# Patient Record
Sex: FEMALE | Race: WHITE | NOT HISPANIC OR LATINO | Employment: OTHER | URBAN - METROPOLITAN AREA
[De-identification: names, ages, dates, MRNs, and addresses within clinical notes are randomized per-mention and may not be internally consistent; named-entity substitution may affect disease eponyms.]

---

## 2023-02-25 PROBLEM — I87.2 VENOUS INSUFFICIENCY: Status: ACTIVE | Noted: 2023-02-25

## 2023-02-25 PROBLEM — E55.9 VITAMIN D DEFICIENCY: Status: ACTIVE | Noted: 2023-02-25

## 2023-02-25 PROBLEM — S92.309A METATARSAL FRACTURE: Status: ACTIVE | Noted: 2023-02-25

## 2023-02-25 PROBLEM — M25.432 SWELLING OF LEFT WRIST: Status: ACTIVE | Noted: 2023-02-25

## 2023-02-25 PROBLEM — M79.606 PAIN AND SWELLING OF LOWER EXTREMITY: Status: ACTIVE | Noted: 2023-02-25

## 2023-02-25 PROBLEM — B37.9 YEAST INFECTION: Status: ACTIVE | Noted: 2023-02-25

## 2023-02-25 PROBLEM — I87.8 CHRONIC VENOUS STASIS: Status: ACTIVE | Noted: 2023-02-25

## 2023-02-25 PROBLEM — M25.422 SWELLING OF LEFT ELBOW: Status: ACTIVE | Noted: 2023-02-25

## 2023-02-25 PROBLEM — J12.9 VIRAL PNEUMONIA: Status: ACTIVE | Noted: 2023-02-25

## 2023-02-25 PROBLEM — E66.812 CLASS 2 SEVERE OBESITY WITH SERIOUS COMORBIDITY AND BODY MASS INDEX (BMI) OF 38.0 TO 38.9 IN ADULT: Status: ACTIVE | Noted: 2023-02-25

## 2023-02-25 PROBLEM — K59.09 CHRONIC CONSTIPATION: Status: ACTIVE | Noted: 2023-02-25

## 2023-02-25 PROBLEM — H26.9 CATARACT, CORTICAL, BOTH EYES: Status: ACTIVE | Noted: 2023-02-25

## 2023-02-25 PROBLEM — I48.91 ATRIAL FIBRILLATION (MULTI): Status: ACTIVE | Noted: 2023-02-25

## 2023-02-25 PROBLEM — R13.10 DYSPHAGIA: Status: ACTIVE | Noted: 2023-02-25

## 2023-02-25 PROBLEM — M65.30 TRIGGER FINGER: Status: ACTIVE | Noted: 2023-02-25

## 2023-02-25 PROBLEM — M79.676 TOE PAIN, CHRONIC: Status: ACTIVE | Noted: 2023-02-25

## 2023-02-25 PROBLEM — H52.00 HYPEROPIA: Status: ACTIVE | Noted: 2023-02-25

## 2023-02-25 PROBLEM — R60.0 BILATERAL LOWER EXTREMITY EDEMA: Status: ACTIVE | Noted: 2023-02-25

## 2023-02-25 PROBLEM — B35.0 TINEA CAPITIS: Status: ACTIVE | Noted: 2023-02-25

## 2023-02-25 PROBLEM — H00.019 STYE: Status: ACTIVE | Noted: 2023-02-25

## 2023-02-25 PROBLEM — L85.3 XEROSIS OF SKIN: Status: ACTIVE | Noted: 2023-02-25

## 2023-02-25 PROBLEM — R94.39 ABNORMAL STRESS TEST: Status: ACTIVE | Noted: 2023-02-25

## 2023-02-25 PROBLEM — E66.01 CLASS 2 SEVERE OBESITY WITH SERIOUS COMORBIDITY AND BODY MASS INDEX (BMI) OF 38.0 TO 38.9 IN ADULT (MULTI): Status: ACTIVE | Noted: 2023-02-25

## 2023-02-25 PROBLEM — K21.9 GASTROESOPHAGEAL REFLUX DISEASE: Status: ACTIVE | Noted: 2023-02-25

## 2023-02-25 PROBLEM — E11.319 DIABETIC RETINOPATHY (MULTI): Status: ACTIVE | Noted: 2023-02-25

## 2023-02-25 PROBLEM — K31.89 GASTRIC NODULE: Status: ACTIVE | Noted: 2023-02-25

## 2023-02-25 PROBLEM — G89.29 TOE PAIN, CHRONIC: Status: ACTIVE | Noted: 2023-02-25

## 2023-02-25 PROBLEM — M79.89 PAIN AND SWELLING OF LOWER EXTREMITY: Status: ACTIVE | Noted: 2023-02-25

## 2023-02-25 PROBLEM — I48.0 PAROXYSMAL ATRIAL FIBRILLATION (MULTI): Status: ACTIVE | Noted: 2023-02-25

## 2023-02-25 PROBLEM — B35.1 FUNGAL NAIL INFECTION: Status: ACTIVE | Noted: 2023-02-25

## 2023-02-25 PROBLEM — E11.3219: Status: ACTIVE | Noted: 2023-02-25

## 2023-02-25 PROBLEM — R60.0 BILATERAL LEG EDEMA: Status: ACTIVE | Noted: 2023-02-25

## 2023-02-25 PROBLEM — E11.9 TYPE 2 DIABETES MELLITUS (MULTI): Status: ACTIVE | Noted: 2023-02-25

## 2023-02-25 PROBLEM — F41.9 ANXIETY: Status: ACTIVE | Noted: 2023-02-25

## 2023-02-25 RX ORDER — INSULIN LISPRO 100 [IU]/ML
5 INJECTION, SOLUTION INTRAVENOUS; SUBCUTANEOUS
COMMUNITY
Start: 2021-10-25 | End: 2023-10-12 | Stop reason: SDUPTHER

## 2023-02-25 RX ORDER — TRIAMCINOLONE ACETONIDE 1 MG/G
CREAM TOPICAL
COMMUNITY
Start: 2017-11-21 | End: 2024-01-22 | Stop reason: SDUPTHER

## 2023-02-25 RX ORDER — BLOOD SUGAR DIAGNOSTIC
STRIP MISCELLANEOUS
COMMUNITY
Start: 2022-05-03 | End: 2024-04-04 | Stop reason: WASHOUT

## 2023-02-25 RX ORDER — PEN NEEDLE, DIABETIC 30 GX3/16"
NEEDLE, DISPOSABLE MISCELLANEOUS
COMMUNITY
End: 2023-10-12 | Stop reason: SDUPTHER

## 2023-02-25 RX ORDER — FUROSEMIDE 20 MG/1
1 TABLET ORAL
COMMUNITY
Start: 2022-01-19 | End: 2024-01-22 | Stop reason: SDUPTHER

## 2023-02-25 RX ORDER — ACETAMINOPHEN, DEXTROMETHORPHAN HBR, DOXYLAMINE SUCCINATE, PHENYLEPHRINE HCL 650; 20; 12.5; 1 MG/30ML; MG/30ML; MG/30ML; MG/30ML
1 SOLUTION ORAL DAILY
COMMUNITY
Start: 2022-05-04

## 2023-02-25 RX ORDER — CHOLECALCIFEROL (VITAMIN D3) 125 MCG
CAPSULE ORAL
COMMUNITY
Start: 2022-10-24

## 2023-02-25 RX ORDER — AMMONIUM LACTATE 12 G/100G
LOTION TOPICAL
COMMUNITY
Start: 2022-03-14

## 2023-02-25 RX ORDER — KETOCONAZOLE 20 MG/ML
SHAMPOO, SUSPENSION TOPICAL EVERY OTHER DAY
COMMUNITY
Start: 2015-10-13 | End: 2024-01-22 | Stop reason: SDUPTHER

## 2023-02-25 RX ORDER — INSULIN GLARGINE 100 [IU]/ML
24 INJECTION, SOLUTION SUBCUTANEOUS EVERY MORNING
COMMUNITY
Start: 2015-03-05 | End: 2023-10-12 | Stop reason: SDUPTHER

## 2023-02-25 RX ORDER — ALBUTEROL SULFATE 90 UG/1
2 AEROSOL, METERED RESPIRATORY (INHALATION) EVERY 4 HOURS PRN
COMMUNITY
Start: 2018-01-20 | End: 2023-05-30 | Stop reason: SDUPTHER

## 2023-03-24 ENCOUNTER — APPOINTMENT (OUTPATIENT)
Dept: PRIMARY CARE | Facility: CLINIC | Age: 58
End: 2023-03-24
Payer: COMMERCIAL

## 2023-04-13 ENCOUNTER — APPOINTMENT (OUTPATIENT)
Dept: PRIMARY CARE | Facility: CLINIC | Age: 58
End: 2023-04-13
Payer: COMMERCIAL

## 2023-05-01 ENCOUNTER — OFFICE VISIT (OUTPATIENT)
Dept: PRIMARY CARE | Facility: CLINIC | Age: 58
End: 2023-05-01
Payer: COMMERCIAL

## 2023-05-01 VITALS
DIASTOLIC BLOOD PRESSURE: 60 MMHG | TEMPERATURE: 97.5 F | WEIGHT: 221 LBS | HEART RATE: 88 BPM | OXYGEN SATURATION: 97 % | RESPIRATION RATE: 14 BRPM | SYSTOLIC BLOOD PRESSURE: 120 MMHG | BODY MASS INDEX: 39.78 KG/M2

## 2023-05-01 DIAGNOSIS — Z79.4 TYPE 2 DIABETES MELLITUS WITH HYPERGLYCEMIA, WITH LONG-TERM CURRENT USE OF INSULIN (MULTI): ICD-10-CM

## 2023-05-01 DIAGNOSIS — L72.3 INFLAMED SEBACEOUS CYST: ICD-10-CM

## 2023-05-01 DIAGNOSIS — E11.319 DIABETIC RETINOPATHY ASSOCIATED WITH TYPE 2 DIABETES MELLITUS, MACULAR EDEMA PRESENCE UNSPECIFIED, UNSPECIFIED LATERALITY, UNSPECIFIED RETINOPATHY SEVERITY (MULTI): ICD-10-CM

## 2023-05-01 DIAGNOSIS — E11.65 TYPE 2 DIABETES MELLITUS WITH HYPERGLYCEMIA, WITH LONG-TERM CURRENT USE OF INSULIN (MULTI): ICD-10-CM

## 2023-05-01 DIAGNOSIS — J30.2 SEASONAL ALLERGIES: ICD-10-CM

## 2023-05-01 DIAGNOSIS — I48.91 ATRIAL FIBRILLATION, UNSPECIFIED TYPE (MULTI): ICD-10-CM

## 2023-05-01 DIAGNOSIS — F41.9 ANXIETY: ICD-10-CM

## 2023-05-01 DIAGNOSIS — E11.3219 NPDR WITH MACULAR EDEMA (MULTI): ICD-10-CM

## 2023-05-01 DIAGNOSIS — I89.0 LYMPHEDEMA: Primary | ICD-10-CM

## 2023-05-01 DIAGNOSIS — I48.0 PAROXYSMAL ATRIAL FIBRILLATION (MULTI): ICD-10-CM

## 2023-05-01 DIAGNOSIS — E66.01 CLASS 2 SEVERE OBESITY DUE TO EXCESS CALORIES WITH SERIOUS COMORBIDITY AND BODY MASS INDEX (BMI) OF 39.0 TO 39.9 IN ADULT (MULTI): ICD-10-CM

## 2023-05-01 PROCEDURE — 3074F SYST BP LT 130 MM HG: CPT | Performed by: INTERNAL MEDICINE

## 2023-05-01 PROCEDURE — 1036F TOBACCO NON-USER: CPT | Performed by: INTERNAL MEDICINE

## 2023-05-01 PROCEDURE — 3046F HEMOGLOBIN A1C LEVEL >9.0%: CPT | Performed by: INTERNAL MEDICINE

## 2023-05-01 PROCEDURE — 99214 OFFICE O/P EST MOD 30 MIN: CPT | Performed by: INTERNAL MEDICINE

## 2023-05-01 PROCEDURE — 3078F DIAST BP <80 MM HG: CPT | Performed by: INTERNAL MEDICINE

## 2023-05-01 PROCEDURE — 3008F BODY MASS INDEX DOCD: CPT | Performed by: INTERNAL MEDICINE

## 2023-05-01 RX ORDER — FLUTICASONE PROPIONATE 50 UG/1
2 SPRAY, METERED NASAL
Qty: 16 G | Refills: 2 | Status: SHIPPED | OUTPATIENT
Start: 2023-05-01 | End: 2024-01-22 | Stop reason: SDUPTHER

## 2023-05-01 RX ORDER — FLUTICASONE PROPIONATE 50 UG/1
2 SPRAY, METERED NASAL
COMMUNITY
End: 2023-05-01 | Stop reason: SDUPTHER

## 2023-05-01 RX ORDER — ALPRAZOLAM 0.25 MG/1
TABLET ORAL
COMMUNITY
End: 2023-05-01 | Stop reason: ALTCHOICE

## 2023-05-01 RX ORDER — ALPRAZOLAM 0.25 MG/1
0.25 TABLET ORAL 3 TIMES DAILY PRN
Qty: 18 TABLET | Refills: 0 | Status: SHIPPED | OUTPATIENT
Start: 2023-05-01 | End: 2024-02-23 | Stop reason: ALTCHOICE

## 2023-05-01 RX ORDER — CEPHALEXIN 500 MG/1
500 CAPSULE ORAL 3 TIMES DAILY
Qty: 21 CAPSULE | Refills: 0 | Status: SHIPPED | OUTPATIENT
Start: 2023-05-01 | End: 2023-05-08

## 2023-05-01 NOTE — PATIENT INSTRUCTIONS
Take abx with food and call if sx do not resolve  Use flonase  for 1-2 weeks  Can use xanax prn for anxiety regarding medical procedures. She should discuss with anesthesia her concerns.  Call  with any problems or questions.   Follow up in 6 months for physical.

## 2023-05-01 NOTE — PROGRESS NOTES
"Subjective    Holly Chavez is a 57 y.o. female who presents for Leg Swelling.  HPI    Follow up after vascular appointment with Dr. Ravi. Has venous deficiency and has to have further testing  She is having a vascular venous procedure and needs some xanax for procedural anxiety.   C/o foot/ankle swelling. Denies sob     C/o skin infection , on abd. Sore to touch . Right side started out as a little \"pimple\" on her right  side of abdomen. Has been covering with a bandage. And it has been  a little red. Burning.    Review of Systems   All other systems reviewed and are negative.        Objective     /60 (BP Location: Left arm, Patient Position: Sitting, BP Cuff Size: Adult)   Pulse 88   Temp 36.4 °C (97.5 °F) (Skin)   Resp 14   Wt 100 kg (221 lb)   SpO2 97%   BMI 39.78 kg/m²    Physical Exam  Vitals reviewed.   Constitutional:       General: She is not in acute distress.     Appearance: Normal appearance.   Cardiovascular:      Rate and Rhythm: Normal rate and regular rhythm.      Pulses: Normal pulses.      Heart sounds: Normal heart sounds.      Comments: Left ankle swelling non pitting  Pulmonary:      Effort: Pulmonary effort is normal.      Breath sounds: Normal breath sounds.   Abdominal:      General: Abdomen is flat.      Tenderness: There is no abdominal tenderness.      Comments: There is an irritated cyst on abd wall. There is no draiange. Mild erythema  Area that was covered with bandage  is red   Skin:     General: Skin is warm and dry.   Neurological:      Mental Status: She is alert.       Health Maintenance Due   Topic Date Due    Hepatitis B Vaccines (1 of 3 - 3-dose series) Never done    Medicare Annual Wellness Visit (AWV)  Never done    HIV Screening  Never done    MMR Vaccines (1 of 1 - Standard series) Never done    Diabetes: Foot Exam  Never done    Diabetes: Retinopathy Screening  Never done    Hepatitis C Screening  Never done    Cervical Cancer Screening  Never done    " DTaP/Tdap/Td Vaccines (1 - Tdap) Never done    Zoster Vaccines (1 of 2) Never done    Pneumococcal Vaccine: Pediatrics (0 to 5 Years) and At-Risk Patients (6 to 64 Years) (2 - PPSV23 if available, else PCV20) 11/22/2018    COVID-19 Vaccine (5 - Booster) 01/28/2023    Diabetes: Hemoglobin A1C  05/09/2023          Assessment/Plan   Problem List Items Addressed This Visit          Circulatory    Atrial fibrillation (CMS/HCC)    Paroxysmal atrial fibrillation (CMS/HCC)       Endocrine/Metabolic    Diabetic retinopathy (CMS/HCC)    Type 2 diabetes mellitus (CMS/HCC)    NPDR with macular edema (CMS/[facility])       Other    Anxiety     Other Visit Diagnoses       Lymphedema    -  Primary    Relevant Orders    Follow Up In [facility] - PCP    Class 2 severe obesity due to excess calories with serious comorbidity and body mass index (BMI) of 39.0 to 39.9 in adult (CMS/HCC)        Inflamed sebaceous cyst        Relevant Medications    cephalexin (Keflex) 500 mg capsule    Seasonal allergies        Relevant Medications    Flonase Allergy Relief 50 mcg/actuation nasal spray        Stable based on symptoms. Continue established treatment plan and follow up at least yearly  She will discuss glp therapy for weight loss  this may help her swelling

## 2023-05-16 LAB — SARS-COV-2 RESULT: NOT DETECTED

## 2023-05-22 ENCOUNTER — DOCUMENTATION (OUTPATIENT)
Dept: PRIMARY CARE | Facility: CLINIC | Age: 58
End: 2023-05-22
Payer: COMMERCIAL

## 2023-05-23 ENCOUNTER — PATIENT OUTREACH (OUTPATIENT)
Dept: PRIMARY CARE | Facility: CLINIC | Age: 58
End: 2023-05-23
Payer: COMMERCIAL

## 2023-05-23 NOTE — PROGRESS NOTES
Discharge Facility:[facility]  Discharge Diagnosis:Pneumonia  Admission Date:5/18/2023  Discharge Date: 5/19/2023    PCP Appointment Date:NONE  Specialist Appointment Date: NONE  Hospital Encounter and Summary: Linked

## 2023-05-25 ENCOUNTER — PATIENT OUTREACH (OUTPATIENT)
Dept: PRIMARY CARE | Facility: CLINIC | Age: 58
End: 2023-05-25
Payer: COMMERCIAL

## 2023-05-25 NOTE — PROGRESS NOTES
"Patient returned my call from 5/22/2023.   She is doing good. Starting to feel like she is \"turning the corner\". She just returned from [place]. There were no appts available with pcp within the 14 day range. Advised patient to call PCP office to make an appt. I also sent a message to the staff.    Angelika Salmeron, [facility]  [facility]  [phone]  [email or web address]    "

## 2023-05-30 ENCOUNTER — OFFICE VISIT (OUTPATIENT)
Dept: PRIMARY CARE | Facility: CLINIC | Age: 58
End: 2023-05-30
Payer: COMMERCIAL

## 2023-05-30 VITALS
SYSTOLIC BLOOD PRESSURE: 120 MMHG | WEIGHT: 225 LBS | DIASTOLIC BLOOD PRESSURE: 60 MMHG | HEIGHT: 63 IN | HEART RATE: 78 BPM | OXYGEN SATURATION: 97 % | TEMPERATURE: 97.5 F | BODY MASS INDEX: 39.87 KG/M2 | RESPIRATION RATE: 14 BRPM

## 2023-05-30 DIAGNOSIS — J45.20 MILD INTERMITTENT REACTIVE AIRWAY DISEASE WITHOUT COMPLICATION (HHS-HCC): ICD-10-CM

## 2023-05-30 DIAGNOSIS — Z79.4 TYPE 2 DIABETES MELLITUS WITH HYPERGLYCEMIA, WITH LONG-TERM CURRENT USE OF INSULIN (MULTI): ICD-10-CM

## 2023-05-30 DIAGNOSIS — J18.9 COMMUNITY ACQUIRED PNEUMONIA, UNSPECIFIED LATERALITY: Primary | ICD-10-CM

## 2023-05-30 DIAGNOSIS — E11.65 TYPE 2 DIABETES MELLITUS WITH HYPERGLYCEMIA, WITH LONG-TERM CURRENT USE OF INSULIN (MULTI): ICD-10-CM

## 2023-05-30 PROCEDURE — 3046F HEMOGLOBIN A1C LEVEL >9.0%: CPT | Performed by: INTERNAL MEDICINE

## 2023-05-30 PROCEDURE — 1036F TOBACCO NON-USER: CPT | Performed by: INTERNAL MEDICINE

## 2023-05-30 PROCEDURE — 3074F SYST BP LT 130 MM HG: CPT | Performed by: INTERNAL MEDICINE

## 2023-05-30 PROCEDURE — 3008F BODY MASS INDEX DOCD: CPT | Performed by: INTERNAL MEDICINE

## 2023-05-30 PROCEDURE — 3078F DIAST BP <80 MM HG: CPT | Performed by: INTERNAL MEDICINE

## 2023-05-30 PROCEDURE — 99495 TRANSJ CARE MGMT MOD F2F 14D: CPT | Performed by: INTERNAL MEDICINE

## 2023-05-30 RX ORDER — ALBUTEROL SULFATE 90 UG/1
2 AEROSOL, METERED RESPIRATORY (INHALATION) EVERY 4 HOURS PRN
Qty: 18 G | Refills: 3 | Status: SHIPPED | OUTPATIENT
Start: 2023-05-30

## 2023-05-30 RX ORDER — ONDANSETRON 4 MG/1
4 TABLET, ORALLY DISINTEGRATING ORAL EVERY 8 HOURS PRN
COMMUNITY
Start: 2023-05-15

## 2023-05-30 RX ORDER — ALBUTEROL SULFATE 0.83 MG/ML
2.5 SOLUTION RESPIRATORY (INHALATION) 4 TIMES DAILY PRN
Qty: 75 ML | Refills: 3 | Status: SHIPPED | OUTPATIENT
Start: 2023-05-30 | End: 2024-05-29

## 2023-05-30 ASSESSMENT — LIFESTYLE VARIABLES: HOW OFTEN DO YOU HAVE A DRINK CONTAINING ALCOHOL: MONTHLY OR LESS

## 2023-05-30 NOTE — PATIENT INSTRUCTIONS
Call  with any problems or questions.   Use nebulizer albuterol  as needed  If still with spastic cough will consider steroid inhaler if not imrpoved.  With her poorly controlled dm will try to avoid oral steroids.  Follow up  if sx do not improve.   Chest xray in 2 weeks.

## 2023-05-30 NOTE — PROGRESS NOTES
"Subjective    Holly Chavez is a 57 y.o. female who presents for Hospital Follow-up.  [facility] hospital follow up 5/17-5/19/2023. Dx pneumonia  She was sick for a week. She was started on an urgent care and the cough got worse. She went to er and she was admitted.   Completed abx. Ran out of albuterol hfa.     C/o cough, fatigue.  Wheezing at night. She was in a couple of days. She was discharged on abx.   The albuterol helps.   She has a venogram scheduled and needs to cancel because she is still not feeling great.     Review of Systems   All other systems reviewed and are negative.        Objective     /60 (BP Location: Left arm, Patient Position: Sitting, BP Cuff Size: Adult)   Pulse 78   Temp 36.4 °C (97.5 °F) (Skin)   Resp 14   Ht 1.588 m (5' 2.5\")   Wt 102 kg (225 lb)   SpO2 97%   BMI 40.50 kg/m²    Physical Exam  Vitals reviewed.   Constitutional:       General: She is not in acute distress.     Appearance: Normal appearance.   Cardiovascular:      Rate and Rhythm: Normal rate and regular rhythm.      Pulses: Normal pulses.      Heart sounds: Normal heart sounds.   Pulmonary:      Effort: Pulmonary effort is normal.      Breath sounds: Normal breath sounds.      Comments: Lungs are clear but there is a wheeze with coughing  Abdominal:      Tenderness: There is no abdominal tenderness.   Musculoskeletal:         General: No swelling.   Skin:     General: Skin is warm and dry.   Neurological:      Mental Status: She is alert.       Health Maintenance Due   Topic Date Due    Hepatitis B Vaccines (1 of 3 - 3-dose series) Never done    Medicare Annual Wellness Visit (AWV)  Never done    HIV Screening  Never done    MMR Vaccines (1 of 1 - Standard series) Never done    Diabetes: Foot Exam  Never done    Diabetes: Retinopathy Screening  Never done    Hepatitis C Screening  Never done    DTaP/Tdap/Td Vaccines (1 - Tdap) Never done    Zoster Vaccines (1 of 2) Never done    Pneumococcal Vaccine: " Pediatrics (0 to 5 Years) and At-Risk Patients (6 to 64 Years) (2 - PPSV23 if available, else PCV20) 11/22/2018    COVID-19 Vaccine (5 - Booster) 01/28/2023    Diabetes: Hemoglobin A1C  05/09/2023    Mammogram  07/25/2023          Assessment/Plan   Problem List Items Addressed This Visit          Respiratory    Viral pneumonia    Relevant Medications    albuterol 90 mcg/actuation inhaler    albuterol 2.5 mg /3 mL (0.083 %) nebulizer solution       Endocrine/Metabolic    Type 2 diabetes mellitus (CMS/HCC)     Other Visit Diagnoses       Community acquired pneumonia, unspecified laterality    -  Primary    Relevant Orders    XR chest 2 views

## 2023-06-02 ENCOUNTER — PATIENT OUTREACH (OUTPATIENT)
Dept: PRIMARY CARE | Facility: CLINIC | Age: 58
End: 2023-06-02
Payer: COMMERCIAL

## 2023-06-02 NOTE — PROGRESS NOTES
Unable to reach patient for call back after patient's follow up appointment with PCP 5/30/2023.   M with call back number for patient to call if needed   If no voicemail available call attempts x 2 were made to contact the patient to assist with any questions or concerns patient may have.

## 2023-06-08 ENCOUNTER — TELEPHONE (OUTPATIENT)
Dept: PRIMARY CARE | Facility: CLINIC | Age: 58
End: 2023-06-08
Payer: COMMERCIAL

## 2023-06-08 NOTE — RESULT ENCOUNTER NOTE
Her cxr shows some vascular congestion. How is she feeling? Have her come in for appt needs further work up

## 2023-06-08 NOTE — TELEPHONE ENCOUNTER
----- Message from Johanna Salas DO sent at 6/8/2023  9:54 AM EDT -----  Her cxr shows some vascular congestion. How is she feeling? Have her come in for appt needs further work up

## 2023-06-12 ENCOUNTER — OFFICE VISIT (OUTPATIENT)
Dept: PRIMARY CARE | Facility: CLINIC | Age: 58
End: 2023-06-12
Payer: COMMERCIAL

## 2023-06-12 ENCOUNTER — LAB (OUTPATIENT)
Dept: LAB | Facility: LAB | Age: 58
End: 2023-06-12
Payer: COMMERCIAL

## 2023-06-12 VITALS
SYSTOLIC BLOOD PRESSURE: 110 MMHG | BODY MASS INDEX: 39.87 KG/M2 | HEART RATE: 76 BPM | RESPIRATION RATE: 14 BRPM | TEMPERATURE: 97.3 F | OXYGEN SATURATION: 99 % | WEIGHT: 225 LBS | DIASTOLIC BLOOD PRESSURE: 70 MMHG | HEIGHT: 63 IN

## 2023-06-12 DIAGNOSIS — I48.0 PAROXYSMAL ATRIAL FIBRILLATION (MULTI): ICD-10-CM

## 2023-06-12 DIAGNOSIS — R06.2 WHEEZING: ICD-10-CM

## 2023-06-12 DIAGNOSIS — R09.89 CHEST CONGESTION: Primary | ICD-10-CM

## 2023-06-12 DIAGNOSIS — R09.89 CHEST CONGESTION: ICD-10-CM

## 2023-06-12 PROBLEM — I48.91 ATRIAL FIBRILLATION (MULTI): Status: RESOLVED | Noted: 2023-02-25 | Resolved: 2023-06-12

## 2023-06-12 LAB — NATRIURETIC PEPTIDE B (PG/ML) IN SER/PLAS: 59 PG/ML (ref 0–99)

## 2023-06-12 PROCEDURE — 36415 COLL VENOUS BLD VENIPUNCTURE: CPT

## 2023-06-12 PROCEDURE — 99214 OFFICE O/P EST MOD 30 MIN: CPT | Performed by: INTERNAL MEDICINE

## 2023-06-12 PROCEDURE — 3078F DIAST BP <80 MM HG: CPT | Performed by: INTERNAL MEDICINE

## 2023-06-12 PROCEDURE — 1036F TOBACCO NON-USER: CPT | Performed by: INTERNAL MEDICINE

## 2023-06-12 PROCEDURE — 3008F BODY MASS INDEX DOCD: CPT | Performed by: INTERNAL MEDICINE

## 2023-06-12 PROCEDURE — 3074F SYST BP LT 130 MM HG: CPT | Performed by: INTERNAL MEDICINE

## 2023-06-12 PROCEDURE — 83880 ASSAY OF NATRIURETIC PEPTIDE: CPT

## 2023-06-12 PROCEDURE — 3046F HEMOGLOBIN A1C LEVEL >9.0%: CPT | Performed by: INTERNAL MEDICINE

## 2023-06-12 NOTE — PROGRESS NOTES
"Subjective    Holly Chavez is a 57 y.o. female who presents for Follow-up.  HPI  Discuss xray results   Still has cough, post nasal drip   She has bloody nose at times.but her drainage is clear. She has been using the Flonase  She has chest congestion.  The albuterol helps.   Not coughing up phlegm    Review of Systems   All other systems reviewed and are negative.        Objective     /70 (BP Location: Left arm, Patient Position: Sitting, BP Cuff Size: Adult)   Pulse 76   Temp 36.3 °C (97.3 °F) (Skin)   Resp 14   Ht 1.588 m (5' 2.5\")   Wt 102 kg (225 lb)   SpO2 99%   BMI 40.50 kg/m²    Physical Exam  Vitals reviewed.   Constitutional:       General: She is not in acute distress.     Appearance: Normal appearance.   HENT:      Right Ear: Tympanic membrane normal.      Left Ear: Tympanic membrane normal.      Ears:      Comments: Bl ome     Mouth/Throat:      Mouth: Mucous membranes are moist.      Pharynx: Oropharynx is clear.   Cardiovascular:      Rate and Rhythm: Normal rate and regular rhythm.      Pulses: Normal pulses.      Heart sounds: Normal heart sounds.   Pulmonary:      Effort: Pulmonary effort is normal.      Breath sounds: Normal breath sounds.   Abdominal:      Tenderness: There is no abdominal tenderness.   Musculoskeletal:         General: No swelling.   Skin:     General: Skin is warm and dry.   Neurological:      Mental Status: She is alert.       Health Maintenance Due   Topic Date Due    Hepatitis B Vaccines (1 of 3 - 3-dose series) Never done    Medicare Annual Wellness Visit (AWV)  Never done    HIV Screening  Never done    MMR Vaccines (1 of 1 - Standard series) Never done    Diabetes: Foot Exam  Never done    Diabetes: Retinopathy Screening  Never done    Hepatitis C Screening  Never done    DTaP/Tdap/Td Vaccines (1 - Tdap) Never done    Zoster Vaccines (1 of 2) Never done    Pneumococcal Vaccine: Pediatrics (0 to 5 Years) and At-Risk Patients (6 to 64 Years) (2 - PPSV23 if " "available, else PCV20) 11/22/2018    COVID-19 Vaccine (5 - Booster) 01/28/2023    Diabetes: Hemoglobin A1C  05/09/2023    Mammogram  07/25/2023          Assessment/Plan   Problem List Items Addressed This Visit          Circulatory    RESOLVED: Atrial fibrillation (CMS/HCC)     Other Visit Diagnoses       Chest congestion    -  Primary    Relevant Orders    B-Type Natriuretic Peptide    Wheezing        Relevant Orders    B-Type Natriuretic Peptide        Her sx are likely allergic. Would recommend she hold off on the fluticasone due to her mild nose bleeds.   Check bnpep. Her cxr   showed cardiomegaly and \"congestion\" she had a normal echo  last year. She just saw cardio . If   "

## 2023-07-06 ENCOUNTER — HOSPITAL ENCOUNTER (OUTPATIENT)
Dept: DATA CONVERSION | Facility: HOSPITAL | Age: 58
End: 2023-07-06
Attending: SURGERY | Admitting: SURGERY
Payer: COMMERCIAL

## 2023-07-06 DIAGNOSIS — R60.0 LOCALIZED EDEMA: ICD-10-CM

## 2023-07-06 DIAGNOSIS — I87.2 VENOUS INSUFFICIENCY (CHRONIC) (PERIPHERAL): ICD-10-CM

## 2023-07-06 LAB
ACTIVATED PARTIAL THROMBOPLASTIN TIME IN PPP BY COAGULATION ASSAY: 35 SEC (ref 27–38)
ANION GAP IN SER/PLAS: 13 MMOL/L (ref 10–20)
CALCIUM (MG/DL) IN SER/PLAS: 9.2 MG/DL (ref 8.6–10.3)
CARBON DIOXIDE, TOTAL (MMOL/L) IN SER/PLAS: 26 MMOL/L (ref 21–32)
CHLORIDE (MMOL/L) IN SER/PLAS: 101 MMOL/L (ref 98–107)
CREATININE (MG/DL) IN SER/PLAS: 0.66 MG/DL (ref 0.5–1.05)
ERYTHROCYTE DISTRIBUTION WIDTH (RATIO) BY AUTOMATED COUNT: 13.1 % (ref 11.5–14.5)
ERYTHROCYTE MEAN CORPUSCULAR HEMOGLOBIN CONCENTRATION (G/DL) BY AUTOMATED: 33.3 G/DL (ref 32–36)
ERYTHROCYTE MEAN CORPUSCULAR VOLUME (FL) BY AUTOMATED COUNT: 81 FL (ref 80–100)
ERYTHROCYTES (10*6/UL) IN BLOOD BY AUTOMATED COUNT: 4.91 X10E12/L (ref 4–5.2)
GFR FEMALE: >90 ML/MIN/1.73M2
GLUCOSE (MG/DL) IN SER/PLAS: 297 MG/DL (ref 74–99)
HEMATOCRIT (%) IN BLOOD BY AUTOMATED COUNT: 39.9 % (ref 36–46)
HEMOGLOBIN (G/DL) IN BLOOD: 13.3 G/DL (ref 12–16)
INR IN PPP BY COAGULATION ASSAY: 1 (ref 0.9–1.1)
LEUKOCYTES (10*3/UL) IN BLOOD BY AUTOMATED COUNT: 9.4 X10E9/L (ref 4.4–11.3)
NRBC (PER 100 WBCS) BY AUTOMATED COUNT: 0 /100 WBC (ref 0–0)
PLATELETS (10*3/UL) IN BLOOD AUTOMATED COUNT: 194 X10E9/L (ref 150–450)
POTASSIUM (MMOL/L) IN SER/PLAS: 4 MMOL/L (ref 3.5–5.3)
PROTHROMBIN TIME (PT) IN PPP BY COAGULATION ASSAY: 11.5 SEC (ref 9.8–12.8)
SODIUM (MMOL/L) IN SER/PLAS: 136 MMOL/L (ref 136–145)
UREA NITROGEN (MG/DL) IN SER/PLAS: 18 MG/DL (ref 6–23)

## 2023-07-19 ENCOUNTER — PATIENT OUTREACH (OUTPATIENT)
Dept: PRIMARY CARE | Facility: CLINIC | Age: 58
End: 2023-07-19
Payer: COMMERCIAL

## 2023-08-18 ENCOUNTER — TELEPHONE (OUTPATIENT)
Dept: PRIMARY CARE | Facility: CLINIC | Age: 58
End: 2023-08-18
Payer: COMMERCIAL

## 2023-08-18 ENCOUNTER — PATIENT OUTREACH (OUTPATIENT)
Dept: PRIMARY CARE | Facility: CLINIC | Age: 58
End: 2023-08-18
Payer: COMMERCIAL

## 2023-08-18 NOTE — PROGRESS NOTES
Patient has met target of no readmission for 90 days post hospital discharge and is graduated from Transitional Care Management program at this time.

## 2023-09-07 PROBLEM — R29.818 SUSPECTED SLEEP APNEA: Status: ACTIVE | Noted: 2023-09-07

## 2023-09-07 PROBLEM — A41.9 SEPSIS (MULTI): Status: ACTIVE | Noted: 2023-09-07

## 2023-09-07 PROBLEM — H25.13 NUCLEAR SCLEROSIS OF BOTH EYES: Status: ACTIVE | Noted: 2023-09-07

## 2023-09-07 PROBLEM — J06.9 VIRAL URI WITH COUGH: Status: ACTIVE | Noted: 2023-09-07

## 2023-09-07 PROBLEM — E66.01 CLASS 2 SEVERE OBESITY WITH SERIOUS COMORBIDITY AND BODY MASS INDEX (BMI) OF 38.0 TO 38.9 IN ADULT (MULTI): Status: ACTIVE | Noted: 2023-09-07

## 2023-09-07 PROBLEM — R06.02 SOB (SHORTNESS OF BREATH): Status: ACTIVE | Noted: 2023-09-07

## 2023-09-07 PROBLEM — R07.81 RIB PAIN: Status: ACTIVE | Noted: 2023-09-07

## 2023-09-07 PROBLEM — J18.9 PNEUMONIA: Status: ACTIVE | Noted: 2023-09-07

## 2023-09-07 PROBLEM — R11.0 NAUSEA IN ADULT: Status: ACTIVE | Noted: 2023-09-07

## 2023-09-07 PROBLEM — E66.812 CLASS 2 SEVERE OBESITY WITH SERIOUS COMORBIDITY AND BODY MASS INDEX (BMI) OF 38.0 TO 38.9 IN ADULT: Status: ACTIVE | Noted: 2023-09-07

## 2023-09-07 PROBLEM — E11.9 TYPE 2 DIABETES MELLITUS WITHOUT COMPLICATION, WITHOUT LONG-TERM CURRENT USE OF INSULIN (MULTI): Status: ACTIVE | Noted: 2018-02-05

## 2023-09-07 PROBLEM — I87.2 VENOUS INSUFFICIENCY: Status: ACTIVE | Noted: 2023-09-07

## 2023-09-07 RX ORDER — LANCETS
EACH MISCELLANEOUS
COMMUNITY
End: 2024-04-04 | Stop reason: WASHOUT

## 2023-09-07 RX ORDER — BENZONATATE 200 MG/1
200 CAPSULE ORAL 3 TIMES DAILY PRN
COMMUNITY
End: 2024-04-26 | Stop reason: WASHOUT

## 2023-09-07 RX ORDER — UBIDECARENONE 75 MG
1 CAPSULE ORAL DAILY
COMMUNITY
End: 2023-12-06 | Stop reason: SDUPTHER

## 2023-09-07 RX ORDER — BLOOD-GLUCOSE,RECEIVER,CONT
EACH MISCELLANEOUS
COMMUNITY
End: 2024-04-04 | Stop reason: WASHOUT

## 2023-09-21 ENCOUNTER — TELEPHONE (OUTPATIENT)
Dept: PEDIATRICS | Facility: CLINIC | Age: 58
End: 2023-09-21
Payer: COMMERCIAL

## 2023-09-21 DIAGNOSIS — I48.0 PAROXYSMAL ATRIAL FIBRILLATION (MULTI): ICD-10-CM

## 2023-09-21 NOTE — TELEPHONE ENCOUNTER
Needs to have apixaban (Eliquis) 5 mg tablet refilled. Send to [id number] - [facility] [address] AT [facility] [address]

## 2023-09-30 NOTE — H&P
History of Present Illness:   Pregnant/Lactating:  ·  Are You Pregnant no   ·  Are You Currently Breastfeeding no     History Present Illness:  Reason for surgery: left leg pain and swelling   HPI:    58yo female presenting with bilateral lower extremity swelling and pain, L>R, not relieved with conservative measures. Venous reflux study revealed significant reflux  in the GSV and common femoral veins. She recommended to under venogram w/IVUS to evaluate for more central sources of reflux.    Allergies:        Allergies:  ·  codeine : Other  ·  Tape  - Adhesive, Bandaids, Paper: Rash    Home Medication Review:   Home Medications Reviewed: yes     Impression/Procedure:   ·  Impression and Planned Procedure: Venous insufficiency with b/l leg pain and swelling   - Proceed with venogram w/IVUS and possible intervention       ERAS (Enhanced Recovery After Surgery):  ·  ERAS Patient: no       Physical Exam by System:    Respiratory/Thorax: Patent airways, CTAB, normal  breath sounds with good chest expansion, thorax symmetric   Cardiovascular: Regular, rate and rhythm, no murmurs,  2+ equal pulses of the extremities, normal S 1and S 2     Consent:   COVID-19 Consent:  ·  COVID-19 Risk Consent Surgeon has reviewed key risks related to the risk of josé miguel COVID-19 and if they contract COVID-19 what the risks are.       Electronic Signatures:  Dana Ravi)  (Signed 06-Jul-2023 12:54)   Authored: History of Present Illness, Allergies, Home  Medication Review, Impression/Procedure, ERAS, Physical Exam, Consent, Note Completion      Last Updated: 06-Jul-2023 12:54 by Dana Ravi)

## 2023-10-12 ENCOUNTER — TELEPHONE (OUTPATIENT)
Dept: PRIMARY CARE | Facility: CLINIC | Age: 58
End: 2023-10-12

## 2023-10-12 ENCOUNTER — PROCEDURE VISIT (OUTPATIENT)
Dept: PODIATRY | Facility: CLINIC | Age: 58
End: 2023-10-12
Payer: COMMERCIAL

## 2023-10-12 DIAGNOSIS — E11.69 TYPE 2 DIABETES MELLITUS WITH OTHER SPECIFIED COMPLICATION, WITH LONG-TERM CURRENT USE OF INSULIN (MULTI): Primary | ICD-10-CM

## 2023-10-12 DIAGNOSIS — B35.1 FUNGAL NAIL INFECTION: ICD-10-CM

## 2023-10-12 DIAGNOSIS — Z79.4 TYPE 2 DIABETES MELLITUS WITH OTHER SPECIFIED COMPLICATION, WITH LONG-TERM CURRENT USE OF INSULIN (MULTI): Primary | ICD-10-CM

## 2023-10-12 DIAGNOSIS — M79.676 CHRONIC PAIN OF TOE, UNSPECIFIED LATERALITY: Primary | ICD-10-CM

## 2023-10-12 DIAGNOSIS — G89.29 CHRONIC PAIN OF TOE, UNSPECIFIED LATERALITY: Primary | ICD-10-CM

## 2023-10-12 PROCEDURE — 11721 DEBRIDE NAIL 6 OR MORE: CPT | Performed by: PODIATRIST

## 2023-10-12 NOTE — PROGRESS NOTES
History Of Present Illness  Holly Chavez is a 58 y.o. female presenting with painful elongated nails.     Past Medical History  She has a past medical history of Acute upper respiratory infection, unspecified (11/13/2018), Benign paroxysmal vertigo, unspecified ear (12/04/2017), Cutaneous abscess of groin (12/04/2017), Cutaneous abscess of limb, unspecified (09/20/2018), Morbid (severe) obesity due to excess calories (CMS/HCC) (07/08/2019), Personal history of diseases of the skin and subcutaneous tissue (11/20/2015), Personal history of diseases of the skin and subcutaneous tissue (09/27/2018), Personal history of other endocrine, nutritional and metabolic disease, Personal history of other endocrine, nutritional and metabolic disease (08/11/2015), Personal history of other endocrine, nutritional and metabolic disease (04/16/2019), Personal history of other endocrine, nutritional and metabolic disease (06/06/2019), Rash and other nonspecific skin eruption (08/11/2015), Rash and other nonspecific skin eruption (08/11/2015), Type 1 diabetes mellitus with mild nonproliferative retinopathy and macular edema (CMS/HCC) (11/09/2015), Type 1 diabetes mellitus with mild nonproliferative retinopathy and macular edema (CMS/HCC) (11/09/2015), Type 1 diabetes mellitus with mild nonproliferative retinopathy and macular edema (CMS/HCC) (11/10/2015), Type 1 diabetes mellitus with mild nonproliferative retinopathy and macular edema (CMS/HCC) (11/12/2015), Type 2 diabetes mellitus with mild nonproliferative diabetic retinopathy without macular edema, unspecified eye (CMS/HCC) (11/09/2015), Type 2 diabetes mellitus with mild nonproliferative diabetic retinopathy without macular edema, unspecified eye (CMS/HCC) (11/09/2015), Type 2 diabetes mellitus with mild nonproliferative diabetic retinopathy without macular edema, unspecified eye (CMS/HCC) (11/10/2015), and Type 2 diabetes mellitus with mild nonproliferative diabetic retinopathy  without macular edema, unspecified eye (CMS/[facility]) (11/12/2015).    Surgical History  She has a past surgical history that includes Tubal ligation (08/11/2015); Hernia repair (08/11/2015); and Cervical biopsy w/ loop electrode excision (04/12/2016).     Social History  She reports that she has quit smoking. Her smoking use included cigarettes. She has never used smokeless tobacco. She reports current alcohol use. She reports that she does not use drugs.    Family History  Family History   Problem Relation Name Age of Onset    Stroke Mother      Diabetes Mother      Other (cerebrovascular accident) Mother      Heart failure Father      Diabetes Father      Pancreatic cancer Father      Breast cancer Mother's Sister      Other (cardiac disorder) Other      Diabetes Other      Hypertension Other      Cancer Other      Kidney disease Other      Breast cancer Other aunt         Allergies  Adhesive tape-silicones and Codeine    Medications  Current Outpatient Medications   Medication Sig Dispense Refill    albuterol 2.5 mg /3 mL (0.083 %) nebulizer solution Take 3 mL (2.5 mg) by nebulization 4 times a day as needed for wheezing or shortness of breath. 75 mL 3    albuterol 90 mcg/actuation inhaler Inhale 2 puffs every 4 hours if needed for wheezing or shortness of breath. 18 g 3    ammonium lactate (Lac-Hydrin) 12 % lotion APPLY 1 APPLICATION ONCE DAILY      apixaban (Eliquis) 5 mg tablet Take 1 tablet (5 mg) by mouth once daily. 90 tablet 3    cholecalciferol (Vitamin D-3) 125 MCG (5000 UT) capsule Take by mouth.      cyanocobalamin, vitamin B-12, (Vitamin B-12) 1,000 mcg tablet extended release Take 1 tablet (1,000 mcg) by mouth once daily.      Dexcom G4 platinum  (Dexcom G6 ) misc       Flonase Allergy Relief 50 mcg/actuation nasal spray Administer 2 sprays into each nostril once daily. 16 g 2    furosemide (Lasix) 20 mg tablet Take 1 tablet (20 mg) by mouth. 3 times a week as needed      insulin glargine  "(Lantus) 100 unit/mL (3 mL) pen Inject 24 Units under the skin once daily in the morning.      insulin lispro (HumaLOG) 100 unit/mL injection Inject 5 Units under the skin. with meals and per sliding scale 45 units max daily dose      ketoconazole (NIZOral) 2 % shampoo every other day. Apply to scalp every other day for 5 minutes and rinse      lancets misc       miscellaneous medical supply [facility] once daily.  thigh high Daily      NON FORMULARY Accu-Chek Sherley Plus      ondansetron ODT (Zofran-ODT) 4 mg disintegrating tablet Take 1 tablet (4 mg) by mouth every 8 hours if needed.      OneTouch Ultra Test strip Test 4 times daily      pen needle, diabetic 32 gauge x 5/32\" needle Use with insulin injections four times daily      triamcinolone (Kenalog) 0.1 % cream APPLY EXTERNALLY TO THE AFFECTED AREA 2 TO 3 TIMES DAILY      UREACIN-20 TOP       ALPRAZolam (Xanax) 0.25 mg tablet Take 1 tablet (0.25 mg) by mouth 3 times a day as needed for anxiety (prn anxiety surrounding medical procedures) for up to 6 days. 18 tablet 0    benzonatate (Tessalon) 200 mg capsule Take 1 capsule (200 mg) by mouth 3 times a day as needed.      BENZONATATE ORAL       cyanocobalamin (Vitamin B-12) 500 mcg tablet Take 1 tablet (500 mcg) by mouth once daily.      FLUCONAZOLE, BULK, MISC       GLIPIZIDE ORAL       guaifenesin (MUCUS RELIEF ORAL)       LISINOPRIL ORAL       NON FORMULARY Dexamethasone      venlafaxine HCl (VENLAFAXINE ORAL)        No current facility-administered medications for this visit.       Review of Systems    REVIEW OF SYSTEMS  GENERAL:  Negative for malaise, significant weight loss, fever  CARDIOVASCULAR: leg swelling   MUSCULOSKELETAL:  Negative for joint pain or swelling, back pain, and muscle pain.  SKIN:  Negative for lesions, rash, and itching  PSYCH:  Negative for sleep disturbance, mood disorder and recent psychosocial stressors  NEURO: Negative, denies any burning, tingling or numbness     Objective:   Vasc: DP " and PT pulses are palpable bilateral.  CFT is less than 3 seconds bilateral.  Skin temperature is warm to cool proximal to distal bilateral.  Pitting pedal edema.     Neuro:  Light touch is intact to the foot bilateral.  Protective sensation is intact to the foot when tested with the 5.07 SWM bilateral.  There is no clonus noted.  The hallux is downgoing bilateral.      Derm: Nails 1-5 bilateral are thickened, elongated and crumbly with subungual debris. Skin is supple with normal texture and turgor noted.  Webspaces are clean, dry and intact bilateral.  There are no hyperkeratoses, ulcerations, verruca or other lesions noted.      Ortho: Muscle strength is 5/5 for all pedal groups tested.  Ankle joint, subtalar joint, 1st MPJ and lesser MPJ ROM is full and without pain or crepitus.  The foot type is rectus bilateral off weight bearing.  There are no structural deformities noted.    Assessment/Plan     Diagnoses and all orders for this visit:  Chronic pain of toe, unspecified laterality  Fungal nail infection      Toenails are debrided in length and thickness to avoid infection and for pain relief        I spent 20 minutes in the professional and overall care of this patient.      Vannessa Singh DPM

## 2023-10-12 NOTE — TELEPHONE ENCOUNTER
Holly needs a refill on her Humalog, the lancets, and pen needles. This will be going to [facility] on [address]. Her last visit was August 2nd.

## 2023-10-13 RX ORDER — INSULIN GLARGINE 100 [IU]/ML
34 INJECTION, SOLUTION SUBCUTANEOUS EVERY MORNING
Qty: 30 ML | Refills: 3 | Status: SHIPPED | OUTPATIENT
Start: 2023-10-13 | End: 2024-04-01 | Stop reason: SDUPTHER

## 2023-10-13 RX ORDER — INSULIN LISPRO 100 [IU]/ML
60 INJECTION, SOLUTION INTRAVENOUS; SUBCUTANEOUS DAILY
Qty: 60 ML | Refills: 2 | Status: SHIPPED | OUTPATIENT
Start: 2023-10-13

## 2023-10-13 RX ORDER — PEN NEEDLE, DIABETIC 30 GX3/16"
4 NEEDLE, DISPOSABLE MISCELLANEOUS DAILY
Qty: 400 EACH | Refills: 3 | Status: SHIPPED | OUTPATIENT
Start: 2023-10-13

## 2023-11-03 ENCOUNTER — APPOINTMENT (OUTPATIENT)
Dept: PRIMARY CARE | Facility: CLINIC | Age: 58
End: 2023-11-03
Payer: COMMERCIAL

## 2023-11-26 ENCOUNTER — HOSPITAL ENCOUNTER (EMERGENCY)
Facility: HOSPITAL | Age: 58
Discharge: HOME | End: 2023-11-26
Attending: STUDENT IN AN ORGANIZED HEALTH CARE EDUCATION/TRAINING PROGRAM
Payer: COMMERCIAL

## 2023-11-26 ENCOUNTER — APPOINTMENT (OUTPATIENT)
Dept: RADIOLOGY | Facility: HOSPITAL | Age: 58
End: 2023-11-26
Payer: COMMERCIAL

## 2023-11-26 VITALS
RESPIRATION RATE: 17 BRPM | OXYGEN SATURATION: 98 % | BODY MASS INDEX: 40.48 KG/M2 | TEMPERATURE: 97.7 F | HEIGHT: 62 IN | WEIGHT: 220 LBS | HEART RATE: 70 BPM | SYSTOLIC BLOOD PRESSURE: 168 MMHG | DIASTOLIC BLOOD PRESSURE: 67 MMHG

## 2023-11-26 DIAGNOSIS — B37.9 YEAST INFECTION: ICD-10-CM

## 2023-11-26 DIAGNOSIS — M77.8 TOE TENDONITIS: Primary | ICD-10-CM

## 2023-11-26 LAB
APPEARANCE UR: CLEAR
BILIRUB UR STRIP.AUTO-MCNC: NEGATIVE MG/DL
COLOR UR: YELLOW
FLUAV RNA RESP QL NAA+PROBE: NOT DETECTED
FLUBV RNA RESP QL NAA+PROBE: NOT DETECTED
GLUCOSE UR STRIP.AUTO-MCNC: ABNORMAL MG/DL
KETONES UR STRIP.AUTO-MCNC: NEGATIVE MG/DL
LEUKOCYTE ESTERASE UR QL STRIP.AUTO: NEGATIVE
NITRITE UR QL STRIP.AUTO: NEGATIVE
PH UR STRIP.AUTO: 6 [PH]
PROT UR STRIP.AUTO-MCNC: NEGATIVE MG/DL
RBC # UR STRIP.AUTO: NEGATIVE /UL
SARS-COV-2 RNA RESP QL NAA+PROBE: NOT DETECTED
SP GR UR STRIP.AUTO: 1.02
UROBILINOGEN UR STRIP.AUTO-MCNC: 2 MG/DL

## 2023-11-26 PROCEDURE — 73630 X-RAY EXAM OF FOOT: CPT | Mod: LT,FY

## 2023-11-26 PROCEDURE — 99284 EMERGENCY DEPT VISIT MOD MDM: CPT | Performed by: EMERGENCY MEDICINE

## 2023-11-26 PROCEDURE — 2500000005 HC RX 250 GENERAL PHARMACY W/O HCPCS: Performed by: EMERGENCY MEDICINE

## 2023-11-26 PROCEDURE — 73630 X-RAY EXAM OF FOOT: CPT | Mod: LEFT SIDE | Performed by: RADIOLOGY

## 2023-11-26 PROCEDURE — 87086 URINE CULTURE/COLONY COUNT: CPT | Mod: STJLAB | Performed by: EMERGENCY MEDICINE

## 2023-11-26 PROCEDURE — 2500000001 HC RX 250 WO HCPCS SELF ADMINISTERED DRUGS (ALT 637 FOR MEDICARE OP): Performed by: EMERGENCY MEDICINE

## 2023-11-26 PROCEDURE — 87636 SARSCOV2 & INF A&B AMP PRB: CPT

## 2023-11-26 PROCEDURE — 99283 EMERGENCY DEPT VISIT LOW MDM: CPT | Mod: 25 | Performed by: STUDENT IN AN ORGANIZED HEALTH CARE EDUCATION/TRAINING PROGRAM

## 2023-11-26 PROCEDURE — 81003 URINALYSIS AUTO W/O SCOPE: CPT | Performed by: EMERGENCY MEDICINE

## 2023-11-26 PROCEDURE — 94760 N-INVAS EAR/PLS OXIMETRY 1: CPT

## 2023-11-26 RX ORDER — FLUCONAZOLE 200 MG/1
200 TABLET ORAL ONCE
Qty: 1 TABLET | Refills: 0 | Status: SHIPPED | OUTPATIENT
Start: 2023-11-26 | End: 2023-11-26

## 2023-11-26 RX ORDER — LIDOCAINE 560 MG/1
1 PATCH PERCUTANEOUS; TOPICAL; TRANSDERMAL ONCE
Status: DISCONTINUED | OUTPATIENT
Start: 2023-11-26 | End: 2023-11-26 | Stop reason: HOSPADM

## 2023-11-26 RX ORDER — FLUCONAZOLE 150 MG/1
150 TABLET ORAL ONCE
Status: COMPLETED | OUTPATIENT
Start: 2023-11-26 | End: 2023-11-26

## 2023-11-26 RX ORDER — LIDOCAINE 50 MG/G
1 PATCH TOPICAL
Qty: 14 PATCH | Refills: 0 | Status: SHIPPED | OUTPATIENT
Start: 2023-11-26 | End: 2023-12-10

## 2023-11-26 RX ADMIN — FLUCONAZOLE 150 MG: 150 TABLET ORAL at 20:54

## 2023-11-26 RX ADMIN — LIDOCAINE 1 PATCH: 4 PATCH TOPICAL at 21:37

## 2023-11-26 ASSESSMENT — LIFESTYLE VARIABLES
HAVE PEOPLE ANNOYED YOU BY CRITICIZING YOUR DRINKING: NO
EVER FELT BAD OR GUILTY ABOUT YOUR DRINKING: NO
EVER HAD A DRINK FIRST THING IN THE MORNING TO STEADY YOUR NERVES TO GET RID OF A HANGOVER: NO
REASON UNABLE TO ASSESS: NO
HAVE YOU EVER FELT YOU SHOULD CUT DOWN ON YOUR DRINKING: NO

## 2023-11-26 ASSESSMENT — COLUMBIA-SUICIDE SEVERITY RATING SCALE - C-SSRS
6. HAVE YOU EVER DONE ANYTHING, STARTED TO DO ANYTHING, OR PREPARED TO DO ANYTHING TO END YOUR LIFE?: NO
2. HAVE YOU ACTUALLY HAD ANY THOUGHTS OF KILLING YOURSELF?: NO
1. IN THE PAST MONTH, HAVE YOU WISHED YOU WERE DEAD OR WISHED YOU COULD GO TO SLEEP AND NOT WAKE UP?: NO

## 2023-11-26 ASSESSMENT — PAIN SCALES - GENERAL: PAINLEVEL_OUTOF10: 4

## 2023-11-26 ASSESSMENT — PAIN - FUNCTIONAL ASSESSMENT: PAIN_FUNCTIONAL_ASSESSMENT: 0-10

## 2023-11-27 NOTE — ED PROVIDER NOTES
EMERGENCY DEPARTMENT ENCOUNTER      Pt Name: Holly Chavez  MRN: [id number]  Birthdate 1965  Date of evaluation: 11/26/2023  Provider: Nicola Lee DO    CHIEF COMPLAINT       Chief Complaint   Patient presents with    Foot Injury     L foot pain 4/10;  2nd toe throbbing pain; denies any recent injury to foot; additional concern for UTI due to urinary urgency          HISTORY OF PRESENT ILLNESS    HPI    58-year-old female with past medical history significant for peripheral vascular disease, type 2 diabetes mellitus, atrial fibrillation on Eliquis, venous stasis, chronic lower extremity and toe pain presenting to the emergency department for chief complaint of pain in her left second toe.  Patient states the pain started last night and has continued to worsen.  Reports the pain extends from the tip of her toe all the way to the base of the foot.  Reports experiencing nausea, chills last night.  Also reports experiencing increased urinary frequency and burning with urination over the past couple days and dry cough.  Denies chest pain, shortness of breath, recent surgery, recent long distance travel, hemoptysis, back pain, flank pain, lightheadedness, nausea, vomiting, diaphoresis.      Nursing Notes were reviewed.    PAST MEDICAL HISTORY     Past Medical History:   Diagnosis Date    Acute upper respiratory infection, unspecified 11/13/2018    Acute URI    Benign paroxysmal vertigo, unspecified ear 12/04/2017    Benign positional vertigo    Cutaneous abscess of groin 12/04/2017    Abscess of groin, left    Cutaneous abscess of limb, unspecified 09/20/2018    Abscess of axilla    Morbid (severe) obesity due to excess calories (CMS/[facility]) 07/08/2019    Morbid obesity due to excess calories    Personal history of diseases of the skin and subcutaneous tissue 11/20/2015    History of skin pruritus    Personal history of diseases of the skin and subcutaneous tissue 09/27/2018    History of hidradenitis suppurativa     Personal history of other endocrine, nutritional and metabolic disease     History of diabetes mellitus    Personal history of other endocrine, nutritional and metabolic disease 08/11/2015    History of uncontrolled diabetes    Personal history of other endocrine, nutritional and metabolic disease 04/16/2019    History of diabetes mellitus    Personal history of other endocrine, nutritional and metabolic disease 06/06/2019    History of diabetes mellitus    Rash and other nonspecific skin eruption 08/11/2015    Rash    Rash and other nonspecific skin eruption 08/11/2015    Rash    Type 1 diabetes mellitus with mild nonproliferative retinopathy and macular edema (CMS/HCC) 11/09/2015    Background diabetic macular edema, with mild nonproliferative retinopathy, associated with type 1 diabetes mellitus    Type 1 diabetes mellitus with mild nonproliferative retinopathy and macular edema (CMS/HCC) 11/09/2015    Background diabetic macular edema, with mild nonproliferative retinopathy, associated with type 1 diabetes mellitus    Type 1 diabetes mellitus with mild nonproliferative retinopathy and macular edema (CMS/HCC) 11/10/2015    Background diabetic macular edema, with mild nonproliferative retinopathy, associated with type 1 diabetes mellitus    Type 1 diabetes mellitus with mild nonproliferative retinopathy and macular edema (CMS/HCC) 11/12/2015    Background diabetic macular edema, with mild nonproliferative retinopathy, associated with type 1 diabetes mellitus    Type 2 diabetes mellitus with mild nonproliferative diabetic retinopathy without macular edema, unspecified eye (CMS/[facility]) 11/09/2015    Mild nonproliferative diabetic retinopathy without macular edema    Type 2 diabetes mellitus with mild nonproliferative diabetic retinopathy without macular edema, unspecified eye (CMS/[facility]) 11/09/2015    Mild nonproliferative diabetic retinopathy without macular edema    Type 2 diabetes mellitus with mild  nonproliferative diabetic retinopathy without macular edema, unspecified eye (CMS/[facility]) 11/10/2015    Mild nonproliferative diabetic retinopathy without macular edema    Type 2 diabetes mellitus with mild nonproliferative diabetic retinopathy without macular edema, unspecified eye (CMS/[facility]) 11/12/2015    Mild nonproliferative diabetic retinopathy without macular edema         SURGICAL HISTORY       Past Surgical History:   Procedure Laterality Date    CERVICAL BIOPSY  W/ LOOP ELECTRODE EXCISION  04/12/2016    Cervical Loop Electrosurgical Excision (LEEP)    HERNIA REPAIR  08/11/2015    Hernia Repair    TUBAL LIGATION  08/11/2015    Tubal Ligation         CURRENT MEDICATIONS       Previous Medications    ALBUTEROL 2.5 MG /3 ML (0.083 %) NEBULIZER SOLUTION    Take 3 mL (2.5 mg) by nebulization 4 times a day as needed for wheezing or shortness of breath.    ALBUTEROL 90 MCG/ACTUATION INHALER    Inhale 2 puffs every 4 hours if needed for wheezing or shortness of breath.    ALPRAZOLAM (XANAX) 0.25 MG TABLET    Take 1 tablet (0.25 mg) by mouth 3 times a day as needed for anxiety (prn anxiety surrounding medical procedures) for up to 6 days.    AMMONIUM LACTATE (LAC-HYDRIN) 12 % LOTION    APPLY 1 APPLICATION ONCE DAILY    APIXABAN (ELIQUIS) 5 MG TABLET    Take 1 tablet (5 mg) by mouth once daily.    BENZONATATE (TESSALON) 200 MG CAPSULE    Take 1 capsule (200 mg) by mouth 3 times a day as needed.    BENZONATATE ORAL        CHOLECALCIFEROL (VITAMIN D-3) 125 MCG (5000 UT) CAPSULE    Take by mouth.    CYANOCOBALAMIN (VITAMIN B-12) 500 MCG TABLET    Take 1 tablet (500 mcg) by mouth once daily.    CYANOCOBALAMIN, VITAMIN B-12, (VITAMIN B-12) 1,000 MCG TABLET EXTENDED RELEASE    Take 1 tablet (1,000 mcg) by mouth once daily.    DEXCOM G4 PLATINUM  (DEXCOM G6 ) MISC        FLONASE ALLERGY RELIEF 50 MCG/ACTUATION NASAL SPRAY    Administer 2 sprays into each nostril once daily.    FLUCONAZOLE, BULK, MISC         "FUROSEMIDE (LASIX) 20 MG TABLET    Take 1 tablet (20 mg) by mouth. 3 times a week as needed    GLIPIZIDE ORAL        GUAIFENESIN (MUCUS RELIEF ORAL)        INSULIN GLARGINE (LANTUS) 100 UNIT/ML (3 ML) PEN    Inject 34 Units under the skin once daily in the morning.    INSULIN LISPRO (HUMALOG) 100 UNIT/ML INJECTION    Inject 60 Units under the skin once daily. As directed in divided doses (max daily dose 60 units)    KETOCONAZOLE (NIZORAL) 2 % SHAMPOO    every other day. Apply to scalp every other day for 5 minutes and rinse    LANCETS MISC        LISINOPRIL ORAL        MISCELLANEOUS MEDICAL SUPPLY MISC    once daily.  thigh high Daily    NON FORMULARY    Dexamethasone    NON FORMULARY    Accu-Chek Sherley Plus    ONDANSETRON ODT (ZOFRAN-ODT) 4 MG DISINTEGRATING TABLET    Take 1 tablet (4 mg) by mouth every 8 hours if needed.    ONETOUCH ULTRA TEST STRIP    Test 4 times daily    PEN NEEDLE, DIABETIC 32 GAUGE X 5/32\" NEEDLE    4 Needles once daily. Use with insulin injections four times daily    TRIAMCINOLONE (KENALOG) 0.1 % CREAM    APPLY EXTERNALLY TO THE AFFECTED AREA 2 TO 3 TIMES DAILY    UREACIN-20 TOP        VENLAFAXINE HCL (VENLAFAXINE ORAL)           ALLERGIES     Adhesive tape-silicones and Codeine    FAMILY HISTORY       Family History   Problem Relation Name Age of Onset    Stroke Mother      Diabetes Mother      Other (cerebrovascular accident) Mother      Heart failure Father      Diabetes Father      Pancreatic cancer Father      Breast cancer Mother's Sister      Other (cardiac disorder) Other      Diabetes Other      Hypertension Other      Cancer Other      Kidney disease Other      Breast cancer Other aunt           SOCIAL HISTORY       Social History     Socioeconomic History    Marital status: Single     Spouse name: None    Number of children: None    Years of education: None    Highest education level: None   Occupational History    None   Tobacco Use    Smoking status: Former     Types: Cigarettes "    Smokeless tobacco: Never   Substance and Sexual Activity    Alcohol use: Yes    Drug use: Never    Sexual activity: None   Other Topics Concern    None   Social History Narrative    None     Social Determinants of Health     Financial Resource Strain: Not on file   Food Insecurity: Not on file   Transportation Needs: Not on file   Physical Activity: Not on file   Stress: Not on file   Social Connections: Not on file   Intimate Partner Violence: Not on file   Housing Stability: Not on file       SCREENINGS                        PHYSICAL EXAM    (up to 7 for level 4, 8 or more for level 5)     ED Triage Vitals [11/26/23 1841]   Temp Heart Rate Resp BP   36.5 °C (97.7 °F) 74 18 171/70      SpO2 Temp src Heart Rate Source Patient Position   94 % -- -- Sitting      BP Location FiO2 (%)     Right arm --       Physical Exam  Constitutional:       General: She is not in acute distress.     Appearance: Normal appearance. She is obese. She is not ill-appearing, toxic-appearing or diaphoretic.   HENT:      Head: Normocephalic and atraumatic.      Right Ear: External ear normal.      Left Ear: External ear normal.      Nose: No congestion or rhinorrhea.      Mouth/Throat:      Mouth: Mucous membranes are moist.      Pharynx: Oropharynx is clear. No oropharyngeal exudate or posterior oropharyngeal erythema.   Eyes:      Extraocular Movements: Extraocular movements intact.      Pupils: Pupils are equal, round, and reactive to light.   Cardiovascular:      Rate and Rhythm: Normal rate and regular rhythm.      Pulses: Normal pulses.      Heart sounds: Normal heart sounds.   Pulmonary:      Effort: Pulmonary effort is normal.      Breath sounds: Normal breath sounds.   Abdominal:      Palpations: Abdomen is soft.   Musculoskeletal:         General: Normal range of motion.      Cervical back: Normal range of motion and neck supple.      Comments:  pain on palpation and with range of motion of the extensor tendon of the second  left toe   Skin:     General: Skin is warm and dry.   Neurological:      General: No focal deficit present.      Mental Status: She is alert and oriented to person, place, and time.   Psychiatric:         Mood and Affect: Mood normal.         Behavior: Behavior normal.          DIAGNOSTIC RESULTS     LABS:  Labs Reviewed   URINALYSIS WITH REFLEX MICROSCOPIC - Abnormal       Result Value    Color, Urine Yellow      Appearance, Urine Clear      Specific Gravity, Urine 1.021      pH, Urine 6.0      Protein, Urine NEGATIVE      Glucose, Urine >=500 (3+) (*)     Blood, Urine NEGATIVE      Ketones, Urine NEGATIVE      Bilirubin, Urine NEGATIVE      Urobilinogen, Urine 2.0 (*)     Nitrite, Urine NEGATIVE      Leukocyte Esterase, Urine NEGATIVE     URINE CULTURE   SARS-COV-2 AND INFLUENZA A/B PCR       All other labs were within normal range or not returned as of this dictation.    Imaging  XR foot left 3+ views    (Results Pending)        Procedures  Procedures     EMERGENCY DEPARTMENT COURSE/MDM:     ED Course as of 11/27/23 1840   Sun Nov 26, 2023   1939 58-year-old female with past medical history significant for peripheral vascular disease, type 2 diabetes mellitus, atrial fibrillation on Eliquis, venous stasis, chronic lower extremity and toe pain presenting to the emergency department for chief complaint of pain in her left second toe.  Patient states the pain started last night and has continued to worsen.  Reports the pain extends from the tip of her toe all the way to the base of the foot.  Reports experiencing nausea, chills last night.  Also reports experiencing increased urinary frequency and burning with urination over the past couple days and dry cough.  Denies chest pain, shortness of breath, recent surgery, recent long distance travel, hemoptysis, back pain, flank pain, lightheadedness, nausea, vomiting, diaphoresis.  Hemodynamically stable, no acute distress, nontoxic-appearing, afebrile.  On exam patient  has blanching erythema of the left lower extremity from the knee to the ankle.  No notable erythema patient's foot or toes.  Asymmetric swelling of the left extremity compared to the right which patient states has been going on for a while and she is being evaluated by vascular for possible lymphedema.  Urinalysis, COVID and flu influenza swabs, x-ray of the left foot ordered. [CH]      ED Course User Index  [CH] Nicola Lee DO         Diagnoses as of 11/27/23 1840   Toe tendonitis   Yeast infection        Medical Decision Making    58-year-old female with past medical history significant for peripheral vascular disease, type 2 diabetes mellitus, atrial fibrillation on Eliquis, venous stasis, chronic lower extremity and toe pain presenting to the emergency department for chief complaint of pain in her left second toe.  Reports dry cough, nausea, chills, hematuria, increased urinary frequency.  Hemodynamically stable, no acute distress, nontoxic-appearing, afebrile.  On exam patient has blanching erythema of the left lower extremity from the knee to the ankle which she states she has had for years and is not new.  No notable erythema patient's foot or toes.  Asymmetric swelling of the left extremity compared to the right which patient states has been going on for a while and she is being evaluated by vascular for possible lymphedema.  Patient does have pain on palpation and with range of motion of the extensor tendon of the second left toe suggestive of possible tendinitis.  Urinalysis, COVID and flu influenza swabs, x-ray of the left foot ordered.    Severe 2nd and 3rd tarsometatarsal joint degenerative changes on x-ray, but otherwise no evidence of acute pathology.  Labs unremarkable.  Attending physician noted edema and itching patient's genital region concerning for yeast infection.  Patient was given a dose of Diflucan.  Patient has an allergy to adhesives.  Will give patient a single lidocaine patch in  the emergency department to wear at home, advised patient to remove the if she experiences itching, swelling, pain, redness, or any other symptoms of allergic reaction.  Patient deemed safe for discharge home for outpatient follow-up with her primary care physician Dr. Saals and her podiatrist Dr. Singh with prescriptions for lidocaine patch, Diflucan for further evaluation treatment of yeast infection and suspected tendinitis.    Patient and or family in agreement and understanding of treatment plan.  All questions answered.      I reviewed the case with the attending ED physician. The attending ED physician agrees with the plan. Patient and/or patient´s representative was counseled regarding labs, imaging, likely diagnosis, and plan. All questions were answered.    Nicola Lee DO   Emergency Medicine, PGY2    ED Medications administered this visit:  Medications - No data to display    New Prescriptions from this visit:    New Prescriptions    No medications on file       Follow-up:  No follow-up provider specified.      Final Impression: No diagnosis found.      (Please note that portions of this note were completed with a voice recognition program.  Efforts were made to edit the dictations but occasionally words are mis-transcribed.)     Nicola Lee DO  Resident  11/27/23 [id number]      The patient was seen by the resident/fellow.  I have personally performed a substantive portion of the encounter.  I have seen and examined the patient; agree with the workup, evaluation, MDM, management and diagnosis.  The care plan has been discussed with the resident/fellow; I have reviewed the resident/fellow’s note and agree with the documented findings with the exception/addition of the following:    Note the patient has spent the last few days, standing on her toes, and placing [place] decorations up outside, as well as letting on the inside.  And this may have caused a tendinitis.  The patient's pain  does seem to travel up the tendon of the second digit going to the midfoot, where it stops.  There is no sign of ischemic lesions, swelling or edema, there is no bony tenderness on exam from the plantar side of the foot.  Therefore believe this is mostly a tendinitis.  She is to use the Lidoderm patches, and rest the foot as directed and return to the emergency room for any fevers, chills, skin breakdown, discoloration, or any worsening concerning symptoms.     Akil Guillen DO  12/02/23 1828     Island Pedicle Flap-Requiring Vessel Identification Text: The defect edges were debeveled with a #15 scalpel blade.  Given the location of the defect, shape of the defect and the proximity to free margins an island pedicle advancement flap was deemed most appropriate.  Using a sterile surgical marker, an appropriate advancement flap was drawn, based on the axial vessel mentioned above, incorporating the defect, outlining the appropriate donor tissue and placing the expected incisions within the relaxed skin tension lines where possible.    The area thus outlined was incised deep to adipose tissue with a #15 scalpel blade.  The skin margins were undermined to an appropriate distance in all directions around the primary defect and laterally outward around the island pedicle utilizing iris scissors.  There was minimal undermining beneath the pedicle flap.

## 2023-11-27 NOTE — DISCHARGE INSTRUCTIONS
Please take your next dose of Diflucan on 11/29/2023 to complete the treatment for the yeast infection.  Return to the emergency room for any worsening pain, swelling, redness, or any worsening concerning symptoms.  You can use over-the-counter antifungal creams as well to help with the discomfort.

## 2023-11-28 LAB — BACTERIA UR CULT: NORMAL

## 2023-11-30 ENCOUNTER — HOSPITAL ENCOUNTER (EMERGENCY)
Facility: HOSPITAL | Age: 58
Discharge: HOME | End: 2023-12-01
Attending: EMERGENCY MEDICINE
Payer: COMMERCIAL

## 2023-11-30 DIAGNOSIS — H65.02 ACUTE SEROUS OTITIS MEDIA OF LEFT EAR, RECURRENCE NOT SPECIFIED: ICD-10-CM

## 2023-11-30 DIAGNOSIS — H65.00 ACUTE SEROUS OTITIS MEDIA, RECURRENCE NOT SPECIFIED, UNSPECIFIED LATERALITY: ICD-10-CM

## 2023-11-30 DIAGNOSIS — J01.00 ACUTE MAXILLARY SINUSITIS, RECURRENCE NOT SPECIFIED: Primary | ICD-10-CM

## 2023-11-30 PROCEDURE — 99283 EMERGENCY DEPT VISIT LOW MDM: CPT | Performed by: EMERGENCY MEDICINE

## 2023-11-30 PROCEDURE — 99284 EMERGENCY DEPT VISIT MOD MDM: CPT | Performed by: EMERGENCY MEDICINE

## 2023-11-30 RX ORDER — AMOXICILLIN AND CLAVULANATE POTASSIUM 875; 125 MG/1; MG/1
1 TABLET, FILM COATED ORAL EVERY 12 HOURS
Qty: 14 TABLET | Refills: 0 | Status: SHIPPED | OUTPATIENT
Start: 2023-11-30 | End: 2023-12-07

## 2023-11-30 ASSESSMENT — LIFESTYLE VARIABLES
HAVE YOU EVER FELT YOU SHOULD CUT DOWN ON YOUR DRINKING: NO
HAVE PEOPLE ANNOYED YOU BY CRITICIZING YOUR DRINKING: NO
EVER FELT BAD OR GUILTY ABOUT YOUR DRINKING: NO
REASON UNABLE TO ASSESS: NO
EVER HAD A DRINK FIRST THING IN THE MORNING TO STEADY YOUR NERVES TO GET RID OF A HANGOVER: NO

## 2023-11-30 ASSESSMENT — COLUMBIA-SUICIDE SEVERITY RATING SCALE - C-SSRS
2. HAVE YOU ACTUALLY HAD ANY THOUGHTS OF KILLING YOURSELF?: NO
6. HAVE YOU EVER DONE ANYTHING, STARTED TO DO ANYTHING, OR PREPARED TO DO ANYTHING TO END YOUR LIFE?: NO
1. IN THE PAST MONTH, HAVE YOU WISHED YOU WERE DEAD OR WISHED YOU COULD GO TO SLEEP AND NOT WAKE UP?: NO

## 2023-11-30 ASSESSMENT — PAIN SCALES - GENERAL: PAINLEVEL_OUTOF10: 5 - MODERATE PAIN

## 2023-11-30 ASSESSMENT — PAIN DESCRIPTION - LOCATION: LOCATION: EAR

## 2023-11-30 ASSESSMENT — PAIN DESCRIPTION - ORIENTATION: ORIENTATION: LEFT

## 2023-11-30 ASSESSMENT — PAIN DESCRIPTION - PAIN TYPE: TYPE: ACUTE PAIN

## 2023-11-30 ASSESSMENT — PAIN - FUNCTIONAL ASSESSMENT: PAIN_FUNCTIONAL_ASSESSMENT: 0-10

## 2023-12-01 VITALS
DIASTOLIC BLOOD PRESSURE: 67 MMHG | HEART RATE: 71 BPM | WEIGHT: 220 LBS | BODY MASS INDEX: 40.48 KG/M2 | OXYGEN SATURATION: 94 % | RESPIRATION RATE: 16 BRPM | TEMPERATURE: 97.7 F | HEIGHT: 62 IN | SYSTOLIC BLOOD PRESSURE: 154 MMHG

## 2023-12-01 RX ORDER — CIPROFLOXACIN AND DEXAMETHASONE 3; 1 MG/ML; MG/ML
4 SUSPENSION/ DROPS AURICULAR (OTIC) 2 TIMES DAILY
Qty: 7.5 ML | Refills: 0 | Status: SHIPPED | OUTPATIENT
Start: 2023-12-01 | End: 2024-01-22 | Stop reason: ALTCHOICE

## 2023-12-01 NOTE — ED PROVIDER NOTES
EMERGENCY DEPARTMENT ENCOUNTER      Pt Name: Holly Chavez  MRN: [id number]  Birthdate 1965  Date of evaluation: 11/30/2023  Provider: Lex Kline DO    CHIEF COMPLAINT       Chief Complaint   Patient presents with    Earache     Pt c/o L ear pain, drainage and sore throat since Monday that is progressively getting worse. Pt denies any fevers or other flu like sx.          HISTORY OF PRESENT ILLNESS    59yo F presenting for evaluation of symptoms that have escalated over the last 7-10 days. Her symptoms originally started as a sore throat. This progressed to L ear pain. She went to urgent care on Tuesday and they told her to take mucinex and flonase. Her L ear pain has become L frontal sinus pain. She denies any N/V, numbness, tingling, fevers but does endorse a dry cough she has not been able to kick for weeks. She does have asthma but has not needed her albuterol. She denies any other issues at this time.    Medhx: DM II on insulin, afib on eliquis, asthma  Allergies: as listed in chart          Nursing Notes were reviewed.    PAST MEDICAL HISTORY     Past Medical History:   Diagnosis Date    Acute upper respiratory infection, unspecified 11/13/2018    Acute URI    Benign paroxysmal vertigo, unspecified ear 12/04/2017    Benign positional vertigo    Cutaneous abscess of groin 12/04/2017    Abscess of groin, left    Cutaneous abscess of limb, unspecified 09/20/2018    Abscess of axilla    Morbid (severe) obesity due to excess calories (CMS/HCC) 07/08/2019    Morbid obesity due to excess calories    Personal history of diseases of the skin and subcutaneous tissue 11/20/2015    History of skin pruritus    Personal history of diseases of the skin and subcutaneous tissue 09/27/2018    History of hidradenitis suppurativa    Personal history of other endocrine, nutritional and metabolic disease     History of diabetes mellitus    Personal history of other endocrine, nutritional and metabolic disease 08/11/2015     History of uncontrolled diabetes    Personal history of other endocrine, nutritional and metabolic disease 04/16/2019    History of diabetes mellitus    Personal history of other endocrine, nutritional and metabolic disease 06/06/2019    History of diabetes mellitus    Rash and other nonspecific skin eruption 08/11/2015    Rash    Rash and other nonspecific skin eruption 08/11/2015    Rash    Type 1 diabetes mellitus with mild nonproliferative retinopathy and macular edema (CMS/HCC) 11/09/2015    Background diabetic macular edema, with mild nonproliferative retinopathy, associated with type 1 diabetes mellitus    Type 1 diabetes mellitus with mild nonproliferative retinopathy and macular edema (CMS/HCC) 11/09/2015    Background diabetic macular edema, with mild nonproliferative retinopathy, associated with type 1 diabetes mellitus    Type 1 diabetes mellitus with mild nonproliferative retinopathy and macular edema (CMS/[facility]) 11/10/2015    Background diabetic macular edema, with mild nonproliferative retinopathy, associated with type 1 diabetes mellitus    Type 1 diabetes mellitus with mild nonproliferative retinopathy and macular edema (CMS/[facility]) 11/12/2015    Background diabetic macular edema, with mild nonproliferative retinopathy, associated with type 1 diabetes mellitus    Type 2 diabetes mellitus with mild nonproliferative diabetic retinopathy without macular edema, unspecified eye (CMS/[facility]) 11/09/2015    Mild nonproliferative diabetic retinopathy without macular edema    Type 2 diabetes mellitus with mild nonproliferative diabetic retinopathy without macular edema, unspecified eye (CMS/[facility]) 11/09/2015    Mild nonproliferative diabetic retinopathy without macular edema    Type 2 diabetes mellitus with mild nonproliferative diabetic retinopathy without macular edema, unspecified eye (CMS/[facility]) 11/10/2015    Mild nonproliferative diabetic retinopathy without macular edema    Type 2 diabetes mellitus with mild  nonproliferative diabetic retinopathy without macular edema, unspecified eye (CMS/[facility]) 11/12/2015    Mild nonproliferative diabetic retinopathy without macular edema         SURGICAL HISTORY       Past Surgical History:   Procedure Laterality Date    CERVICAL BIOPSY  W/ LOOP ELECTRODE EXCISION  04/12/2016    Cervical Loop Electrosurgical Excision (LEEP)    HERNIA REPAIR  08/11/2015    Hernia Repair    TUBAL LIGATION  08/11/2015    Tubal Ligation         CURRENT MEDICATIONS       Previous Medications    ALBUTEROL 2.5 MG /3 ML (0.083 %) NEBULIZER SOLUTION    Take 3 mL (2.5 mg) by nebulization 4 times a day as needed for wheezing or shortness of breath.    ALBUTEROL 90 MCG/ACTUATION INHALER    Inhale 2 puffs every 4 hours if needed for wheezing or shortness of breath.    ALPRAZOLAM (XANAX) 0.25 MG TABLET    Take 1 tablet (0.25 mg) by mouth 3 times a day as needed for anxiety (prn anxiety surrounding medical procedures) for up to 6 days.    AMMONIUM LACTATE (LAC-HYDRIN) 12 % LOTION    APPLY 1 APPLICATION ONCE DAILY    APIXABAN (ELIQUIS) 5 MG TABLET    Take 1 tablet (5 mg) by mouth once daily.    BENZONATATE (TESSALON) 200 MG CAPSULE    Take 1 capsule (200 mg) by mouth 3 times a day as needed.    BENZONATATE ORAL        CHOLECALCIFEROL (VITAMIN D-3) 125 MCG (5000 UT) CAPSULE    Take by mouth.    CYANOCOBALAMIN (VITAMIN B-12) 500 MCG TABLET    Take 1 tablet (500 mcg) by mouth once daily.    CYANOCOBALAMIN, VITAMIN B-12, (VITAMIN B-12) 1,000 MCG TABLET EXTENDED RELEASE    Take 1 tablet (1,000 mcg) by mouth once daily.    DEXCOM G4 PLATINUM  (DEXCOM G6 ) MISC        FLONASE ALLERGY RELIEF 50 MCG/ACTUATION NASAL SPRAY    Administer 2 sprays into each nostril once daily.    FUROSEMIDE (LASIX) 20 MG TABLET    Take 1 tablet (20 mg) by mouth. 3 times a week as needed    GLIPIZIDE ORAL        GUAIFENESIN (MUCUS RELIEF ORAL)        INSULIN GLARGINE (LANTUS) 100 UNIT/ML (3 ML) PEN    Inject 34 Units under the skin  "once daily in the morning.    INSULIN LISPRO (HUMALOG) 100 UNIT/ML INJECTION    Inject 60 Units under the skin once daily. As directed in divided doses (max daily dose 60 units)    KETOCONAZOLE (NIZORAL) 2 % SHAMPOO    every other day. Apply to scalp every other day for 5 minutes and rinse    LANCETS MISC        LIDOCAINE (LIDODERM) 5 % PATCH    Place 1 patch over 12 hours on the skin once daily (M-F) for 14 days. Please use 1 patch for 12 hours and it must be removed for 12 hours as well.    LISINOPRIL ORAL        MISCELLANEOUS MEDICAL SUPPLY [facility]    once daily.  thigh high Daily    NON FORMULARY    Dexamethasone    NON FORMULARY    Accu-Chek Sherley Plus    ONDANSETRON ODT (ZOFRAN-ODT) 4 MG DISINTEGRATING TABLET    Take 1 tablet (4 mg) by mouth every 8 hours if needed.    ONETOUCH ULTRA TEST STRIP    Test 4 times daily    PEN NEEDLE, DIABETIC 32 GAUGE X 5/32\" NEEDLE    4 Needles once daily. Use with insulin injections four times daily    TRIAMCINOLONE (KENALOG) 0.1 % CREAM    APPLY EXTERNALLY TO THE AFFECTED AREA 2 TO 3 TIMES DAILY    UREACIN-20 TOP        VENLAFAXINE HCL (VENLAFAXINE ORAL)           ALLERGIES     Adhesive tape-silicones and Codeine    FAMILY HISTORY       Family History   Problem Relation Name Age of Onset    Stroke Mother      Diabetes Mother      Other (cerebrovascular accident) Mother      Heart failure Father      Diabetes Father      Pancreatic cancer Father      Breast cancer Mother's Sister      Other (cardiac disorder) Other      Diabetes Other      Hypertension Other      Cancer Other      Kidney disease Other      Breast cancer Other aunt           SOCIAL HISTORY       Social History     Socioeconomic History    Marital status: Single     Spouse name: None    Number of children: None    Years of education: None    Highest education level: None   Occupational History    None   Tobacco Use    Smoking status: Former     Types: Cigarettes    Smokeless tobacco: Never   Substance and Sexual " Activity    Alcohol use: Yes    Drug use: Never    Sexual activity: None   Other Topics Concern    None   Social History Narrative    None     Social Determinants of Health     Financial Resource Strain: Not on file   Food Insecurity: Not on file   Transportation Needs: Not on file   Physical Activity: Not on file   Stress: Not on file   Social Connections: Not on file   Intimate Partner Violence: Not on file   Housing Stability: Not on file       SCREENINGS                        PHYSICAL EXAM    (up to 7 for level 4, 8 or more for level 5)     ED Triage Vitals [11/30/23 2304]   Temp Heart Rate Resp BP   36.5 °C (97.7 °F) 80 16 (!) 197/84      SpO2 Temp Source Heart Rate Source Patient Position   95 % Temporal Monitor Sitting      BP Location FiO2 (%)     Right arm --       Physical Exam  Constitutional:       Appearance: Normal appearance.   HENT:      Head:      Comments: TTP frontal sinus     Ears:      Comments: Pustule noted on L TM, otitis externa     Nose: Nose normal.   Eyes:      Extraocular Movements: Extraocular movements intact.      Conjunctiva/sclera: Conjunctivae normal.   Cardiovascular:      Rate and Rhythm: Normal rate and regular rhythm.   Pulmonary:      Effort: Pulmonary effort is normal.      Breath sounds: Normal breath sounds.   Musculoskeletal:         General: Normal range of motion.      Cervical back: Normal range of motion.   Neurological:      General: No focal deficit present.      Mental Status: She is alert and oriented to person, place, and time.   Psychiatric:         Mood and Affect: Mood normal.         Behavior: Behavior normal.          DIAGNOSTIC RESULTS     LABS:  Labs Reviewed - No data to display    All other labs were within normal range or not returned as of this dictation.    Imaging  No orders to display        Procedures  Procedures     EMERGENCY DEPARTMENT COURSE/MDM:     Diagnoses as of 12/01/23 0056   Acute serous otitis media of left ear, recurrence not specified         Medical Decision Making  57yo F presenting with worsening symptoms consistent with sinusitis. Pt has L sided frontal sinus TTP, ear fullness sensation, and congestion. PE supports these symptoms. Pt BP elevated at 197/84 in room, pt denies BP issues. Will recheck. Pt stable for discharge home with augmentin and ciprodex at this time.         Patient and or family in agreement and understanding of treatment plan.  All questions answered.      I reviewed the case with the attending ED physician. The attending ED physician agrees with the plan. Patient and/or patient´s representative was counseled regarding labs, imaging, likely diagnosis, and plan. All questions were answered.    ED Medications administered this visit:  Medications - No data to display    New Prescriptions from this visit:    New Prescriptions    AMOXICILLIN-POT CLAVULANATE (AUGMENTIN) 875-125 MG TABLET    Take 1 tablet (875 mg) by mouth every 12 hours for 7 days.    CIPROFLOXACIN-DEXAMETHASONE (CIPRODEX) OTIC SUSPENSION    Administer 4 drops into the left ear 2 times a day.       Follow-up:  Johanna Salas DO  [address] [place] [id number] [phone]              Final Impression:   1. Acute maxillary sinusitis, recurrence not specified    2. Acute serous otitis media, recurrence not specified, unspecified laterality    3. Acute serous otitis media of left ear, recurrence not specified          (Please note that portions of this note were completed with a voice recognition program.  Efforts were made to edit the dictations but occasionally words are mis-transcribed.)     Lex Kline DO  Resident  12/01/23 0056

## 2023-12-01 NOTE — DISCHARGE INSTRUCTIONS
You have a sinus infection with L ear infection. You are prescribed two abx to use. You have been given ear drops to use for your external ear infection and augmentin for your sinusitis. Please take these as prescribed. Your BP was noted to be elevated today, you have no symptoms concerning with high BP but li keep monitoring your BP at home and follow up with your PCP. You can come back should your symptoms worsen or fail to improve.

## 2023-12-06 ENCOUNTER — OFFICE VISIT (OUTPATIENT)
Dept: PRIMARY CARE | Facility: CLINIC | Age: 58
End: 2023-12-06
Payer: COMMERCIAL

## 2023-12-06 VITALS
SYSTOLIC BLOOD PRESSURE: 130 MMHG | RESPIRATION RATE: 14 BRPM | BODY MASS INDEX: 41.41 KG/M2 | HEIGHT: 62 IN | WEIGHT: 225 LBS | TEMPERATURE: 97.3 F | DIASTOLIC BLOOD PRESSURE: 72 MMHG | OXYGEN SATURATION: 99 % | HEART RATE: 70 BPM

## 2023-12-06 DIAGNOSIS — I10 PRIMARY HYPERTENSION: ICD-10-CM

## 2023-12-06 DIAGNOSIS — G47.33 OSA (OBSTRUCTIVE SLEEP APNEA): Primary | ICD-10-CM

## 2023-12-06 DIAGNOSIS — J32.9 CHRONIC SINUSITIS, UNSPECIFIED LOCATION: ICD-10-CM

## 2023-12-06 DIAGNOSIS — I87.8 CHRONIC VENOUS STASIS: ICD-10-CM

## 2023-12-06 PROCEDURE — 3008F BODY MASS INDEX DOCD: CPT | Performed by: INTERNAL MEDICINE

## 2023-12-06 PROCEDURE — 1036F TOBACCO NON-USER: CPT | Performed by: INTERNAL MEDICINE

## 2023-12-06 PROCEDURE — 3046F HEMOGLOBIN A1C LEVEL >9.0%: CPT | Performed by: INTERNAL MEDICINE

## 2023-12-06 PROCEDURE — 4010F ACE/ARB THERAPY RXD/TAKEN: CPT | Performed by: INTERNAL MEDICINE

## 2023-12-06 PROCEDURE — G0008 ADMIN INFLUENZA VIRUS VAC: HCPCS | Performed by: INTERNAL MEDICINE

## 2023-12-06 PROCEDURE — 90686 IIV4 VACC NO PRSV 0.5 ML IM: CPT | Performed by: INTERNAL MEDICINE

## 2023-12-06 PROCEDURE — 3078F DIAST BP <80 MM HG: CPT | Performed by: INTERNAL MEDICINE

## 2023-12-06 PROCEDURE — 99214 OFFICE O/P EST MOD 30 MIN: CPT | Performed by: INTERNAL MEDICINE

## 2023-12-06 PROCEDURE — 3075F SYST BP GE 130 - 139MM HG: CPT | Performed by: INTERNAL MEDICINE

## 2023-12-06 RX ORDER — LISINOPRIL 2.5 MG/1
2.5 TABLET ORAL DAILY
Qty: 30 TABLET | Refills: 5 | Status: SHIPPED | OUTPATIENT
Start: 2023-12-06 | End: 2024-04-26 | Stop reason: ALTCHOICE

## 2023-12-06 NOTE — PROGRESS NOTES
"Subjective    Holly Chavez is a 58 y.o. female who presents for Hospital Follow-up.  HPI  Has had 2 ED visits to [facility] in the last week  First visit was for swelling of left foot. Diagnosed with tendonitis and prescribed lidocaine patches. Still has left foot swelling. She sees vascular in Feb for venous stasis. She does not wear her compression reglularly. She has been wearing it for a few days and her left is feeling better      Second visit- was for sore throat and ear pain. Prescribed ear gtts and abx for sinus infection. Still c/o left ear pain.   She has chronic sinus allergies. Her ears feel pressure.   She has a cat in the house.  She was on something for her bp and chol in the past  but she  got lightheaded so she stopped.    Her weight is up  Needs flu vaccine    Review of Systems   All other systems reviewed and are negative.        Objective     /72 (BP Location: Left arm, Patient Position: Sitting, BP Cuff Size: Adult)   Pulse 70   Temp 36.3 °C (97.3 °F) (Skin)   Resp 14   Ht 1.575 m (5' 2\")   Wt 102 kg (225 lb)   SpO2 99%   BMI 41.15 kg/m²    Physical Exam  Vitals reviewed.   Constitutional:       General: She is not in acute distress.     Appearance: Normal appearance.   HENT:      Ears:      Comments: Bl ome  Cardiovascular:      Rate and Rhythm: Normal rate and regular rhythm.      Pulses: Normal pulses.      Heart sounds: Normal heart sounds.   Pulmonary:      Effort: Pulmonary effort is normal.      Breath sounds: Normal breath sounds.   Abdominal:      Tenderness: There is no abdominal tenderness.   Musculoskeletal:         General: Swelling present.      Comments: Wearing compression on right leg only   Skin:     General: Skin is warm and dry.   Neurological:      Mental Status: She is alert.       Health Maintenance Due   Topic Date Due    Medicare Annual Wellness Visit (AWV)  Never done    HIV Screening  Never done    MMR Vaccines (1 of 1 - Standard series) Never done    Diabetes: " Foot Exam  Never done    Diabetes: Retinopathy Screening  Never done    Hepatitis C Screening  Never done    DTaP/Tdap/Td Vaccines (1 - Tdap) Never done    Zoster Vaccines (1 of 2) Never done    Pneumococcal Vaccine: Pediatrics (0 to 5 Years) and At-Risk Patients (6 to 64 Years) (2 - PPSV23 or PCV20) 11/22/2018    COVID-19 Vaccine (5 - Pfizer series) 01/28/2023    Diabetes: Hemoglobin A1C  05/09/2023    Mammogram  07/25/2023    Colorectal Cancer Screening  09/23/2023          Assessment/Plan   Problem List Items Addressed This Visit       Chronic venous stasis     Other Visit Diagnoses       EDDI (obstructive sleep apnea)    -  Primary    Relevant Orders    In-Center Sleep Study (Non-Sleep Provider Only)    Primary hypertension        Relevant Medications    lisinopril 2.5 mg tablet    Other Relevant Orders    Basic Metabolic Panel    Chronic sinusitis, unspecified location            Recommend daily fluticasone nasal spray and a non sedating antihistamine  of her choice.   [place] out abx.   She has chronic venous stasis. Recommend weight loss, sleep study. Wear compression stalkings daily.  Her bp is elevated. Start low dose lisinopril. Side effects discussed.  Will see her back in 4-6 weeks and will discus starting statin.

## 2023-12-27 ENCOUNTER — APPOINTMENT (OUTPATIENT)
Dept: ENDOCRINOLOGY | Facility: CLINIC | Age: 58
End: 2023-12-27
Payer: COMMERCIAL

## 2023-12-27 ENCOUNTER — OFFICE VISIT (OUTPATIENT)
Dept: OPHTHALMOLOGY | Facility: CLINIC | Age: 58
End: 2023-12-27
Payer: COMMERCIAL

## 2023-12-27 DIAGNOSIS — E11.3411 SEVERE NONPROLIFERATIVE DIABETIC RETINOPATHY OF RIGHT EYE, WITH MACULAR EDEMA, ASSOCIATED WITH TYPE 2 DIABETES MELLITUS (MULTI): Primary | ICD-10-CM

## 2023-12-27 PROCEDURE — 67028 INJECTION EYE DRUG: CPT | Mod: BILATERAL PROCEDURE | Performed by: OPHTHALMOLOGY

## 2023-12-27 PROCEDURE — 92134 CPTRZ OPH DX IMG PST SGM RTA: CPT | Mod: BILATERAL PROCEDURE | Performed by: OPHTHALMOLOGY

## 2023-12-27 PROCEDURE — 1036F TOBACCO NON-USER: CPT | Performed by: OPHTHALMOLOGY

## 2023-12-27 PROCEDURE — 3046F HEMOGLOBIN A1C LEVEL >9.0%: CPT | Performed by: OPHTHALMOLOGY

## 2023-12-27 PROCEDURE — 3008F BODY MASS INDEX DOCD: CPT | Performed by: OPHTHALMOLOGY

## 2023-12-27 PROCEDURE — 4010F ACE/ARB THERAPY RXD/TAKEN: CPT | Performed by: OPHTHALMOLOGY

## 2023-12-27 ASSESSMENT — VISUAL ACUITY
OD_CC+: -2
METHOD: SNELLEN - LINEAR
CORRECTION_TYPE: GLASSES
OD_CC: 20/50
OS_CC+: -2
OS_CC: 20/30

## 2023-12-27 ASSESSMENT — REFRACTION_WEARINGRX
OS_CYLINDER: -0.50
OD_ADD: +2.50
OD_AXIS: 065
OS_AXIS: 045
OD_SPHERE: +3.50
OS_SPHERE: +4.50
OD_CYLINDER: -0.50
OS_ADD: +2.50

## 2023-12-27 ASSESSMENT — ENCOUNTER SYMPTOMS
NEUROLOGICAL NEGATIVE: 0
HEMATOLOGIC/LYMPHATIC NEGATIVE: 0
CARDIOVASCULAR NEGATIVE: 0
GASTROINTESTINAL NEGATIVE: 0
EYES NEGATIVE: 0
ALLERGIC/IMMUNOLOGIC NEGATIVE: 0
ENDOCRINE NEGATIVE: 0
PSYCHIATRIC NEGATIVE: 0
MUSCULOSKELETAL NEGATIVE: 0
RESPIRATORY NEGATIVE: 0
CONSTITUTIONAL NEGATIVE: 0

## 2023-12-27 ASSESSMENT — TONOMETRY
OS_IOP_MMHG: 22
OD_IOP_MMHG: 22
IOP_METHOD: GOLDMANN APPLANATION

## 2023-12-28 PROBLEM — E11.3411: Status: ACTIVE | Noted: 2023-12-28

## 2023-12-28 ASSESSMENT — CUP TO DISC RATIO
OD_RATIO: .3
OS_RATIO: .3

## 2023-12-28 ASSESSMENT — SLIT LAMP EXAM - LIDS
COMMENTS: GOOD POSITION
COMMENTS: GOOD POSITION

## 2023-12-28 ASSESSMENT — EXTERNAL EXAM - RIGHT EYE: OD_EXAM: NORMAL

## 2023-12-28 ASSESSMENT — EXTERNAL EXAM - LEFT EYE: OS_EXAM: NORMAL

## 2023-12-28 NOTE — PROGRESS NOTES
Assessment/Plan   Assessment/Plan   Diagnoses and all orders for this visit:  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, with macular edema, associated with type 2 diabetes mellitus (CMS/[facility])  -     OCT, Retina - OU - Both Eyes  -     Intravitreal Injection, Pharmacologic Agent - OU - Both Eyes  -     aflibercept (Eylea) intravitreal injection 2 mg  -     aflibercept (Eylea) intravitreal injection 2 mg      Today will need therapy ou    Treatment options for DME OU discussed, including observation, anti-VEGF injections (including Avastin, Lucentis, Beovu, Vabysmo and Eylea) and laser. Recommend anti-VEGF injections. Eylea done OUtoday in a sterile manner with Betadine 5% for antisepsis.        3m

## 2024-01-02 ENCOUNTER — TELEPHONE (OUTPATIENT)
Dept: ENDOCRINOLOGY | Facility: CLINIC | Age: 59
End: 2024-01-02
Payer: MEDICARE

## 2024-01-02 DIAGNOSIS — E11.9 TYPE 2 DIABETES MELLITUS WITHOUT COMPLICATION, WITHOUT LONG-TERM CURRENT USE OF INSULIN (MULTI): Primary | ICD-10-CM

## 2024-01-02 RX ORDER — LANCETS 26 GAUGE
EACH MISCELLANEOUS
Qty: 1 EACH | Refills: 0 | Status: SHIPPED | OUTPATIENT
Start: 2024-01-02 | End: 2025-01-01

## 2024-01-02 NOTE — TELEPHONE ENCOUNTER
Patient broke lancing device; patient needs onetouch delica plus lancing device sent to local pharmacy.

## 2024-01-04 ENCOUNTER — LAB (OUTPATIENT)
Dept: LAB | Facility: LAB | Age: 59
End: 2024-01-04
Payer: MEDICARE

## 2024-01-04 DIAGNOSIS — I10 PRIMARY HYPERTENSION: ICD-10-CM

## 2024-01-04 LAB
ANION GAP SERPL CALC-SCNC: 13 MMOL/L (ref 10–20)
BUN SERPL-MCNC: 21 MG/DL (ref 6–23)
CALCIUM SERPL-MCNC: 8.8 MG/DL (ref 8.6–10.3)
CHLORIDE SERPL-SCNC: 100 MMOL/L (ref 98–107)
CO2 SERPL-SCNC: 30 MMOL/L (ref 21–32)
CREAT SERPL-MCNC: 0.72 MG/DL (ref 0.5–1.05)
GFR SERPL CREATININE-BSD FRML MDRD: >90 ML/MIN/1.73M*2
GLUCOSE SERPL-MCNC: 348 MG/DL (ref 74–99)
POTASSIUM SERPL-SCNC: 4.5 MMOL/L (ref 3.5–5.3)
SODIUM SERPL-SCNC: 138 MMOL/L (ref 136–145)

## 2024-01-04 PROCEDURE — 80048 BASIC METABOLIC PNL TOTAL CA: CPT

## 2024-01-04 PROCEDURE — 36415 COLL VENOUS BLD VENIPUNCTURE: CPT

## 2024-01-09 ENCOUNTER — PROCEDURE VISIT (OUTPATIENT)
Dept: PODIATRY | Facility: CLINIC | Age: 59
End: 2024-01-09
Payer: MEDICARE

## 2024-01-09 DIAGNOSIS — M79.676 CHRONIC PAIN OF TOE, UNSPECIFIED LATERALITY: ICD-10-CM

## 2024-01-09 DIAGNOSIS — B35.1 FUNGAL NAIL INFECTION: ICD-10-CM

## 2024-01-09 DIAGNOSIS — G89.29 CHRONIC PAIN OF TOE, UNSPECIFIED LATERALITY: ICD-10-CM

## 2024-01-09 DIAGNOSIS — E11.9 TYPE 2 DIABETES MELLITUS WITHOUT COMPLICATION, WITHOUT LONG-TERM CURRENT USE OF INSULIN (MULTI): Primary | ICD-10-CM

## 2024-01-09 PROCEDURE — 11721 DEBRIDE NAIL 6 OR MORE: CPT | Performed by: PODIATRIST

## 2024-01-09 NOTE — PROGRESS NOTES
History Of Present Illness  Holly Chavez is a 58 y.o. female presenting with painful elongated nails.     Past Medical History  She has a past medical history of Acute upper respiratory infection, unspecified (11/13/2018), Benign paroxysmal vertigo, unspecified ear (12/04/2017), Cutaneous abscess of groin (12/04/2017), Cutaneous abscess of limb, unspecified (09/20/2018), Morbid (severe) obesity due to excess calories (CMS/HCC) (07/08/2019), Personal history of diseases of the skin and subcutaneous tissue (11/20/2015), Personal history of diseases of the skin and subcutaneous tissue (09/27/2018), Personal history of other endocrine, nutritional and metabolic disease, Personal history of other endocrine, nutritional and metabolic disease (08/11/2015), Personal history of other endocrine, nutritional and metabolic disease (04/16/2019), Personal history of other endocrine, nutritional and metabolic disease (06/06/2019), Rash and other nonspecific skin eruption (08/11/2015), Rash and other nonspecific skin eruption (08/11/2015), Type 1 diabetes mellitus with mild nonproliferative retinopathy and macular edema (CMS/HCC) (11/09/2015), Type 1 diabetes mellitus with mild nonproliferative retinopathy and macular edema (CMS/HCC) (11/09/2015), Type 1 diabetes mellitus with mild nonproliferative retinopathy and macular edema (CMS/HCC) (11/10/2015), Type 1 diabetes mellitus with mild nonproliferative retinopathy and macular edema (CMS/HCC) (11/12/2015), Type 2 diabetes mellitus with mild nonproliferative diabetic retinopathy without macular edema, unspecified eye (CMS/HCC) (11/09/2015), Type 2 diabetes mellitus with mild nonproliferative diabetic retinopathy without macular edema, unspecified eye (CMS/HCC) (11/09/2015), Type 2 diabetes mellitus with mild nonproliferative diabetic retinopathy without macular edema, unspecified eye (CMS/HCC) (11/10/2015), and Type 2 diabetes mellitus with mild nonproliferative diabetic retinopathy  without macular edema, unspecified eye (CMS/[facility]) (11/12/2015).    Surgical History  She has a past surgical history that includes Tubal ligation (08/11/2015); Hernia repair (08/11/2015); and Cervical biopsy w/ loop electrode excision (04/12/2016).     Social History  She reports that she has quit smoking. Her smoking use included cigarettes. She has never used smokeless tobacco. She reports current alcohol use. She reports that she does not use drugs.    Family History  Family History   Problem Relation Name Age of Onset    Stroke Mother      Diabetes Mother      Other (cerebrovascular accident) Mother      Heart failure Father      Diabetes Father      Pancreatic cancer Father      Breast cancer Mother's Sister      Other (cardiac disorder) Other      Diabetes Other      Hypertension Other      Cancer Other      Kidney disease Other      Breast cancer Other aunt     Glaucoma Neg Hx      Macular degeneration Neg Hx          Allergies  Adhesive tape-silicones and Codeine    Medications  Current Outpatient Medications   Medication Sig Dispense Refill    albuterol 2.5 mg /3 mL (0.083 %) nebulizer solution Take 3 mL (2.5 mg) by nebulization 4 times a day as needed for wheezing or shortness of breath. 75 mL 3    albuterol 90 mcg/actuation inhaler Inhale 2 puffs every 4 hours if needed for wheezing or shortness of breath. 18 g 3    ammonium lactate (Lac-Hydrin) 12 % lotion APPLY 1 APPLICATION ONCE DAILY      apixaban (Eliquis) 5 mg tablet Take 1 tablet (5 mg) by mouth once daily. 90 tablet 3    Autolet (OneTouch Delica Plus Lanc Dev) lancing device Use as instructed 1 each 0    benzonatate (Tessalon) 200 mg capsule Take 1 capsule (200 mg) by mouth 3 times a day as needed.      BENZONATATE ORAL       cholecalciferol (Vitamin D-3) 125 MCG (5000 UT) capsule Take by mouth.      ciprofloxacin-dexamethasone (Ciprodex) otic suspension Administer 4 drops into the left ear 2 times a day. 7.5 mL 0    cyanocobalamin, vitamin B-12,  "(Vitamin B-12) 1,000 mcg tablet extended release Take 1 tablet (1,000 mcg) by mouth once daily.      Dexcom G4 platinum  (Dexcom G6 ) misc       Flonase Allergy Relief 50 mcg/actuation nasal spray Administer 2 sprays into each nostril once daily. 16 g 2    furosemide (Lasix) 20 mg tablet Take 1 tablet (20 mg) by mouth. 3 times a week as needed      guaifenesin (MUCUS RELIEF ORAL)       insulin glargine (Lantus) 100 unit/mL (3 mL) pen Inject 34 Units under the skin once daily in the morning. 30 mL 3    insulin lispro (HumaLOG) 100 unit/mL injection Inject 60 Units under the skin once daily. As directed in divided doses (max daily dose 60 units) 60 mL 2    ketoconazole (NIZOral) 2 % shampoo every other day. Apply to scalp every other day for 5 minutes and rinse      lancets misc       lisinopril 2.5 mg tablet Take 1 tablet (2.5 mg) by mouth once daily. 30 tablet 5    miscellaneous medical supply [facility] once daily.  thigh high Daily      NON FORMULARY Accu-Chek Sherley Plus      ondansetron ODT (Zofran-ODT) 4 mg disintegrating tablet Take 1 tablet (4 mg) by mouth every 8 hours if needed.      OneTouch Ultra Test strip Test 4 times daily      pen needle, diabetic 32 gauge x 5/32\" needle 4 Needles once daily. Use with insulin injections four times daily 400 each 3    triamcinolone (Kenalog) 0.1 % cream APPLY EXTERNALLY TO THE AFFECTED AREA 2 TO 3 TIMES DAILY      ALPRAZolam (Xanax) 0.25 mg tablet Take 1 tablet (0.25 mg) by mouth 3 times a day as needed for anxiety (prn anxiety surrounding medical procedures) for up to 6 days. 18 tablet 0     No current facility-administered medications for this visit.       Review of Systems    REVIEW OF SYSTEMS  GENERAL:  Negative for malaise, significant weight loss, fever  CARDIOVASCULAR: leg swelling   MUSCULOSKELETAL:  Negative for joint pain or swelling, back pain, and muscle pain.  SKIN:  Negative for lesions, rash, and itching  PSYCH:  Negative for sleep disturbance, " mood disorder and recent psychosocial stressors  NEURO: Negative, denies any burning, tingling or numbness     Objective:   Vasc: DP and PT pulses are palpable bilateral.  CFT is less than 3 seconds bilateral.  Skin temperature is warm to cool proximal to distal bilateral.  Pitting pedal edema. No digital hair      Neuro:  Light touch is intact to the foot bilateral.  Protective sensation is intact to the foot when tested with the 5.07 SWM bilateral.  There is no clonus noted.  The hallux is downgoing bilateral.      Derm: Nails 1-5 bilateral are thickened, elongated and crumbly with subungual debris. Skin is supple with normal texture and turgor noted.  Webspaces are clean, dry and intact bilateral.  There are no hyperkeratoses, ulcerations, verruca or other lesions noted.      Ortho: Muscle strength is 5/5 for all pedal groups tested.  Ankle joint, subtalar joint, 1st MPJ and lesser MPJ ROM is full and without pain or crepitus.  The foot type is rectus bilateral off weight bearing.  There are no structural deformities noted.    Assessment/Plan     There are no diagnoses linked to this encounter.      Toenails are debrided in length and thickness to avoid infection and for pain relief        I spent 20 minutes in the professional and overall care of this patient.      Annel Cancino, CMA

## 2024-01-10 ENCOUNTER — APPOINTMENT (OUTPATIENT)
Dept: PRIMARY CARE | Facility: CLINIC | Age: 59
End: 2024-01-10
Payer: MEDICARE

## 2024-01-22 ENCOUNTER — OFFICE VISIT (OUTPATIENT)
Dept: PRIMARY CARE | Facility: CLINIC | Age: 59
End: 2024-01-22
Payer: MEDICARE

## 2024-01-22 VITALS
BODY MASS INDEX: 40.67 KG/M2 | HEART RATE: 72 BPM | WEIGHT: 221 LBS | RESPIRATION RATE: 14 BRPM | TEMPERATURE: 97.3 F | OXYGEN SATURATION: 99 % | SYSTOLIC BLOOD PRESSURE: 140 MMHG | DIASTOLIC BLOOD PRESSURE: 70 MMHG | HEIGHT: 62 IN

## 2024-01-22 DIAGNOSIS — Z79.4 TYPE 2 DIABETES MELLITUS WITH HYPERGLYCEMIA, WITH LONG-TERM CURRENT USE OF INSULIN (MULTI): ICD-10-CM

## 2024-01-22 DIAGNOSIS — Z12.12 SCREENING FOR COLORECTAL CANCER: ICD-10-CM

## 2024-01-22 DIAGNOSIS — I10 PRIMARY HYPERTENSION: ICD-10-CM

## 2024-01-22 DIAGNOSIS — I48.0 PAROXYSMAL ATRIAL FIBRILLATION (MULTI): ICD-10-CM

## 2024-01-22 DIAGNOSIS — E78.5 HYPERLIPIDEMIA, UNSPECIFIED HYPERLIPIDEMIA TYPE: ICD-10-CM

## 2024-01-22 DIAGNOSIS — Z12.11 SCREENING FOR COLORECTAL CANCER: ICD-10-CM

## 2024-01-22 DIAGNOSIS — E11.3411 SEVERE NONPROLIFERATIVE DIABETIC RETINOPATHY OF RIGHT EYE, WITH MACULAR EDEMA, ASSOCIATED WITH TYPE 2 DIABETES MELLITUS (MULTI): ICD-10-CM

## 2024-01-22 DIAGNOSIS — J32.9 CHRONIC SINUSITIS, UNSPECIFIED LOCATION: ICD-10-CM

## 2024-01-22 DIAGNOSIS — J30.2 SEASONAL ALLERGIES: ICD-10-CM

## 2024-01-22 DIAGNOSIS — R60.0 BILATERAL LOWER EXTREMITY EDEMA: ICD-10-CM

## 2024-01-22 DIAGNOSIS — Z00.00 ROUTINE GENERAL MEDICAL EXAMINATION AT HEALTH CARE FACILITY: Primary | ICD-10-CM

## 2024-01-22 DIAGNOSIS — R42 VERTIGO: ICD-10-CM

## 2024-01-22 DIAGNOSIS — E11.65 TYPE 2 DIABETES MELLITUS WITH HYPERGLYCEMIA, WITH LONG-TERM CURRENT USE OF INSULIN (MULTI): ICD-10-CM

## 2024-01-22 DIAGNOSIS — E66.01 MORBID OBESITY WITH BODY MASS INDEX (BMI) OF 40.0 TO 44.9 IN ADULT (MULTI): ICD-10-CM

## 2024-01-22 DIAGNOSIS — B35.0 TINEA CAPITIS: ICD-10-CM

## 2024-01-22 DIAGNOSIS — Z12.31 ENCOUNTER FOR SCREENING MAMMOGRAM FOR BREAST CANCER: ICD-10-CM

## 2024-01-22 LAB — POC HEMOGLOBIN A1C: 11 % (ref 4.2–6.5)

## 2024-01-22 PROCEDURE — G0439 PPPS, SUBSEQ VISIT: HCPCS | Performed by: INTERNAL MEDICINE

## 2024-01-22 PROCEDURE — 3078F DIAST BP <80 MM HG: CPT | Performed by: INTERNAL MEDICINE

## 2024-01-22 PROCEDURE — 4010F ACE/ARB THERAPY RXD/TAKEN: CPT | Performed by: INTERNAL MEDICINE

## 2024-01-22 PROCEDURE — 1036F TOBACCO NON-USER: CPT | Performed by: INTERNAL MEDICINE

## 2024-01-22 PROCEDURE — 3077F SYST BP >= 140 MM HG: CPT | Performed by: INTERNAL MEDICINE

## 2024-01-22 PROCEDURE — 99214 OFFICE O/P EST MOD 30 MIN: CPT | Performed by: INTERNAL MEDICINE

## 2024-01-22 PROCEDURE — 3008F BODY MASS INDEX DOCD: CPT | Performed by: INTERNAL MEDICINE

## 2024-01-22 PROCEDURE — 83036 HEMOGLOBIN GLYCOSYLATED A1C: CPT | Performed by: INTERNAL MEDICINE

## 2024-01-22 RX ORDER — ATORVASTATIN CALCIUM 10 MG/1
10 TABLET, FILM COATED ORAL DAILY
Qty: 30 TABLET | Refills: 5 | Status: SHIPPED | OUTPATIENT
Start: 2024-01-22 | End: 2024-07-20

## 2024-01-22 RX ORDER — FLUTICASONE PROPIONATE 50 UG/1
2 SPRAY, METERED NASAL
Qty: 16 G | Refills: 2 | Status: SHIPPED | OUTPATIENT
Start: 2024-01-22

## 2024-01-22 RX ORDER — TIRZEPATIDE 2.5 MG/.5ML
2.5 INJECTION, SOLUTION SUBCUTANEOUS
Qty: 2 ML | Refills: 1 | Status: SHIPPED | OUTPATIENT
Start: 2024-01-22 | End: 2024-02-23 | Stop reason: DRUGHIGH

## 2024-01-22 RX ORDER — FUROSEMIDE 20 MG/1
20 TABLET ORAL 3 TIMES WEEKLY
Qty: 36 TABLET | Refills: 3 | Status: SHIPPED | OUTPATIENT
Start: 2024-01-22 | End: 2025-01-21

## 2024-01-22 RX ORDER — KETOCONAZOLE 20 MG/ML
SHAMPOO, SUSPENSION TOPICAL EVERY OTHER DAY
Qty: 120 ML | Refills: 3 | Status: SHIPPED | OUTPATIENT
Start: 2024-01-22 | End: 2024-02-23

## 2024-01-22 RX ORDER — TRIAMCINOLONE ACETONIDE 1 MG/G
CREAM TOPICAL 2 TIMES DAILY
Qty: 45 G | Refills: 3 | Status: SHIPPED | OUTPATIENT
Start: 2024-01-22 | End: 2025-01-21

## 2024-01-22 ASSESSMENT — ACTIVITIES OF DAILY LIVING (ADL)
MANAGING_FINANCES: INDEPENDENT
TAKING_MEDICATION: INDEPENDENT
DOING_HOUSEWORK: INDEPENDENT
DRESSING: INDEPENDENT
GROCERY_SHOPPING: INDEPENDENT
BATHING: INDEPENDENT

## 2024-01-22 ASSESSMENT — PATIENT HEALTH QUESTIONNAIRE - PHQ9
2. FEELING DOWN, DEPRESSED OR HOPELESS: NOT AT ALL
SUM OF ALL RESPONSES TO PHQ9 QUESTIONS 1 AND 2: 0
1. LITTLE INTEREST OR PLEASURE IN DOING THINGS: NOT AT ALL

## 2024-01-22 ASSESSMENT — ENCOUNTER SYMPTOMS
LOSS OF SENSATION IN FEET: 0
DEPRESSION: 0
OCCASIONAL FEELINGS OF UNSTEADINESS: 1

## 2024-01-22 NOTE — PROGRESS NOTES
"Subjective    Holly Chavez is a 58 y.o. female who presents for Medicare Annual Wellness Visit Subsequent.  HPI  Colonoscopy   Due for mammogram, order is .   Blood glucose 275-300s , appt with Dr. Nelson in April   Continues to have b/l foot swelling.   She is not eating well.   She has enlarged.     Has not had shingrix  No Living will     Review of Systems   All other systems reviewed and are negative.        Objective     /70 (BP Location: Left arm, Patient Position: Sitting, BP Cuff Size: Adult)   Pulse 72   Temp 36.3 °C (97.3 °F) (Skin)   Resp 14   Ht 1.575 m (5' 2\")   Wt 100 kg (221 lb)   SpO2 99%   BMI 40.42 kg/m²    Physical Exam  Constitutional:       Appearance: Normal appearance.   Cardiovascular:      Rate and Rhythm: Normal rate and regular rhythm.      Pulses: Normal pulses.   Pulmonary:      Effort: Pulmonary effort is normal.      Breath sounds: Normal breath sounds.   Chest:      Chest wall: No mass or tenderness.   Breasts:     Right: Normal.      Left: Normal.   Abdominal:      General: Abdomen is flat.   Musculoskeletal:      Cervical back: Neck supple. No tenderness.   Lymphadenopathy:      Cervical: No cervical adenopathy.      Upper Body:      Right upper body: No supraclavicular or axillary adenopathy.      Left upper body: No supraclavicular or axillary adenopathy.   Neurological:      Mental Status: She is alert.   Psychiatric:         Attention and Perception: Attention and perception normal.         Mood and Affect: Mood and affect normal.       Health Maintenance Due   Topic Date Due    Yearly Adult Physical  Never done    HIV Screening  Never done    MMR Vaccines (1 of 1 - Standard series) Never done    Diabetes: Foot Exam  Never done    Hepatitis C Screening  Never done    DTaP/Tdap/Td Vaccines (1 - Tdap) Never done    Zoster Vaccines (1 of 2) Never done    Pneumococcal Vaccine: Pediatrics (0 to 5 Years) and At-Risk Patients (6 to 64 Years) (2 - PPSV23 or PCV20) " 11/22/2018    Mammogram  07/25/2023    Colorectal Cancer Screening  09/23/2023    Lipid Panel  02/09/2024    Diabetes: Urine Protein Screening  02/09/2024          Assessment/Plan   Problem List Items Addressed This Visit       Bilateral lower extremity edema    Relevant Medications    furosemide (Lasix) 20 mg tablet    Tinea capitis    Relevant Medications    triamcinolone (Kenalog) 0.1 % cream    ketoconazole (NIZOral) 2 % shampoo    Paroxysmal atrial fibrillation (CMS/HCC)    Type 2 diabetes mellitus (CMS/HCC)    Relevant Medications    tirzepatide (Mounjaro) 2.5 mg/0.5 mL pen injector    Other Relevant Orders    POCT glycosylated hemoglobin (Hb A1C) manually resulted (Completed)    Lipid Panel    Albumin , Urine Random    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, with macular edema, associated with type 2 diabetes mellitus (CMS/HCC)     Other Visit Diagnoses       Routine general medical examination at health care facility    -  Primary    Relevant Orders    CBC    Comprehensive Metabolic Panel    Lipid Panel    Morbid obesity with body mass index (BMI) of 40.0 to 44.9 in adult (CMS/HCC)        Seasonal allergies        Relevant Medications    Flonase Allergy Relief 50 mcg/actuation nasal spray    Encounter for screening mammogram for breast cancer        Relevant Orders    BI mammo bilateral screening tomosynthesis    Screening for colorectal cancer        Relevant Orders    Colonoscopy Screening; Average Risk Patient    Hyperlipidemia, unspecified hyperlipidemia type        Relevant Medications    atorvastatin (Lipitor) 10 mg tablet    Primary hypertension        Relevant Orders    CBC    Vertigo        Relevant Orders    Referral to Physical Therapy    Chronic sinusitis, unspecified location        Relevant Orders    Referral to ENT        Her diabetes is very poorly controlled. We discussed the importance of diet. Decrease her sugars , processed carbs  Her blood pressure is elevated. She has  vertigo (bpv) and that is more from her inner ear.  Cont on her lisinopril.  Add atorvastatin. Side effects discussed  Increase her lantus to 34 units.  Wear her cgm.  Recommend weight loss.   Will add mounjaro to see if covered.  Colonoscopy ordered  Check labs in one month  Call  with any problems or questions.   Follow up in a month.

## 2024-01-24 ENCOUNTER — TELEPHONE (OUTPATIENT)
Dept: PRIMARY CARE | Facility: CLINIC | Age: 59
End: 2024-01-24
Payer: MEDICARE

## 2024-01-24 DIAGNOSIS — I10 HYPERTENSION, UNSPECIFIED TYPE: ICD-10-CM

## 2024-01-24 RX ORDER — ACETAMINOPHEN 500 MG
1 TABLET ORAL DAILY
COMMUNITY
End: 2024-01-26 | Stop reason: SDUPTHER

## 2024-01-24 RX ORDER — ACETAMINOPHEN 500 MG
1 TABLET ORAL DAILY
Qty: 1 KIT | Refills: 0 | Status: CANCELLED | OUTPATIENT
Start: 2024-01-24

## 2024-01-24 NOTE — TELEPHONE ENCOUNTER
Pt left vm stating she forgot to mention.  Has been taking Lisinopril x1 month.  Felt dizzy, lethargic yesterday.  Took /67.  BP 98/56 today.  Fatigued.  Rechecked before calling 101/70.  Questioned recommendations.

## 2024-01-24 NOTE — TELEPHONE ENCOUNTER
Pt reports she started new meds. Woke up with congestion, cough today.  Questioned what she can take w/new meds.

## 2024-01-26 ENCOUNTER — CLINICAL SUPPORT (OUTPATIENT)
Dept: PRIMARY CARE | Facility: CLINIC | Age: 59
End: 2024-01-26
Payer: MEDICARE

## 2024-01-26 VITALS
OXYGEN SATURATION: 98 % | BODY MASS INDEX: 40.67 KG/M2 | DIASTOLIC BLOOD PRESSURE: 60 MMHG | HEIGHT: 62 IN | TEMPERATURE: 97.2 F | WEIGHT: 221 LBS | SYSTOLIC BLOOD PRESSURE: 110 MMHG | RESPIRATION RATE: 14 BRPM | HEART RATE: 76 BPM

## 2024-01-26 DIAGNOSIS — I10 HYPERTENSION, UNSPECIFIED TYPE: ICD-10-CM

## 2024-01-26 DIAGNOSIS — B37.2 YEAST INFECTION OF THE SKIN: ICD-10-CM

## 2024-01-26 RX ORDER — NYSTATIN 100000 U/G
OINTMENT TOPICAL 2 TIMES DAILY
COMMUNITY
End: 2024-01-26 | Stop reason: SDUPTHER

## 2024-01-26 RX ORDER — NYSTATIN 100000 U/G
OINTMENT TOPICAL 2 TIMES DAILY
Qty: 30 G | Refills: 3 | Status: SHIPPED | OUTPATIENT
Start: 2024-01-26

## 2024-01-26 RX ORDER — NYSTATIN 100000 U/G
OINTMENT TOPICAL 2 TIMES DAILY
Qty: 30 G | Refills: 3 | Status: CANCELLED | OUTPATIENT
Start: 2024-01-26

## 2024-01-26 RX ORDER — ACETAMINOPHEN 500 MG
1 TABLET ORAL DAILY
Qty: 1 KIT | Refills: 0 | Status: CANCELLED | OUTPATIENT
Start: 2024-01-26

## 2024-01-26 RX ORDER — ACETAMINOPHEN 500 MG
1 TABLET ORAL DAILY
Qty: 1 KIT | Refills: 0 | Status: SHIPPED | OUTPATIENT
Start: 2024-01-26 | End: 2024-04-26 | Stop reason: ALTCHOICE

## 2024-01-26 NOTE — PROGRESS NOTES
NV blood pressure check  Since decreasing lisinopril to 1/2 tablet every day on Wednesday, denies dizziness.

## 2024-01-29 ENCOUNTER — APPOINTMENT (OUTPATIENT)
Dept: RADIOLOGY | Facility: CLINIC | Age: 59
End: 2024-01-29
Payer: MEDICARE

## 2024-02-05 ENCOUNTER — APPOINTMENT (OUTPATIENT)
Dept: PHYSICAL THERAPY | Facility: CLINIC | Age: 59
End: 2024-02-05
Payer: MEDICARE

## 2024-02-07 ENCOUNTER — CLINICAL SUPPORT (OUTPATIENT)
Dept: PHYSICAL THERAPY | Facility: CLINIC | Age: 59
End: 2024-02-07
Payer: MEDICARE

## 2024-02-07 ENCOUNTER — TELEPHONE (OUTPATIENT)
Dept: VASCULAR SURGERY | Facility: CLINIC | Age: 59
End: 2024-02-07

## 2024-02-07 ENCOUNTER — TELEMEDICINE (OUTPATIENT)
Dept: VASCULAR SURGERY | Facility: CLINIC | Age: 59
End: 2024-02-07
Payer: MEDICARE

## 2024-02-07 DIAGNOSIS — I83.12 VARICOSE VEINS OF BOTH LOWER EXTREMITIES WITH INFLAMMATION: ICD-10-CM

## 2024-02-07 DIAGNOSIS — R42 VERTIGO: Primary | ICD-10-CM

## 2024-02-07 DIAGNOSIS — I83.11 VARICOSE VEINS OF BOTH LOWER EXTREMITIES WITH INFLAMMATION: ICD-10-CM

## 2024-02-07 DIAGNOSIS — R42 VERTIGO: ICD-10-CM

## 2024-02-07 DIAGNOSIS — I87.2 VENOUS INSUFFICIENCY: Primary | ICD-10-CM

## 2024-02-07 PROCEDURE — 99213 OFFICE O/P EST LOW 20 MIN: CPT | Performed by: SURGERY

## 2024-02-07 PROCEDURE — 97110 THERAPEUTIC EXERCISES: CPT | Mod: GP | Performed by: GENERAL ACUTE CARE HOSPITAL

## 2024-02-07 PROCEDURE — 1036F TOBACCO NON-USER: CPT | Performed by: SURGERY

## 2024-02-07 PROCEDURE — 4010F ACE/ARB THERAPY RXD/TAKEN: CPT | Performed by: SURGERY

## 2024-02-07 PROCEDURE — 97162 PT EVAL MOD COMPLEX 30 MIN: CPT | Mod: GP | Performed by: GENERAL ACUTE CARE HOSPITAL

## 2024-02-07 PROCEDURE — 3008F BODY MASS INDEX DOCD: CPT | Performed by: SURGERY

## 2024-02-07 RX ORDER — HYDROCHLOROTHIAZIDE 12.5 MG/1
12.5 CAPSULE ORAL
COMMUNITY
End: 2024-04-26 | Stop reason: ALTCHOICE

## 2024-02-07 RX ORDER — CIPROFLOXACIN 500 MG/1
500 TABLET ORAL EVERY 12 HOURS
COMMUNITY
Start: 2021-03-13 | End: 2024-02-23 | Stop reason: WASHOUT

## 2024-02-07 ASSESSMENT — ENCOUNTER SYMPTOMS
BRUISES/BLEEDS EASILY: 0
CONSTITUTIONAL NEGATIVE: 1
GASTROINTESTINAL NEGATIVE: 1
DIZZINESS: 0
HEADACHES: 0
WOUND: 0
NUMBNESS: 0
ENDOCRINE NEGATIVE: 1
EYES NEGATIVE: 1
SPEECH DIFFICULTY: 0
SHORTNESS OF BREATH: 0
BACK PAIN: 0
COLOR CHANGE: 0
COUGH: 0
WEAKNESS: 0
PSYCHIATRIC NEGATIVE: 1

## 2024-02-07 NOTE — PROGRESS NOTES
History Of Present Illness  Holly Chavez is a 58 y.o. female presenting via telephone visit for follow-up of venous insufficiency and leg edema.  Her last visit was 6 months ago.  She states that since that time her swelling has improved.  She has been wearing compression stockings daily and states that this has helped with her symptoms.     Past Medical History  She has a past medical history of Acute upper respiratory infection, unspecified (11/13/2018), Benign paroxysmal vertigo, unspecified ear (12/04/2017), Cutaneous abscess of groin (12/04/2017), Cutaneous abscess of limb, unspecified (09/20/2018), Morbid (severe) obesity due to excess calories (CMS/[facility]) (07/08/2019), Personal history of diseases of the skin and subcutaneous tissue (11/20/2015), Personal history of diseases of the skin and subcutaneous tissue (09/27/2018), Personal history of other endocrine, nutritional and metabolic disease, Personal history of other endocrine, nutritional and metabolic disease (08/11/2015), Personal history of other endocrine, nutritional and metabolic disease (04/16/2019), Personal history of other endocrine, nutritional and metabolic disease (06/06/2019), Rash and other nonspecific skin eruption (08/11/2015), Rash and other nonspecific skin eruption (08/11/2015), Type 1 diabetes mellitus with mild nonproliferative retinopathy and macular edema (CMS/HCC) (11/09/2015), Type 1 diabetes mellitus with mild nonproliferative retinopathy and macular edema (CMS/HCC) (11/09/2015), Type 1 diabetes mellitus with mild nonproliferative retinopathy and macular edema (CMS/HCC) (11/10/2015), Type 1 diabetes mellitus with mild nonproliferative retinopathy and macular edema (CMS/HCC) (11/12/2015), Type 2 diabetes mellitus with mild nonproliferative diabetic retinopathy without macular edema, unspecified eye (CMS/[facility]) (11/09/2015), Type 2 diabetes mellitus with mild nonproliferative diabetic retinopathy without macular edema, unspecified eye  (CMS/[facility]) (11/09/2015), Type 2 diabetes mellitus with mild nonproliferative diabetic retinopathy without macular edema, unspecified eye (CMS/[facility]) (11/10/2015), and Type 2 diabetes mellitus with mild nonproliferative diabetic retinopathy without macular edema, unspecified eye (CMS/[facility]) (11/12/2015).    Surgical History  She has a past surgical history that includes Tubal ligation (08/11/2015); Hernia repair (08/11/2015); and Cervical biopsy w/ loop electrode excision (04/12/2016).     Social History  She reports that she has quit smoking. Her smoking use included cigarettes. She has never used smokeless tobacco. She reports current alcohol use. She reports that she does not use drugs.    Family History  Family History   Problem Relation Name Age of Onset    Stroke Mother      Diabetes Mother      Other (cerebrovascular accident) Mother      Heart failure Father      Diabetes Father      Pancreatic cancer Father      Breast cancer Mother's Sister      Other (cardiac disorder) Other      Diabetes Other      Hypertension Other      Cancer Other      Kidney disease Other      Breast cancer Other aunt     Glaucoma Neg Hx      Macular degeneration Neg Hx          Allergies  Adhesive tape-silicones and Codeine    Review of Systems   Constitutional: Negative.    HENT: Negative.     Eyes: Negative.    Respiratory:  Negative for cough and shortness of breath.    Cardiovascular:  Positive for leg swelling. Negative for chest pain.   Gastrointestinal: Negative.    Endocrine: Negative.    Genitourinary: Negative.    Musculoskeletal:  Negative for back pain and gait problem.   Skin:  Negative for color change, pallor and wound.   Neurological:  Negative for dizziness, syncope, speech difficulty, weakness, numbness and headaches.   Hematological:  Does not bruise/bleed easily.   Psychiatric/Behavioral: Negative.          Physical Exam     Last Recorded Vitals  There were no vitals taken for this visit.    Relevant  Results      Current Outpatient Medications:     albuterol 2.5 mg /3 mL (0.083 %) nebulizer solution, Take 3 mL (2.5 mg) by nebulization 4 times a day as needed for wheezing or shortness of breath., Disp: 75 mL, Rfl: 3    albuterol 90 mcg/actuation inhaler, Inhale 2 puffs every 4 hours if needed for wheezing or shortness of breath., Disp: 18 g, Rfl: 3    ammonium lactate (Lac-Hydrin) 12 % lotion, APPLY 1 APPLICATION ONCE DAILY, Disp: , Rfl:     apixaban (Eliquis) 5 mg tablet, Take 1 tablet (5 mg) by mouth once daily., Disp: 90 tablet, Rfl: 3    atorvastatin (Lipitor) 10 mg tablet, Take 1 tablet (10 mg) by mouth once daily., Disp: 30 tablet, Rfl: 5    Autolet (OneTouch Delica Plus Lanc Dev) lancing device, Use as instructed, Disp: 1 each, Rfl: 0    benzonatate (Tessalon) 200 mg capsule, Take 1 capsule (200 mg) by mouth 3 times a day as needed., Disp: , Rfl:     BENZONATATE ORAL, , Disp: , Rfl:     blood pressure monitor kit, 1 each once daily., Disp: 1 kit, Rfl: 0    cholecalciferol (Vitamin D-3) 125 MCG (5000 UT) capsule, Take by mouth., Disp: , Rfl:     ciprofloxacin (Cipro) 500 mg tablet, Take 1 tablet (500 mg) by mouth every 12 hours., Disp: , Rfl:     cyanocobalamin, vitamin B-12, (Vitamin B-12) 1,000 mcg tablet extended release, Take 1 tablet (1,000 mcg) by mouth once daily., Disp: , Rfl:     Dexcom G4 platinum  (Dexcom G6 ) misc, , Disp: , Rfl:     Flonase Allergy Relief 50 mcg/actuation nasal spray, Administer 2 sprays into each nostril once daily., Disp: 16 g, Rfl: 2    furosemide (Lasix) 20 mg tablet, Take 1 tablet (20 mg) by mouth 3 times a week. 3 times a week as needed, Disp: 36 tablet, Rfl: 3    guaifenesin (MUCUS RELIEF ORAL), , Disp: , Rfl:     hydroCHLOROthiazide (Microzide) 12.5 mg capsule, Take 1 capsule (12.5 mg) by mouth., Disp: , Rfl:     insulin glargine (Lantus) 100 unit/mL (3 mL) pen, Inject 34 Units under the skin once daily in the morning., Disp: 30 mL, Rfl: 3    insulin  "lispro (HumaLOG) 100 unit/mL injection, Inject 60 Units under the skin once daily. As directed in divided doses (max daily dose 60 units), Disp: 60 mL, Rfl: 2    ketoconazole (NIZOral) 2 % shampoo, Apply topically every other day. Apply to scalp every other day for 5 minutes and rinse, Disp: 120 mL, Rfl: 3    lancets misc, , Disp: , Rfl:     lisinopril 2.5 mg tablet, Take 1 tablet (2.5 mg) by mouth once daily., Disp: 30 tablet, Rfl: 5    miscellaneous medical supply misc, once daily.  thigh high Daily, Disp: , Rfl:     NON FORMULARY, Accu-Chek Sherley Plus, Disp: , Rfl:     nystatin (Mycostatin) ointment, Apply topically 2 times a day., Disp: 30 g, Rfl: 3    ondansetron ODT (Zofran-ODT) 4 mg disintegrating tablet, Take 1 tablet (4 mg) by mouth every 8 hours if needed., Disp: , Rfl:     OneTouch Ultra Test strip, Test 4 times daily, Disp: , Rfl:     pen needle, diabetic 32 gauge x 5/32\" needle, 4 Needles once daily. Use with insulin injections four times daily, Disp: 400 each, Rfl: 3    tirzepatide (Mounjaro) 2.5 mg/0.5 mL pen injector, Inject 2.5 mg under the skin 1 (one) time per week., Disp: 2 mL, Rfl: 1    triamcinolone (Kenalog) 0.1 % cream, Apply topically 2 times a day. APPLY EXTERNALLY TO THE AFFECTED AREA 2 TO 3 TIMES DAILY, Disp: 45 g, Rfl: 3    ALPRAZolam (Xanax) 0.25 mg tablet, Take 1 tablet (0.25 mg) by mouth 3 times a day as needed for anxiety (prn anxiety surrounding medical procedures) for up to 6 days., Disp: 18 tablet, Rfl: 0          Assessment/Plan   Diagnoses and all orders for this visit:  Venous insufficiency  Varicose veins of both lower extremities with inflammation      57yo female with known venous insufficiency.  Her symptoms have improved significantly with conservative management.  I have recommended that she continue wearing compression stockings daily.  She should also continue with daily walking exercise and elevating her legs at rest.  She is to follow-up in the vascular office in 1 " year, or sooner if needed       I spent 20 minutes in the professional and overall care of this patient.      Dana Ravi MD

## 2024-02-07 NOTE — PROGRESS NOTES
Patient Name: Holly Chavez  MRN: [id number]  Today's Date: 2/7/2024     Current Problem:  1. Vertigo  Referral to Physical Therapy        Visit 1/10    Cause of symptoms: nothing that can be recalled, years ago took meds for dizziness   Severity of Symptoms: 0/10, 2/10 when getting up from sitting, standing looking up and turn head fast 3-4-10, 7/10   COVID 2 years ago   Previous treatment/effect: anti dizziness med 2 monnths and dizziness subsided   PMH related to problems: DM, HTN, afib   Psychological (depression/anxiety):  Falls/ near falls: nonoe   Functional limitations: none   Occupation/Hobbies: disability,   Sleep: sleep so so   Red Flags: none  Cranial Nerves: WFL  Spontaneous Nystagmus:   Gaze Holding Nystagmus: -  Smooth Pursuit: -negative  Saccadic Eye Movement: -  VOR (slow): -  VOR (Head Thrust): -  VOR cancellation: -  Convergence: 10 cm  Pupillary reflex: normal    SLS eyes open (R/L): 5/7  SLS eyes closed (R/L):  <3/<3     Gait: independent   [place] Hallpike: -    Cervical Spine:  Cervical ROM (degrees) AROM(PROM)   Cervical Flexion: 42   Cervical Extension:29   Cervical Rotation R/L:    61()  64 ()   Cervical Sidebend R/L:    29() 31  ()    Evaluation, Tests and measures, Education including HEP instruction and feedback. Patient appears to be compliant and motivated to perform HEP as instructed and participate in active physical therapy as indicated.The patient was educated on: the importance of positioning, proper posture, and body mechanics signs and symptoms of Vertigo BPPV peripheral vs central origin. .  Patient's questions were answered to their satisfaction, & patient verbalized understanding & agreement with POC.  The patient presents to Physical Therapy with signs and symptoms consistent with the medical diagnosis of vertigo. Key impairments include:  dizziness unsteadiness and difficulty with functional activities such as changing positions . Skilled PT is required to address these key  impairments and to provide and progress with an appropriate home exercise program. This evaluation is of moderate complexity due to the stable/changing/unstable nature of the patient's presentation as well as the comorbidities and medical factors included in this evaluation.  Treatment may include: Therapeutic Exercise (PROM, AA/AROM, Flexibility, Strengthening, Stabilization, HEP instruction). Therapeutic Activities (Transfers, Body Mechanics, Work Related Activities, Closed Chain, Agility and Power).   Manual Techniques (Soft tissue Mobilization, Joint mobilization/Distraction, Muscle Energy Techniques, Lymphatic Drainage, Dry Needling).  Neuromuscular Reeducation (Postural Training, Balance/Proprioception, Relaxation Techniques). Biofeedback. Aquatic Exercise. Modalities: Ultrasound, Moist Heat, Cryotherapy, Vasopneumatic with or without Cryotherapy. Electrical Stimulation (TENS/IFC/ Pre modulated for pain relief, NMES for Muscle reeducation). Gait Training. Orthotic Fit and Training. Strapping, Kinesio taping.     Cervical AROM will be >/=: Flexion 50  deg, Extension 60  deg, Side Bend  30 deg, Rotation 70  deg, to demonstrate improved joint mechanics during functional activities.    Patient will have >/= 30 seconds with cervical neck flexion test to demonstrate improved cervical stabilization.    Patient will improve cervical deep neck flexion through biofeedback holding 30 mmHg for 10 seconds for 10 repetitions.    Patient will demonstrate improved posture without cueing through therapy session indicated by neutral cervical tilt, neutral shoulder position, and decreased cervical lordosis    Patient will correctly perform home exercise program independently, demonstrated through patient teach back upon discharge.

## 2024-02-07 NOTE — LETTER
February 7, 2024    Florentin Vargas, ISABELLE  [address] [place] [address], [address] [address]    Patient: Holly Chavez   YOB: 1965   Date of Visit: 2/7/2024       Dear Johanna Salas DO  [address] [facility] [place] [address]    The attached plan of care is being sent to you because your patient’s medical reimbursement requires that you certify the plan of care. Your signature is required to allow uninterrupted insurance coverage.      You may indicate your approval by signing below and faxing this form back to us at Dept Fax: [phone].    Please call Dept: [phone] with any questions or concerns.    Thank you for this referral,        Florentin Vargas PT  [address]  [address] [place] [address]    Payer: Payor: MEDICARE / Plan: MEDICARE PART A AND B / Product Type: *No Product type* /                                                                         Date:     Dear Florentin Vargas, PT,     Re: Ms. Holly Chavez, MRN:[id number]    I certify that I have reviewed the attached plan of care and it is medically necessary for Ms. Holly Chavez (1965) who is under my care.          ______________________________________                    _________________  Provider name and credentials                                           Date and time                                                                                           Plan of Care 2/7/24   Effective from: 2/7/2024  Effective to: 5/7/2024    Plan ID: [id number]            Participants as of Finalize on 2/7/2024    Name Type Comments Contact Info    Johanna Salas DO PCP - General  [phone]    Florentin Vargas PT Physical Therapist  [phone]       Last Plan Note     Author: Florentin Vargas PT Status: Incomplete Last edited: 2/7/2024 10:00 AM       Patient Name: Holly Chavez  MRN: [id number]  Today's Date: 2/7/2024     Current Problem:  1. Vertigo   Referral to Physical Therapy        Visit 1/10    Cause of symptoms: nothing that can be recalled, years ago took meds for dizziness   Severity of Symptoms: 0/10, 2/10 when getting up from sitting, standing looking up and turn head fast 3-4-10, 7/10   COVID 2 years ago   Previous treatment/effect: anti dizziness med 2 monnths and dizziness subsided   PMH related to problems: DM, HTN, afib   Psychological (depression/anxiety):  Falls/ near falls: nonoe   Functional limitations: none   Occupation/Hobbies: disability,   Sleep: sleep so so   Red Flags: none  Cranial Nerves: WFL  Spontaneous Nystagmus:   Gaze Holding Nystagmus: -  Smooth Pursuit: -negative  Saccadic Eye Movement: -  VOR (slow): -  VOR (Head Thrust): -  VOR cancellation: -  Convergence: 10 cm  Pupillary reflex: normal    SLS eyes open (R/L): 5/7  SLS eyes closed (R/L):  <3/<3     Gait: independent   [place] Hallpike: -    Cervical Spine:  Cervical ROM (degrees) AROM(PROM)   Cervical Flexion: 42   Cervical Extension:29   Cervical Rotation R/L:    61()  64 ()   Cervical Sidebend R/L:    29() 31  ()    Evaluation, Tests and measures, Education including HEP instruction and feedback. Patient appears to be compliant and motivated to perform HEP as instructed and participate in active physical therapy as indicated.The patient was educated on: the importance of positioning, proper posture, and body mechanics signs and symptoms of Vertigo BPPV peripheral vs central origin. .  Patient's questions were answered to their satisfaction, & patient verbalized understanding & agreement with POC.  The patient presents to Physical Therapy with signs and symptoms consistent with the medical diagnosis of vertigo. Key impairments include:  dizziness unsteadiness and difficulty with functional activities such as changing positions . Skilled PT is required to address these key impairments and to provide and progress with an appropriate home exercise program. This evaluation is of  moderate complexity due to the stable/changing/unstable nature of the patient's presentation as well as the comorbidities and medical factors included in this evaluation.  Treatment may include: Therapeutic Exercise (PROM, AA/AROM, Flexibility, Strengthening, Stabilization, HEP instruction). Therapeutic Activities (Transfers, Body Mechanics, Work Related Activities, Closed Chain, Agility and Power).   Manual Techniques (Soft tissue Mobilization, Joint mobilization/Distraction, Muscle Energy Techniques, Lymphatic Drainage, Dry Needling).  Neuromuscular Reeducation (Postural Training, Balance/Proprioception, Relaxation Techniques). Biofeedback. Aquatic Exercise. Modalities: Ultrasound, Moist Heat, Cryotherapy, Vasopneumatic with or without Cryotherapy. Electrical Stimulation (TENS/IFC/ Pre modulated for pain relief, NMES for Muscle reeducation). Gait Training. Orthotic Fit and Training. Strapping, Kinesio taping.     Cervical AROM will be >/=: Flexion 50  deg, Extension 60  deg, Side Bend  30 deg, Rotation 70  deg, to demonstrate improved joint mechanics during functional activities.    Patient will have >/= 30 seconds with cervical neck flexion test to demonstrate improved cervical stabilization.    Patient will improve cervical deep neck flexion through biofeedback holding 30 mmHg for 10 seconds for 10 repetitions.    Patient will demonstrate improved posture without cueing through therapy session indicated by neutral cervical tilt, neutral shoulder position, and decreased cervical lordosis    Patient will correctly perform home exercise program independently, demonstrated through patient teach back upon discharge.          Current Participants as of 2/7/2024    Name Type Comments Contact Info    Johanna Salas DO PCP - General  [phone]    Signature pending    Florentin Vargas PT Physical Therapist  [phone]    Signature pending

## 2024-02-13 ENCOUNTER — HOSPITAL ENCOUNTER (OUTPATIENT)
Dept: RADIOLOGY | Facility: CLINIC | Age: 59
Discharge: HOME | End: 2024-02-13
Payer: MEDICARE

## 2024-02-13 ENCOUNTER — OFFICE VISIT (OUTPATIENT)
Dept: OTOLARYNGOLOGY | Facility: CLINIC | Age: 59
End: 2024-02-13
Payer: MEDICARE

## 2024-02-13 VITALS
DIASTOLIC BLOOD PRESSURE: 75 MMHG | SYSTOLIC BLOOD PRESSURE: 114 MMHG | BODY MASS INDEX: 40.67 KG/M2 | TEMPERATURE: 97.4 F | WEIGHT: 221 LBS | HEIGHT: 62 IN

## 2024-02-13 DIAGNOSIS — J34.3 HYPERTROPHY OF BOTH INFERIOR NASAL TURBINATES: ICD-10-CM

## 2024-02-13 DIAGNOSIS — Z12.31 ENCOUNTER FOR SCREENING MAMMOGRAM FOR BREAST CANCER: ICD-10-CM

## 2024-02-13 DIAGNOSIS — J30.9 CHRONIC ALLERGIC RHINITIS: ICD-10-CM

## 2024-02-13 DIAGNOSIS — K21.9 LARYNGOPHARYNGEAL REFLUX (LPR): Primary | ICD-10-CM

## 2024-02-13 PROCEDURE — 77067 SCR MAMMO BI INCL CAD: CPT | Performed by: RADIOLOGY

## 2024-02-13 PROCEDURE — 3074F SYST BP LT 130 MM HG: CPT | Performed by: OTOLARYNGOLOGY

## 2024-02-13 PROCEDURE — 77063 BREAST TOMOSYNTHESIS BI: CPT | Performed by: RADIOLOGY

## 2024-02-13 PROCEDURE — 31575 DIAGNOSTIC LARYNGOSCOPY: CPT | Performed by: OTOLARYNGOLOGY

## 2024-02-13 PROCEDURE — 3008F BODY MASS INDEX DOCD: CPT | Performed by: OTOLARYNGOLOGY

## 2024-02-13 PROCEDURE — 3078F DIAST BP <80 MM HG: CPT | Performed by: OTOLARYNGOLOGY

## 2024-02-13 PROCEDURE — 1036F TOBACCO NON-USER: CPT | Performed by: OTOLARYNGOLOGY

## 2024-02-13 PROCEDURE — 99204 OFFICE O/P NEW MOD 45 MIN: CPT | Performed by: OTOLARYNGOLOGY

## 2024-02-13 PROCEDURE — 4010F ACE/ARB THERAPY RXD/TAKEN: CPT | Performed by: OTOLARYNGOLOGY

## 2024-02-13 PROCEDURE — 77067 SCR MAMMO BI INCL CAD: CPT

## 2024-02-13 RX ORDER — OMEPRAZOLE 20 MG/1
CAPSULE, DELAYED RELEASE ORAL
Qty: 90 CAPSULE | Refills: 3 | Status: SHIPPED | OUTPATIENT
Start: 2024-02-13

## 2024-02-13 ASSESSMENT — PATIENT HEALTH QUESTIONNAIRE - PHQ9
1. LITTLE INTEREST OR PLEASURE IN DOING THINGS: NOT AT ALL
2. FEELING DOWN, DEPRESSED OR HOPELESS: NOT AT ALL
SUM OF ALL RESPONSES TO PHQ9 QUESTIONS 1 AND 2: 0

## 2024-02-13 NOTE — PROGRESS NOTES
Impression:  1. Laryngopharyngeal reflux (LPR)  omeprazole (PriLOSEC) 20 mg DR capsule      2. Chronic allergic rhinitis  Referral to ENT      3. Hypertrophy of both inferior nasal turbinates              RECOMMENDATIONS/PLAN :  I reassured the patient there is no evidence of any infectious drainage from her sinuses however her nose does appear allergic.  We will restart Flonase nasal spray-2 puffs each nostril daily for the next 3 months.  I want her to drink plenty of water and she will start rinsing her nose using a sinus rinse kit.  We will also start her on omeprazole 20 mg daily for the next 6 months.  She will call and let us know how she is doing over the next 5 to 6 months and can otherwise follow-up as needed.      **This electronic medical record note was created with the use of voice recognition software.  Despite proofreading, typographical or grammatical errors may be present that could affect meaning of content **    Subjective   Patient ID:     Holly Chavez is a 58 y.o. female who presents to the office today complaining of nasal congestion with postnasal drip.  Most of the time her drainage is clear and thick.  Currently she is not rinsing her sinuses.  She denies repetitive sinus infections.  She also complains of a fullness sensation in her throat with persistent throat clearing.  She thought this was postnasal drip.  Sometimes she does have heartburn and she is not taking any medication right now.  She denies any hemoptysis or hematemesis.    ROS:  A detailed 12 system review of systems is noted on the intake form has been reviewed with the patient with details noted in the HPI and scanned into the patient's medical record.    Objective     Past Medical History:   Diagnosis Date    Acute upper respiratory infection, unspecified 11/13/2018    Acute URI    Benign paroxysmal vertigo, unspecified ear 12/04/2017    Benign positional vertigo    Cutaneous abscess of groin 12/04/2017    Abscess of groin,  left    Cutaneous abscess of limb, unspecified 09/20/2018    Abscess of axilla    Morbid (severe) obesity due to excess calories (CMS/HCC) 07/08/2019    Morbid obesity due to excess calories    Personal history of diseases of the skin and subcutaneous tissue 11/20/2015    History of skin pruritus    Personal history of diseases of the skin and subcutaneous tissue 09/27/2018    History of hidradenitis suppurativa    Personal history of other endocrine, nutritional and metabolic disease     History of diabetes mellitus    Personal history of other endocrine, nutritional and metabolic disease 08/11/2015    History of uncontrolled diabetes    Personal history of other endocrine, nutritional and metabolic disease 04/16/2019    History of diabetes mellitus    Personal history of other endocrine, nutritional and metabolic disease 06/06/2019    History of diabetes mellitus    Rash and other nonspecific skin eruption 08/11/2015    Rash    Rash and other nonspecific skin eruption 08/11/2015    Rash    Type 1 diabetes mellitus with mild nonproliferative retinopathy and macular edema (CMS/HCC) 11/09/2015    Background diabetic macular edema, with mild nonproliferative retinopathy, associated with type 1 diabetes mellitus    Type 1 diabetes mellitus with mild nonproliferative retinopathy and macular edema (CMS/HCC) 11/09/2015    Background diabetic macular edema, with mild nonproliferative retinopathy, associated with type 1 diabetes mellitus    Type 1 diabetes mellitus with mild nonproliferative retinopathy and macular edema (CMS/HCC) 11/10/2015    Background diabetic macular edema, with mild nonproliferative retinopathy, associated with type 1 diabetes mellitus    Type 1 diabetes mellitus with mild nonproliferative retinopathy and macular edema (CMS/HCC) 11/12/2015    Background diabetic macular edema, with mild nonproliferative retinopathy, associated with type 1 diabetes mellitus    Type 2 diabetes mellitus with mild  nonproliferative diabetic retinopathy without macular edema, unspecified eye (CMS/[facility]) 11/09/2015    Mild nonproliferative diabetic retinopathy without macular edema    Type 2 diabetes mellitus with mild nonproliferative diabetic retinopathy without macular edema, unspecified eye (CMS/HCC) 11/09/2015    Mild nonproliferative diabetic retinopathy without macular edema    Type 2 diabetes mellitus with mild nonproliferative diabetic retinopathy without macular edema, unspecified eye (CMS/HCC) 11/10/2015    Mild nonproliferative diabetic retinopathy without macular edema    Type 2 diabetes mellitus with mild nonproliferative diabetic retinopathy without macular edema, unspecified eye (CMS/HCC) 11/12/2015    Mild nonproliferative diabetic retinopathy without macular edema        Past Surgical History:   Procedure Laterality Date    CERVICAL BIOPSY  W/ LOOP ELECTRODE EXCISION  04/12/2016    Cervical Loop Electrosurgical Excision (LEEP)    HERNIA REPAIR  08/11/2015    Hernia Repair    TUBAL LIGATION  08/11/2015    Tubal Ligation        Allergies   Allergen Reactions    Adhesive Tape-Silicones Unknown    Codeine Unknown          Current Outpatient Medications:     ammonium lactate (Lac-Hydrin) 12 % lotion, APPLY 1 APPLICATION ONCE DAILY, Disp: , Rfl:     apixaban (Eliquis) 5 mg tablet, Take 1 tablet (5 mg) by mouth once daily., Disp: 90 tablet, Rfl: 3    atorvastatin (Lipitor) 10 mg tablet, Take 1 tablet (10 mg) by mouth once daily., Disp: 30 tablet, Rfl: 5    Autolet (OneTouch Delica Plus Lanc Dev) lancing device, Use as instructed, Disp: 1 each, Rfl: 0    blood pressure monitor kit, 1 each once daily., Disp: 1 kit, Rfl: 0    cholecalciferol (Vitamin D-3) 125 MCG (5000 UT) capsule, Take by mouth., Disp: , Rfl:     cyanocobalamin, vitamin B-12, (Vitamin B-12) 1,000 mcg tablet extended release, Take 1 tablet (1,000 mcg) by mouth once daily., Disp: , Rfl:     Flonase Allergy Relief 50 mcg/actuation nasal spray, Administer 2  sprays into each nostril once daily., Disp: 16 g, Rfl: 2    insulin glargine (Lantus) 100 unit/mL (3 mL) pen, Inject 34 Units under the skin once daily in the morning., Disp: 30 mL, Rfl: 3    insulin lispro (HumaLOG) 100 unit/mL injection, Inject 60 Units under the skin once daily. As directed in divided doses (max daily dose 60 units), Disp: 60 mL, Rfl: 2    ketoconazole (NIZOral) 2 % shampoo, Apply topically every other day. Apply to scalp every other day for 5 minutes and rinse, Disp: 120 mL, Rfl: 3    lisinopril 2.5 mg tablet, Take 1 tablet (2.5 mg) by mouth once daily., Disp: 30 tablet, Rfl: 5    nystatin (Mycostatin) ointment, Apply topically 2 times a day., Disp: 30 g, Rfl: 3    tirzepatide (Mounjaro) 2.5 mg/0.5 mL pen injector, Inject 2.5 mg under the skin 1 (one) time per week., Disp: 2 mL, Rfl: 1    triamcinolone (Kenalog) 0.1 % cream, Apply topically 2 times a day. APPLY EXTERNALLY TO THE AFFECTED AREA 2 TO 3 TIMES DAILY, Disp: 45 g, Rfl: 3    albuterol 2.5 mg /3 mL (0.083 %) nebulizer solution, Take 3 mL (2.5 mg) by nebulization 4 times a day as needed for wheezing or shortness of breath., Disp: 75 mL, Rfl: 3    albuterol 90 mcg/actuation inhaler, Inhale 2 puffs every 4 hours if needed for wheezing or shortness of breath., Disp: 18 g, Rfl: 3    ALPRAZolam (Xanax) 0.25 mg tablet, Take 1 tablet (0.25 mg) by mouth 3 times a day as needed for anxiety (prn anxiety surrounding medical procedures) for up to 6 days., Disp: 18 tablet, Rfl: 0    benzonatate (Tessalon) 200 mg capsule, Take 1 capsule (200 mg) by mouth 3 times a day as needed., Disp: , Rfl:     BENZONATATE ORAL, , Disp: , Rfl:     ciprofloxacin (Cipro) 500 mg tablet, Take 1 tablet (500 mg) by mouth every 12 hours., Disp: , Rfl:     Dexcom G4 platinum  (Dexcom G6 ) misc, , Disp: , Rfl:     furosemide (Lasix) 20 mg tablet, Take 1 tablet (20 mg) by mouth 3 times a week. 3 times a week as needed, Disp: 36 tablet, Rfl: 3    guaifenesin  "(MUCUS RELIEF ORAL), , Disp: , Rfl:     hydroCHLOROthiazide (Microzide) 12.5 mg capsule, Take 1 capsule (12.5 mg) by mouth., Disp: , Rfl:     lancets misc, , Disp: , Rfl:     miscellaneous medical supply misc, once daily.  thigh high Daily, Disp: , Rfl:     NON FORMULARY, Accu-Chek Sherley Plus, Disp: , Rfl:     omeprazole (PriLOSEC) 20 mg DR capsule, 20 mg by mouth daily, Disp: 90 capsule, Rfl: 3    ondansetron ODT (Zofran-ODT) 4 mg disintegrating tablet, Take 1 tablet (4 mg) by mouth every 8 hours if needed., Disp: , Rfl:     OneTouch Ultra Test strip, Test 4 times daily, Disp: , Rfl:     pen needle, diabetic 32 gauge x 5/32\" needle, 4 Needles once daily. Use with insulin injections four times daily, Disp: 400 each, Rfl: 3     Tobacco Use: Medium Risk (2/13/2024)    Patient History     Smoking Tobacco Use: Former     Smokeless Tobacco Use: Never     Passive Exposure: Not on file        Alcohol Use: Unknown (5/30/2023)    AUDIT-C     Frequency of Alcohol Consumption: Monthly or less     Average Number of Drinks: Not on file     Frequency of Binge Drinking: Not on file        Social History     Substance and Sexual Activity   Drug Use Never        Physical Exam:  Visit Vitals  /75   Temp 36.3 °C (97.4 °F) (Temporal)   Ht 1.575 m (5' 2\")   Wt 100 kg (221 lb)   BMI 40.42 kg/m²   Smoking Status Former   BSA 2.09 m²      General: Patient is alert, oriented, cooperative in no apparent distress.  Head: Normocephalic, atraumatic.  Eyes: PERRL, EOMI, Conjunctiva is clear. No nystagmus.  Ears: Right Ear-- Pinna is normal.  External auditory canal is patent. Tympanic membrane is [intact, translucent and has good mobility with my pneumatic otoscope. No effusion].  Mastoid is nontender.  Left ear-- Pinna is normal.  External auditory canal is patent. Tympanic membrane is [intact, translucent and has good mobility with my pneumatic otoscope.  No effusion].  Mastoid is nontender.  Nose: Septum is mildly deviated to the left.  " No septal perforation or lesions. No septal hematoma/ seroma.  No signs of bleeding.  Inferior turbinates are moderately swollen and pale.   No evidence of intranasal polyps.  No infectious drainage.  Throat:  Floor of mouth is clear, no masses.  Tongue appears normal, no lesions or masses. Gums, gingiva, buccal mucosa appear pink and moist, no lesions.  Edentulous upper and lower.  Mouth is extremely dry.  Peritonsillar regions appear symmetric without swelling.  Hard and soft palate appear normal, no obvious cleft. Uvula is midline.  Oropharynx: No lesions. Retropharyngeal wall is flat.  No active postnasal drip.  Neck: Supple,  no lymphadenopathy.  No masses.  Salivary Glands: Symmetric bilaterally.  No palpable masses.  No evidence of acute infection or salivary stones  Neurologic: Cranial Nerves 2-12 are grossly intact without focal deficits. Cerebellar function testing is normal.     Results:   []    Procedure:   Pre Op Diagnosis: Fullness sensation in the throat-rule out upper airway swelling  Post Op Diagnosis: Same  Procedure: Flexible Nasopharyngolaryngoscopy  Surgeon: Cristofer Morris DO  Assist: None  Anesthesia: Topical Lidocaine 4%/ 0.05% Afrin 1:1 mixture  EBL: None  Complications: None  Disposition: The patient tolerated the procedure well. There were no complications.    Procedure:  After informed consent was obtained with the risks, benefits, complications and alternatives explained to the patient/ guardian, the patient was sat up in the ENT chair and the nose was anesthetized and decongested with topical  4% lidocaine and 0.05% Afrin. After allowing this to take effect, the flexible nasopharyngoscope was advanced thru the nostrils and down to the larynx. The following areas were visualized:  The nasal passages, septum, nasopharynx, sinus ostia, base of tongue, epiglottis, true and false vocal folds, arytenoids, post cricoid region and subglottis were all examined and found to be normal except as  follows:  Septum has a slight spur with deviation to the left.  Ostiomeatal complexes are clear bilaterally.  No pus polyps or lesions.  Nasopharynx is clear.  No postnasal drip.  Base of tongue clear.  Epiglottis is normal.  True and false vocal cords are approximating equally bilaterally.  No nodules polyps or lesions.  Arytenoids show mild to moderate edema consistent with laryngopharyngeal reflux.  Subglottis is clear.      The patient tolerated the procedure well and there were no complications.      Cristofer Morris, DO

## 2024-02-20 ENCOUNTER — LAB (OUTPATIENT)
Dept: LAB | Facility: LAB | Age: 59
End: 2024-02-20
Payer: MEDICARE

## 2024-02-20 DIAGNOSIS — E11.65 TYPE 2 DIABETES MELLITUS WITH HYPERGLYCEMIA, WITH LONG-TERM CURRENT USE OF INSULIN (MULTI): ICD-10-CM

## 2024-02-20 DIAGNOSIS — I10 PRIMARY HYPERTENSION: ICD-10-CM

## 2024-02-20 DIAGNOSIS — Z00.00 ROUTINE GENERAL MEDICAL EXAMINATION AT HEALTH CARE FACILITY: ICD-10-CM

## 2024-02-20 DIAGNOSIS — Z79.4 TYPE 2 DIABETES MELLITUS WITH HYPERGLYCEMIA, WITH LONG-TERM CURRENT USE OF INSULIN (MULTI): ICD-10-CM

## 2024-02-20 LAB
ALBUMIN SERPL BCP-MCNC: 4.1 G/DL (ref 3.4–5)
ALP SERPL-CCNC: 73 U/L (ref 33–136)
ALT SERPL W P-5'-P-CCNC: 12 U/L (ref 7–45)
ANION GAP SERPL CALC-SCNC: 11 MMOL/L (ref 10–20)
AST SERPL W P-5'-P-CCNC: 13 U/L (ref 9–39)
BILIRUB SERPL-MCNC: 0.9 MG/DL (ref 0–1.2)
BUN SERPL-MCNC: 20 MG/DL (ref 6–23)
CALCIUM SERPL-MCNC: 9.4 MG/DL (ref 8.6–10.3)
CHLORIDE SERPL-SCNC: 101 MMOL/L (ref 98–107)
CHOLEST SERPL-MCNC: 111 MG/DL (ref 0–199)
CHOLESTEROL/HDL RATIO: 2.8
CO2 SERPL-SCNC: 31 MMOL/L (ref 21–32)
CREAT SERPL-MCNC: 0.73 MG/DL (ref 0.5–1.05)
CREAT UR-MCNC: 118 MG/DL (ref 20–320)
EGFRCR SERPLBLD CKD-EPI 2021: >90 ML/MIN/1.73M*2
ERYTHROCYTE [DISTWIDTH] IN BLOOD BY AUTOMATED COUNT: 13.5 % (ref 11.5–14.5)
GLUCOSE SERPL-MCNC: 197 MG/DL (ref 74–99)
HCT VFR BLD AUTO: 37.7 % (ref 36–46)
HDLC SERPL-MCNC: 39.7 MG/DL
HGB BLD-MCNC: 12.6 G/DL (ref 12–16)
LDLC SERPL CALC-MCNC: 56 MG/DL
MCH RBC QN AUTO: 27.9 PG (ref 26–34)
MCHC RBC AUTO-ENTMCNC: 33.4 G/DL (ref 32–36)
MCV RBC AUTO: 84 FL (ref 80–100)
MICROALBUMIN UR-MCNC: 31.8 MG/L
MICROALBUMIN/CREAT UR: 26.9 UG/MG CREAT
NON HDL CHOLESTEROL: 71 MG/DL (ref 0–149)
NRBC BLD-RTO: 0 /100 WBCS (ref 0–0)
PLATELET # BLD AUTO: 208 X10*3/UL (ref 150–450)
POTASSIUM SERPL-SCNC: 4.5 MMOL/L (ref 3.5–5.3)
PROT SERPL-MCNC: 7 G/DL (ref 6.4–8.2)
RBC # BLD AUTO: 4.51 X10*6/UL (ref 4–5.2)
SODIUM SERPL-SCNC: 138 MMOL/L (ref 136–145)
TRIGL SERPL-MCNC: 79 MG/DL (ref 0–149)
VLDL: 16 MG/DL (ref 0–40)
WBC # BLD AUTO: 8.9 X10*3/UL (ref 4.4–11.3)

## 2024-02-20 PROCEDURE — 36415 COLL VENOUS BLD VENIPUNCTURE: CPT

## 2024-02-20 PROCEDURE — 82043 UR ALBUMIN QUANTITATIVE: CPT

## 2024-02-20 PROCEDURE — 80061 LIPID PANEL: CPT

## 2024-02-20 PROCEDURE — 85027 COMPLETE CBC AUTOMATED: CPT

## 2024-02-20 PROCEDURE — 80053 COMPREHEN METABOLIC PANEL: CPT

## 2024-02-20 PROCEDURE — 82570 ASSAY OF URINE CREATININE: CPT

## 2024-02-23 ENCOUNTER — OFFICE VISIT (OUTPATIENT)
Dept: PRIMARY CARE | Facility: CLINIC | Age: 59
End: 2024-02-23
Payer: MEDICARE

## 2024-02-23 ENCOUNTER — TREATMENT (OUTPATIENT)
Dept: PHYSICAL THERAPY | Facility: CLINIC | Age: 59
End: 2024-02-23
Payer: MEDICARE

## 2024-02-23 VITALS
OXYGEN SATURATION: 94 % | HEART RATE: 74 BPM | WEIGHT: 220 LBS | SYSTOLIC BLOOD PRESSURE: 120 MMHG | BODY MASS INDEX: 40.48 KG/M2 | DIASTOLIC BLOOD PRESSURE: 70 MMHG | HEIGHT: 62 IN | RESPIRATION RATE: 14 BRPM | TEMPERATURE: 97.3 F

## 2024-02-23 DIAGNOSIS — L72.3 INFLAMED SEBACEOUS CYST: Primary | ICD-10-CM

## 2024-02-23 DIAGNOSIS — I10 PRIMARY HYPERTENSION: ICD-10-CM

## 2024-02-23 DIAGNOSIS — E11.65 TYPE 2 DIABETES MELLITUS WITH HYPERGLYCEMIA, WITH LONG-TERM CURRENT USE OF INSULIN (MULTI): ICD-10-CM

## 2024-02-23 DIAGNOSIS — R42 VERTIGO: ICD-10-CM

## 2024-02-23 DIAGNOSIS — Z79.4 TYPE 2 DIABETES MELLITUS WITH HYPERGLYCEMIA, WITH LONG-TERM CURRENT USE OF INSULIN (MULTI): ICD-10-CM

## 2024-02-23 PROBLEM — J12.9 VIRAL PNEUMONIA: Status: RESOLVED | Noted: 2023-02-25 | Resolved: 2024-02-23

## 2024-02-23 PROBLEM — R60.0 BILATERAL LOWER EXTREMITY EDEMA: Status: RESOLVED | Noted: 2023-02-25 | Resolved: 2024-02-23

## 2024-02-23 PROBLEM — E66.812 CLASS 2 SEVERE OBESITY WITH SERIOUS COMORBIDITY AND BODY MASS INDEX (BMI) OF 38.0 TO 38.9 IN ADULT: Status: RESOLVED | Noted: 2023-09-07 | Resolved: 2024-02-23

## 2024-02-23 PROBLEM — M79.89 PAIN AND SWELLING OF LOWER EXTREMITY: Status: RESOLVED | Noted: 2023-02-25 | Resolved: 2024-02-23

## 2024-02-23 PROBLEM — I87.2 VENOUS INSUFFICIENCY: Status: RESOLVED | Noted: 2023-09-07 | Resolved: 2024-02-23

## 2024-02-23 PROBLEM — R11.0 NAUSEA IN ADULT: Status: RESOLVED | Noted: 2023-09-07 | Resolved: 2024-02-23

## 2024-02-23 PROBLEM — B37.9 YEAST INFECTION: Status: RESOLVED | Noted: 2023-02-25 | Resolved: 2024-02-23

## 2024-02-23 PROBLEM — J18.9 PNEUMONIA: Status: RESOLVED | Noted: 2023-09-07 | Resolved: 2024-02-23

## 2024-02-23 PROBLEM — S92.309A METATARSAL FRACTURE: Status: RESOLVED | Noted: 2023-02-25 | Resolved: 2024-02-23

## 2024-02-23 PROBLEM — A41.9 SEPSIS (MULTI): Status: RESOLVED | Noted: 2023-09-07 | Resolved: 2024-02-23

## 2024-02-23 PROBLEM — M79.606 PAIN AND SWELLING OF LOWER EXTREMITY: Status: RESOLVED | Noted: 2023-02-25 | Resolved: 2024-02-23

## 2024-02-23 PROBLEM — E66.01 CLASS 2 SEVERE OBESITY WITH SERIOUS COMORBIDITY AND BODY MASS INDEX (BMI) OF 38.0 TO 38.9 IN ADULT (MULTI): Status: RESOLVED | Noted: 2023-09-07 | Resolved: 2024-02-23

## 2024-02-23 PROBLEM — R07.81 RIB PAIN: Status: RESOLVED | Noted: 2023-09-07 | Resolved: 2024-02-23

## 2024-02-23 PROBLEM — M25.432 SWELLING OF LEFT WRIST: Status: RESOLVED | Noted: 2023-02-25 | Resolved: 2024-02-23

## 2024-02-23 PROBLEM — R06.02 SOB (SHORTNESS OF BREATH): Status: RESOLVED | Noted: 2023-09-07 | Resolved: 2024-02-23

## 2024-02-23 PROCEDURE — 3048F LDL-C <100 MG/DL: CPT | Performed by: INTERNAL MEDICINE

## 2024-02-23 PROCEDURE — 3008F BODY MASS INDEX DOCD: CPT | Performed by: INTERNAL MEDICINE

## 2024-02-23 PROCEDURE — 99214 OFFICE O/P EST MOD 30 MIN: CPT | Performed by: INTERNAL MEDICINE

## 2024-02-23 PROCEDURE — 1036F TOBACCO NON-USER: CPT | Performed by: INTERNAL MEDICINE

## 2024-02-23 PROCEDURE — 97110 THERAPEUTIC EXERCISES: CPT | Mod: GP | Performed by: GENERAL ACUTE CARE HOSPITAL

## 2024-02-23 PROCEDURE — 3078F DIAST BP <80 MM HG: CPT | Performed by: INTERNAL MEDICINE

## 2024-02-23 PROCEDURE — 4010F ACE/ARB THERAPY RXD/TAKEN: CPT | Performed by: INTERNAL MEDICINE

## 2024-02-23 PROCEDURE — 3060F POS MICROALBUMINURIA REV: CPT | Performed by: INTERNAL MEDICINE

## 2024-02-23 PROCEDURE — 97140 MANUAL THERAPY 1/> REGIONS: CPT | Mod: GP | Performed by: GENERAL ACUTE CARE HOSPITAL

## 2024-02-23 PROCEDURE — 3074F SYST BP LT 130 MM HG: CPT | Performed by: INTERNAL MEDICINE

## 2024-02-23 RX ORDER — TIRZEPATIDE 5 MG/.5ML
5 INJECTION, SOLUTION SUBCUTANEOUS
Qty: 2 ML | Refills: 3 | Status: SHIPPED | OUTPATIENT
Start: 2024-02-23

## 2024-02-23 RX ORDER — CEPHALEXIN 500 MG/1
500 CAPSULE ORAL 4 TIMES DAILY
Qty: 28 CAPSULE | Refills: 0 | Status: SHIPPED | OUTPATIENT
Start: 2024-02-23 | End: 2024-03-01

## 2024-02-23 NOTE — PROGRESS NOTES
"Subjective    Holly Chavez is a 58 y.o. female who presents for Follow-up.  HPI  1 month follow up   Started Mounjaro, statin. Tolerating.   Blood sugar average 170-210.     C/o cyst right axilla, painful, swollen, draining.   She has had this cyst for 20 years. It is now painful and seaping.    Reports ongoing cough. Saw ENT. Dx silent reflux.     Review of Systems   All other systems reviewed and are negative.        Objective     /70 (BP Location: Left arm, Patient Position: Sitting, BP Cuff Size: Adult)   Pulse 74   Temp 36.3 °C (97.3 °F)   Resp 14   Ht 1.575 m (5' 2\")   Wt 99.8 kg (220 lb)   LMP  (LMP Unknown)   SpO2 94%   BMI 40.24 kg/m²    Physical Exam  Vitals reviewed.   Constitutional:       General: She is not in acute distress.     Appearance: Normal appearance.   Cardiovascular:      Rate and Rhythm: Normal rate and regular rhythm.      Pulses: Normal pulses.      Heart sounds: Normal heart sounds.   Pulmonary:      Effort: Pulmonary effort is normal.      Breath sounds: Normal breath sounds.   Abdominal:      Tenderness: There is no abdominal tenderness.   Musculoskeletal:         General: No swelling.      Comments: Right axilla shows inflamed sebaceous cyst. No drainage   Skin:     General: Skin is warm and dry.   Neurological:      Mental Status: She is alert.       Health Maintenance Due   Topic Date Due    HIV Screening  Never done    MMR Vaccines (1 of 1 - Standard series) Never done    Diabetes: Foot Exam  Never done    Hepatitis C Screening  Never done    DTaP/Tdap/Td Vaccines (1 - Tdap) Never done    Zoster Vaccines (1 of 2) Never done    Pneumococcal Vaccine: Pediatrics (0 to 5 Years) and At-Risk Patients (6 to 64 Years) (2 - PPSV23 or PCV20) 11/22/2018    Colorectal Cancer Screening  09/23/2023    Diabetes: Hemoglobin A1C  04/22/2024          Assessment/Plan   Problem List Items Addressed This Visit       Type 2 diabetes mellitus (CMS/HCC)    Relevant Medications    tirzepatide " (Mounjaro) 5 mg/0.5 mL pen injector    Primary hypertension     Other Visit Diagnoses       Inflamed sebaceous cyst    -  Primary    Relevant Medications    cephalexin (Keflex) 500 mg capsule    Other Relevant Orders    Referral to Dermatology        Will refer to derm. Take abx with food.  Increase the mounaro.  Call  with any problems or questions.   Follow up  in two months.   If she starts having low blood sugars back off on her meal time insulin. She will call me  if any lows  Her bp is good

## 2024-02-23 NOTE — PROGRESS NOTES
Patient Name: Holly Chavez  MRN: [id number]  Today's Date: 2/23/2024     Current Problem:  1. Vertigo  Follow Up In Physical Therapy          Visit 2/10    Subjective:  Change in pain/ pain level: 3/10  Patient comments: head ache today    Objective: no reported dizziness with exercises when corrected     Treatment:    Therapeutic exercise (09517):  HEP review   Manual Therapy (55323):  STM Cervical paraspinals, Sternocleidomastoid bending, Upper Trapezius release, Suboccipital Release, manual traction  Manual stretching UT, levator trap, SCM,Upper cervical Flexion    Assessment:  Patient notes improved pain levels resultant from activities in therapy session. Improved tissue quality noted as well as body mechanics demonstrated after cueing and corrections. Patient continues to require active therapy to progress functional capabilities with decreasing pain levels and improving mechanics with functional activities including progression of Home exercise program. Tolerance to today's treatment was good. Muscle fatigue noted.  Patient appears motivated and compliant this date, demonstrating a working understanding of principals instructed and home program.    Plan: TOBIN NV?  Progress with functional strength as tolerated with respect to tissue healing. Manual therapy PRN to improve/maintain ROM, prevent adhesion, reduce pain.

## 2024-03-04 ENCOUNTER — APPOINTMENT (OUTPATIENT)
Dept: OPHTHALMOLOGY | Facility: CLINIC | Age: 59
End: 2024-03-04
Payer: COMMERCIAL

## 2024-03-05 ENCOUNTER — APPOINTMENT (OUTPATIENT)
Dept: PHYSICAL THERAPY | Facility: CLINIC | Age: 59
End: 2024-03-05
Payer: MEDICARE

## 2024-03-05 ENCOUNTER — TELEPHONE (OUTPATIENT)
Dept: PRIMARY CARE | Facility: CLINIC | Age: 59
End: 2024-03-05
Payer: COMMERCIAL

## 2024-03-05 NOTE — TELEPHONE ENCOUNTER
Pt left vm.  Checked glucose approx 1/2 hour prior to call.  77.  Ate sugary foods and drink.  Checked 15 min later.  66.  1/2 hour later 59.  Requesting call back d/t glucose dropping.

## 2024-03-12 ENCOUNTER — PROCEDURE VISIT (OUTPATIENT)
Dept: PODIATRY | Facility: CLINIC | Age: 59
End: 2024-03-12
Payer: MEDICARE

## 2024-03-12 ENCOUNTER — TREATMENT (OUTPATIENT)
Dept: PHYSICAL THERAPY | Facility: CLINIC | Age: 59
End: 2024-03-12
Payer: MEDICARE

## 2024-03-12 DIAGNOSIS — R42 VERTIGO: ICD-10-CM

## 2024-03-12 DIAGNOSIS — I87.8 CHRONIC VENOUS STASIS: Primary | ICD-10-CM

## 2024-03-12 DIAGNOSIS — M79.676 CHRONIC PAIN OF TOE, UNSPECIFIED LATERALITY: ICD-10-CM

## 2024-03-12 DIAGNOSIS — B35.1 FUNGAL NAIL INFECTION: ICD-10-CM

## 2024-03-12 DIAGNOSIS — G89.29 CHRONIC PAIN OF TOE, UNSPECIFIED LATERALITY: ICD-10-CM

## 2024-03-12 PROCEDURE — 97140 MANUAL THERAPY 1/> REGIONS: CPT | Mod: GP | Performed by: GENERAL ACUTE CARE HOSPITAL

## 2024-03-12 PROCEDURE — 97110 THERAPEUTIC EXERCISES: CPT | Mod: GP | Performed by: GENERAL ACUTE CARE HOSPITAL

## 2024-03-12 PROCEDURE — 11721 DEBRIDE NAIL 6 OR MORE: CPT | Performed by: PODIATRIST

## 2024-03-12 NOTE — PROGRESS NOTES
Patient Name: Holly Chavez  MRN: [id number]  Today's Date: 3/12/2024     Current Problem:  1. Vertigo  Follow Up In Physical Therapy          Visit 3/10    Subjective:  Change in pain/ pain level: 0/10  Change in function: about 60-70 % better   Patient comments: no head aches, not havig pain   I am noticing differences with not as wobbly and not stumbing as much- strill have my moments     Treatment:    Therapeutic exercise (19239):  HEP review, posture correction, exercise discussion, walking progression, fitness tracker   Manual Therapy (65004):  IDN bilateral UT lateral and belly, levator- all with manip       Assessment:  Patient educated in dry needling precautions, indications, and contraindications. Patient is aware of the risks and benefits of procedure and wishes to have it performed. No adverse reaction to the dry needling noted. Patient notes improved pain levels resultant from activities in therapy session. Improved tissue quality noted as well as body mechanics demonstrated after cueing and corrections. Patient continues to require active therapy to progress functional capabilities with decreasing pain levels and improving mechanics with functional activities including progression of Home exercise program. Tolerance to today's treatment was good. Muscle fatigue noted.  Patient appears motivated and compliant this date, demonstrating a working understanding of principals instructed and home program.    Plan:  Progress with functional strength as tolerated with respect to tissue healing. Manual therapy PRN to improve/maintain ROM, prevent adhesion, reduce pain.

## 2024-03-12 NOTE — PROGRESS NOTES
History Of Present Illness  Holly Chavez is a 58 y.o. female presenting for diabetic nail care. Patient complains with painful elongated nails.     Past Medical History  She has a past medical history of Acute upper respiratory infection, unspecified (11/13/2018), Benign paroxysmal vertigo, unspecified ear (12/04/2017), Cutaneous abscess of groin (12/04/2017), Cutaneous abscess of limb, unspecified (09/20/2018), Morbid (severe) obesity due to excess calories (CMS/[facility]) (07/08/2019), Personal history of diseases of the skin and subcutaneous tissue (11/20/2015), Personal history of diseases of the skin and subcutaneous tissue (09/27/2018), Personal history of other endocrine, nutritional and metabolic disease, Personal history of other endocrine, nutritional and metabolic disease (08/11/2015), Personal history of other endocrine, nutritional and metabolic disease (04/16/2019), Personal history of other endocrine, nutritional and metabolic disease (06/06/2019), Pneumonia (09/07/2023), Rash and other nonspecific skin eruption (08/11/2015), Rash and other nonspecific skin eruption (08/11/2015), Type 1 diabetes mellitus with mild nonproliferative retinopathy and macular edema (CMS/HCC) (11/09/2015), Type 1 diabetes mellitus with mild nonproliferative retinopathy and macular edema (CMS/HCC) (11/09/2015), Type 1 diabetes mellitus with mild nonproliferative retinopathy and macular edema (CMS/HCC) (11/10/2015), Type 1 diabetes mellitus with mild nonproliferative retinopathy and macular edema (CMS/HCC) (11/12/2015), Type 2 diabetes mellitus with mild nonproliferative diabetic retinopathy without macular edema, unspecified eye (CMS/HCC) (11/09/2015), Type 2 diabetes mellitus with mild nonproliferative diabetic retinopathy without macular edema, unspecified eye (CMS/HCC) (11/09/2015), Type 2 diabetes mellitus with mild nonproliferative diabetic retinopathy without macular edema, unspecified eye (CMS/HCC) (11/10/2015), and Type 2  diabetes mellitus with mild nonproliferative diabetic retinopathy without macular edema, unspecified eye (CMS/[facility]) (11/12/2015).    Surgical History  She has a past surgical history that includes Tubal ligation (08/11/2015); Hernia repair (08/11/2015); and Cervical biopsy w/ loop electrode excision (04/12/2016).     Social History  She reports that she quit smoking about 25 years ago. Her smoking use included cigarettes. She has never used smokeless tobacco. She reports current alcohol use. She reports that she does not use drugs.    Family History  Family History   Problem Relation Name Age of Onset    Stroke Mother      Diabetes Mother      Other (cerebrovascular accident) Mother      Heart failure Father      Diabetes Father      Pancreatic cancer Father      Breast cancer Mother's Sister      Other (cardiac disorder) Other      Diabetes Other      Hypertension Other      Cancer Other      Kidney disease Other      Breast cancer Other aunt     Breast cancer Father's Sister      Glaucoma Neg Hx      Macular degeneration Neg Hx          Allergies  Adhesive tape-silicones and Codeine    Medications  Current Outpatient Medications   Medication Sig Dispense Refill    albuterol 2.5 mg /3 mL (0.083 %) nebulizer solution Take 3 mL (2.5 mg) by nebulization 4 times a day as needed for wheezing or shortness of breath. 75 mL 3    albuterol 90 mcg/actuation inhaler Inhale 2 puffs every 4 hours if needed for wheezing or shortness of breath. 18 g 3    ammonium lactate (Lac-Hydrin) 12 % lotion APPLY 1 APPLICATION ONCE DAILY      apixaban (Eliquis) 5 mg tablet Take 1 tablet (5 mg) by mouth once daily. 90 tablet 3    atorvastatin (Lipitor) 10 mg tablet Take 1 tablet (10 mg) by mouth once daily. 30 tablet 5    Autolet (OneTouch Delica Plus Lanc Dev) lancing device Use as instructed 1 each 0    benzonatate (Tessalon) 200 mg capsule Take 1 capsule (200 mg) by mouth 3 times a day as needed.      BENZONATATE ORAL       blood pressure  "monitor kit 1 each once daily. 1 kit 0    cholecalciferol (Vitamin D-3) 125 MCG (5000 UT) capsule Take by mouth.      cyanocobalamin, vitamin B-12, (Vitamin B-12) 1,000 mcg tablet extended release Take 1 tablet (1,000 mcg) by mouth once daily.      Dexcom G4 platinum  (Dexcom G6 ) misc       Flonase Allergy Relief 50 mcg/actuation nasal spray Administer 2 sprays into each nostril once daily. 16 g 2    furosemide (Lasix) 20 mg tablet Take 1 tablet (20 mg) by mouth 3 times a week. 3 times a week as needed 36 tablet 3    guaifenesin (MUCUS RELIEF ORAL)       hydroCHLOROthiazide (Microzide) 12.5 mg capsule Take 1 capsule (12.5 mg) by mouth.      insulin glargine (Lantus) 100 unit/mL (3 mL) pen Inject 34 Units under the skin once daily in the morning. 30 mL 3    insulin lispro (HumaLOG) 100 unit/mL injection Inject 60 Units under the skin once daily. As directed in divided doses (max daily dose 60 units) 60 mL 2    lancets misc       lisinopril 2.5 mg tablet Take 1 tablet (2.5 mg) by mouth once daily. (Patient taking differently: Take 0.5 tablets (1.25 mg) by mouth once daily.) 30 tablet 5    miscellaneous medical supply [facility] once daily.  thigh high Daily      NON FORMULARY Accu-Chek Sherley Plus      nystatin (Mycostatin) ointment Apply topically 2 times a day. 30 g 3    omeprazole (PriLOSEC) 20 mg DR capsule 20 mg by mouth daily 90 capsule 3    ondansetron ODT (Zofran-ODT) 4 mg disintegrating tablet Take 1 tablet (4 mg) by mouth every 8 hours if needed.      OneTouch Ultra Test strip Test 4 times daily      pen needle, diabetic 32 gauge x 5/32\" needle 4 Needles once daily. Use with insulin injections four times daily 400 each 3    tirzepatide (Mounjaro) 5 mg/0.5 mL pen injector Inject 5 mg under the skin 1 (one) time per week. 2 mL 3    triamcinolone (Kenalog) 0.1 % cream Apply topically 2 times a day. APPLY EXTERNALLY TO THE AFFECTED AREA 2 TO 3 TIMES DAILY 45 g 3     No current facility-administered " medications for this visit.       Review of Systems    REVIEW OF SYSTEMS  GENERAL:  Negative for malaise, significant weight loss, fever  CARDIOVASCULAR: leg swelling   MUSCULOSKELETAL:  Negative for joint pain or swelling, back pain, and muscle pain.  SKIN:  Negative for lesions, rash, and itching  PSYCH:  Negative for sleep disturbance, mood disorder and recent psychosocial stressors  NEURO: Negative, denies any burning, tingling or numbness     Objective:   Vasc: DP and PT pulses are palpable bilateral.  CFT is less than 3 seconds bilateral.  Skin temperature is warm to cool proximal to distal bilateral.  Pitting pedal edema. No digital hair.      Neuro:  Light touch is intact to the foot bilateral.      Derm: Nails 1-5 bilateral are thickened, elongated and crumbly with subungual debris. Skin is supple with normal texture and turgor noted.  Webspaces are clean, dry and intact bilateral.  There are no hyperkeratoses, ulcerations, verruca or other lesions noted.      Ortho: Muscle strength is 5/5 for all pedal groups tested.  Ankle joint, subtalar joint, 1st MPJ and lesser MPJ ROM is full and without pain or crepitus.  The foot type is rectus bilateral off weight bearing.  There are no structural deformities noted.    Assessment/Plan   DM  Painful nail mycosis        Toenails are debrided in length and thickness to avoid infection and for pain relief        I spent 20 minutes in the professional and overall care of this patient.

## 2024-03-19 ENCOUNTER — APPOINTMENT (OUTPATIENT)
Dept: PHYSICAL THERAPY | Facility: CLINIC | Age: 59
End: 2024-03-19
Payer: MEDICARE

## 2024-03-26 ENCOUNTER — TREATMENT (OUTPATIENT)
Dept: PHYSICAL THERAPY | Facility: CLINIC | Age: 59
End: 2024-03-26
Payer: MEDICARE

## 2024-03-26 DIAGNOSIS — R42 VERTIGO: ICD-10-CM

## 2024-03-26 PROCEDURE — 97110 THERAPEUTIC EXERCISES: CPT | Mod: GP | Performed by: GENERAL ACUTE CARE HOSPITAL

## 2024-03-26 NOTE — PROGRESS NOTES
Patient Name: Holly Chavez  MRN: [id number]  Today's Date: 3/26/2024     Current Problem:  1. Vertigo  Follow Up In Physical Therapy          Visit 4/10    Subjective:  Change in pain/ pain level: 0/10  Change in function: no vertigo  Patient comments: improved symptoms     Objective: HEP complinace good     Treatment:    Therapeutic exercise (12758):  MDT       Assessment:  Patient notes improved pain levels resultant from activities in therapy session. Improved tissue quality noted as well as body mechanics demonstrated after cueing and corrections. Patient continues to require active therapy to progress functional capabilities with decreasing pain levels and improving mechanics with functional activities including progression of Home exercise program. Tolerance to today's treatment was good.   Patient appears motivated and compliant this date, demonstrating a working understanding of principals instructed and home program.    Plan:  Progress with functional strength as tolerated with respect to tissue healing. Manual therapy PRN to improve/maintain ROM, prevent adhesion, reduce pain.

## 2024-04-01 ENCOUNTER — OFFICE VISIT (OUTPATIENT)
Dept: ENDOCRINOLOGY | Facility: CLINIC | Age: 59
End: 2024-04-01
Payer: MEDICARE

## 2024-04-01 VITALS
BODY MASS INDEX: 39.38 KG/M2 | WEIGHT: 214 LBS | HEIGHT: 62 IN | SYSTOLIC BLOOD PRESSURE: 104 MMHG | DIASTOLIC BLOOD PRESSURE: 66 MMHG

## 2024-04-01 DIAGNOSIS — E11.69 TYPE 2 DIABETES MELLITUS WITH OTHER SPECIFIED COMPLICATION, WITH LONG-TERM CURRENT USE OF INSULIN (MULTI): ICD-10-CM

## 2024-04-01 DIAGNOSIS — E66.01 MORBID OBESITY (MULTI): ICD-10-CM

## 2024-04-01 DIAGNOSIS — E11.9 TYPE 2 DIABETES MELLITUS WITHOUT COMPLICATION, WITHOUT LONG-TERM CURRENT USE OF INSULIN (MULTI): Primary | ICD-10-CM

## 2024-04-01 DIAGNOSIS — Z79.4 TYPE 2 DIABETES MELLITUS WITH OTHER SPECIFIED COMPLICATION, WITH LONG-TERM CURRENT USE OF INSULIN (MULTI): ICD-10-CM

## 2024-04-01 LAB
POC FINGERSTICK BLOOD GLUCOSE: 164 MG/DL (ref 70–100)
POC HEMOGLOBIN A1C: 7.4 % (ref 4.2–6.5)

## 2024-04-01 PROCEDURE — 4010F ACE/ARB THERAPY RXD/TAKEN: CPT | Performed by: HOSPITALIST

## 2024-04-01 PROCEDURE — 3078F DIAST BP <80 MM HG: CPT | Performed by: HOSPITALIST

## 2024-04-01 PROCEDURE — 83036 HEMOGLOBIN GLYCOSYLATED A1C: CPT | Performed by: HOSPITALIST

## 2024-04-01 PROCEDURE — 3048F LDL-C <100 MG/DL: CPT | Performed by: HOSPITALIST

## 2024-04-01 PROCEDURE — 3060F POS MICROALBUMINURIA REV: CPT | Performed by: HOSPITALIST

## 2024-04-01 PROCEDURE — 82962 GLUCOSE BLOOD TEST: CPT | Performed by: HOSPITALIST

## 2024-04-01 PROCEDURE — 3074F SYST BP LT 130 MM HG: CPT | Performed by: HOSPITALIST

## 2024-04-01 PROCEDURE — 3008F BODY MASS INDEX DOCD: CPT | Performed by: HOSPITALIST

## 2024-04-01 PROCEDURE — 99214 OFFICE O/P EST MOD 30 MIN: CPT | Performed by: HOSPITALIST

## 2024-04-01 RX ORDER — INSULIN GLARGINE 100 [IU]/ML
30 INJECTION, SOLUTION SUBCUTANEOUS EVERY MORNING
Qty: 18 ML | Refills: 2 | Status: SHIPPED | OUTPATIENT
Start: 2024-04-01 | End: 2024-05-31 | Stop reason: CLARIF

## 2024-04-01 ASSESSMENT — ENCOUNTER SYMPTOMS
AGITATION: 0
NERVOUS/ANXIOUS: 0
ARTHRALGIAS: 0
FREQUENCY: 0
CONSTIPATION: 1
SORE THROAT: 0
CONSTITUTIONAL NEGATIVE: 1
TROUBLE SWALLOWING: 0
DIARRHEA: 0
HEADACHES: 0
SHORTNESS OF BREATH: 0
LIGHT-HEADEDNESS: 0
VOICE CHANGE: 0
TREMORS: 0
PHOTOPHOBIA: 0
ABDOMINAL DISTENTION: 0
SLEEP DISTURBANCE: 0
NAUSEA: 1
DYSURIA: 0
PALPITATIONS: 0
ABDOMINAL PAIN: 0
VOMITING: 0
CHEST TIGHTNESS: 0
EYE ITCHING: 0

## 2024-04-01 NOTE — PROGRESS NOTES
T2 DM UNcontrolled without neuropathy    A1c not checked as it was a virtual visit      she has DEXCOM G7 CGM now , has been using it but not today.      BG checks 3 times a day, last 3 days BG all in 200s , lowest in 190s, no hypoglycemia      Current regimen    Lantus 30-0-0-0  ( was on 34 before monjaro   HUmlog 8-8-8-0, SSI 2: 50 > 150 ( not taking it, only took twice since 3 week)    mounjaro 5 mg weekly ( since 3 weeks )       Trulcity stopped due to severe nausea. discussed we will avoid GLP 1 agonist    tried 1/2 tab metformin and again had diarrhea od stopped it   She mentions she has not been on GLP1 agonist as her father passed away with pancreatic cancer and mother had pancreas infection and she is very scared. she doesn't have any personal history for the same   she does have frequent UTIs   glipizide caused hypoglycemia in past     no meter for download      Internal h/o : started on lisinopril summer 2023. Was started on statin as well. Dr. Parisi started on mounjaro ~ 2m ago. Currently 5 mg weekly    Lost 13 lbs first month. Constantly hungry with 5 mg dose.    Mild nausea and constipation. Started taking miralax prn , was going daily , 4d / week .      Dexcom G67 CGM - doesn't have clarity stella, no hypoglycemia since 2 weeks.     A1c improved a lot after starting on Mounjaro.    PLAN:  Blood sugars much better after starting on Mounjaro.  Will hold off increasing the Mounjaro dose to 7.5 mg for another 2 months due to mild side effects.  Restart Humalog sliding scale only 2 units for every 50 above 150 of blood sugar.  Continue Lantus 30 units if blood sugar less than 80 in the morning decreased to 28 units.  Diabetic diet and start light weight resistance exercise to prevent muscle loss while on incretins  DIscussed hypoglycemia and the Management of hypoglycemia.       Advised to download clarity stella so we can get her linked and I can download her Dexcom remotely.  She is agreeable for virtual apt  in future.  -Continue lisinopril and atorvastatin.  Managed with primary care.    RTC 4 months      SH- here with her partner   She has 3 kids 1 son and 2 daughters , 8 GK

## 2024-04-01 NOTE — PROGRESS NOTES
"Subjective   Patient ID: Holly Chavez is a 58 y.o. female who presents for Diabetes (DM 2 diagnosed at age 40, Family history mom and dad, Pcp is Dr Parisi, Does see podiatry and an eye doctor  Tests TID  Meter is Dexcom G&).   Lab Results   Component Value Date    HGBA1C 7.4 (A) 04/01/2024              HPI    Review of Systems   Constitutional: Negative.    HENT:  Negative for sore throat, trouble swallowing and voice change.    Eyes:  Negative for photophobia, itching and visual disturbance.   Respiratory:  Negative for chest tightness and shortness of breath.    Cardiovascular:  Negative for chest pain and palpitations.   Gastrointestinal:  Positive for constipation and nausea. Negative for abdominal distention, abdominal pain, diarrhea and vomiting.   Endocrine: Negative for cold intolerance, heat intolerance and polyuria.   Genitourinary:  Negative for dysuria and frequency.   Musculoskeletal:  Negative for arthralgias.   Skin:  Negative for pallor.   Allergic/Immunologic: Negative for environmental allergies.   Neurological:  Negative for tremors, light-headedness and headaches.   Psychiatric/Behavioral:  Negative for agitation and sleep disturbance. The patient is not nervous/anxious.     Blood pressure 104/66, height 1.575 m (5' 2\"), weight 97.1 kg (214 lb).      Objective   Physical Exam  Constitutional:       Appearance: Normal appearance.   HENT:      Head: Normocephalic.      Nose: Nose normal.      Mouth/Throat:      Mouth: Mucous membranes are moist.   Eyes:      Extraocular Movements: Extraocular movements intact.   Cardiovascular:      Rate and Rhythm: Normal rate.   Pulmonary:      Effort: Pulmonary effort is normal. No respiratory distress.   Abdominal:      General: There is no distension.   Musculoskeletal:         General: Normal range of motion.      Cervical back: Normal range of motion and neck supple.      Right lower leg: Edema present.      Left lower leg: Edema present.   Skin:     General: " Skin is warm and dry.   Neurological:      Mental Status: She is alert and oriented to person, place, and time.   Psychiatric:         Mood and Affect: Mood normal.         Assessment/Plan   Diagnoses and all orders for this visit:  Type 2 diabetes mellitus without complication, without long-term current use of insulin (CMS/[facility])  -     POCT glycosylated hemoglobin (Hb A1C) manually resulted  -     POCT fingerstick glucose manually resulted  Type 2 diabetes mellitus with other specified complication, with long-term current use of insulin (CMS/[facility])  -     insulin glargine (Lantus) 100 unit/mL (3 mL) pen; Inject 30 Units under the skin once daily in the morning.  Morbid obesity (CMS/[facility])       T2 DM UNcontrolled without neuropathy    A1c not checked as it was a virtual visit      she has DEXCOM G7 CGM now , has been using it but not today.      BG checks 3 times a day, last 3 days BG all in 200s , lowest in 190s, no hypoglycemia      Current regimen    Lantus 30-0-0-0  ( was on 34 before monjaro   HUmlog 8-8-8-0, SSI 2: 50 > 150 ( not taking it, only took twice since 3 week)    mounjaro 5 mg weekly ( since 3 weeks )       Trulcity stopped due to severe nausea. discussed we will avoid GLP 1 agonist    tried 1/2 tab metformin and again had diarrhea od stopped it   She mentions she has not been on GLP1 agonist as her father passed away with pancreatic cancer and mother had pancreas infection and she is very scared. she doesn't have any personal history for the same   she does have frequent UTIs   glipizide caused hypoglycemia in past     no meter for download      Internal h/o : started on lisinopril summer 2023. Was started on statin as well. Dr. Parisi started on mounjaro ~ 2m ago. Currently 5 mg weekly    Lost 13 lbs first month. Constantly hungry with 5 mg dose.    Mild nausea and constipation. Started taking miralax prn , was going daily , 4d / week .      Dexcom G67 CGM - doesn't have clarity stella, no hypoglycemia  since 2 weeks.     A1c improved a lot after starting on Mounjaro.    PLAN:  Blood sugars much better after starting on Mounjaro.  Will hold off increasing the Mounjaro dose to 7.5 mg for another 2 months due to mild side effects.  Restart Humalog sliding scale only 2 units for every 50 above 150 of blood sugar.  Continue Lantus 30 units if blood sugar less than 80 in the morning decreased to 28 units.  Diabetic diet and start light weight resistance exercise to prevent muscle loss while on incretins  DIscussed hypoglycemia and the Management of hypoglycemia.       Advised to download clarity stella so we can get her linked and I can download her Dexcom remotely.  She is agreeable for virtual apt in future.  -Continue lisinopril and atorvastatin.  Managed with primary care.    RTC 4 months      SH- here with her partner   She has 3 kids 1 son and 2 daughters , 8 GK

## 2024-04-04 DIAGNOSIS — E11.9 TYPE 2 DIABETES MELLITUS WITHOUT COMPLICATION, WITHOUT LONG-TERM CURRENT USE OF INSULIN (MULTI): ICD-10-CM

## 2024-04-04 RX ORDER — BLOOD-GLUCOSE SENSOR
1 EACH MISCELLANEOUS
Qty: 6 EACH | Refills: 1 | Status: SHIPPED | OUTPATIENT
Start: 2024-04-04 | End: 2024-05-09 | Stop reason: SDUPTHER

## 2024-04-04 RX ORDER — INSULIN PUMP SYRINGE, 3 ML
1 EACH MISCELLANEOUS SEE ADMIN INSTRUCTIONS
Qty: 1 EACH | Refills: 0 | Status: SHIPPED | OUTPATIENT
Start: 2024-04-04 | End: 2025-04-04

## 2024-04-09 ENCOUNTER — TREATMENT (OUTPATIENT)
Dept: PHYSICAL THERAPY | Facility: CLINIC | Age: 59
End: 2024-04-09
Payer: MEDICARE

## 2024-04-09 DIAGNOSIS — R42 VERTIGO: ICD-10-CM

## 2024-04-09 PROCEDURE — 97110 THERAPEUTIC EXERCISES: CPT | Mod: GP | Performed by: GENERAL ACUTE CARE HOSPITAL

## 2024-04-09 PROCEDURE — 97140 MANUAL THERAPY 1/> REGIONS: CPT | Mod: GP | Performed by: GENERAL ACUTE CARE HOSPITAL

## 2024-04-09 NOTE — PROGRESS NOTES
Patient Name: Holly Chavez  MRN: [id number]  Today's Date: 4/9/2024     Current Problem:  1. Vertigo  Follow Up In Physical Therapy          Visit 5/10    Subjective:  Change in pain/ pain level: 0/10  Change in function: no dizziness, have had a couple of times of unsteadiness.  Have had some sensations of weakness when I stand up. Have had low blood sugar and lowered blood pressure due to meds   Patient comments: had a cough for the last few weeks (silent reflux), I think it may be from the BP meds, it is a side effect     Objective: Added to HEP     Treatment:    Therapeutic exercise (14136):  Access Code: [email or web address]  URL: [email or web address]  Date: 04/09/2024  Prepared by: Florentin Vargas    Exercises  - Standing Shoulder Row with Anchored Resistance  - 2-3 x weekly - 20 reps  - Shoulder extension with resistance - Neutral  - 2-3 x weekly - 20 reps  - Standing Row with Resistance with Anchored Resistance at Chest Height Palms Down  - 2-3 x weekly - 20 reps  - Shoulder External Rotation with Anchored Resistance  - 2-3 x weekly - 20 reps  - Shoulder Internal Rotation with Resistance  - 2-3 x weekly - 20 reps  - Cervical Retraction with Overpressure  - 8 x daily - 7 x weekly - 10 reps  - Seated Cervical Retraction and Extension  - 8 x daily - 7 x weekly - 10 reps  Manual Therapy (64092):  STM Cervical paraspinals, Sternocleidomastoid bending, Upper Trapezius release, Suboccipital Release, manual traction  Manual stretching UT, levator trap, SCM,Upper cervical Flexion    Assessment:  Progressed HEP. Patient notes improved pain levels resultant from activities in therapy session. Improved tissue quality noted as well as body mechanics demonstrated after cueing and corrections. Patient continues to require active therapy to progress functional capabilities with decreasing pain levels and improving mechanics with functional activities including progression of Home exercise program. Tolerance to  today's treatment was good. Muscle fatigue noted.  Patient appears motivated and compliant this date, demonstrating a working understanding of principals instructed and home program.    Plan:  Progress with functional strength as tolerated with respect to tissue healing. Manual therapy PRN to improve/maintain ROM, prevent adhesion, reduce pain.

## 2024-04-16 ENCOUNTER — TELEPHONE (OUTPATIENT)
Dept: ENDOCRINOLOGY | Facility: CLINIC | Age: 59
End: 2024-04-16
Payer: COMMERCIAL

## 2024-04-16 ENCOUNTER — APPOINTMENT (OUTPATIENT)
Dept: PHYSICAL THERAPY | Facility: CLINIC | Age: 59
End: 2024-04-16
Payer: MEDICARE

## 2024-04-16 NOTE — TELEPHONE ENCOUNTER
Holly left a vm 4-16-24 stating that she has not heard if her sensors were  Approved.  This has been since her appt, 4-1-24.  Please call her at [phone] to discuss.

## 2024-04-24 ENCOUNTER — TREATMENT (OUTPATIENT)
Dept: PHYSICAL THERAPY | Facility: CLINIC | Age: 59
End: 2024-04-24
Payer: MEDICARE

## 2024-04-24 ENCOUNTER — APPOINTMENT (OUTPATIENT)
Dept: PRIMARY CARE | Facility: CLINIC | Age: 59
End: 2024-04-24
Payer: MEDICARE

## 2024-04-24 DIAGNOSIS — R42 VERTIGO: ICD-10-CM

## 2024-04-24 PROCEDURE — 97110 THERAPEUTIC EXERCISES: CPT | Mod: GP | Performed by: GENERAL ACUTE CARE HOSPITAL

## 2024-04-24 NOTE — PROGRESS NOTES
Patient Name: Holly Chavez  MRN: [id number]  Today's Date: 4/24/2024     Current Problem:  1. Vertigo  Follow Up In Physical Therapy          Visit 6/10    Subjective:  Change in pain/ pain level: 0/10  Patient comments: I have been sick- feeling like I am going to pass out  Monjaro causing severe constipation and then having bad diarrhea   Objective: progressed HEP    Treatment:    Therapeutic exercise (09215):    Access Code: [email or web address]  URL: [email or web address]  Date: 04/24/2024  Prepared by: Florentin Vargas    Exercises  - Standing Shoulder Row with Anchored Resistance  - 2-3 x weekly - 20 reps  - Shoulder extension with resistance - Neutral  - 2-3 x weekly - 20 reps  - Standing Row with Resistance with Anchored Resistance at Chest Height Palms Down  - 2-3 x weekly - 20 reps  - Shoulder External Rotation with Anchored Resistance  - 2-3 x weekly - 20 reps  - Shoulder Internal Rotation with Resistance  - 2-3 x weekly - 20 reps  - Cervical Retraction with Overpressure  - 8 x daily - 7 x weekly - 10 reps  - Seated Cervical Retraction and Extension  - 8 x daily - 7 x weekly - 10 reps  - Supine Bridge  - 2-3 x weekly - 20 reps - 5 sec  hold  - Clamshell  - 2-3 x weekly - 20 reps  - Sidelying Hip Abduction  - 2-3 x weekly - 20 reps  - Active Straight Leg Raise with Quad Set  - 2-3 x weekly - 20 reps  - Seated Long Arc Quad  - 2-3 x weekly - 20 reps  - Sit to Stand Without Arm Support  - 2-3 x weekly - 20 reps    Assessment:   Patient notes no change in pain levels resultant from activities in therapy session. Improved tissue quality noted as well as body mechanics demonstrated after cueing and corrections. Patient continues to require active therapy to progress functional capabilities with decreasing pain levels and improving mechanics with functional activities including progression of Home exercise program. Tolerance to today's treatment was good. Muscle fatigue noted.  Patient appears motivated  and compliant this date, demonstrating a working understanding of principals instructed and home program.    Plan:  Progress with functional strength as tolerated with respect to tissue healing. Manual therapy PRN to improve/maintain ROM, prevent adhesion, reduce pain.

## 2024-04-26 ENCOUNTER — OFFICE VISIT (OUTPATIENT)
Dept: PRIMARY CARE | Facility: CLINIC | Age: 59
End: 2024-04-26
Payer: MEDICARE

## 2024-04-26 VITALS
TEMPERATURE: 98.1 F | HEIGHT: 62 IN | SYSTOLIC BLOOD PRESSURE: 120 MMHG | HEART RATE: 78 BPM | OXYGEN SATURATION: 100 % | BODY MASS INDEX: 38.83 KG/M2 | RESPIRATION RATE: 14 BRPM | DIASTOLIC BLOOD PRESSURE: 60 MMHG | WEIGHT: 211 LBS

## 2024-04-26 DIAGNOSIS — K21.9 GASTROESOPHAGEAL REFLUX DISEASE WITHOUT ESOPHAGITIS: ICD-10-CM

## 2024-04-26 DIAGNOSIS — I10 PRIMARY HYPERTENSION: ICD-10-CM

## 2024-04-26 DIAGNOSIS — E11.65 TYPE 2 DIABETES MELLITUS WITH HYPERGLYCEMIA, WITH LONG-TERM CURRENT USE OF INSULIN (MULTI): Primary | ICD-10-CM

## 2024-04-26 DIAGNOSIS — Z79.4 TYPE 2 DIABETES MELLITUS WITH HYPERGLYCEMIA, WITH LONG-TERM CURRENT USE OF INSULIN (MULTI): Primary | ICD-10-CM

## 2024-04-26 DIAGNOSIS — H93.A1 PULSATILE TINNITUS OF RIGHT EAR: ICD-10-CM

## 2024-04-26 PROCEDURE — 99214 OFFICE O/P EST MOD 30 MIN: CPT | Performed by: INTERNAL MEDICINE

## 2024-04-26 PROCEDURE — 1036F TOBACCO NON-USER: CPT | Performed by: INTERNAL MEDICINE

## 2024-04-26 PROCEDURE — 3060F POS MICROALBUMINURIA REV: CPT | Performed by: INTERNAL MEDICINE

## 2024-04-26 PROCEDURE — 3074F SYST BP LT 130 MM HG: CPT | Performed by: INTERNAL MEDICINE

## 2024-04-26 PROCEDURE — 3048F LDL-C <100 MG/DL: CPT | Performed by: INTERNAL MEDICINE

## 2024-04-26 PROCEDURE — 3078F DIAST BP <80 MM HG: CPT | Performed by: INTERNAL MEDICINE

## 2024-04-26 PROCEDURE — 3008F BODY MASS INDEX DOCD: CPT | Performed by: INTERNAL MEDICINE

## 2024-04-26 RX ORDER — TIRZEPATIDE 7.5 MG/.5ML
7.5 INJECTION, SOLUTION SUBCUTANEOUS
Qty: 2 ML | Refills: 5 | Status: SHIPPED | OUTPATIENT
Start: 2024-04-28

## 2024-04-26 NOTE — PROGRESS NOTES
"Subjective    Holly Chavez is a 58 y.o. female who presents for Diabetes.  HPI    2 month follow up   Did better Mounjaro 2.5mg with appetite.     Saw Dr. Nelson 4/1/2024 hA1c 7.4. Has not used humalog in months     Following with dermatology for cyst.     C/o ringing in ears   Going to PT for vertigo. It has helped  Reports BP has been low. Holding lisinopril   Her blood pressure has been low    Review of Systems   All other systems reviewed and are negative.        Objective     /60 (BP Location: Left arm, Patient Position: Sitting, BP Cuff Size: Adult)   Pulse 78   Temp 36.7 °C (98.1 °F) (Skin)   Resp 14   Ht 1.575 m (5' 2\")   Wt 95.7 kg (211 lb)   LMP  (LMP Unknown)   SpO2 100%   BMI 38.59 kg/m²    Physical Exam  Vitals reviewed.   Constitutional:       General: She is not in acute distress.     Appearance: Normal appearance.   Cardiovascular:      Rate and Rhythm: Normal rate and regular rhythm.      Pulses: Normal pulses.      Heart sounds: Normal heart sounds.   Pulmonary:      Effort: Pulmonary effort is normal.      Breath sounds: Normal breath sounds.   Abdominal:      Tenderness: There is no abdominal tenderness.   Musculoskeletal:         General: No swelling.   Skin:     General: Skin is warm and dry.   Neurological:      Mental Status: She is alert.       Health Maintenance Due   Topic Date Due    HIV Screening  Never done    MMR Vaccines (1 of 1 - Standard series) Never done    Diabetes: Foot Exam  Never done    Hepatitis C Screening  Never done    DTaP/Tdap/Td Vaccines (1 - Tdap) Never done    Zoster Vaccines (1 of 2) Never done    Pneumococcal Vaccine: Pediatrics (0 to 5 Years) and At-Risk Patients (6 to 64 Years) (2 of 2 - PPSV23 or PCV20) 11/22/2018    Colorectal Cancer Screening  09/23/2023    RSV Pregnant patients and/or  patients aged 60+ years (1 - 1-dose 60+ series) Never done          Assessment/Plan   Problem List Items Addressed This Visit       Gastroesophageal reflux disease "    Type 2 diabetes mellitus (Multi) - Primary    Relevant Medications    tirzepatide (Mounjaro) 7.5 mg/0.5 mL pen injector (Start on 4/28/2024)    Primary hypertension     Other Visit Diagnoses       Pulsatile tinnitus of right ear        Relevant Orders    Referral to ENT        Her blood pressure recheck   is better.   Will increase her mounjaro to 7.5  follow up in 3 months. Recommend yearly eye exams and self foot exams. call if any problems or questions.

## 2024-04-30 ENCOUNTER — TREATMENT (OUTPATIENT)
Dept: PHYSICAL THERAPY | Facility: CLINIC | Age: 59
End: 2024-04-30
Payer: MEDICARE

## 2024-04-30 DIAGNOSIS — Z79.4 TYPE 2 DIABETES MELLITUS WITH HYPERGLYCEMIA, WITH LONG-TERM CURRENT USE OF INSULIN (MULTI): ICD-10-CM

## 2024-04-30 DIAGNOSIS — R42 VERTIGO: ICD-10-CM

## 2024-04-30 DIAGNOSIS — E11.65 TYPE 2 DIABETES MELLITUS WITH HYPERGLYCEMIA, WITH LONG-TERM CURRENT USE OF INSULIN (MULTI): ICD-10-CM

## 2024-04-30 PROCEDURE — 97140 MANUAL THERAPY 1/> REGIONS: CPT | Mod: GP | Performed by: GENERAL ACUTE CARE HOSPITAL

## 2024-04-30 PROCEDURE — 97110 THERAPEUTIC EXERCISES: CPT | Mod: GP | Performed by: GENERAL ACUTE CARE HOSPITAL

## 2024-04-30 RX ORDER — BLOOD-GLUCOSE SENSOR
1 EACH MISCELLANEOUS
COMMUNITY
End: 2024-04-30 | Stop reason: SDUPTHER

## 2024-04-30 RX ORDER — BLOOD-GLUCOSE SENSOR
EACH MISCELLANEOUS
Qty: 10 EACH | Refills: 3 | Status: SHIPPED | OUTPATIENT
Start: 2024-04-30

## 2024-04-30 NOTE — PROGRESS NOTES
Patient Name: Holly Chavez  MRN: [id number]  Today's Date: 4/30/2024     Current Problem:  1. Vertigo  Follow Up In Physical Therapy          Visit 7/10    Subjective:  Change in pain/ pain level: 0/10    Patient comments:  say I need more fluids and salt to bring up BP    Treatment:    Therapeutic exercise (97453):  HEP review MDT [facility]  Manual Therapy (06706):  STM Cervical paraspinals, Sternocleidomastoid bending, Upper Trapezius release, Suboccipital Release, manual traction  Mobilization with motion  Manual stretching UT, levator trap, SCM,Upper cervical Flexion  Thoracic P-A Rib P-A    Assessment:  Patient notes improved pain levels resultant from activities in therapy session. Improved tissue quality noted as well as body mechanics demonstrated after cueing and corrections. Patient continues to require active therapy to progress functional capabilities with decreasing pain levels and improving mechanics with functional activities including progression of Home exercise program. Tolerance to today's treatment was good. Muscle fatigue noted.  Patient appears motivated and compliant this date, demonstrating a working understanding of principals instructed and home program.    Plan:  Progress with functional strength as tolerated with respect to tissue healing. Manual therapy PRN to improve/maintain ROM, prevent adhesion, reduce pain.

## 2024-05-01 ENCOUNTER — OFFICE VISIT (OUTPATIENT)
Dept: CARDIOLOGY | Facility: CLINIC | Age: 59
End: 2024-05-01
Payer: MEDICARE

## 2024-05-01 VITALS
WEIGHT: 210 LBS | HEIGHT: 62 IN | SYSTOLIC BLOOD PRESSURE: 106 MMHG | DIASTOLIC BLOOD PRESSURE: 62 MMHG | HEART RATE: 76 BPM | BODY MASS INDEX: 38.64 KG/M2

## 2024-05-01 DIAGNOSIS — I48.0 PAROXYSMAL ATRIAL FIBRILLATION (MULTI): Primary | ICD-10-CM

## 2024-05-01 DIAGNOSIS — E11.9 TYPE 2 DIABETES MELLITUS WITHOUT COMPLICATION, WITHOUT LONG-TERM CURRENT USE OF INSULIN (MULTI): ICD-10-CM

## 2024-05-01 PROBLEM — E66.01 MORBID OBESITY (MULTI): Status: RESOLVED | Noted: 2023-02-25 | Resolved: 2024-05-01

## 2024-05-01 PROCEDURE — 3008F BODY MASS INDEX DOCD: CPT | Performed by: INTERNAL MEDICINE

## 2024-05-01 PROCEDURE — 3074F SYST BP LT 130 MM HG: CPT | Performed by: INTERNAL MEDICINE

## 2024-05-01 PROCEDURE — 1036F TOBACCO NON-USER: CPT | Performed by: INTERNAL MEDICINE

## 2024-05-01 PROCEDURE — 3078F DIAST BP <80 MM HG: CPT | Performed by: INTERNAL MEDICINE

## 2024-05-01 PROCEDURE — 3048F LDL-C <100 MG/DL: CPT | Performed by: INTERNAL MEDICINE

## 2024-05-01 PROCEDURE — 99214 OFFICE O/P EST MOD 30 MIN: CPT | Performed by: INTERNAL MEDICINE

## 2024-05-01 PROCEDURE — 3060F POS MICROALBUMINURIA REV: CPT | Performed by: INTERNAL MEDICINE

## 2024-05-01 NOTE — PATIENT INSTRUCTIONS

## 2024-05-01 NOTE — PROGRESS NOTES
"Chief Complaint:   Please see below.     History Of Present Illness:    Holly Chavez is a 58 y.o. female presenting with PAF.    This 58-year-old diabetic, hyperlipidemic woman with a BMI of 38.4 returns to the office in routine follow-up.  The patient has a [id number] score is at least 2, placing her at high risk for stroke and thromboembolism. She is appropriately anticoagulated.. The patient's pharmacologic nuclear stress test was negative for ischemia in April 2022.     Overall she feels much better.  She has lost 10 pounds since her last visit in April 2023.  She has been exercising doing resistance bands and walking.  With her weight loss, her blood pressure has decreased, and she has experienced episodes of lightheadedness.  Her lisinopril was recently stopped by her primary care physician.  Of note, she only drinks about 32 ounces of water per day.  Since the last visit here, the patient denies chest pain, shortness of breath, palpitations, orthopnea, PND, syncope, and near syncope.       Last Recorded Vitals:  Vitals:    05/01/24 1500   BP: 106/62   BP Location: Left arm   Patient Position: Sitting   Pulse: 76   Weight: 95.3 kg (210 lb)   Height: 1.575 m (5' 2\")   Body mass index is 38.41 kg/m².      Past Medical History:  She has a past medical history of Acute upper respiratory infection, unspecified (11/13/2018), Benign paroxysmal vertigo, unspecified ear (12/04/2017), Cutaneous abscess of groin (12/04/2017), Cutaneous abscess of limb, unspecified (09/20/2018), Morbid (severe) obesity due to excess calories (Multi) (07/08/2019), Personal history of diseases of the skin and subcutaneous tissue (11/20/2015), Personal history of diseases of the skin and subcutaneous tissue (09/27/2018), Personal history of other endocrine, nutritional and metabolic disease, Personal history of other endocrine, nutritional and metabolic disease (08/11/2015), Personal history of other endocrine, nutritional and metabolic " disease (04/16/2019), Personal history of other endocrine, nutritional and metabolic disease (06/06/2019), Pneumonia (09/07/2023), Rash and other nonspecific skin eruption (08/11/2015), Rash and other nonspecific skin eruption (08/11/2015), Type 1 diabetes mellitus with mild nonproliferative retinopathy and macular edema (Multi) (11/09/2015), Type 1 diabetes mellitus with mild nonproliferative retinopathy and macular edema (Multi) (11/09/2015), Type 1 diabetes mellitus with mild nonproliferative retinopathy and macular edema (Multi) (11/10/2015), Type 1 diabetes mellitus with mild nonproliferative retinopathy and macular edema (Multi) (11/12/2015), Type 2 diabetes mellitus with mild nonproliferative diabetic retinopathy without macular edema, unspecified eye (Multi) (11/09/2015), Type 2 diabetes mellitus with mild nonproliferative diabetic retinopathy without macular edema, unspecified eye (Multi) (11/09/2015), Type 2 diabetes mellitus with mild nonproliferative diabetic retinopathy without macular edema, unspecified eye (Multi) (11/10/2015), and Type 2 diabetes mellitus with mild nonproliferative diabetic retinopathy without macular edema, unspecified eye (Multi) (11/12/2015).    Past Surgical History:  She has a past surgical history that includes Tubal ligation (08/11/2015); Hernia repair (08/11/2015); and Cervical biopsy w/ loop electrode excision (04/12/2016).      Social History:  She reports that she quit smoking about 25 years ago. Her smoking use included cigarettes. She has never used smokeless tobacco. She reports that she does not currently use alcohol. She reports that she does not use drugs.    Family History:  Family History   Problem Relation Name Age of Onset    Stroke Mother      Diabetes Mother      Other (cerebrovascular accident) Mother      Heart failure Father      Diabetes Father      Pancreatic cancer Father      Breast cancer Mother's Sister      Other (cardiac disorder) Other      Diabetes  "Other      Hypertension Other      Cancer Other      Kidney disease Other      Breast cancer Other aunt     Breast cancer Father's Sister      Glaucoma Neg Hx      Macular degeneration Neg Hx          Allergies:  Adhesive tape-silicones and Codeine    Outpatient Medications:  Current Outpatient Medications   Medication Instructions    albuterol 90 mcg/actuation inhaler 2 puffs, inhalation, Every 4 hours PRN    albuterol 2.5 mg, nebulization, 4 times daily PRN    ammonium lactate (Lac-Hydrin) 12 % lotion APPLY 1 APPLICATION ONCE DAILY    apixaban (ELIQUIS) 5 mg, oral, 2 times daily    atorvastatin (LIPITOR) 10 mg, oral, Daily    Autolet (OneTouch Delica Plus Lanc Dev) lancing device Use as instructed    Blood glucose monitoring meter kit (OneTouch Ultra2 Meter) kit 1 each, miscellaneous, See admin instructions, Test 3 x daily    blood-glucose sensor (Dexcom G7 Sensor) device 1 each, miscellaneous, Every 14 days    blood-glucose sensor (Dexcom G7 Sensor) device Change every 10 days    cholecalciferol (Vitamin D-3) 125 MCG (5000 UT) capsule oral    cyanocobalamin, vitamin B-12, (Vitamin B-12) 1,000 mcg tablet extended release 1 tablet, oral, Daily    Flonase Allergy Relief 50 mcg/actuation nasal spray 2 sprays, Each Nostril, Daily RT    furosemide (LASIX) 20 mg, oral, 3 times weekly, 3 times a week as needed    guaifenesin (MUCUS RELIEF ORAL)     insulin glargine (LANTUS) 30 Units, subcutaneous, Every morning    insulin lispro (HUMALOG) 60 Units, subcutaneous, Daily, As directed in divided doses (max daily dose 60 units)    miscellaneous medical supply misc Daily,  thigh high Daily<BR>    Mounjaro 5 mg, subcutaneous, Once Weekly    Mounjaro 7.5 mg, subcutaneous, Once Weekly    nystatin (Mycostatin) ointment Topical, 2 times daily    omeprazole (PriLOSEC) 20 mg DR capsule 20 mg by mouth daily    ondansetron ODT (ZOFRAN-ODT) 4 mg, oral, Every 8 hours PRN    pen needle, diabetic 32 gauge x 5/32\" needle 4 Needles, " miscellaneous, Daily, Use with insulin injections four times daily    triamcinolone (Kenalog) 0.1 % cream Topical, 2 times daily, APPLY EXTERNALLY TO THE AFFECTED AREA 2 TO 3 TIMES DAILY       Physical Exam:  GENERAL:  pleasant 58 year-old  HEENT: No xanthelasma  NECK: Supple, no palpable adenopathy or thyromegaly  CHEST: Clear to auscultation, respiratory effort unlabored  CARDIAC: RRR, normal S1 and S2, no audible murmur, rub, gallop, carotids are brisk, PMI is not displaced  ABD: Active bowel sounds, nontender, no organomegaly, no evidence of ascites  EXT: No clubbing, cyanosis, edema, or tenderness  NEURO: Awake, alert, appropriate, speech is fluent       Last Labs:  CBC -  Lab Results   Component Value Date    WBC 8.9 02/20/2024    HGB 12.6 02/20/2024    HCT 37.7 02/20/2024    MCV 84 02/20/2024     02/20/2024       CMP -  Lab Results   Component Value Date    CALCIUM 9.4 02/20/2024    PHOS 2.9 05/19/2023    PROT 7.0 02/20/2024    ALBUMIN 4.1 02/20/2024    AST 13 02/20/2024    ALT 12 02/20/2024    ALKPHOS 73 02/20/2024    BILITOT 0.9 02/20/2024       LIPID PANEL -   Lab Results   Component Value Date    CHOL 111 02/20/2024    TRIG 79 02/20/2024    HDL 39.7 02/20/2024    CHHDL 2.8 02/20/2024    LDLF 87 02/09/2023    VLDL 16 02/20/2024    NHDL 71 02/20/2024       RENAL FUNCTION PANEL -   Lab Results   Component Value Date    GLUCOSE 197 (H) 02/20/2024     02/20/2024    K 4.5 02/20/2024     02/20/2024    CO2 31 02/20/2024    ANIONGAP 11 02/20/2024    BUN 20 02/20/2024    CREATININE 0.73 02/20/2024    CALCIUM 9.4 02/20/2024    PHOS 2.9 05/19/2023    ALBUMIN 4.1 02/20/2024        Lab Results   Component Value Date     (H) 08/19/2023    HGBA1C 7.4 (A) 04/01/2024         Lab review: I have Chemistry CMP:   Lab Results   Component Value Date    ALBUMIN 4.1 02/20/2024    CALCIUM 9.4 02/20/2024    CO2 31 02/20/2024    CREATININE 0.73 02/20/2024    GLUCOSE 197 (H) 02/20/2024    BILITOT 0.9  02/20/2024    PROT 7.0 02/20/2024    ALT 12 02/20/2024    AST 13 02/20/2024    ALKPHOS 73 02/20/2024   , Chemistry BMP   Lab Results   Component Value Date    GLUCOSE 197 (H) 02/20/2024    CALCIUM 9.4 02/20/2024    CO2 31 02/20/2024    CREATININE 0.73 02/20/2024   , CBC:  Lab Results   Component Value Date    WBC 8.9 02/20/2024    RBC 4.51 02/20/2024    HGB 12.6 02/20/2024    HCT 37.7 02/20/2024    MCV 84 02/20/2024    MCH 27.9 02/20/2024    MCHC 33.4 02/20/2024    RDW 13.5 02/20/2024    NRBC 0.0 02/20/2024   , and Lipids:   Lab Results   Component Value Date    CHOL 111 02/20/2024    HDL 39.7 02/20/2024    LDLCALC 56 02/20/2024    TRIG 79 02/20/2024       Assessment/Plan   Problem List Items Addressed This Visit          Cardiac and Vasculature    Paroxysmal atrial fibrillation (Multi) - Primary    Relevant Medications    apixaban (Eliquis) 5 mg tablet    Other Relevant Orders    Follow Up In Cardiology       Endocrine/Metabolic    Type 2 diabetes mellitus without complication, without long-term current use of insulin (Multi)    BMI 38.0-38.9,adult   PAF:stable on anticoagulation, maintaining SR.  Continue present management.  I've reviewed the patient's recent labs.  Episodes of lightheadedness: Likely related to lisinopril, also aggravated by inadequate fluid intake.  Additionally, she may have an element of diabetic autonomic dysfunction contributing to orthostatic changes in blood pressure and lightheadedness.  I advised her to increase her intake of noncaffeinated, nonalcoholic beverages to at least 64 to 80 ounces daily.  3.  BMI 38.4: Congratulated her on her weight loss.  Advised her to continue her efforts of reincorporating exercise into her daily routine.  Encouraged her to add weight lifting with light weights in addition to aerobic work.      Faustino Dove MD

## 2024-05-07 ENCOUNTER — TREATMENT (OUTPATIENT)
Dept: PHYSICAL THERAPY | Facility: CLINIC | Age: 59
End: 2024-05-07
Payer: MEDICARE

## 2024-05-07 ENCOUNTER — TELEPHONE (OUTPATIENT)
Dept: PRIMARY CARE | Facility: CLINIC | Age: 59
End: 2024-05-07
Payer: COMMERCIAL

## 2024-05-07 DIAGNOSIS — R42 VERTIGO: ICD-10-CM

## 2024-05-07 PROCEDURE — 97110 THERAPEUTIC EXERCISES: CPT | Mod: GP | Performed by: GENERAL ACUTE CARE HOSPITAL

## 2024-05-07 NOTE — PROGRESS NOTES
Patient Name: Holly Chavez  MRN: [id number]  Today's Date: 5/7/2024     Current Problem:  No diagnosis found.    Visit 8/10    Subjective:  Change in pain/ pain level: 0/10    Treatment:    Therapeutic exercise (13991):  Access Code: [email or web address]  URL: [email or web address]  Date: 05/07/2024  Prepared by: Florentin Vargas    Exercises  - Standing Shoulder Row with Anchored Resistance  - 2-3 x weekly - 20 reps  - Shoulder extension with resistance - Neutral  - 2-3 x weekly - 20 reps  - Standing Row with Resistance with Anchored Resistance at Chest Height Palms Down  - 2-3 x weekly - 20 reps  - Shoulder External Rotation with Anchored Resistance  - 2-3 x weekly - 20 reps  - Shoulder Internal Rotation with Resistance  - 2-3 x weekly - 20 reps  - Cervical Retraction with Overpressure  - 8 x daily - 7 x weekly - 10 reps  - Seated Cervical Retraction and Extension  - 8 x daily - 7 x weekly - 10 reps  - Supine Bridge  - 2-3 x weekly - 20 reps - 5 sec  hold  - Clamshell  - 2-3 x weekly - 20 reps  - Sidelying Hip Abduction  - 2-3 x weekly - 20 reps  - Active Straight Leg Raise with Quad Set  - 2-3 x weekly - 20 reps  - Seated Long Arc Quad  - 2-3 x weekly - 20 reps  - Sit to Stand Without Arm Support  - 2-3 x weekly - 20 reps  - Supine Transversus Abdominis Bracing - Hands on Stomach  - 3 x daily - 7 x weekly - 10 reps - 10 hold  - Supine Pelvic Floor Contraction  - 3 x daily - 7 x weekly - 10 reps - 10 hold  - Supine Multifidus with Heel Press  - 3 x daily - 7 x weekly - 10 reps - 10 hold    Assessment:  Progressed exercises SEE EFS. Patient notes improved pain levels resultant from activities in therapy session. Improved tissue quality noted as well as body mechanics demonstrated after cueing and corrections. Patient continues to require active therapy to progress functional capabilities with decreasing pain levels and improving mechanics with functional activities including progression of Home exercise  program. Tolerance to today's treatment was good. Muscle fatigue noted.  Patient appears motivated and compliant this date, demonstrating a working understanding of principals instructed and home program.    Plan:  Progress with functional strength as tolerated with respect to tissue healing. Manual therapy PRN to improve/maintain ROM, prevent adhesion, reduce pain.

## 2024-05-09 DIAGNOSIS — E11.9 TYPE 2 DIABETES MELLITUS WITHOUT COMPLICATION, WITHOUT LONG-TERM CURRENT USE OF INSULIN (MULTI): ICD-10-CM

## 2024-05-09 RX ORDER — BLOOD-GLUCOSE SENSOR
1 EACH MISCELLANEOUS SEE ADMIN INSTRUCTIONS
Qty: 9 EACH | Refills: 1 | Status: SHIPPED | OUTPATIENT
Start: 2024-05-09

## 2024-05-10 ENCOUNTER — TELEPHONE (OUTPATIENT)
Dept: ENDOCRINOLOGY | Facility: CLINIC | Age: 59
End: 2024-05-10
Payer: COMMERCIAL

## 2024-05-10 NOTE — TELEPHONE ENCOUNTER
Holly called and left a message 5-10-24.  She is still fighting to get her G7 Dex Com Sensors.  The drug store told her they have to come from [facility] because they are medical equipment.  She talked to Don from [phone] and he said he needs a new prescription along with any paperwork available.  Their fax number is [phone].  Any questions please Holly at [phone].  Thank you

## 2024-05-14 ENCOUNTER — APPOINTMENT (OUTPATIENT)
Dept: PHYSICAL THERAPY | Facility: CLINIC | Age: 59
End: 2024-05-14
Payer: MEDICARE

## 2024-05-23 ENCOUNTER — PROCEDURE VISIT (OUTPATIENT)
Dept: PODIATRY | Facility: CLINIC | Age: 59
End: 2024-05-23
Payer: MEDICARE

## 2024-05-23 DIAGNOSIS — E11.65 TYPE 2 DIABETES MELLITUS WITH HYPERGLYCEMIA, WITH LONG-TERM CURRENT USE OF INSULIN (MULTI): Primary | ICD-10-CM

## 2024-05-23 DIAGNOSIS — Z79.4 TYPE 2 DIABETES MELLITUS WITH HYPERGLYCEMIA, WITH LONG-TERM CURRENT USE OF INSULIN (MULTI): Primary | ICD-10-CM

## 2024-05-23 DIAGNOSIS — G89.29 CHRONIC PAIN OF TOE, UNSPECIFIED LATERALITY: ICD-10-CM

## 2024-05-23 DIAGNOSIS — B35.1 FUNGAL NAIL INFECTION: ICD-10-CM

## 2024-05-23 DIAGNOSIS — I89.0 LYMPHEDEMA: ICD-10-CM

## 2024-05-23 DIAGNOSIS — M79.676 CHRONIC PAIN OF TOE, UNSPECIFIED LATERALITY: ICD-10-CM

## 2024-05-23 DIAGNOSIS — R60.0 BILATERAL LEG EDEMA: ICD-10-CM

## 2024-05-23 PROCEDURE — 99213 OFFICE O/P EST LOW 20 MIN: CPT | Performed by: PODIATRIST

## 2024-05-23 NOTE — PROGRESS NOTES
History Of Present Illness  Holly Chavez is a 58 y.o. female presenting for diabetic nail care. Patient complains with painful elongated nails. PCP Dr Parisi, last visit 4/2024 patient did see vascular who evaluated her venous insufficiency.  This was ruled out and she was diagnosed with lymphedema.  Her ankles are very swollen as well as her lower leg.  They are very uncomfortable.     Past Medical History  She has a past medical history of Acute upper respiratory infection, unspecified (11/13/2018), Benign paroxysmal vertigo, unspecified ear (12/04/2017), Cutaneous abscess of groin (12/04/2017), Cutaneous abscess of limb, unspecified (09/20/2018), Morbid (severe) obesity due to excess calories (Multi) (07/08/2019), Personal history of diseases of the skin and subcutaneous tissue (11/20/2015), Personal history of diseases of the skin and subcutaneous tissue (09/27/2018), Personal history of other endocrine, nutritional and metabolic disease, Personal history of other endocrine, nutritional and metabolic disease (08/11/2015), Personal history of other endocrine, nutritional and metabolic disease (04/16/2019), Personal history of other endocrine, nutritional and metabolic disease (06/06/2019), Pneumonia (09/07/2023), Rash and other nonspecific skin eruption (08/11/2015), Rash and other nonspecific skin eruption (08/11/2015), Type 1 diabetes mellitus with mild nonproliferative retinopathy and macular edema (Multi) (11/09/2015), Type 1 diabetes mellitus with mild nonproliferative retinopathy and macular edema (Multi) (11/09/2015), Type 1 diabetes mellitus with mild nonproliferative retinopathy and macular edema (Multi) (11/10/2015), Type 1 diabetes mellitus with mild nonproliferative retinopathy and macular edema (Multi) (11/12/2015), Type 2 diabetes mellitus with mild nonproliferative diabetic retinopathy without macular edema, unspecified eye (Multi) (11/09/2015), Type 2 diabetes mellitus with mild nonproliferative  diabetic retinopathy without macular edema, unspecified eye (Multi) (11/09/2015), Type 2 diabetes mellitus with mild nonproliferative diabetic retinopathy without macular edema, unspecified eye (Multi) (11/10/2015), and Type 2 diabetes mellitus with mild nonproliferative diabetic retinopathy without macular edema, unspecified eye (Multi) (11/12/2015).    Surgical History  She has a past surgical history that includes Tubal ligation (08/11/2015); Hernia repair (08/11/2015); and Cervical biopsy w/ loop electrode excision (04/12/2016).     Social History  She reports that she quit smoking about 25 years ago. Her smoking use included cigarettes. She has never used smokeless tobacco. She reports that she does not currently use alcohol. She reports that she does not use drugs.    Family History  Family History   Problem Relation Name Age of Onset    Stroke Mother      Diabetes Mother      Other (cerebrovascular accident) Mother      Heart failure Father      Diabetes Father      Pancreatic cancer Father      Breast cancer Mother's Sister      Other (cardiac disorder) Other      Diabetes Other      Hypertension Other      Cancer Other      Kidney disease Other      Breast cancer Other aunt     Breast cancer Father's Sister      Glaucoma Neg Hx      Macular degeneration Neg Hx          Allergies  Adhesive tape-silicones and Codeine    Medications  Current Outpatient Medications   Medication Sig Dispense Refill    albuterol 2.5 mg /3 mL (0.083 %) nebulizer solution Take 3 mL (2.5 mg) by nebulization 4 times a day as needed for wheezing or shortness of breath. 75 mL 3    albuterol 90 mcg/actuation inhaler Inhale 2 puffs every 4 hours if needed for wheezing or shortness of breath. 18 g 3    ammonium lactate (Lac-Hydrin) 12 % lotion APPLY 1 APPLICATION ONCE DAILY      apixaban (Eliquis) 5 mg tablet Take 1 tablet (5 mg) by mouth 2 times a day. 180 tablet 3    atorvastatin (Lipitor) 10 mg tablet Take 1 tablet (10 mg) by mouth  "once daily. 30 tablet 5    Autolet (OneTouch Delica Plus Lanc Dev) lancing device Use as instructed 1 each 0    Blood glucose monitoring meter kit (OneTouch Ultra2 Meter) kit 1 each see administration instructions. Test 3 x daily 1 each 0    blood-glucose sensor (Dexcom G7 Sensor) device Change every 10 days 10 each 3    blood-glucose sensor (Dexcom G7 Sensor) device 1 each see administration instructions. Change sensor every 10 days 9 each 1    cholecalciferol (Vitamin D-3) 125 MCG (5000 UT) capsule Take by mouth.      cyanocobalamin, vitamin B-12, (Vitamin B-12) 1,000 mcg tablet extended release Take 1 tablet (1,000 mcg) by mouth once daily.      Flonase Allergy Relief 50 mcg/actuation nasal spray Administer 2 sprays into each nostril once daily. 16 g 2    furosemide (Lasix) 20 mg tablet Take 1 tablet (20 mg) by mouth 3 times a week. 3 times a week as needed 36 tablet 3    guaifenesin (MUCUS RELIEF ORAL)       insulin glargine (Lantus) 100 unit/mL (3 mL) pen Inject 30 Units under the skin once daily in the morning. 18 mL 2    insulin lispro (HumaLOG) 100 unit/mL injection Inject 60 Units under the skin once daily. As directed in divided doses (max daily dose 60 units) 60 mL 2    miscellaneous medical supply [facility] once daily.  thigh high Daily      nystatin (Mycostatin) ointment Apply topically 2 times a day. 30 g 3    omeprazole (PriLOSEC) 20 mg DR capsule 20 mg by mouth daily 90 capsule 3    ondansetron ODT (Zofran-ODT) 4 mg disintegrating tablet Take 1 tablet (4 mg) by mouth every 8 hours if needed.      pen needle, diabetic 32 gauge x 5/32\" needle 4 Needles once daily. Use with insulin injections four times daily 400 each 3    tirzepatide (Mounjaro) 5 mg/0.5 mL pen injector Inject 5 mg under the skin 1 (one) time per week. 2 mL 3    tirzepatide (Mounjaro) 7.5 mg/0.5 mL pen injector Inject 7.5 mg under the skin 1 (one) time per week. 2 mL 5    triamcinolone (Kenalog) 0.1 % cream Apply topically 2 times a day. " APPLY EXTERNALLY TO THE AFFECTED AREA 2 TO 3 TIMES DAILY 45 g 3     No current facility-administered medications for this visit.       Review of Systems    REVIEW OF SYSTEMS  GENERAL:  Negative for malaise, significant weight loss, fever  CARDIOVASCULAR: leg swelling   MUSCULOSKELETAL:  Negative for joint pain or swelling, back pain, and muscle pain.  SKIN:  Negative for lesions, rash, and itching  PSYCH:  Negative for sleep disturbance, mood disorder and recent psychosocial stressors  NEURO: Negative, denies any burning, tingling or numbness     Objective:   Vasc: DP and PT pulses are palpable bilateral.  CFT is less than 3 seconds bilateral.  Skin temperature is warm to cool proximal to distal bilateral.  Pitting pedal edema. No digital hair.      Neuro:  Light touch is intact to the foot bilateral.      Derm: Nails 1-5 bilateral are thickened, elongated and crumbly with subungual debris. Skin is supple with normal texture and turgor noted.  Webspaces are clean, dry and intact bilateral.  There are no hyperkeratoses, ulcerations, verruca or other lesions noted.  Patient has preulcerative lesions on her anterior legs.    Ortho: Muscle strength is 5/5 for all pedal groups tested.  Ankle joint, subtalar joint, 1st MPJ and lesser MPJ ROM is full and without pain or crepitus.  The foot type is rectus bilateral off weight bearing.  There are no structural deformities noted.    Assessment/Plan   DM  Painful nail mycosis  Lymphedema      Toenails are debrided in length and thickness to avoid infection and for pain relief    Feel best option for this patient is to initiate lymphedema therapy.  A prescription is given.  Patient is to continue with her compression hose as well.    I spent 20 minutes in the professional and overall care of this patient.

## 2024-05-31 DIAGNOSIS — E11.69 TYPE 2 DIABETES MELLITUS WITH OTHER SPECIFIED COMPLICATION, WITH LONG-TERM CURRENT USE OF INSULIN (MULTI): ICD-10-CM

## 2024-05-31 DIAGNOSIS — Z79.4 TYPE 2 DIABETES MELLITUS WITH OTHER SPECIFIED COMPLICATION, WITH LONG-TERM CURRENT USE OF INSULIN (MULTI): ICD-10-CM

## 2024-05-31 RX ORDER — INSULIN GLARGINE 100 [IU]/ML
30 INJECTION, SOLUTION SUBCUTANEOUS EVERY MORNING
COMMUNITY
Start: 2024-05-31

## 2024-05-31 RX ORDER — INSULIN GLARGINE 100 [IU]/ML
30 INJECTION, SOLUTION SUBCUTANEOUS NIGHTLY
COMMUNITY
Start: 2024-05-31

## 2024-06-06 ENCOUNTER — CLINICAL SUPPORT (OUTPATIENT)
Dept: AUDIOLOGY | Facility: CLINIC | Age: 59
End: 2024-06-06
Payer: MEDICARE

## 2024-06-06 ENCOUNTER — OFFICE VISIT (OUTPATIENT)
Dept: OTOLARYNGOLOGY | Facility: CLINIC | Age: 59
End: 2024-06-06
Payer: MEDICARE

## 2024-06-06 DIAGNOSIS — H93.A1 PULSATILE TINNITUS OF RIGHT EAR: ICD-10-CM

## 2024-06-06 DIAGNOSIS — H90.3 BILATERAL HIGH FREQUENCY SENSORINEURAL HEARING LOSS: Primary | ICD-10-CM

## 2024-06-06 DIAGNOSIS — H93.11 TINNITUS OF RIGHT EAR: ICD-10-CM

## 2024-06-06 PROCEDURE — 99213 OFFICE O/P EST LOW 20 MIN: CPT | Performed by: PHYSICIAN ASSISTANT

## 2024-06-06 PROCEDURE — 3048F LDL-C <100 MG/DL: CPT | Performed by: PHYSICIAN ASSISTANT

## 2024-06-06 PROCEDURE — 3060F POS MICROALBUMINURIA REV: CPT | Performed by: PHYSICIAN ASSISTANT

## 2024-06-06 PROCEDURE — 3008F BODY MASS INDEX DOCD: CPT | Performed by: PHYSICIAN ASSISTANT

## 2024-06-06 NOTE — PROGRESS NOTES
Holly Chavez is a 58 y.o. year old female patient with complaints of tinnitus.  Patient with complaints of 2 months of constant tinnitus right ear only.  She states that she has occasional whooshing or pulsatile sensation right ear which she states is present during times of strenuous activity.  She denies other ENT related concerns at this time.        Review of Systems   All other systems reviewed and are negative.        Physical Exam:   General appearance: No acute distress. Normal facies. Symmetric facial movement. No gross lesions of the face are noted.  The external ear structures appear normal.  Auscultation appear regular region and neck unremarkable.  The ear canals patent and the tympanic membranes are intact without evidence of air-fluid levels, retraction, or congenital defects.  Anterior rhinoscopy notes essentially a midline nasal septum. Examination is noted for normal healthy mucosal membranes without any evidence of lesions, polyps, or exudate. The tongue is normally mobile. There are no lesions on the gingiva, buccal, or oral mucosa. There are no oral cavity masses.  The neck is negative for mass lymphadenopathy. The trachea and parotid are clear. The thyroid bed is grossly unremarkable. The salivary gland structures are grossly unremarkable.    Audiogram demonstrates bilateral high-frequency sensorineural hearing loss which is mild to slightly worse right than left  Assessment/Plan     1.  Tinnitus  Patient seen in the office today for assessment of ears with complaints of tinnitus right ear.  Patient with bilateral high-frequency loss slightly worse right greater than left.  Auscultation of the pericaval region unremarkable.  Patient has constant tinnitus but intermittent pulsatile sensation.  At this time I would recommend that the patient follow-up with primary care or cardiology as the symptoms of pulsatile tinnitus are present with strenuous activity.  It may be important for the patient to  have carotid ultrasound performed.  We will see her back in 4 months for follow-up.

## 2024-06-07 PROBLEM — H93.11 TINNITUS OF RIGHT EAR: Status: ACTIVE | Noted: 2024-06-07

## 2024-06-07 PROBLEM — H90.3 BILATERAL HIGH FREQUENCY SENSORINEURAL HEARING LOSS: Status: ACTIVE | Noted: 2024-06-07

## 2024-06-07 NOTE — PROGRESS NOTES
Ms. Chavez, age 58, was seen today for a hearing evaluation during her ENT visit with Dr. Romo's physician's assistant, Abdifatah Pinzon.  She denied hearing concern but did state concern regarding tinnitus in her right ear which she has been experiencing for the past two months.  She stated she hears a constant ringing as well as an intermittent whooshing which is pulsatile.    Results:  Otoscopy revealed clear ear canals and tympanic membranes were visualized bilaterally.  Tympanometry revealed normal, Type A tympanograms, indicating normal ear canal volume, peak pressure and compliance bilaterally.  Audiometric thresholds revealed normal hearing sensitivity 250-4000 Hz sloping to a mild high frequency sensorineural hearing loss bilaterally.  Word recognition scores were excellent bilaterally.    Recommendations:  Follow-up with PCP, Dr. Isak Salas, as medically directed.  Follow-up with ENT, as medically directed.  Retest hearing annually to monitor.

## 2024-06-11 ENCOUNTER — APPOINTMENT (OUTPATIENT)
Dept: PHYSICAL THERAPY | Facility: CLINIC | Age: 59
End: 2024-06-11
Payer: MEDICARE

## 2024-06-12 ENCOUNTER — TELEPHONE (OUTPATIENT)
Dept: PRIMARY CARE | Facility: CLINIC | Age: 59
End: 2024-06-12
Payer: COMMERCIAL

## 2024-06-12 NOTE — TELEPHONE ENCOUNTER
Patient lm    BP has been on the low end- 106/70, 100/60,    Called on call service last night.   [place] weak, checked BP 95/55    On call dr advised hydration, salt    Early morning 5am/6am 89/56,  98/56    At 8 am had a teaspoon of soy sauce /65    Please advise (appt scheduled in the meantime)

## 2024-06-14 ENCOUNTER — OFFICE VISIT (OUTPATIENT)
Dept: PRIMARY CARE | Facility: CLINIC | Age: 59
End: 2024-06-14
Payer: COMMERCIAL

## 2024-06-14 VITALS
HEIGHT: 62 IN | SYSTOLIC BLOOD PRESSURE: 128 MMHG | HEART RATE: 64 BPM | DIASTOLIC BLOOD PRESSURE: 60 MMHG | TEMPERATURE: 98.2 F | OXYGEN SATURATION: 95 % | WEIGHT: 206 LBS | RESPIRATION RATE: 14 BRPM | BODY MASS INDEX: 37.91 KG/M2

## 2024-06-14 DIAGNOSIS — E11.9 TYPE 2 DIABETES MELLITUS WITHOUT COMPLICATION, WITH LONG-TERM CURRENT USE OF INSULIN (MULTI): ICD-10-CM

## 2024-06-14 DIAGNOSIS — Z79.4 TYPE 2 DIABETES MELLITUS WITHOUT COMPLICATION, WITH LONG-TERM CURRENT USE OF INSULIN (MULTI): ICD-10-CM

## 2024-06-14 DIAGNOSIS — I95.89 HYPOTENSION DUE TO HYPOVOLEMIA: Primary | ICD-10-CM

## 2024-06-14 DIAGNOSIS — E86.1 HYPOTENSION DUE TO HYPOVOLEMIA: Primary | ICD-10-CM

## 2024-06-14 PROCEDURE — 99214 OFFICE O/P EST MOD 30 MIN: CPT | Performed by: INTERNAL MEDICINE

## 2024-06-14 PROCEDURE — 3078F DIAST BP <80 MM HG: CPT | Performed by: INTERNAL MEDICINE

## 2024-06-14 PROCEDURE — 3060F POS MICROALBUMINURIA REV: CPT | Performed by: INTERNAL MEDICINE

## 2024-06-14 PROCEDURE — 3074F SYST BP LT 130 MM HG: CPT | Performed by: INTERNAL MEDICINE

## 2024-06-14 PROCEDURE — 3008F BODY MASS INDEX DOCD: CPT | Performed by: INTERNAL MEDICINE

## 2024-06-14 PROCEDURE — 3048F LDL-C <100 MG/DL: CPT | Performed by: INTERNAL MEDICINE

## 2024-06-14 PROCEDURE — 1036F TOBACCO NON-USER: CPT | Performed by: INTERNAL MEDICINE

## 2024-06-14 RX ORDER — INSULIN PUMP SYRINGE, 3 ML
1 EACH MISCELLANEOUS SEE ADMIN INSTRUCTIONS
Qty: 1 EACH | Refills: 0 | Status: SHIPPED | OUTPATIENT
Start: 2024-06-14 | End: 2025-06-14

## 2024-06-14 RX ORDER — BLOOD SUGAR DIAGNOSTIC
1 STRIP MISCELLANEOUS 3 TIMES DAILY
Qty: 100 EACH | Refills: 11 | Status: SHIPPED | OUTPATIENT
Start: 2024-06-14

## 2024-06-14 NOTE — PROGRESS NOTES
"Subjective    Holly Chavez is a 58 y.o. female who presents for Hypotension.  HPI    BP has been low for a few weeks.   As low as 89/56   Eating a salty snack a day. Has not taken lasix in over a year.    Does not drink enough her blood pressure was low once day she was lightheaded  Generalized weakness she feels fatigued  Stopped atorvastatin per endocrinology   Review of Systems   All other systems reviewed and are negative.        Objective     /60 (BP Location: Left arm, Patient Position: Sitting, BP Cuff Size: Adult)   Pulse 64   Temp 36.8 °C (98.2 °F) (Skin)   Resp 14   Ht 1.575 m (5' 2\")   Wt 93.4 kg (206 lb)   LMP  (LMP Unknown)   SpO2 95%   BMI 37.68 kg/m²    Physical Exam  Health Maintenance Due   Topic Date Due    HIV Screening  Never done    MMR Vaccines (1 of 1 - Standard series) Never done    Diabetes: Foot Exam  Never done    Hepatitis C Screening  Never done    DTaP/Tdap/Td Vaccines (1 - Tdap) Never done    Zoster Vaccines (1 of 2) Never done    Pneumococcal Vaccine: Pediatrics (0 to 5 Years) and At-Risk Patients (6 to 64 Years) (2 of 2 - PPSV23 or PCV20) 11/22/2018    Colorectal Cancer Screening  09/23/2023    RSV Pregnant patients and/or  patients aged 60+ years (1 - 1-dose 60+ series) Never done    Diabetes: Hemoglobin A1C  07/01/2024          Assessment/Plan   Problem List Items Addressed This Visit       Type 2 diabetes mellitus without complication, without long-term current use of insulin (Multi)    Relevant Medications    Blood glucose monitoring meter kit (OneTouch Ultra2 Meter) kit    OneTouch Ultra Test strip     Other Visit Diagnoses       Hypotension due to hypovolemia    -  Primary        Her blood pressure is good. Stay hydrated. Add salt to diet  She is doing well on the Mounjaro but she is not taking in  enough fluids.  Recommend at least 64 ounce of non sweetened non caffeinated fluid  Recommend protein with every meal.  Call  with any problems or questions.   Follow up " is already schedueled  Recommend she take her atorvastatin

## 2024-06-14 NOTE — LETTER
Holly Chavez  1965    6/14/2024    To Whom This May Concern:    My patient, Holly Chavez, is unable to perform Jury Duty due to a physical condition that renders the prospective juror unfit for jury service for the remainder of the jury year.    Should you have any questions please do not hesitate to call.    Thank you for your cooperation.    Sincerely,      Johanna Salas, DO

## 2024-06-21 ENCOUNTER — EVALUATION (OUTPATIENT)
Dept: OCCUPATIONAL THERAPY | Facility: CLINIC | Age: 59
End: 2024-06-21
Payer: MEDICARE

## 2024-06-21 DIAGNOSIS — R60.0 BILATERAL LEG EDEMA: ICD-10-CM

## 2024-06-21 DIAGNOSIS — E11.65 TYPE 2 DIABETES MELLITUS WITH HYPERGLYCEMIA, WITH LONG-TERM CURRENT USE OF INSULIN (MULTI): ICD-10-CM

## 2024-06-21 DIAGNOSIS — Z79.4 TYPE 2 DIABETES MELLITUS WITH HYPERGLYCEMIA, WITH LONG-TERM CURRENT USE OF INSULIN (MULTI): ICD-10-CM

## 2024-06-21 DIAGNOSIS — M25.671 ANKLE JOINT STIFFNESS, BILATERAL: Primary | ICD-10-CM

## 2024-06-21 DIAGNOSIS — R29.898 LEG HEAVINESS: ICD-10-CM

## 2024-06-21 DIAGNOSIS — M25.672 ANKLE JOINT STIFFNESS, BILATERAL: Primary | ICD-10-CM

## 2024-06-21 DIAGNOSIS — I89.0 LYMPHEDEMA: ICD-10-CM

## 2024-06-21 DIAGNOSIS — R53.1 WEAKNESS GENERALIZED: ICD-10-CM

## 2024-06-21 PROCEDURE — 97535 SELF CARE MNGMENT TRAINING: CPT | Mod: GO | Performed by: OCCUPATIONAL THERAPIST

## 2024-06-21 PROCEDURE — 97166 OT EVAL MOD COMPLEX 45 MIN: CPT | Mod: GO | Performed by: OCCUPATIONAL THERAPIST

## 2024-06-21 ASSESSMENT — ACTIVITIES OF DAILY LIVING (ADL): HOME_MANAGEMENT_TIME_ENTRY: 8

## 2024-06-21 NOTE — PROGRESS NOTES
Occupational Therapy    Evaluation    Patient Name: Holly Chavez  MRN: [id number]  Today's Date: 6/21/2024         Assessment:   Holly Chavez presents to occupational therapy today with BLE lymphedema, tightness, joint stiffness, leg pain, weakness, impacting functional mobility, ADLS, IADLS, and leisure activities.   Standardized testing and measures administered today, including LLIS, reveal that the patient has multiple impairments in body structures and functions, activity limitations, and participation restrictions.   The patient has personal factors and comorbidities that may serve as barriers affecting the plan of care, including leg swelling, very sensitive skin, Type 2 Diabetes, neuropathy, hypotension, Paroxymal Atrial Fibrillation, and vertigo.   Skilled OT services are warranted in order to realize measurable change in the above outcome measures and achieve improvements in the patient's functional status and individual goals. The patient verbalized understanding and is in agreement with all goals and plan of care.     Plan:   1-2x per week for 8-12 weeks     Current Problem:  1. Ankle joint stiffness, bilateral        2. Type 2 diabetes mellitus with hyperglycemia, with long-term current use of insulin (Multi)  Referral to Occupational Therapy      3. Bilateral leg edema  Referral to Occupational Therapy      4. Lymphedema  Referral to Occupational Therapy      5. Leg heaviness        6. Weakness generalized          Subjective   Patient arrives to OT with chief complaint of BLE lymphedema, joint stiffness, skin texture changes and generalized weakness all impacting independence with ADLs, IADLs, functional mobility, work and leisure tasks.     Pt reports onset of BLEs swelling ~ 2 years ago. Pt reports swelling initially lasted a few months, improved and then worsened again. Pt reports no history of wounds or infection. However, pt reports weeping in BLEs several years ago. Pt associates pain with swelling  "in BLEs; describes pain as pressure and achy. Patient reports swelling improves with elevation and worsens with prolonged sitting or standing. Patient reports her legs are the best in the morning and swell throughout the day. Patient has not received lymphedema therapy in the past. Patient does wear compression garments intermittently at this time. However, has difficulty donning compression garments. Pt able to mildly tolerate 15-20mmHg zipper compression. Pt reports increased sensitivity in skin, itchiness, and tender to the touch.      Per podiatrist, Dr. Singh:  Holly Chavez is a 58 y.o. female presenting for diabetic nail care. Patient complains with painful elongated nails. PCP Dr Parisi, last visit 4/2024 patient did see vascular who evaluated her venous insufficiency.  This was ruled out and she was diagnosed with lymphedema.  Her ankles are very swollen as well as her lower leg. They are very uncomfortable.     ADLs:   Pt's spouse assist with ADLs; pt limited by hand weakness   Pt reports intermittent lightheadedness when standing to perform ADLs; takes intermittent standing or seated breaks   Pt reports impaired sensation at fingertips and toes due to diabetes. \"My hands feel like sandpaper, numbness or no feeling.\"   Pt has 3 kids and 8 grand kids  Pt currently is not working; on disability     Precautions:   Latex allergy  Neuropathy in hands and feet  No hx of falls    Pain:   Pt reports 0/10 pain on today's date. However, pt reports her legs are painful. \"They feel like they could explode\"     Objective     Lymphedema Assessment    Right Lower Extremity:   Lipodermatosclerosis lower leg    Stemmer sign positive    Mild fullness at foot    Dry patch lower leg    Hemosiderin staining lower leg      Left Lower Extremity:   Lipodermatosclerosis lower leg    Stemmer sign positive    Mild fullness at foot    Hemosiderin staining lower leg    Increased fullness at foot and ankle    Mild heat at redness in " lower leg; pt reports this is her normal      LE Skin Appearance/Condition and Girth:   R Superior border of patella (SBP) 42.2   R 10cm above SBP  -  R 10cm below SBP 35.2   R 20cm below SBP 41.1   R 30cm below SBP 30.2   R 35cm below SBP 26.9   R Ankle lobule 30.0   R Ankle  26.7   R Forefoot  24.0      L Superior border of patella (SBP) 43.0   L10cm above SBP -   L 10cm below SBP 35.1  L 20cm below SBP 41.7  L 30cm below SBP 33.3   L 35cm below SBP 28.5   L Ankle lobule 31.5   L Ankle  28.2   L Forefoot 24.0     Outcome Measures:    LLIS: 36.76%    OP EDUCATION:  OT educated pt on anatomy / physiology of the lymphatic system. OT educated pt on complete decongestive therapy (CDT) for lymphedema treatment. OT developed goals and plan of care with patient.      OT educated pt on importance of meticulous skin care per lymphedema precautions. OT reviewed signs/symptoms of infection. OT instructed pt to monitor skin for changes; pt expressed carryover. OT issued skin care checklist with recommendations of products for optimal skin care.          Goals:  -Demonstrate decreased swelling and softened tissue texture in B LE upon visual inspection and palpation to increase safe functional mobility, ADLS, IADLS, and leisure activities.  -Decrease circumferential measurements lower extremity by .5cm to 3cm.  -Demonstrate increased knowledge with lymphedema skin care precautions to reduce the risk of infection and exacerbation.  -Demonstrate independence with the self manual lymph drainage (MLD) to enhance lymphatic flow and decongestion of trunk,  lower extremities.  -Demonstrate independence with self bandaging/compression techniques and independent understanding of the principles and theory of lymphatic compression.  -Be fit with and demonstrate good tolerance to lower extremity compression garment when the patient is stable, at maximal decongestion.  -Demonstrate independence with donning/doffing garment and adherence to  wear schedule, adaptive techniques as needed.  -Improve LLIS score by 10 points by discharge.  -Decrease pain, tightness lower extremities.  -Improve bilateral LE functional ROM and strength as needed for safe functional mobility.  -Demonstrate independence with home exercise program and compliance with lymphedema exercise precautions.

## 2024-06-21 NOTE — PROGRESS NOTES
"Annual Exam    Pap: 2022 nml HPV-  Fabi: 2024 Cat 1  LMP: absent  CC: None    Maureen Thomas MA II     GYN ANNUAL EXAM    SUBJECTIVE    Holly Chavez is a 58 y.o. female who presents for annual exam today.  She is post-menopausal with no recent bleeding/spotting.  She has no complaints today.      PMH - type II DM, a-fib    PSH - hernia repair, carpal tunnel surgery     OB history -   OB History    Para Term  AB Living   4 3 3   1 3   SAB IAB Ectopic Multiple Live Births   1       3      # Outcome Date GA Lbr David/2nd Weight Sex Delivery Anes PTL Lv   4 SAB            3 Term      Vag-Spont   BERNA   2 Term      Vag-Spont   BERNA   1 Term      Vag-Spont   BERNA     Last pap -   Normal HPV Negative-      Last mammogram - 2024 - BIRADs 1     Family history of breast or ovarian cancer - paternal aunt - breast cancer, no FH of ovarian cancer     OBJECTIVE  /75 (BP Location: Right arm, Patient Position: Sitting)   Pulse 79   Ht 1.575 m (5' 2\")   Wt 94.5 kg (208 lb 6.4 oz)   LMP  (LMP Unknown)   SpO2 98%   BMI 38.12 kg/m²     General Appearance   - consistent with stated age, well groomed and cooperative    Integumentary  - skin warm and dry without rash    Head and Neck  - normalocephalic and neck supple    Chest and Lung Exam  - normal breathing effort, no respiratory distress    Breast  - symmetry noted, no mass palpable, no skin change and no nipple discharge.    Abdomen  - soft, nontender and no hepatomegaly, splenomegaly, or mass    Female Genitourinary  - vulva normal without rash or lesion, normal vaginal rugae, no vaginal discharge, uterus normal size & no palpable masses, no adnexal mass, no adnexal tenderness, no cervical motion tenderness    Peripheral Vascular  - no edema present    ASSESSMENT/PLAN  58 y.o.  female who presents for annual exam.       Actions performed during this visit include:  - Clinical breast exam  - Clinical pelvic exam  - Pap- UTD and not indicated " today   - Mammogram- Already had BIRADs 1 mammogram this year.   - Colon cancer screening- Due for screening colonoscopy per pt - order placed   - STI screening  - Declines     Please return for your next visit in 1 year or sooner as needed.     Bree Schmidt MD

## 2024-06-25 ENCOUNTER — APPOINTMENT (OUTPATIENT)
Dept: OBSTETRICS AND GYNECOLOGY | Facility: CLINIC | Age: 59
End: 2024-06-25
Payer: MEDICARE

## 2024-06-25 VITALS
OXYGEN SATURATION: 98 % | BODY MASS INDEX: 38.35 KG/M2 | SYSTOLIC BLOOD PRESSURE: 117 MMHG | HEART RATE: 79 BPM | DIASTOLIC BLOOD PRESSURE: 75 MMHG | HEIGHT: 62 IN | WEIGHT: 208.4 LBS

## 2024-06-25 DIAGNOSIS — Z01.419 ENCOUNTER FOR ANNUAL ROUTINE GYNECOLOGICAL EXAMINATION: Primary | ICD-10-CM

## 2024-06-25 DIAGNOSIS — Z12.11 COLON CANCER SCREENING: ICD-10-CM

## 2024-06-25 DIAGNOSIS — Z12.31 VISIT FOR SCREENING MAMMOGRAM: ICD-10-CM

## 2024-06-25 PROCEDURE — 3074F SYST BP LT 130 MM HG: CPT | Performed by: OBSTETRICS & GYNECOLOGY

## 2024-06-25 PROCEDURE — 3078F DIAST BP <80 MM HG: CPT | Performed by: OBSTETRICS & GYNECOLOGY

## 2024-06-25 PROCEDURE — 3008F BODY MASS INDEX DOCD: CPT | Performed by: OBSTETRICS & GYNECOLOGY

## 2024-06-25 PROCEDURE — 3060F POS MICROALBUMINURIA REV: CPT | Performed by: OBSTETRICS & GYNECOLOGY

## 2024-06-25 PROCEDURE — 1036F TOBACCO NON-USER: CPT | Performed by: OBSTETRICS & GYNECOLOGY

## 2024-06-25 PROCEDURE — 99396 PREV VISIT EST AGE 40-64: CPT | Performed by: OBSTETRICS & GYNECOLOGY

## 2024-06-25 PROCEDURE — 3048F LDL-C <100 MG/DL: CPT | Performed by: OBSTETRICS & GYNECOLOGY

## 2024-06-25 SDOH — HEALTH STABILITY: PHYSICAL HEALTH: ON AVERAGE, HOW MANY DAYS PER WEEK DO YOU ENGAGE IN MODERATE TO STRENUOUS EXERCISE (LIKE A BRISK WALK)?: 0 DAYS

## 2024-06-25 SDOH — HEALTH STABILITY: PHYSICAL HEALTH: ON AVERAGE, HOW MANY MINUTES DO YOU ENGAGE IN EXERCISE AT THIS LEVEL?: 0 MIN

## 2024-06-25 ASSESSMENT — PAIN SCALES - GENERAL: PAINLEVEL: 0-NO PAIN

## 2024-07-05 ENCOUNTER — APPOINTMENT (OUTPATIENT)
Dept: ENDOCRINOLOGY | Facility: CLINIC | Age: 59
End: 2024-07-05
Payer: COMMERCIAL

## 2024-07-18 ENCOUNTER — TREATMENT (OUTPATIENT)
Dept: OCCUPATIONAL THERAPY | Facility: CLINIC | Age: 59
End: 2024-07-18
Payer: COMMERCIAL

## 2024-07-18 DIAGNOSIS — M25.671 ANKLE JOINT STIFFNESS, BILATERAL: ICD-10-CM

## 2024-07-18 DIAGNOSIS — R53.1 WEAKNESS GENERALIZED: ICD-10-CM

## 2024-07-18 DIAGNOSIS — R29.898 LEG HEAVINESS: ICD-10-CM

## 2024-07-18 DIAGNOSIS — I89.0 LYMPHEDEMA OF BOTH LOWER EXTREMITIES: Primary | ICD-10-CM

## 2024-07-18 DIAGNOSIS — M25.672 ANKLE JOINT STIFFNESS, BILATERAL: ICD-10-CM

## 2024-07-18 PROCEDURE — 97535 SELF CARE MNGMENT TRAINING: CPT | Mod: GO | Performed by: OCCUPATIONAL THERAPIST

## 2024-07-18 PROCEDURE — 97140 MANUAL THERAPY 1/> REGIONS: CPT | Mod: GO | Performed by: OCCUPATIONAL THERAPIST

## 2024-07-18 ASSESSMENT — ACTIVITIES OF DAILY LIVING (ADL): HOME_MANAGEMENT_TIME_ENTRY: 38

## 2024-07-18 NOTE — PROGRESS NOTES
Occupational Therapy    Occupational Therapy Treatment     Patient Name: Holly Chavez  MRN: [id number]  Today's Date: 7/18/2024         Assessment:   OT initiated Manual Lymph Drainage sequence for BLE lymphedema. Pt and spouse demonstrated carryover to education provided. OT will further assess and refine home program. OT educated pt on importance of skin care and reviewed signs/symptoms of infection.      Plan:   1-2x per week for 8-12 weeks    Current Problem:  1. Lymphedema of both lower extremities        2. Weakness generalized        3. Leg heaviness        4. Ankle joint stiffness, bilateral            Subjective   Patient reports she is doing okay, but has noticed an increase in swelling in the L leg. Pt reports 3 weeks ago she noticed red spots on her R leg, and started to experience symptoms of burning, itching, stinging, and hot to the touch. Since then, symptoms have started to subside and the number of red dots has decreased.     Precautions:   Latex allergy  Neuropathy in hands and feet  No hx of falls    Pain:   Pt did not report any pain on today's date.    Objective     Right Lower Extremity:   Lipodermatosclerosis lower leg    Stemmer sign positive    Mild fullness at foot    Dry patch lower leg    Hemosiderin staining lower leg      Left Lower Extremity:   Lipodermatosclerosis lower leg    Stemmer sign positive    Mild fullness at foot    Hemosiderin staining lower leg    Increased fullness at foot and ankle    Mild heat at redness in lower leg; pt reports this is her normal      LE Skin Appearance/Condition and Girth:  No measurements taken on today's date.      Treatment: 56 min    Self care: 38   OT assessed skin and tissue texture.     OT educated pt on importance of meticulous skin care per lymphedema precautions. OT reviewed signs/symptoms of infection. OT instructed pt to monitor skin for changes; pt expressed carryover. OT issued skin care checklist with recommendations of products for  optimal skin care.      OT initiated manual lymph drainage (MLD) home program for BLE lymphedema. OT provided rationale for MLD; OT utilized visual aids of the lymphatic system to reinforce education. OT provided patient with step by step personalized MLD sequence. OT educated pt on correct hand techniques and sequencing for self MLD.  Pt with good teach back of education provided.     Manual: 18   OT initiated MLD sequence with diaphragmatic breathing and opening of lymph nodes to promote lymphatic circulation.      OT provided MLD to chest, trunk, and R LE, while simultaneoulsy, lymphapress pump is applied to L leg. Softened tissue texture noted post tx. Pt reports she likes the lymphapress pump and would be beneficial for her

## 2024-07-25 ENCOUNTER — TREATMENT (OUTPATIENT)
Dept: OCCUPATIONAL THERAPY | Facility: CLINIC | Age: 59
End: 2024-07-25
Payer: COMMERCIAL

## 2024-07-25 DIAGNOSIS — M25.672 ANKLE JOINT STIFFNESS, BILATERAL: Primary | ICD-10-CM

## 2024-07-25 DIAGNOSIS — R29.898 LEG HEAVINESS: ICD-10-CM

## 2024-07-25 DIAGNOSIS — M25.671 ANKLE JOINT STIFFNESS, BILATERAL: Primary | ICD-10-CM

## 2024-07-25 DIAGNOSIS — R53.1 WEAKNESS GENERALIZED: ICD-10-CM

## 2024-07-25 DIAGNOSIS — I89.0 LYMPHEDEMA OF BOTH LOWER EXTREMITIES: ICD-10-CM

## 2024-07-25 PROCEDURE — 97140 MANUAL THERAPY 1/> REGIONS: CPT | Mod: GO | Performed by: OCCUPATIONAL THERAPIST

## 2024-07-25 PROCEDURE — 97535 SELF CARE MNGMENT TRAINING: CPT | Mod: GO | Performed by: OCCUPATIONAL THERAPIST

## 2024-07-25 ASSESSMENT — ACTIVITIES OF DAILY LIVING (ADL): HOME_MANAGEMENT_TIME_ENTRY: 39

## 2024-07-25 NOTE — PROGRESS NOTES
Occupational Therapy    Occupational Therapy Treatment     Patient Name: Holly Chavez  MRN: [id number]  Today's Date: 7/25/2024         Assessment:   OT initiated Manual Lymph Drainage sequence for BLE lymphedema. Pt and spouse demonstrated carryover to education provided. OT will further assess and refine home program. OT educated pt on importance of skin care and reviewed signs/symptoms of infection.      Plan:   1-2x per week for 8-12 weeks    Current Problem:  1. Ankle joint stiffness, bilateral        2. Leg heaviness        3. Lymphedema of both lower extremities        4. Weakness generalized              Subjective   Patient reports she is doing okay, but has noticed an increase in swelling in the L leg. Pt reports 3 weeks ago she noticed red spots on her R leg, and started to experience symptoms of burning, itching, stinging, and hot to the touch. Since then, symptoms have started to subside and the number of red dots has decreased.     Precautions:   Latex allergy  Neuropathy in hands and feet  No hx of falls    Pain:   Pt did not report any pain on today's date.    Objective     Right Lower Extremity:   Lipodermatosclerosis lower leg    Stemmer sign positive    Mild fullness at foot    Dry patch lower leg    Hemosiderin staining lower leg      Left Lower Extremity:   Lipodermatosclerosis lower leg    Stemmer sign positive    Mild fullness at foot    Hemosiderin staining lower leg    Increased fullness at foot and ankle    Mild heat at redness in lower leg; pt reports this is her normal      LE Skin Appearance/Condition and Girth:   R Superior border of patella (SBP) 42.2   R 10cm above SBP  -  R 10cm below SBP 35.2   R 20cm below SBP 41.1   R 30cm below SBP 30.2   R 35cm below SBP 26.9   R Ankle lobule 30.0   R Ankle  26.7   R Forefoot  24.0      L Superior border of patella (SBP) 43.8  L10cm above SBP -   L 10cm below SBP 37.8  L 20cm below SBP 42.8  L 30cm below SBP 31.6   L 35cm below SBP 27.3  L Ankle  lobule 28.1  L Ankle  26.3  L Forefoot 24.2    Fluctuations in BLEs since evaluation      Treatment: 56 min    Self care: 38   OT assessed skin and tissue texture.     OT educated pt on importance of meticulous skin care per lymphedema precautions. OT reviewed signs/symptoms of infection. OT instructed pt to monitor skin for changes; pt expressed carryover. OT issued skin care checklist with recommendations of products for optimal skin care.      OT initiated manual lymph drainage (MLD) home program for BLE lymphedema. OT provided rationale for MLD; OT utilized visual aids of the lymphatic system to reinforce education. OT provided patient with step by step personalized MLD sequence. OT educated pt on correct hand techniques and sequencing for self MLD.  Pt with good teach back of education provided.     Manual: 18   OT initiated MLD sequence with diaphragmatic breathing and opening of lymph nodes to promote lymphatic circulation.      OT provided MLD to chest, trunk, and R LE, while simultaneoulsy, lymphapress pump is applied to L leg. Softened tissue texture noted post tx. Pt reports she likes the lymphapress pump and would be beneficial for her

## 2024-07-25 NOTE — PROGRESS NOTES
Occupational Therapy    Occupational Therapy Treatment     Patient Name: Holly Chavez  MRN: [id number]  Today's Date: 7/25/2024         Assessment:   OT initiated short stretch bandage HEP for BLE lymphedema. Pt expressed carryover of issued HEP. OT will further assess and refine home program.      Plan:   1-2x per week for 8-12 weeks    Current Problem:  1. Ankle joint stiffness, bilateral        2. Leg heaviness        3. Lymphedema of both lower extremities        4. Weakness generalized            Subjective   Patient reports a lymphapress rep came to her house this past week to trial the pump and take measurements. Patient reports no new changes in her leg pain or swelling. Patient states she couldn't remember if she was doing her manual lymph drainage sequence correctly.      Precautions:   Latex allergy  Neuropathy in hands and feet  No hx of falls    Pain:   Pt did not report any pain on today's date.    Objective     Right Lower Extremity:   Lipodermatosclerosis lower leg    Stemmer sign positive    Mild fullness at foot    Dry patch lower leg    Hemosiderin staining lower leg      Left Lower Extremity:   Lipodermatosclerosis lower leg    Stemmer sign positive    Mild fullness at foot    Hemosiderin staining lower leg    Increased fullness at foot and ankle    Mild heat at redness in lower leg; pt reports this is her normal      LE Skin Appearance/Condition and Girth:  R Superior border of patella (SBP) 42.2   R 10cm above SBP  -  R 10cm below SBP 35.2   R 20cm below SBP 41.1   R 30cm below SBP 30.2   R 35cm below SBP 26.9   R Ankle lobule 30.0   R Ankle  26.7   R Forefoot  24.0      L Superior border of patella (SBP) 43.8  L10cm above SBP -   L 10cm below SBP 37.8  L 20cm below SBP 42.8  L 30cm below SBP 31.6   L 35cm below SBP 27.3  L Ankle lobule 28.1  L Ankle  26.3  L Forefoot 24.2     Fluctuations in BLEs since evaluation     Treatment: 57 min    Self care: 39  OT assessed skin and tissue texture and took  measurements of BLEs.     OT reviewed manual lymph drainage (MLD) home program for BLE lymphedema. OT provided patient with step by step personalized MLD sequence. OT educated pt on correct hand techniques and sequencing for self MLD. Pt with good teach back of education provided.     Despite completion of MLD, compression, elevation and exercise, swelling still remains.      OT applied short stretch bandages to BLE and provided rationale for application. OT provided pt and spouse with repeated verbal and visual demonstrations to reinforce carryover. Pt's spouse with good teach back of education provided. OT provided pt with handout for care and use of short stretch bandages.     OT applied short stretch bandages to B LEs as follows:  - stockinette as base layer, 1-8cm and 1-12cm from foot to knee    Manual: 18  OT initiated MLD sequence with diaphragmatic breathing and opening of lymph nodes to promote lymphatic circulation.     OT provided MLD to chest, trunk, and R LE, while simultaneoulsy, lymphapress pump is applied to L leg with pressure setting increased to 40mmHg. Softened tissue texture noted post tx. Pt states she likes the lymphapress pump and would be beneficial for her with neuropathy in her fingers.

## 2024-07-29 ENCOUNTER — TREATMENT (OUTPATIENT)
Dept: OCCUPATIONAL THERAPY | Facility: CLINIC | Age: 59
End: 2024-07-29
Payer: COMMERCIAL

## 2024-07-29 DIAGNOSIS — M25.672 ANKLE JOINT STIFFNESS, BILATERAL: ICD-10-CM

## 2024-07-29 DIAGNOSIS — R53.1 WEAKNESS GENERALIZED: ICD-10-CM

## 2024-07-29 DIAGNOSIS — M25.671 ANKLE JOINT STIFFNESS, BILATERAL: ICD-10-CM

## 2024-07-29 DIAGNOSIS — I89.0 LYMPHEDEMA OF BOTH LOWER EXTREMITIES: Primary | ICD-10-CM

## 2024-07-29 DIAGNOSIS — R29.898 LEG HEAVINESS: ICD-10-CM

## 2024-07-29 PROCEDURE — 97140 MANUAL THERAPY 1/> REGIONS: CPT | Mod: GO,CO

## 2024-07-29 PROCEDURE — 97535 SELF CARE MNGMENT TRAINING: CPT | Mod: GO,CO

## 2024-07-29 PROCEDURE — 97110 THERAPEUTIC EXERCISES: CPT | Mod: GO,CO

## 2024-07-29 ASSESSMENT — ACTIVITIES OF DAILY LIVING (ADL): HOME_MANAGEMENT_TIME_ENTRY: 10

## 2024-07-29 NOTE — PROGRESS NOTES
Occupational Therapy    Occupational Therapy Treatment     Patient Name: Holly Chavez  MRN: [id number]  Today's Date: 7/29/2024      Time Calculation  Start Time: 0800  Stop Time: 0857  Time Calculation (min): 57 min    Assessment:   Pt appears to tolerate treatment well with no complaints of pain. Good tissue texture softening post MLD. WRAY instructs pt to complete home exercise program 5-7 days per week, pt agreeable. Handout issued and reviewed.    Plan:   1-2x per week for 8-12 weeks    Current Problem:  1. Lymphedema of both lower extremities        2. Weakness generalized        3. Ankle joint stiffness, bilateral        4. Leg heaviness              Subjective   Pt states she wears bandages for about an hour, all she can tolerate due to itching. Pt states when she removes the bandages there is a little redness around calf after removing bandages. Pt states she is feeling more confident with manual lymph drainage.     Precautions:   Latex allergy  Neuropathy in hands and feet  No hx of falls    Pain:   Pt did not report any pain on today's date.    Objective     Right Lower Extremity:   Lipodermatosclerosis lower leg    Stemmer sign positive    Mild fullness at foot    Dry patch lower leg    Hemosiderin staining lower leg      Left Lower Extremity:   Lipodermatosclerosis lower leg    Stemmer sign positive    Mild fullness at foot    Hemosiderin staining lower leg    Increased fullness at foot and ankle    Mild heat at redness in lower leg; pt reports this is her normal      LE Skin Appearance/Condition and Girth:  R Superior border of patella (SBP) 41.6  R 10cm above SBP  -  R 10cm below SBP 36.0  R 20cm below SBP 41.0   R 30cm below SBP 29.0   R 35cm below SBP 26.4   R Ankle lobule 28.4  R Ankle  26.0  R Forefoot  23.1     L Superior border of patella (SBP) 41.0  L10cm above SBP -   L 10cm below SBP 35.2  L 20cm below SBP 41.9  L 30cm below SBP 31.5   L 35cm below SBP 28.0  L Ankle lobule 27.7  L Ankle  27.5  L  Forefoot 24.1     Fluctuations in BLEs since evaluation     Treatment: 57 min    Self care: 10  WRAY assessed skin and tissue texture and took measurements of BLEs.     Despite completion of MLD, compression, elevation and exercise, swelling still remains.      Pt did not bring bandages this date, will wrap B LE once returning home.    Manual: 32  WRAY initiated MLD sequence with diaphragmatic breathing and opening of lymph nodes to promote lymphatic circulation.     WRAY provided MLD to chest, trunk, and R LE, while simultaneoulsy, lymphapress pump is applied to L leg with pressure setting increased to 40mmHg. Softened tissue texture noted post tx.     Ther Ex 15  Heel toe raises 2x10  Hip adduction 2x10  Hip abduction 2x10, green theraband  Glute sets 2x10    Home exercise program issued and reviewed, green theraband latex free provided to pt.

## 2024-07-31 ENCOUNTER — APPOINTMENT (OUTPATIENT)
Dept: OPHTHALMOLOGY | Facility: CLINIC | Age: 59
End: 2024-07-31
Payer: MEDICARE

## 2024-07-31 DIAGNOSIS — E11.3411 SEVERE NONPROLIFERATIVE DIABETIC RETINOPATHY OF RIGHT EYE, WITH MACULAR EDEMA, ASSOCIATED WITH TYPE 2 DIABETES MELLITUS (MULTI): ICD-10-CM

## 2024-07-31 DIAGNOSIS — H25.13 NUCLEAR SCLEROSIS OF BOTH EYES: ICD-10-CM

## 2024-07-31 DIAGNOSIS — E11.3413 DIABETIC VISUAL LOSS: SEVERE VISION IMPAIRMENT OF BOTH EYES, WITH MACULAR EDEMA, WITH SEVERE NONPROLIFERATIVE RETINOPATHY, ASSOCIATED WITH TYPE 2 DIABETES MELLITUS (MULTI): Primary | ICD-10-CM

## 2024-07-31 DIAGNOSIS — H54.2X22 DIABETIC VISUAL LOSS: SEVERE VISION IMPAIRMENT OF BOTH EYES, WITH MACULAR EDEMA, WITH SEVERE NONPROLIFERATIVE RETINOPATHY, ASSOCIATED WITH TYPE 2 DIABETES MELLITUS (MULTI): Primary | ICD-10-CM

## 2024-07-31 PROCEDURE — 67028 INJECTION EYE DRUG: CPT | Mod: BILATERAL PROCEDURE | Performed by: OPHTHALMOLOGY

## 2024-07-31 PROCEDURE — 92134 CPTRZ OPH DX IMG PST SGM RTA: CPT | Mod: BILATERAL PROCEDURE | Performed by: OPHTHALMOLOGY

## 2024-07-31 ASSESSMENT — VISUAL ACUITY
METHOD: SNELLEN - LINEAR
CORRECTION_TYPE: GLASSES
OD_CC: 20/60
OS_CC: 20/50-2

## 2024-07-31 ASSESSMENT — CONF VISUAL FIELD
OS_NORMAL: 1
OD_SUPERIOR_NASAL_RESTRICTION: 0
OD_INFERIOR_NASAL_RESTRICTION: 0
OS_INFERIOR_NASAL_RESTRICTION: 0
OD_NORMAL: 1
OD_SUPERIOR_TEMPORAL_RESTRICTION: 0
OD_INFERIOR_TEMPORAL_RESTRICTION: 0
OS_INFERIOR_TEMPORAL_RESTRICTION: 0
OS_SUPERIOR_TEMPORAL_RESTRICTION: 0
OS_SUPERIOR_NASAL_RESTRICTION: 0

## 2024-07-31 ASSESSMENT — TONOMETRY
OS_IOP_MMHG: 20
IOP_METHOD: GOLDMANN APPLANATION
OD_IOP_MMHG: 21

## 2024-07-31 ASSESSMENT — REFRACTION_WEARINGRX
OD_ADD: +2.50
OS_ADD: +2.50
OD_SPHERE: +3.50
OS_AXIS: 045
OD_AXIS: 065
OD_CYLINDER: -0.50
OS_CYLINDER: -0.50
OS_SPHERE: +4.50

## 2024-07-31 ASSESSMENT — SLIT LAMP EXAM - LIDS
COMMENTS: GOOD POSITION
COMMENTS: GOOD POSITION

## 2024-07-31 ASSESSMENT — EXTERNAL EXAM - RIGHT EYE: OD_EXAM: NORMAL

## 2024-07-31 ASSESSMENT — ENCOUNTER SYMPTOMS
ENDOCRINE NEGATIVE: 1
EYES NEGATIVE: 1

## 2024-07-31 ASSESSMENT — EXTERNAL EXAM - LEFT EYE: OS_EXAM: NORMAL

## 2024-07-31 ASSESSMENT — CUP TO DISC RATIO
OD_RATIO: .3
OS_RATIO: .3

## 2024-07-31 NOTE — PROGRESS NOTES
Assessment/Plan   Assessment/Plan   Diagnoses and all orders for this visit:  Diabetic visual loss: severe vision impairment of both eyes, with macular edema, with severe nonproliferative retinopathy, associated with type 2 diabetes mellitus (Multi)  -     Intravitreal Injection, Pharmacologic Agent - OU - Both Eyes  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, with macular edema, associated with type 2 diabetes mellitus (Multi)  -     OCT, Retina - OU - Both Eyes  Nuclear sclerosis of both eyes        DIAGNOSTIC PROCEDURE DONE    OCT DONE OD/OS            REASON FOR TEST: will help address and tailor  therapy by detecting subclinical CME SRF     Hi quality OCT  scans obtained  signal good    OCT OD - Normal Foveal Contour,  Edema, IS/OS Junction Normal - worsened  OCT OS - Normal Foveal Contour,  Edema, IS/OS Junction Normal - worsened    additional commnents:    Lost to follow up since 12/2023. Patient was started on munjaro and A1c dropped from 11.0 to 7.4. Today she has interval worsening DME both eyes  today will need therapy ou    Treatment options for DME OU discussed, including observation, anti-VEGF injections (including Avastin, Lucentis, Beovu, Vabysmo and Eylea) and laser. Recommend anti-VEGF injections. Eylea  HD done both eyes today in a sterile manner with Betadine 5% for antisepsis.     1m    #Nuclear Cataracts both eyes  - may be visually significant  - will refer for cataract eval once DME stabilizes

## 2024-08-01 ENCOUNTER — APPOINTMENT (OUTPATIENT)
Dept: PODIATRY | Facility: CLINIC | Age: 59
End: 2024-08-01
Payer: MEDICARE

## 2024-08-01 ENCOUNTER — TREATMENT (OUTPATIENT)
Dept: OCCUPATIONAL THERAPY | Facility: CLINIC | Age: 59
End: 2024-08-01
Payer: COMMERCIAL

## 2024-08-01 DIAGNOSIS — M79.675 PAIN IN TOES OF BOTH FEET: ICD-10-CM

## 2024-08-01 DIAGNOSIS — M25.671 ANKLE JOINT STIFFNESS, BILATERAL: ICD-10-CM

## 2024-08-01 DIAGNOSIS — B35.1 FUNGAL NAIL INFECTION: ICD-10-CM

## 2024-08-01 DIAGNOSIS — R29.898 LEG HEAVINESS: ICD-10-CM

## 2024-08-01 DIAGNOSIS — M25.672 ANKLE JOINT STIFFNESS, BILATERAL: ICD-10-CM

## 2024-08-01 DIAGNOSIS — I87.8 CHRONIC VENOUS STASIS: ICD-10-CM

## 2024-08-01 DIAGNOSIS — R53.1 WEAKNESS GENERALIZED: Primary | ICD-10-CM

## 2024-08-01 DIAGNOSIS — M79.674 PAIN IN TOES OF BOTH FEET: ICD-10-CM

## 2024-08-01 DIAGNOSIS — I89.0 LYMPHEDEMA OF BOTH LOWER EXTREMITIES: ICD-10-CM

## 2024-08-01 DIAGNOSIS — E11.9 TYPE 2 DIABETES MELLITUS WITHOUT COMPLICATION, WITHOUT LONG-TERM CURRENT USE OF INSULIN (MULTI): Primary | ICD-10-CM

## 2024-08-01 DIAGNOSIS — I89.0 LYMPHEDEMA: ICD-10-CM

## 2024-08-01 PROCEDURE — 97140 MANUAL THERAPY 1/> REGIONS: CPT | Mod: GO | Performed by: OCCUPATIONAL THERAPIST

## 2024-08-01 PROCEDURE — 97535 SELF CARE MNGMENT TRAINING: CPT | Mod: GO | Performed by: OCCUPATIONAL THERAPIST

## 2024-08-01 PROCEDURE — 99213 OFFICE O/P EST LOW 20 MIN: CPT | Performed by: PODIATRIST

## 2024-08-01 ASSESSMENT — ACTIVITIES OF DAILY LIVING (ADL): HOME_MANAGEMENT_TIME_ENTRY: 15

## 2024-08-01 NOTE — PROGRESS NOTES
History Of Present Illness  Holly Chavez is a 58 y.o. female presenting for diabetic nail care. Patient complains with painful elongated nails.  Patient started lymphedema therapy with significant decrease in edema of the leg.  She did develop a rash on her right lower extremity.  PCP Dr Parisi,   last visit 6/14/24     Past Medical History  She has a past medical history of Acute upper respiratory infection, unspecified (11/13/2018), Benign paroxysmal vertigo, unspecified ear (12/04/2017), Cutaneous abscess of groin (12/04/2017), Cutaneous abscess of limb, unspecified (09/20/2018), Morbid (severe) obesity due to excess calories (Multi) (07/08/2019), Personal history of diseases of the skin and subcutaneous tissue (11/20/2015), Personal history of diseases of the skin and subcutaneous tissue (09/27/2018), Personal history of other endocrine, nutritional and metabolic disease, Personal history of other endocrine, nutritional and metabolic disease (08/11/2015), Personal history of other endocrine, nutritional and metabolic disease (04/16/2019), Personal history of other endocrine, nutritional and metabolic disease (06/06/2019), Pneumonia (09/07/2023), Rash and other nonspecific skin eruption (08/11/2015), Rash and other nonspecific skin eruption (08/11/2015), Type 1 diabetes mellitus with mild nonproliferative retinopathy and macular edema (Multi) (11/09/2015), Type 1 diabetes mellitus with mild nonproliferative retinopathy and macular edema (Multi) (11/09/2015), Type 1 diabetes mellitus with mild nonproliferative retinopathy and macular edema (Multi) (11/10/2015), Type 1 diabetes mellitus with mild nonproliferative retinopathy and macular edema (Multi) (11/12/2015), Type 2 diabetes mellitus with mild nonproliferative diabetic retinopathy without macular edema, unspecified eye (Multi) (11/09/2015), Type 2 diabetes mellitus with mild nonproliferative diabetic retinopathy without macular edema, unspecified eye (Multi)  (11/09/2015), Type 2 diabetes mellitus with mild nonproliferative diabetic retinopathy without macular edema, unspecified eye (Multi) (11/10/2015), and Type 2 diabetes mellitus with mild nonproliferative diabetic retinopathy without macular edema, unspecified eye (Multi) (11/12/2015).    Surgical History  She has a past surgical history that includes Tubal ligation (08/11/2015); Hernia repair (08/11/2015); and Cervical biopsy w/ loop electrode excision (04/12/2016).     Social History  She reports that she quit smoking about 25 years ago. Her smoking use included cigarettes. She has never used smokeless tobacco. She reports that she does not currently use alcohol. She reports that she does not use drugs.    Family History  Family History   Problem Relation Name Age of Onset    Stroke Mother      Diabetes Mother      Other (cerebrovascular accident) Mother      Heart failure Father      Diabetes Father      Pancreatic cancer Father      Breast cancer Mother's Sister      Other (cardiac disorder) Other      Diabetes Other      Hypertension Other      Cancer Other      Kidney disease Other      Breast cancer Other aunt     Breast cancer Father's Sister      Glaucoma Neg Hx      Macular degeneration Neg Hx          Allergies  Adhesive tape-silicones and Codeine    Medications  Current Outpatient Medications   Medication Sig Dispense Refill    albuterol 90 mcg/actuation inhaler Inhale 2 puffs every 4 hours if needed for wheezing or shortness of breath. 18 g 3    ammonium lactate (Lac-Hydrin) 12 % lotion APPLY 1 APPLICATION ONCE DAILY      apixaban (Eliquis) 5 mg tablet Take 1 tablet (5 mg) by mouth 2 times a day. 180 tablet 3    blood-glucose sensor (Dexcom G7 Sensor) device Change every 10 days 10 each 3    blood-glucose sensor (Dexcom G7 Sensor) device 1 each see administration instructions. Change sensor every 10 days 9 each 1    cholecalciferol (Vitamin D-3) 125 MCG (5000 UT) capsule Take by mouth.       "cyanocobalamin, vitamin B-12, (Vitamin B-12) 1,000 mcg tablet extended release Take 1 tablet (1,000 mcg) by mouth once daily.      Flonase Allergy Relief 50 mcg/actuation nasal spray Administer 2 sprays into each nostril once daily. 16 g 2    furosemide (Lasix) 20 mg tablet Take 1 tablet (20 mg) by mouth 3 times a week. 3 times a week as needed 36 tablet 3    guaifenesin (MUCUS RELIEF ORAL)       insulin glargine (Basaglar KwikPen U-100 Insulin) 100 unit/mL (3 mL) pen Inject 30 Units under the skin once daily at bedtime. Take as directed per insulin instructions.      insulin lispro (HumaLOG) 100 unit/mL injection Inject 60 Units under the skin once daily. As directed in divided doses (max daily dose 60 units) 60 mL 2    nystatin (Mycostatin) ointment Apply topically 2 times a day. 30 g 3    omeprazole (PriLOSEC) 20 mg DR capsule 20 mg by mouth daily 90 capsule 3    OneTouch Ultra Test strip 1 each 3 times a day. 100 each 11    pen needle, diabetic 32 gauge x 5/32\" needle 4 Needles once daily. Use with insulin injections four times daily 400 each 3    tirzepatide (Mounjaro) 7.5 mg/0.5 mL pen injector Inject 7.5 mg under the skin 1 (one) time per week. 2 mL 5    triamcinolone (Kenalog) 0.1 % cream Apply topically 2 times a day. APPLY EXTERNALLY TO THE AFFECTED AREA 2 TO 3 TIMES DAILY 45 g 3     No current facility-administered medications for this visit.       Review of Systems    REVIEW OF SYSTEMS  GENERAL:  Negative for malaise, significant weight loss, fever  CARDIOVASCULAR: leg swelling   MUSCULOSKELETAL:  Negative for joint pain or swelling, back pain, and muscle pain.  SKIN:  Negative for lesions, rash, and itching  PSYCH:  Negative for sleep disturbance, mood disorder and recent psychosocial stressors  NEURO: Negative, denies any burning, tingling or numbness     Objective:   Vasc: DP and PT pulses are palpable bilateral.  CFT is less than 3 seconds bilateral.  Skin temperature is warm to cool proximal to " distal bilateral.  None pitting pedal edema. No digital hair.      Neuro:  Light touch is intact to the foot bilateral.      Derm: Nails 1-5 bilateral are thickened, elongated and crumbly with subungual debris. Skin is supple with normal texture and turgor noted.  Webspaces are clean, dry and intact bilateral.  There are no hyperkeratoses, ulcerations, verruca or other lesions noted.  Patient has preulcerative lesions on her anterior legs.  Patient has a mild rash present on the medial aspect of her right lower leg.  It appears to be stasis dermatitis.    Ortho: Muscle strength is 5/5 for all pedal groups tested.  Ankle joint, subtalar joint, 1st MPJ and lesser MPJ ROM is full and without pain or crepitus.  The foot type is rectus bilateral off weight bearing.  There are no structural deformities noted.    Assessment/Plan   DM  Painful nail mycosis  Lymphedema improved with lymphedema therapy.  Stasis dermatitis.  Patient continues prescription of Kenalog cream.    Toenails are debrided in length and thickness to avoid infection and for pain relief    Continue with lymphedema therapy.  I spent 20 minutes in the professional and overall care of this patient.

## 2024-08-01 NOTE — PROGRESS NOTES
Occupational Therapy    Occupational Therapy Treatment     Patient Name: Holly Chavez  MRN: [id number]  Today's Date: 8/1/2024      Assessment:  OT issued TG  for BLEs; pt expressed comfort with use of TG .  OT discussed potential transition to velcro wraps with pt; pt expressed interest in obtaining.     Plan:   1-2x per week for 8-12 weeks    Current Problem:  1. Weakness generalized        2. Lymphedema of both lower extremities        3. Leg heaviness        4. Ankle joint stiffness, bilateral            Subjective   Patient reports she has an appointment with the podiatrist this morning and was prescribed a cream for redness at ankle; per pt, podiatrist does not suspect infection.     Precautions:   Latex allergy  Neuropathy in hands and feet  No hx of falls    Pain:   Pt did not report any pain on today's date.    Objective     Right Lower Extremity:   Lipodermatosclerosis lower leg    Stemmer sign positive    Mild fullness at foot    Dry patch lower leg    Hemosiderin staining lower leg      Left Lower Extremity:   Lipodermatosclerosis lower leg    Stemmer sign positive    Mild fullness at foot    Hemosiderin staining lower leg    Increased fullness at foot and ankle    Mild heat at redness in lower leg; pt reports this is her normal      LE Skin Appearance/Condition and Girth:  R Superior border of patella (SBP) 41.6  R 10cm above SBP  -  R 10cm below SBP 36.0  R 20cm below SBP 41.0   R 30cm below SBP 29.0   R 35cm below SBP 26.4   R Ankle lobule 28.4  R Ankle  26.0  R Forefoot  23.1     L Superior border of patella (SBP) 41.0  L10cm above SBP -   L 10cm below SBP 35.2  L 20cm below SBP 41.9  L 30cm below SBP 31.5   L 35cm below SBP 28.0  L Ankle lobule 27.7  L Ankle  27.5  L Forefoot 24.1     Fluctuations in BLEs since evaluation     Treatment: 55 min    Self care: 15    OT assessed skin    Pt expressed poor tolerance to short stretch bandages.  OT trialed use of TG  (size F) to BLEs from ankle  to knees.  Pt expressed comfort with use.     Pt expressed interest in velcro compression wrap.  OT showed patient sample of garment; pt expressed interest in obtaining.  Will send for script     Manual: 40    OT initiated MLD sequence with diaphragmatic breathing and opening of lymph nodes to promote lymphatic circulation.   OT provided MLD to B trunk and BLEs.  Softened tissue texture noted post tx.

## 2024-08-05 ENCOUNTER — APPOINTMENT (OUTPATIENT)
Dept: PRIMARY CARE | Facility: CLINIC | Age: 59
End: 2024-08-05
Payer: COMMERCIAL

## 2024-08-05 VITALS
BODY MASS INDEX: 37.73 KG/M2 | OXYGEN SATURATION: 96 % | RESPIRATION RATE: 14 BRPM | WEIGHT: 205 LBS | TEMPERATURE: 97.9 F | HEART RATE: 75 BPM | SYSTOLIC BLOOD PRESSURE: 120 MMHG | HEIGHT: 62 IN | DIASTOLIC BLOOD PRESSURE: 60 MMHG

## 2024-08-05 DIAGNOSIS — Z79.4 TYPE 2 DIABETES MELLITUS WITH OTHER OPHTHALMIC COMPLICATION, WITH LONG-TERM CURRENT USE OF INSULIN (MULTI): Primary | ICD-10-CM

## 2024-08-05 DIAGNOSIS — E11.39 TYPE 2 DIABETES MELLITUS WITH OTHER OPHTHALMIC COMPLICATION, WITH LONG-TERM CURRENT USE OF INSULIN (MULTI): Primary | ICD-10-CM

## 2024-08-05 DIAGNOSIS — I10 PRIMARY HYPERTENSION: ICD-10-CM

## 2024-08-05 DIAGNOSIS — I48.0 PAROXYSMAL ATRIAL FIBRILLATION (MULTI): ICD-10-CM

## 2024-08-05 PROBLEM — M25.671 ANKLE JOINT STIFFNESS, BILATERAL: Status: RESOLVED | Noted: 2024-06-21 | Resolved: 2024-08-05

## 2024-08-05 PROBLEM — E11.9 TYPE 2 DIABETES MELLITUS WITHOUT COMPLICATION, WITHOUT LONG-TERM CURRENT USE OF INSULIN (MULTI): Status: RESOLVED | Noted: 2018-02-05 | Resolved: 2024-08-05

## 2024-08-05 PROBLEM — B35.0 TINEA CAPITIS: Status: RESOLVED | Noted: 2023-02-25 | Resolved: 2024-08-05

## 2024-08-05 PROBLEM — M25.672 ANKLE JOINT STIFFNESS, BILATERAL: Status: RESOLVED | Noted: 2024-06-21 | Resolved: 2024-08-05

## 2024-08-05 PROBLEM — B35.1 FUNGAL NAIL INFECTION: Status: RESOLVED | Noted: 2023-02-25 | Resolved: 2024-08-05

## 2024-08-05 PROBLEM — J06.9 VIRAL URI WITH COUGH: Status: RESOLVED | Noted: 2023-09-07 | Resolved: 2024-08-05

## 2024-08-05 PROBLEM — R53.1 WEAKNESS GENERALIZED: Status: RESOLVED | Noted: 2024-06-21 | Resolved: 2024-08-05

## 2024-08-05 PROBLEM — R29.898 LEG HEAVINESS: Status: RESOLVED | Noted: 2024-06-21 | Resolved: 2024-08-05

## 2024-08-05 LAB — POC HEMOGLOBIN A1C: 6.1 % (ref 4.2–6.5)

## 2024-08-05 PROCEDURE — 1036F TOBACCO NON-USER: CPT | Performed by: INTERNAL MEDICINE

## 2024-08-05 PROCEDURE — 3048F LDL-C <100 MG/DL: CPT | Performed by: INTERNAL MEDICINE

## 2024-08-05 PROCEDURE — 3060F POS MICROALBUMINURIA REV: CPT | Performed by: INTERNAL MEDICINE

## 2024-08-05 PROCEDURE — 99214 OFFICE O/P EST MOD 30 MIN: CPT | Performed by: INTERNAL MEDICINE

## 2024-08-05 PROCEDURE — 3074F SYST BP LT 130 MM HG: CPT | Performed by: INTERNAL MEDICINE

## 2024-08-05 PROCEDURE — 3008F BODY MASS INDEX DOCD: CPT | Performed by: INTERNAL MEDICINE

## 2024-08-05 PROCEDURE — 83036 HEMOGLOBIN GLYCOSYLATED A1C: CPT | Performed by: INTERNAL MEDICINE

## 2024-08-05 PROCEDURE — 3078F DIAST BP <80 MM HG: CPT | Performed by: INTERNAL MEDICINE

## 2024-08-05 NOTE — PROGRESS NOTES
"Subjective    Holly Chavez is a 58 y.o. female who presents for Follow-up.  HPI    3 month fu   Doing well.   Decreased AM insulin to 24 units.   She has had lows below 70  She will still occasionally still have low blood sugars.  She has occasional lightheadedness      Review of Systems   All other systems reviewed and are negative.        Objective     /60 (BP Location: Left arm, Patient Position: Sitting, BP Cuff Size: Adult)   Pulse 75   Temp 36.6 °C (97.9 °F) (Skin)   Resp 14   Ht 1.575 m (5' 2\")   Wt 93 kg (205 lb)   LMP  (LMP Unknown)   SpO2 96%   BMI 37.49 kg/m²    Physical Exam  Vitals reviewed.   Constitutional:       General: She is not in acute distress.     Appearance: Normal appearance.   Cardiovascular:      Rate and Rhythm: Normal rate and regular rhythm.      Pulses: Normal pulses.      Heart sounds: Normal heart sounds.   Pulmonary:      Effort: Pulmonary effort is normal.      Breath sounds: Normal breath sounds.   Abdominal:      Tenderness: There is no abdominal tenderness.   Musculoskeletal:         General: No swelling.   Skin:     General: Skin is warm and dry.   Neurological:      Mental Status: She is alert.       Health Maintenance Due   Topic Date Due    HIV Screening  Never done    MMR Vaccines (1 of 1 - Standard series) Never done    Diabetes: Foot Exam  Never done    Hepatitis C Screening  Never done    DTaP/Tdap/Td Vaccines (1 - Tdap) Never done    Zoster Vaccines (1 of 2) Never done    Pneumococcal Vaccine: Pediatrics (0 to 5 Years) and At-Risk Patients (6 to 64 Years) (2 of 2 - PPSV23 or PCV20) 11/22/2018    Colorectal Cancer Screening  09/23/2023    RSV Pregnant patients and/or  patients aged 60+ years (1 - 1-dose 60+ series) Never done    Diabetes: Hemoglobin A1C  07/01/2024    Influenza Vaccine (1) 09/01/2024          Assessment/Plan   Problem List Items Addressed This Visit       Paroxysmal atrial fibrillation (Multi)    Type 2 diabetes mellitus (Multi) - Primary    " Relevant Orders    POCT glycosylated hemoglobin (Hb A1C) manually resulted    Primary hypertension   Her blood sugars are much improved. Cont current dose of mounjaro and consider increasing next refil for weight loss  Bp is good.   Labs are good.   follow up in 3 months. Recommend yearly eye exams and self foot exams. call if any problems or questions.

## 2024-08-08 ENCOUNTER — APPOINTMENT (OUTPATIENT)
Dept: OCCUPATIONAL THERAPY | Facility: CLINIC | Age: 59
End: 2024-08-08
Payer: MEDICAID

## 2024-08-12 ENCOUNTER — APPOINTMENT (OUTPATIENT)
Dept: OCCUPATIONAL THERAPY | Facility: CLINIC | Age: 59
End: 2024-08-12
Payer: MEDICAID

## 2024-08-12 ENCOUNTER — TELEPHONE (OUTPATIENT)
Dept: PRIMARY CARE | Facility: CLINIC | Age: 59
End: 2024-08-12
Payer: MEDICARE

## 2024-08-12 NOTE — TELEPHONE ENCOUNTER
Pt left  stating she has not taken Lantus x4 days.  Was taking 30, then to 24.  Dropping to 70 overnight.  Holding med.  Ave: 120-130.  Up to 170-180 after eating.  Should she take lower dose of Lantus?  Or continue holding?

## 2024-08-15 ENCOUNTER — TREATMENT (OUTPATIENT)
Dept: OCCUPATIONAL THERAPY | Facility: CLINIC | Age: 59
End: 2024-08-15
Payer: MEDICAID

## 2024-08-15 ENCOUNTER — OFFICE VISIT (OUTPATIENT)
Dept: PRIMARY CARE | Facility: CLINIC | Age: 59
End: 2024-08-15
Payer: MEDICARE

## 2024-08-15 VITALS
DIASTOLIC BLOOD PRESSURE: 84 MMHG | OXYGEN SATURATION: 98 % | HEART RATE: 95 BPM | TEMPERATURE: 97.5 F | RESPIRATION RATE: 18 BRPM | SYSTOLIC BLOOD PRESSURE: 136 MMHG | WEIGHT: 205 LBS | BODY MASS INDEX: 37.49 KG/M2

## 2024-08-15 DIAGNOSIS — R21 RASH, SKIN: ICD-10-CM

## 2024-08-15 DIAGNOSIS — I89.0 LYMPHEDEMA OF BOTH LOWER EXTREMITIES: Primary | ICD-10-CM

## 2024-08-15 DIAGNOSIS — B37.31 YEAST INFECTION INVOLVING THE VAGINA AND SURROUNDING AREA: ICD-10-CM

## 2024-08-15 DIAGNOSIS — R53.1 WEAKNESS GENERALIZED: ICD-10-CM

## 2024-08-15 DIAGNOSIS — R29.898 LEG HEAVINESS: ICD-10-CM

## 2024-08-15 DIAGNOSIS — L03.115 CELLULITIS OF RIGHT LOWER EXTREMITY: Primary | ICD-10-CM

## 2024-08-15 PROCEDURE — 97535 SELF CARE MNGMENT TRAINING: CPT | Mod: GO | Performed by: OCCUPATIONAL THERAPIST

## 2024-08-15 PROCEDURE — 97140 MANUAL THERAPY 1/> REGIONS: CPT | Mod: GO | Performed by: OCCUPATIONAL THERAPIST

## 2024-08-15 PROCEDURE — 87798 DETECT AGENT NOS DNA AMP: CPT

## 2024-08-15 PROCEDURE — 99214 OFFICE O/P EST MOD 30 MIN: CPT | Performed by: FAMILY MEDICINE

## 2024-08-15 RX ORDER — FLUCONAZOLE 150 MG/1
150 TABLET ORAL
Qty: 2 TABLET | Refills: 0 | Status: SHIPPED | OUTPATIENT
Start: 2024-08-15 | End: 2024-08-21

## 2024-08-15 RX ORDER — CEPHALEXIN 500 MG/1
500 CAPSULE ORAL 3 TIMES DAILY
Qty: 30 CAPSULE | Refills: 0 | Status: SHIPPED | OUTPATIENT
Start: 2024-08-15 | End: 2024-08-25

## 2024-08-15 ASSESSMENT — ACTIVITIES OF DAILY LIVING (ADL): HOME_MANAGEMENT_TIME_ENTRY: 24

## 2024-08-15 ASSESSMENT — ENCOUNTER SYMPTOMS
WHEEZING: 0
NAUSEA: 0
RHINORRHEA: 0
DYSURIA: 0
VOMITING: 0
SHORTNESS OF BREATH: 0
FEVER: 0
HEMATURIA: 0
DIARRHEA: 0
COUGH: 0
DIFFICULTY URINATING: 0
SORE THROAT: 0

## 2024-08-15 NOTE — PROGRESS NOTES
Occupational Therapy    Occupational Therapy Treatment     Patient Name: Holly Chavez  MRN: [id number]  Today's Date: 8/15/2024      Assessment:  Pt with noted increased redness and burning sensation at right lower leg.  OT educated pt on signs and symptoms of infection.  OT instructed pt to contact PCP regarding increased redness to rule out infection.  OT will further assess.     Plan:   1-2x per week for 8-12 weeks    Current Problem:  1. Lymphedema of both lower extremities        2. Leg heaviness        3. Weakness generalized              Subjective   Patient reports her right leg is bothering her more today; reports the lower part of her leg is burning despite use of cream prescribed by podiatry     Precautions:   Latex allergy  Neuropathy in hands and feet  No hx of falls    Pain:   Pt did not report any pain on today's date.    Objective     Right Lower Extremity:   Lipodermatosclerosis lower leg    Stemmer sign positive    Mild fullness at foot    Dry patch lower leg    Hemosiderin staining lower leg      Left Lower Extremity:   Lipodermatosclerosis lower leg    Stemmer sign positive    Mild fullness at foot    Hemosiderin staining lower leg    Increased fullness at foot and ankle    Mild heat at redness in lower leg; pt reports this is her normal      LE Skin Appearance/Condition and Girth:  No measurements on today's date     Treatment: 54  min    Self care: 24    OT assessed skin    Pt with continued red rash like coloring on medial aspect of R lower leg.  Pt reports rash on lower leg is getting worse with time.  Patient reports internal burning sensation at spot.  OT educated pt on signs and symptoms of infection.  OT educated pt to contact PCP to have spot assessed to rule out infection.  Pt expressed plan to contact PCP.   Will further assess     OT donned TG  (size F) to BLEs post treatment     Manual: 30    OT initiated MLD sequence with diaphragmatic breathing and opening of lymph nodes to  promote lymphatic circulation.   OT applied lymphapress to LLE at 40 mmHg.  Simultaneously, OT provided Manual Lymph Drainage to R trunk and RLE.  OT held MLD over red spot on right lower leg due to potential infection.  Softened tissue texture noted post tx.

## 2024-08-15 NOTE — ASSESSMENT & PLAN NOTE
RLE rash, will check v zoster swab and tx as needed  2. Buttock rash c/w fungal infection, pt to use her rx crm for nystatin  2x/d for up to 2 wk  Keep area clean and dry  F/up with pcp if no improvement

## 2024-08-15 NOTE — PROGRESS NOTES
Subjective   Patient ID: Holly Chavez is a 58 y.o. female who presents for Rash and Vaginal Discharge    Rash  Episode onset: 3 weeks. The affected locations include the right lower leg. The rash is characterized by itchiness, pain and burning. Pertinent negatives include no congestion, cough, diarrhea, fever, rhinorrhea, shortness of breath, sore throat or vomiting.   Vaginal Discharge  The patient's primary symptoms include genital itching and vaginal discharge. Episode onset: 3 days. Associated symptoms include rash. Pertinent negatives include no diarrhea, dysuria, fever, hematuria, nausea, sore throat or vomiting.        Review of Systems   Constitutional:  Negative for fever.   HENT:  Negative for congestion, ear pain, rhinorrhea and sore throat.    Respiratory:  Negative for cough, shortness of breath and wheezing.    Gastrointestinal:  Negative for diarrhea, nausea and vomiting.   Genitourinary:  Positive for vaginal discharge. Negative for difficulty urinating, dysuria, hematuria and vaginal bleeding.        Yeast infection x 3 d  Vaginal disch, vulvovaginal itching and burning.   Skin:  Positive for rash.        Rash on bottom and RLE x 3 wk  Seen podiatry  and told to use steroid crm  Rash is painful and burning, told it may be infection.       Objective   /84   Pulse 95   Temp 36.4 °C (97.5 °F)   Resp 18   Wt 93 kg (205 lb)   LMP  (LMP Unknown)   SpO2 98%   BMI 37.49 kg/m²     Physical Exam  Vitals and nursing note reviewed.   Constitutional:       General: She is not in acute distress.     Appearance: Normal appearance.   Cardiovascular:      Rate and Rhythm: Normal rate and regular rhythm.      Heart sounds: Normal heart sounds.   Pulmonary:      Effort: Pulmonary effort is normal.      Breath sounds: Normal breath sounds.   Skin:     General: Skin is warm and dry.      Findings: Rash present.      Comments: RLE with rash and  erythema just above ankle. Painful to palpation.   Buttock with  gluteal rash red and macerated       Neurological:      Mental Status: She is alert.         Assessment/Plan   Problem List Items Addressed This Visit             ICD-10-CM    Cellulitis of right lower extremity - Primary L03.115     Take atbx as directed  Keep area clean and dry   F/up with pcp if no improvement         Relevant Medications    cephalexin (Keflex) 500 mg capsule    Yeast infection involving the vagina and surrounding area B37.31     Advise pt to take med as directed  F/up with pcp or gyn if cont symptoms         Relevant Medications    fluconazole (Diflucan) 150 mg tablet    Rash, skin R21     RLE rash, will check v zoster swab and tx as needed  2. Buttock rash c/w fungal infection, pt to use her rx crm for nystatin  2x/d for up to 2 wk  Keep area clean and dry  F/up with pcp if no improvement         Relevant Orders    HSV PCR, Skin/Mucosa    VZV By PCR Qualitative Skin/Mucosa Lesion   HSV pcr not sent, ordered in error

## 2024-08-16 LAB — VZV DNA SPEC QL NAA+PROBE: NOT DETECTED

## 2024-08-20 ENCOUNTER — APPOINTMENT (OUTPATIENT)
Dept: OCCUPATIONAL THERAPY | Facility: CLINIC | Age: 59
End: 2024-08-20
Payer: MEDICAID

## 2024-08-27 ENCOUNTER — APPOINTMENT (OUTPATIENT)
Dept: CARDIOLOGY | Facility: HOSPITAL | Age: 59
End: 2024-08-27
Payer: MEDICARE

## 2024-08-27 ENCOUNTER — APPOINTMENT (OUTPATIENT)
Dept: RADIOLOGY | Facility: HOSPITAL | Age: 59
End: 2024-08-27
Payer: MEDICARE

## 2024-08-27 ENCOUNTER — HOSPITAL ENCOUNTER (EMERGENCY)
Facility: HOSPITAL | Age: 59
Discharge: HOME | End: 2024-08-27
Attending: EMERGENCY MEDICINE
Payer: MEDICARE

## 2024-08-27 ENCOUNTER — TREATMENT (OUTPATIENT)
Dept: OCCUPATIONAL THERAPY | Facility: CLINIC | Age: 59
End: 2024-08-27
Payer: MEDICAID

## 2024-08-27 VITALS
BODY MASS INDEX: 36.14 KG/M2 | HEART RATE: 76 BPM | TEMPERATURE: 98.1 F | DIASTOLIC BLOOD PRESSURE: 61 MMHG | HEIGHT: 63 IN | WEIGHT: 204 LBS | OXYGEN SATURATION: 97 % | SYSTOLIC BLOOD PRESSURE: 134 MMHG | RESPIRATION RATE: 18 BRPM

## 2024-08-27 DIAGNOSIS — K21.00 GASTROESOPHAGEAL REFLUX DISEASE WITH ESOPHAGITIS, UNSPECIFIED WHETHER HEMORRHAGE: Primary | ICD-10-CM

## 2024-08-27 DIAGNOSIS — R29.898 LEG HEAVINESS: ICD-10-CM

## 2024-08-27 DIAGNOSIS — R53.1 WEAKNESS GENERALIZED: ICD-10-CM

## 2024-08-27 DIAGNOSIS — I89.0 LYMPHEDEMA OF BOTH LOWER EXTREMITIES: Primary | ICD-10-CM

## 2024-08-27 DIAGNOSIS — R07.9 CHEST PAIN, UNSPECIFIED TYPE: ICD-10-CM

## 2024-08-27 LAB
ALBUMIN SERPL BCP-MCNC: 4.1 G/DL (ref 3.4–5)
ALP SERPL-CCNC: 58 U/L (ref 33–136)
ALT SERPL W P-5'-P-CCNC: 11 U/L (ref 7–45)
ANION GAP SERPL CALC-SCNC: 9 MMOL/L (ref 10–20)
AST SERPL W P-5'-P-CCNC: 11 U/L (ref 9–39)
BASOPHILS # BLD AUTO: 0.04 X10*3/UL (ref 0–0.1)
BASOPHILS NFR BLD AUTO: 0.6 %
BILIRUB SERPL-MCNC: 0.6 MG/DL (ref 0–1.2)
BUN SERPL-MCNC: 13 MG/DL (ref 6–23)
CALCIUM SERPL-MCNC: 9.5 MG/DL (ref 8.6–10.3)
CARDIAC TROPONIN I PNL SERPL HS: 4 NG/L (ref 0–13)
CARDIAC TROPONIN I PNL SERPL HS: 4 NG/L (ref 0–13)
CHLORIDE SERPL-SCNC: 102 MMOL/L (ref 98–107)
CO2 SERPL-SCNC: 32 MMOL/L (ref 21–32)
CREAT SERPL-MCNC: 0.73 MG/DL (ref 0.5–1.05)
EGFRCR SERPLBLD CKD-EPI 2021: >90 ML/MIN/1.73M*2
EOSINOPHIL # BLD AUTO: 0.09 X10*3/UL (ref 0–0.7)
EOSINOPHIL NFR BLD AUTO: 1.3 %
ERYTHROCYTE [DISTWIDTH] IN BLOOD BY AUTOMATED COUNT: 12.8 % (ref 11.5–14.5)
GLUCOSE SERPL-MCNC: 139 MG/DL (ref 74–99)
HCT VFR BLD AUTO: 36.1 % (ref 36–46)
HGB BLD-MCNC: 12.3 G/DL (ref 12–16)
IMM GRANULOCYTES # BLD AUTO: 0.02 X10*3/UL (ref 0–0.7)
IMM GRANULOCYTES NFR BLD AUTO: 0.3 % (ref 0–0.9)
INR PPP: 1.4 (ref 0.9–1.1)
LYMPHOCYTES # BLD AUTO: 1.48 X10*3/UL (ref 1.2–4.8)
LYMPHOCYTES NFR BLD AUTO: 21.5 %
MCH RBC QN AUTO: 28.7 PG (ref 26–34)
MCHC RBC AUTO-ENTMCNC: 34.1 G/DL (ref 32–36)
MCV RBC AUTO: 84 FL (ref 80–100)
MONOCYTES # BLD AUTO: 0.33 X10*3/UL (ref 0.1–1)
MONOCYTES NFR BLD AUTO: 4.8 %
NEUTROPHILS # BLD AUTO: 4.91 X10*3/UL (ref 1.2–7.7)
NEUTROPHILS NFR BLD AUTO: 71.5 %
NRBC BLD-RTO: 0 /100 WBCS (ref 0–0)
PLATELET # BLD AUTO: 180 X10*3/UL (ref 150–450)
POTASSIUM SERPL-SCNC: 4.6 MMOL/L (ref 3.5–5.3)
PROT SERPL-MCNC: 7.2 G/DL (ref 6.4–8.2)
PROTHROMBIN TIME: 15.3 SECONDS (ref 9.8–12.8)
RBC # BLD AUTO: 4.28 X10*6/UL (ref 4–5.2)
SARS-COV-2 RNA RESP QL NAA+PROBE: NOT DETECTED
SODIUM SERPL-SCNC: 138 MMOL/L (ref 136–145)
WBC # BLD AUTO: 6.9 X10*3/UL (ref 4.4–11.3)

## 2024-08-27 PROCEDURE — 80053 COMPREHEN METABOLIC PANEL: CPT | Performed by: EMERGENCY MEDICINE

## 2024-08-27 PROCEDURE — 85025 COMPLETE CBC W/AUTO DIFF WBC: CPT | Performed by: EMERGENCY MEDICINE

## 2024-08-27 PROCEDURE — 71045 X-RAY EXAM CHEST 1 VIEW: CPT | Mod: FOREIGN READ | Performed by: RADIOLOGY

## 2024-08-27 PROCEDURE — 84484 ASSAY OF TROPONIN QUANT: CPT | Performed by: EMERGENCY MEDICINE

## 2024-08-27 PROCEDURE — 99284 EMERGENCY DEPT VISIT MOD MDM: CPT | Mod: 25

## 2024-08-27 PROCEDURE — 93005 ELECTROCARDIOGRAM TRACING: CPT

## 2024-08-27 PROCEDURE — 85610 PROTHROMBIN TIME: CPT | Performed by: EMERGENCY MEDICINE

## 2024-08-27 PROCEDURE — 71045 X-RAY EXAM CHEST 1 VIEW: CPT

## 2024-08-27 PROCEDURE — 97140 MANUAL THERAPY 1/> REGIONS: CPT | Mod: GO,CO

## 2024-08-27 PROCEDURE — 36415 COLL VENOUS BLD VENIPUNCTURE: CPT | Performed by: EMERGENCY MEDICINE

## 2024-08-27 PROCEDURE — 87635 SARS-COV-2 COVID-19 AMP PRB: CPT

## 2024-08-27 PROCEDURE — 97535 SELF CARE MNGMENT TRAINING: CPT | Mod: GO,CO

## 2024-08-27 RX ORDER — FAMOTIDINE 20 MG/1
20 TABLET, FILM COATED ORAL 2 TIMES DAILY
Qty: 30 TABLET | Refills: 0 | Status: SHIPPED | OUTPATIENT
Start: 2024-08-27 | End: 2024-09-11

## 2024-08-27 RX ORDER — SUCRALFATE 1 G/10ML
1 SUSPENSION ORAL 4 TIMES DAILY
Qty: 1200 ML | Refills: 0 | Status: SHIPPED | OUTPATIENT
Start: 2024-08-27 | End: 2024-09-26

## 2024-08-27 ASSESSMENT — ACTIVITIES OF DAILY LIVING (ADL): HOME_MANAGEMENT_TIME_ENTRY: 23

## 2024-08-27 ASSESSMENT — LIFESTYLE VARIABLES
HAVE PEOPLE ANNOYED YOU BY CRITICIZING YOUR DRINKING: NO
HAVE YOU EVER FELT YOU SHOULD CUT DOWN ON YOUR DRINKING: NO
EVER HAD A DRINK FIRST THING IN THE MORNING TO STEADY YOUR NERVES TO GET RID OF A HANGOVER: NO
TOTAL SCORE: 0
EVER FELT BAD OR GUILTY ABOUT YOUR DRINKING: NO

## 2024-08-27 ASSESSMENT — HEART SCORE
AGE: 45-64
HISTORY: SLIGHTLY SUSPICIOUS
RISK FACTORS: 1-2 RISK FACTORS
HEART SCORE: 2
ECG: NORMAL
TROPONIN: LESS THAN OR EQUAL TO NORMAL LIMIT

## 2024-08-27 ASSESSMENT — PAIN SCALES - GENERAL
PAINLEVEL_OUTOF10: 4
PAINLEVEL_OUTOF10: 1
PAINLEVEL_OUTOF10: 2

## 2024-08-27 ASSESSMENT — PAIN DESCRIPTION - LOCATION: LOCATION: CHEST

## 2024-08-27 ASSESSMENT — PAIN DESCRIPTION - DESCRIPTORS: DESCRIPTORS: BURNING

## 2024-08-27 ASSESSMENT — PAIN - FUNCTIONAL ASSESSMENT
PAIN_FUNCTIONAL_ASSESSMENT: 0-10
PAIN_FUNCTIONAL_ASSESSMENT: 0-10

## 2024-08-27 ASSESSMENT — PAIN DESCRIPTION - PAIN TYPE: TYPE: ACUTE PAIN

## 2024-08-27 NOTE — PROGRESS NOTES
Occupational Therapy    Occupational Therapy Treatment     Patient Name: Holly Chavez  MRN: [id number]  Today's Date: 8/27/2024    Time In 0900a  Time Out 0953a  Total Time 53 mins    Assessment:  Pt appears to tolerate treatment well with no complaints of pain. Good tissue texture softening post MLD. This WRAY provides prescription for velcro closing compression, pt to set appointment and get fit.    Plan:   Continue occupational therapy lymphedema CDT for decreased swelling and increased ease of functional mobility tasks.    Current Problem:  1. Lymphedema of both lower extremities        2. Leg heaviness        3. Weakness generalized            Subjective   Patient states she has been using cream prescribed since going to get red area on lower right leg checked last week. Pt states her pump has been delivered but hasn't used it yet. Pt states she feels like she has a tight band around her chest, has come and gone recently but was at its worst last night. Pt states her and her  will either go to UrgentCare or ED after therapy session to get assessed.    Precautions:   Latex allergy  Neuropathy in hands and feet  No hx of falls    Pain:   Pt did not report any pain on today's date.    Objective     Right Lower Extremity:   Lipodermatosclerosis lower leg    Stemmer sign positive    Mild fullness at foot    Dry patch lower leg    Hemosiderin staining lower leg      Left Lower Extremity:   Lipodermatosclerosis lower leg    Stemmer sign positive    Mild fullness at foot    Hemosiderin staining lower leg    Increased fullness at foot and ankle    Mild heat at redness in lower leg; pt reports this is her normal      LE Skin Appearance/Condition and Girth:  R Superior border of patella (SBP) 41.1  R 10cm above SBP  -  R 10cm below SBP 35.2  R 20cm below SBP 40.3   R 30cm below SBP 28.9   R 35cm below SBP 26.2   R Ankle lobule 28.0  R Ankle  25.9  R Forefoot  22.9     L Superior border of patella (SBP) 41.6  L10cm  above SBP -   L 10cm below SBP 35.4  L 20cm below SBP 41.4  L 30cm below SBP 30.5   L 35cm below SBP 28.0  L Ankle lobule 28.9  L Ankle  27.4  L Forefoot 23.7  Mild decreases    Treatment: 53  min    Self care: 23    WRAY assessed skin, take and assess measurements.    Pt has been applying cream prescribed by podiatrist for redness distal R LE, pt state it has been helping take some redness down and is not as painful.    Manual: 30    WRAY initiated MLD sequence with diaphragmatic breathing and opening of lymph nodes to promote lymphatic circulation.   WRAY provides MLD to B LE, good tissue texture softening post MLD.

## 2024-08-27 NOTE — ED PROVIDER NOTES
EMERGENCY DEPARTMENT ENCOUNTER      Pt Name: Holly Chavez  MRN: [id number]  Birthdate 1965  Date of evaluation: 8/27/2024  Provider: Nima Ramachandran DO    CHIEF COMPLAINT       Chief Complaint   Patient presents with    Chest Pain     Epigastric pain radiating to right back.  Exposure to COVID         HISTORY OF PRESENT ILLNESS    58-year-old female, history of paroxysmal A-fib on Eliquis, diabetes on Mounjaro, no longer being treated for hypertension, has obesity, comes to the emergency room for squeezing chest pain onset yesterday afternoon, relatively constant pain, was worse last night when she went to sleep, resolved this morning initially however came back as she started her day.  Denies that it is exertional, Tylenol did not help with the pain, does see Dr. Simms of cardiology, had a stress test about a year ago which she states was normal, has never had any stents in her heart.  Has no first-degree relatives with a history of coronary disease, she does not smoke.  Has no shortness of breath, nausea, vomiting, fevers, chills, cough, congestion, rhinorrhea, no frequency, urgency, hematuria or dysuria.  Does have recent COVID exposure.  No other symptoms or concerns today.      History provided by:  Patient      Nursing Notes were reviewed.    PAST MEDICAL HISTORY     Past Medical History:   Diagnosis Date    Acute upper respiratory infection, unspecified 11/13/2018    Acute URI    Benign paroxysmal vertigo, unspecified ear 12/04/2017    Benign positional vertigo    Cutaneous abscess of groin 12/04/2017    Abscess of groin, left    Cutaneous abscess of limb, unspecified 09/20/2018    Abscess of axilla    Morbid (severe) obesity due to excess calories (Multi) 07/08/2019    Morbid obesity due to excess calories    Personal history of diseases of the skin and subcutaneous tissue 11/20/2015    History of skin pruritus    Personal history of diseases of the skin and subcutaneous tissue 09/27/2018    History of  hidradenitis suppurativa    Personal history of other endocrine, nutritional and metabolic disease     History of diabetes mellitus    Personal history of other endocrine, nutritional and metabolic disease 08/11/2015    History of uncontrolled diabetes    Personal history of other endocrine, nutritional and metabolic disease 04/16/2019    History of diabetes mellitus    Personal history of other endocrine, nutritional and metabolic disease 06/06/2019    History of diabetes mellitus    Pneumonia 09/07/2023    Rash and other nonspecific skin eruption 08/11/2015    Rash    Rash and other nonspecific skin eruption 08/11/2015    Rash    Type 1 diabetes mellitus with mild nonproliferative retinopathy and macular edema (Multi) 11/09/2015    Background diabetic macular edema, with mild nonproliferative retinopathy, associated with type 1 diabetes mellitus    Type 1 diabetes mellitus with mild nonproliferative retinopathy and macular edema (Multi) 11/09/2015    Background diabetic macular edema, with mild nonproliferative retinopathy, associated with type 1 diabetes mellitus    Type 1 diabetes mellitus with mild nonproliferative retinopathy and macular edema (Multi) 11/10/2015    Background diabetic macular edema, with mild nonproliferative retinopathy, associated with type 1 diabetes mellitus    Type 1 diabetes mellitus with mild nonproliferative retinopathy and macular edema (Multi) 11/12/2015    Background diabetic macular edema, with mild nonproliferative retinopathy, associated with type 1 diabetes mellitus    Type 2 diabetes mellitus with mild nonproliferative diabetic retinopathy without macular edema, unspecified eye (Multi) 11/09/2015    Mild nonproliferative diabetic retinopathy without macular edema    Type 2 diabetes mellitus with mild nonproliferative diabetic retinopathy without macular edema, unspecified eye (Multi) 11/09/2015    Mild nonproliferative diabetic retinopathy without macular edema    Type 2  diabetes mellitus with mild nonproliferative diabetic retinopathy without macular edema, unspecified eye (Multi) 11/10/2015    Mild nonproliferative diabetic retinopathy without macular edema    Type 2 diabetes mellitus with mild nonproliferative diabetic retinopathy without macular edema, unspecified eye (Multi) 11/12/2015    Mild nonproliferative diabetic retinopathy without macular edema         SURGICAL HISTORY       Past Surgical History:   Procedure Laterality Date    CERVICAL BIOPSY  W/ LOOP ELECTRODE EXCISION  04/12/2016    Cervical Loop Electrosurgical Excision (LEEP)    HERNIA REPAIR  08/11/2015    Hernia Repair    TUBAL LIGATION  08/11/2015    Tubal Ligation         CURRENT MEDICATIONS       Previous Medications    ALBUTEROL 90 MCG/ACTUATION INHALER    Inhale 2 puffs every 4 hours if needed for wheezing or shortness of breath.    AMMONIUM LACTATE (LAC-HYDRIN) 12 % LOTION    APPLY 1 APPLICATION ONCE DAILY    APIXABAN (ELIQUIS) 5 MG TABLET    Take 1 tablet (5 mg) by mouth 2 times a day.    BLOOD-GLUCOSE SENSOR (DEXCOM G7 SENSOR) DEVICE    Change every 10 days    BLOOD-GLUCOSE SENSOR (DEXCOM G7 SENSOR) DEVICE    1 each see administration instructions. Change sensor every 10 days    CHOLECALCIFEROL (VITAMIN D-3) 125 MCG (5000 UT) CAPSULE    Take by mouth.    CYANOCOBALAMIN, VITAMIN B-12, (VITAMIN B-12) 1,000 MCG TABLET EXTENDED RELEASE    Take 1 tablet (1,000 mcg) by mouth once daily.    FLONASE ALLERGY RELIEF 50 MCG/ACTUATION NASAL SPRAY    Administer 2 sprays into each nostril once daily.    FUROSEMIDE (LASIX) 20 MG TABLET    Take 1 tablet (20 mg) by mouth 3 times a week. 3 times a week as needed    GUAIFENESIN (MUCUS RELIEF ORAL)        INSULIN GLARGINE (BASAGLAR KWIKPEN U-100 INSULIN) 100 UNIT/ML (3 ML) PEN    Inject 24 Units under the skin once daily in the morning. Take as directed per insulin instructions.    INSULIN LISPRO (HUMALOG) 100 UNIT/ML INJECTION    Inject 60 Units under the skin once daily.  "As directed in divided doses (max daily dose 60 units)    NYSTATIN (MYCOSTATIN) OINTMENT    Apply topically 2 times a day.    OMEPRAZOLE (PRILOSEC) 20 MG DR CAPSULE    20 mg by mouth daily    ONETOUCH ULTRA TEST STRIP    1 each 3 times a day.    PEN NEEDLE, DIABETIC 32 GAUGE X 5/32\" NEEDLE    4 Needles once daily. Use with insulin injections four times daily    TIRZEPATIDE (MOUNJARO) 7.5 MG/0.5 ML PEN INJECTOR    Inject 7.5 mg under the skin 1 (one) time per week.    TRIAMCINOLONE (KENALOG) 0.1 % CREAM    Apply topically 2 times a day. APPLY EXTERNALLY TO THE AFFECTED AREA 2 TO 3 TIMES DAILY       ALLERGIES     Adhesive tape-silicones and Codeine    FAMILY HISTORY       Family History   Problem Relation Name Age of Onset    Stroke Mother      Diabetes Mother      Other (cerebrovascular accident) Mother      Heart failure Father      Diabetes Father      Pancreatic cancer Father      Breast cancer Mother's Sister      Other (cardiac disorder) Other      Diabetes Other      Hypertension Other      Cancer Other      Kidney disease Other      Breast cancer Other aunt     Breast cancer Father's Sister      Glaucoma Neg Hx      Macular degeneration Neg Hx            SOCIAL HISTORY       Social History     Socioeconomic History    Marital status: Significant Other   Tobacco Use    Smoking status: Former     Current packs/day: 0.00     Types: Cigarettes     Quit date:      Years since quittin.6    Smokeless tobacco: Never   Vaping Use    Vaping status: Never Used   Substance and Sexual Activity    Alcohol use: Not Currently    Drug use: Never    Sexual activity: Defer     Social Determinants of Health     Physical Activity: Inactive (2024)    Exercise Vital Sign     Days of Exercise per Week: 0 days     Minutes of Exercise per Session: 0 min       SCREENINGS                        PHYSICAL EXAM    (up to 7 for level 4, 8 or more for level 5)     ED Triage Vitals [24 1024]   Temperature Heart Rate " Respirations BP   36.7 °C (98.1 °F) 77 16 134/67      Pulse Ox Temp Source Heart Rate Source Patient Position   98 % Temporal Monitor Sitting      BP Location FiO2 (%)     Left arm --       Physical Exam  Vitals and nursing note reviewed.   Constitutional:       General: She is not in acute distress.  HENT:      Head: Normocephalic and atraumatic.   Eyes:      General: No scleral icterus.        Right eye: No discharge.         Left eye: No discharge.      Conjunctiva/sclera: Conjunctivae normal.   Cardiovascular:      Rate and Rhythm: Normal rate and regular rhythm.      Pulses: Normal pulses.   Pulmonary:      Effort: Pulmonary effort is normal.   Abdominal:      General: Abdomen is flat.      Palpations: Abdomen is soft.      Tenderness: There is no abdominal tenderness. There is no guarding or rebound.   Musculoskeletal:         General: No deformity.      Right lower leg: No edema.      Left lower leg: No edema.   Skin:     General: Skin is warm and dry.   Neurological:      Mental Status: She is alert and oriented to person, place, and time. Mental status is at baseline.   Psychiatric:         Mood and Affect: Mood normal.         Behavior: Behavior normal.          DIAGNOSTIC RESULTS     LABS:  Labs Reviewed   COMPREHENSIVE METABOLIC PANEL - Abnormal       Result Value    Glucose 139 (*)     Sodium 138      Potassium 4.6      Chloride 102      Bicarbonate 32      Anion Gap 9 (*)     Urea Nitrogen 13      Creatinine 0.73      eGFR >90      Calcium 9.5      Albumin 4.1      Alkaline Phosphatase 58      Total Protein 7.2      AST 11      Bilirubin, Total 0.6      ALT 11     PROTIME-INR - Abnormal    Protime 15.3 (*)     INR 1.4 (*)    SERIAL TROPONIN-INITIAL - Normal    Troponin I, High Sensitivity 4      Narrative:     Less than 99th percentile of normal range cutoff-  Female and children under 18 years old <14 ng/L; Male <21 ng/L: Negative  Repeat testing should be performed if clinically indicated.     Female  and children under 18 years old 14-50 ng/L; Male 21-50 ng/L:  Consistent with possible cardiac damage and possible increased clinical   risk. Serial measurements may help to assess extent of myocardial damage.     >50 ng/L: Consistent with cardiac damage, increased clinical risk and  myocardial infarction. Serial measurements may help assess extent of   myocardial damage.      NOTE: Children less than 1 year old may have higher baseline troponin   levels and results should be interpreted in conjunction with the overall   clinical context.     NOTE: Troponin I testing is performed using a different   testing methodology at [facility] than at [facility]. Direct result comparisons should only   be made within the same method.   SARS-COV-2 PCR - Normal    Coronavirus 2019, PCR Not Detected      Narrative:     This assay has received FDA Emergency Use Authorization (EUA) and is only authorized for the duration of time that circumstances exist to justify the authorization of the emergency use of in vitro diagnostic tests for the detection of SARS-CoV-2 virus and/or diagnosis of COVID-19 infection under section 564(b)(1) of the Act, 21 U.S.C. 360bbb-3(b)(1). This assay is an in vitro diagnostic nucleic acid amplification test for the qualitative detection of SARS-CoV-2 from nasopharyngeal specimens and has been validated for use at [facility]. Negative results do not preclude COVID-19 infections and should not be used as the sole basis for diagnosis, treatment, or other management decisions.     SERIAL TROPONIN, 1 HOUR - Normal    Troponin I, High Sensitivity 4      Narrative:     Less than 99th percentile of normal range cutoff-  Female and children under 18 years old <14 ng/L; Male <21 ng/L: Negative  Repeat testing should be performed if clinically indicated.     Female and children under 18 years old 14-50 ng/L; Male 21-50 ng/L:  Consistent with possible cardiac  damage and possible increased clinical   risk. Serial measurements may help to assess extent of myocardial damage.     >50 ng/L: Consistent with cardiac damage, increased clinical risk and  myocardial infarction. Serial measurements may help assess extent of   myocardial damage.      NOTE: Children less than 1 year old may have higher baseline troponin   levels and results should be interpreted in conjunction with the overall   clinical context.     NOTE: Troponin I testing is performed using a different   testing methodology at [facility] than at other   [facility]. Direct result comparisons should only   be made within the same method.   CBC WITH AUTO DIFFERENTIAL    WBC 6.9      nRBC 0.0      RBC 4.28      Hemoglobin 12.3      Hematocrit 36.1      MCV 84      MCH 28.7      MCHC 34.1      RDW 12.8      Platelets 180      Neutrophils % 71.5      Immature Granulocytes %, Automated 0.3      Lymphocytes % 21.5      Monocytes % 4.8      Eosinophils % 1.3      Basophils % 0.6      Neutrophils Absolute 4.91      Immature Granulocytes Absolute, Automated 0.02      Lymphocytes Absolute 1.48      Monocytes Absolute 0.33      Eosinophils Absolute 0.09      Basophils Absolute 0.04     TROPONIN SERIES- (INITIAL, 1 HR)    Narrative:     The following orders were created for panel order Troponin I Series, High Sensitivity (0, 1 HR).  Procedure                               Abnormality         Status                     ---------                               -----------         ------                     Troponin I, High Sensiti...[[id number]]  Normal              Final result               Troponin, High Sensitivi...[[id number]]  Normal              Final result                 Please view results for these tests on the individual orders.       All other labs were within normal range or not returned as of this dictation.    Imaging  XR chest 1 view   Final Result   No acute pulmonary pathology.   Signed by  Orlando Irene MD           Procedures  Procedures     EMERGENCY DEPARTMENT COURSE/MDM:     ED Course as of 08/27/24 1417   e Aug 27, 2024   1124 I interpreted this EKG independently shows sinus rhythm 78 bpm, QTc 437, no acute injury pattern, there are inverted T waves in lead III which is chronic. [RD]   1248 Spoke with Dr. Simms of cardiology who said given patient's been having symptoms for about 24 hours, has a normal troponin, can be discharged to follow-up as an outpatient. [RD]      ED Course User Index  [RD] Nimarichard RamachandranDO         Diagnoses as of 08/27/24 1417   Chest pain, unspecified type        Medical Decision Making  58-year-old female comes emergency with chest pain onset yesterday.  Given symptomatology, close area, differential for ACS, pneumonia, pneumothorax, cardiac workup ordered assessment is etiologies.  Patient's pain is not tearing to the back, she is not tachycardic, hypertensive, no acute distress initially, has equal and symmetrical distal pulses in all 4 extremities, I am not acutely concerned on initial presentation for aortic dissection.    On independent trepidation of labs, CBC unremarkable, chemistry unremarkable.  EKG shows no acute injury pattern, troponins are negative x 2, COVID test is negative.  No signs of pneumonia, pneumothorax on chest x-ray, mediastinum is within normal limits, not suspicious at this time given normal mediastinum, hemodynamics, symmetrical pulses that the patient is having an acute aortic dissection.  Patient is on anticoagulation, compliant, has not missed any doses, no suspicion for PE causing her chest pain.    Spoke with patient's cardiologist Dr. Simms who said patient can follow-up as an outpatient given she had negative cardiac markers, given duration of symptoms, ACS was not likely.  Patient discharged home at this time in stable condition.  She will return for any new, concerning or worsening of his    Amount and/or Complexity of Data  Reviewed  Labs: ordered.  Radiology: ordered.  ECG/medicine tests: ordered and independent interpretation performed. Decision-making details documented in ED Course.        Patient and or family in agreement and understanding of treatment plan.  All questions answered.      I reviewed the case with the attending ED physician. The attending ED physician agrees with the plan. Patient and/or patient´s representative was counseled regarding labs, imaging, likely diagnosis, and plan. All questions were answered.    ED Medications administered this visit:  Medications - No data to display    New Prescriptions from this visit:    New Prescriptions    No medications on file       Follow-up:  Faustino Dove MD  [id number]  [facility] [address]  [phone]          Johanna Salas DO  [facility]  [facility] [address]  [phone]              Final Impression:   1. Chest pain, unspecified type          (Please note that portions of this note were completed with a voice recognition program.  Efforts were made to edit the dictations but occasionally words are mis-transcribed.)     Nima Ramachandran DO  Resident  08/27/24 8021

## 2024-08-28 ENCOUNTER — APPOINTMENT (OUTPATIENT)
Dept: OPHTHALMOLOGY | Facility: CLINIC | Age: 59
End: 2024-08-28
Payer: MEDICARE

## 2024-08-28 DIAGNOSIS — E11.3213 DIABETIC VISUAL LOSS: BLINDNESS OF BOTH EYES, WITH MACULAR EDEMA, WITH MILD NONPROLIFERATIVE RETINOPATHY, ASSOCIATED WITH TYPE 2 DIABETES MELLITUS (MULTI): ICD-10-CM

## 2024-08-28 DIAGNOSIS — E11.3411 SEVERE NONPROLIFERATIVE DIABETIC RETINOPATHY OF RIGHT EYE, WITH MACULAR EDEMA, ASSOCIATED WITH TYPE 2 DIABETES MELLITUS (MULTI): Primary | ICD-10-CM

## 2024-08-28 DIAGNOSIS — H54.3 DIABETIC VISUAL LOSS: BLINDNESS OF BOTH EYES, WITH MACULAR EDEMA, WITH MILD NONPROLIFERATIVE RETINOPATHY, ASSOCIATED WITH TYPE 2 DIABETES MELLITUS (MULTI): ICD-10-CM

## 2024-08-28 PROCEDURE — 92134 CPTRZ OPH DX IMG PST SGM RTA: CPT | Mod: BILATERAL PROCEDURE | Performed by: OPHTHALMOLOGY

## 2024-08-28 PROCEDURE — 67028 INJECTION EYE DRUG: CPT | Mod: BILATERAL PROCEDURE | Performed by: OPHTHALMOLOGY

## 2024-08-28 ASSESSMENT — REFRACTION_WEARINGRX
OD_AXIS: 065
OS_SPHERE: +4.50
OD_SPHERE: +3.50
OD_CYLINDER: -0.50
OS_AXIS: 045
OS_ADD: +2.50
OS_CYLINDER: -0.50
OD_ADD: +2.50

## 2024-08-28 ASSESSMENT — ENCOUNTER SYMPTOMS
ALLERGIC/IMMUNOLOGIC NEGATIVE: 0
HEMATOLOGIC/LYMPHATIC NEGATIVE: 0
RESPIRATORY NEGATIVE: 0
CONSTITUTIONAL NEGATIVE: 0
EYES NEGATIVE: 1
GASTROINTESTINAL NEGATIVE: 0
MUSCULOSKELETAL NEGATIVE: 0
NEUROLOGICAL NEGATIVE: 0
ENDOCRINE NEGATIVE: 1
PSYCHIATRIC NEGATIVE: 0
CARDIOVASCULAR NEGATIVE: 0

## 2024-08-28 ASSESSMENT — TONOMETRY
OS_IOP_MMHG: 19
IOP_METHOD: GOLDMANN APPLANATION
OD_IOP_MMHG: 19

## 2024-08-28 ASSESSMENT — VISUAL ACUITY
OS_CC: 20/30
CORRECTION_TYPE: GLASSES
OD_CC: 20/40-1
METHOD: SNELLEN - LINEAR

## 2024-08-29 NOTE — PROGRESS NOTES
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, with macular edema, with mild nonproliferative retinopathy, associated with type 2 diabetes mellitus (Multi)  -     Intravitreal Injection, Pharmacologic Agent - OU - Both Eyes    DIAGNOSTIC PROCEDURE DONE    OCT DONE OD/OS            REASON FOR TEST: will help address and tailor  therapy by detecting subclinical CME SRF     Hi quality OCT  scans obtained  signal good    OCT OD - Normal Foveal Contour Edema, IS/OS Junction Normal  OCT OS - Normal Foveal Contour, Edema, IS/OS Junction Normal    additional commnents:    Today will need therapy ou    Treatment options for DME OU discussed, including observation, anti-VEGF injections (including Avastin, Lucentis, Beovu, Vabysmo and Eylea) and laser. Recommend anti-VEGF injections. Eylea HD done OUtoday in a sterile manner with Betadine 5% for antisepsis.       FU 3m

## 2024-09-01 LAB
ATRIAL RATE: 78 BPM
ATRIAL RATE: 80 BPM
P AXIS: 24 DEGREES
P AXIS: 33 DEGREES
P OFFSET: 185 MS
P OFFSET: 192 MS
P ONSET: 138 MS
P ONSET: 146 MS
PR INTERVAL: 152 MS
PR INTERVAL: 156 MS
Q ONSET: 216 MS
Q ONSET: 222 MS
QRS COUNT: 12 BEATS
QRS COUNT: 13 BEATS
QRS DURATION: 74 MS
QRS DURATION: 80 MS
QT INTERVAL: 384 MS
QT INTERVAL: 398 MS
QTC CALCULATION(BAZETT): 437 MS
QTC CALCULATION(BAZETT): 459 MS
QTC FREDERICIA: 419 MS
QTC FREDERICIA: 438 MS
R AXIS: 16 DEGREES
R AXIS: 25 DEGREES
T AXIS: 18 DEGREES
T AXIS: 6 DEGREES
T OFFSET: 414 MS
T OFFSET: 415 MS
VENTRICULAR RATE: 78 BPM
VENTRICULAR RATE: 80 BPM

## 2024-09-03 ENCOUNTER — TREATMENT (OUTPATIENT)
Dept: OCCUPATIONAL THERAPY | Facility: CLINIC | Age: 59
End: 2024-09-03
Payer: COMMERCIAL

## 2024-09-03 DIAGNOSIS — I89.0 LYMPHEDEMA OF BOTH LOWER EXTREMITIES: ICD-10-CM

## 2024-09-03 DIAGNOSIS — R29.898 LEG HEAVINESS: Primary | ICD-10-CM

## 2024-09-03 PROCEDURE — 97535 SELF CARE MNGMENT TRAINING: CPT | Mod: GO | Performed by: OCCUPATIONAL THERAPIST

## 2024-09-03 PROCEDURE — 97140 MANUAL THERAPY 1/> REGIONS: CPT | Mod: GO | Performed by: OCCUPATIONAL THERAPIST

## 2024-09-03 ASSESSMENT — ACTIVITIES OF DAILY LIVING (ADL): HOME_MANAGEMENT_TIME_ENTRY: 15

## 2024-09-03 NOTE — PROGRESS NOTES
Occupational Therapy    Occupational Therapy Treatment     Patient Name: Holly Chavez  MRN: [id number]  Today's Date: 9/3/2024    Time In 0900a  Time Out 0953a  Total Time 53 mins    Assessment:  OT educated pt on HEPs for management of lymphapress; reviewed regular use of lymphapress for BLEs and recommendation to schedule appointment with .  Pt expressed carryover.  Will further assess.     Plan:   Continue occupational therapy lymphedema CDT for decreased swelling and increased ease of functional mobility tasks.    Current Problem:  1. Leg heaviness        2. Lymphedema of both lower extremities            Subjective   Patient reports she did go to the ER last week for chest pain.  Pt reports no acute findings once in ER.  Pt reports she has received her lymphapress and has not had a chance to use it yet but will soon.     Precautions:   Latex allergy  Neuropathy in hands and feet  No hx of falls    Pain:   Pt did not report any pain on today's date.    Objective     Right Lower Extremity:   Lipodermatosclerosis lower leg    Stemmer sign positive    Mild fullness at foot    Dry patch lower leg    Hemosiderin staining lower leg      Left Lower Extremity:   Lipodermatosclerosis lower leg    Stemmer sign positive    Mild fullness at foot    Hemosiderin staining lower leg    Increased fullness at foot and ankle    Mild heat at redness in lower leg; pt reports this is her normal      LE Skin Appearance/Condition and Girth:  No measurements on today's date     Treatment: 48 min    Self care: 15    OT assessed skin     OT applied lotion to BLEs per lymphedema skin care precautions     OT applied TG  size F to BLEs post Manual Lymph Drainage treatment.     Pt has script for velcro compression garment.  Pt reports plan to schedule appointment .      Manual: 33    OT initiated MLD sequence with diaphragmatic breathing and opening of lymph nodes to promote lymphatic circulation.   OT provided  MLD to B trunk and BLEs.  Softened tissue texture noted post tx.

## 2024-09-10 ENCOUNTER — APPOINTMENT (OUTPATIENT)
Dept: OCCUPATIONAL THERAPY | Facility: CLINIC | Age: 59
End: 2024-09-10
Payer: COMMERCIAL

## 2024-09-24 ENCOUNTER — APPOINTMENT (OUTPATIENT)
Dept: PRIMARY CARE | Facility: CLINIC | Age: 59
End: 2024-09-24
Payer: MEDICARE

## 2024-09-24 VITALS
DIASTOLIC BLOOD PRESSURE: 60 MMHG | BODY MASS INDEX: 36.32 KG/M2 | OXYGEN SATURATION: 96 % | TEMPERATURE: 97.7 F | HEIGHT: 63 IN | RESPIRATION RATE: 14 BRPM | WEIGHT: 205 LBS | SYSTOLIC BLOOD PRESSURE: 130 MMHG | HEART RATE: 76 BPM

## 2024-09-24 DIAGNOSIS — M79.671 RIGHT FOOT PAIN: ICD-10-CM

## 2024-09-24 DIAGNOSIS — E11.649: Primary | ICD-10-CM

## 2024-09-24 DIAGNOSIS — R21 RASH: ICD-10-CM

## 2024-09-24 DIAGNOSIS — R19.5 LOOSE STOOLS: ICD-10-CM

## 2024-09-24 DIAGNOSIS — K21.9 GASTROESOPHAGEAL REFLUX DISEASE WITHOUT ESOPHAGITIS: ICD-10-CM

## 2024-09-24 PROCEDURE — 3060F POS MICROALBUMINURIA REV: CPT | Performed by: INTERNAL MEDICINE

## 2024-09-24 PROCEDURE — 3078F DIAST BP <80 MM HG: CPT | Performed by: INTERNAL MEDICINE

## 2024-09-24 PROCEDURE — 3075F SYST BP GE 130 - 139MM HG: CPT | Performed by: INTERNAL MEDICINE

## 2024-09-24 PROCEDURE — 99214 OFFICE O/P EST MOD 30 MIN: CPT | Performed by: INTERNAL MEDICINE

## 2024-09-24 PROCEDURE — 3048F LDL-C <100 MG/DL: CPT | Performed by: INTERNAL MEDICINE

## 2024-09-24 PROCEDURE — 3008F BODY MASS INDEX DOCD: CPT | Performed by: INTERNAL MEDICINE

## 2024-09-24 PROCEDURE — 1036F TOBACCO NON-USER: CPT | Performed by: INTERNAL MEDICINE

## 2024-09-24 NOTE — PROGRESS NOTES
"Subjective    Holly Chavez is a 58 y.o. female who presents for Diabetes.  HPI    Blood sugars drop at night.   As low as 69.   Tries to go to bed with a blood sugar of 190 to avoid dropping at night.     C/o worse diarrhea . Some days. Explosive.not multiple times a day. . Uncontrollable. Onset is sudden.  Takes imodium. Helps for a few days.     Right foot pain. Stabbing, no known injury she has pain on the right foot on the top  She wears sandals.    Review of Systems   All other systems reviewed and are negative.        Objective     /60 (BP Location: Left arm, Patient Position: Sitting, BP Cuff Size: Adult)   Pulse 76   Temp 36.5 °C (97.7 °F) (Skin)   Resp 14   Ht 1.588 m (5' 2.5\")   Wt 93 kg (205 lb)   LMP  (LMP Unknown)   SpO2 96%   BMI 36.90 kg/m²    Physical Exam  Vitals reviewed.   Constitutional:       General: She is not in acute distress.     Appearance: Normal appearance.   Cardiovascular:      Rate and Rhythm: Normal rate and regular rhythm.      Pulses: Normal pulses.      Heart sounds: Normal heart sounds.   Pulmonary:      Effort: Pulmonary effort is normal.      Breath sounds: Normal breath sounds.   Abdominal:      Tenderness: There is no abdominal tenderness.   Musculoskeletal:         General: No swelling.      Right foot: Tenderness present. No swelling or deformity.   Skin:     General: Skin is warm and dry.      Comments: Chronic venous stasis changes.   Right leg with small red circular macular rash   Neurological:      Mental Status: She is alert.       Health Maintenance Due   Topic Date Due    HIV Screening  Never done    MMR Vaccines (1 of 1 - Standard series) Never done    Hepatitis C Screening  Never done    DTaP/Tdap/Td Vaccines (1 - Tdap) Never done    Zoster Vaccines (1 of 2) Never done    Pneumococcal Vaccine: Pediatrics (0 to 5 Years) and At-Risk Patients (6 to 64 Years) (2 of 2 - PPSV23 or PCV20) 11/22/2018    Colorectal Cancer Screening  09/23/2023    RSV Pregnant " patients and/or  patients aged 60+ years (1 - 1-dose 60+ series) Never done    Diabetes: Hemoglobin A1C  11/05/2024          Assessment/Plan   Problem List Items Addressed This Visit       Gastroesophageal reflux disease     Other Visit Diagnoses       Low blood sugar in diabetes (Multi)    -  Primary    Rash        Relevant Orders    Referral to Dermatology    Loose stools        Right foot pain        Relevant Orders    XR foot right 1-2 views        Will have her decrease her lantus decrease to 10 units  Hold omeprazole and can take pepcid twice a day.  Her right foot  has pain. Likely arthritic. Check xrays  Recommend she wear supportive shoes.  Call  with any problems or questions.   Follow up as scheduled Pt is compliant with diabetes treatment . She is adherent to their CGM regimen

## 2024-09-30 ENCOUNTER — APPOINTMENT (OUTPATIENT)
Dept: OPHTHALMOLOGY | Facility: CLINIC | Age: 59
End: 2024-09-30
Payer: MEDICARE

## 2024-09-30 DIAGNOSIS — E11.3411 SEVERE NONPROLIFERATIVE DIABETIC RETINOPATHY OF RIGHT EYE, WITH MACULAR EDEMA, ASSOCIATED WITH TYPE 2 DIABETES MELLITUS: Primary | ICD-10-CM

## 2024-09-30 DIAGNOSIS — H25.13 NUCLEAR SCLEROSIS OF BOTH EYES: ICD-10-CM

## 2024-09-30 PROCEDURE — 99213 OFFICE O/P EST LOW 20 MIN: CPT | Performed by: OPHTHALMOLOGY

## 2024-09-30 PROCEDURE — 92134 CPTRZ OPH DX IMG PST SGM RTA: CPT | Mod: BILATERAL PROCEDURE | Performed by: OPHTHALMOLOGY

## 2024-09-30 ASSESSMENT — ENCOUNTER SYMPTOMS
ALLERGIC/IMMUNOLOGIC NEGATIVE: 0
RESPIRATORY NEGATIVE: 0
EYES NEGATIVE: 1
NEUROLOGICAL NEGATIVE: 0
PSYCHIATRIC NEGATIVE: 0
ENDOCRINE NEGATIVE: 0
GASTROINTESTINAL NEGATIVE: 0
CONSTITUTIONAL NEGATIVE: 0
MUSCULOSKELETAL NEGATIVE: 0
CARDIOVASCULAR NEGATIVE: 0
HEMATOLOGIC/LYMPHATIC NEGATIVE: 0

## 2024-09-30 ASSESSMENT — EXTERNAL EXAM - RIGHT EYE: OD_EXAM: NORMAL

## 2024-09-30 ASSESSMENT — SLIT LAMP EXAM - LIDS
COMMENTS: GOOD POSITION
COMMENTS: GOOD POSITION

## 2024-09-30 ASSESSMENT — TONOMETRY
OD_IOP_MMHG: 20
OS_IOP_MMHG: 20
IOP_METHOD: TONOPEN

## 2024-09-30 ASSESSMENT — REFRACTION_WEARINGRX
OD_CYLINDER: -0.50
OS_SPHERE: +4.50
OS_AXIS: 045
OD_SPHERE: +3.50
OS_CYLINDER: -0.50
OS_ADD: +2.50
OD_AXIS: 065
OD_ADD: +2.50

## 2024-09-30 ASSESSMENT — VISUAL ACUITY
OS_PH_CC: 20/30-2
METHOD: SNELLEN - LINEAR
OD_CC: 20/60-1
OS_CC: 20/40-2
CORRECTION_TYPE: GLASSES
OD_PH_CC: 20/40-1

## 2024-09-30 ASSESSMENT — CUP TO DISC RATIO
OS_RATIO: .3
OD_RATIO: .3

## 2024-09-30 ASSESSMENT — EXTERNAL EXAM - LEFT EYE: OS_EXAM: NORMAL

## 2024-09-30 NOTE — PROGRESS NOTES
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

## 2024-10-03 ENCOUNTER — APPOINTMENT (OUTPATIENT)
Dept: PODIATRY | Facility: CLINIC | Age: 59
End: 2024-10-03
Payer: MEDICARE

## 2024-10-03 DIAGNOSIS — M79.675 PAIN IN TOES OF BOTH FEET: ICD-10-CM

## 2024-10-03 DIAGNOSIS — I87.8 CHRONIC VENOUS STASIS: ICD-10-CM

## 2024-10-03 DIAGNOSIS — R21 RASH: ICD-10-CM

## 2024-10-03 DIAGNOSIS — M79.674 PAIN IN TOES OF BOTH FEET: ICD-10-CM

## 2024-10-03 DIAGNOSIS — R60.0 BILATERAL LEG EDEMA: Primary | ICD-10-CM

## 2024-10-03 DIAGNOSIS — B35.1 FUNGAL NAIL INFECTION: ICD-10-CM

## 2024-10-03 PROCEDURE — 99213 OFFICE O/P EST LOW 20 MIN: CPT | Performed by: PODIATRIST

## 2024-10-03 RX ORDER — CLOBETASOL PROPIONATE 0.5 MG/G
OINTMENT TOPICAL 2 TIMES DAILY
Qty: 60 G | Refills: 1 | Status: SHIPPED | OUTPATIENT
Start: 2024-10-03 | End: 2024-10-04

## 2024-10-03 NOTE — PROGRESS NOTES
History Of Present Illness  Holly Chavez is a 58 y.o. female presenting for diabetic nail care. Patient complains with painful elongated nails.  Patient has a new onset rash on right lower leg.  It is getting worse.  It is not itchy.  It is only present on the distal medial aspect of her lower leg.  PCP Johanna Parisi DO   last visit 9/24/24     Past Medical History  She has a past medical history of Acute upper respiratory infection, unspecified (11/13/2018), Benign paroxysmal vertigo, unspecified ear (12/04/2017), Cutaneous abscess of groin (12/04/2017), Cutaneous abscess of limb, unspecified (09/20/2018), Morbid (severe) obesity due to excess calories (Multi) (07/08/2019), Personal history of diseases of the skin and subcutaneous tissue (11/20/2015), Personal history of diseases of the skin and subcutaneous tissue (09/27/2018), Personal history of other endocrine, nutritional and metabolic disease, Personal history of other endocrine, nutritional and metabolic disease (08/11/2015), Personal history of other endocrine, nutritional and metabolic disease (04/16/2019), Personal history of other endocrine, nutritional and metabolic disease (06/06/2019), Pneumonia (09/07/2023), Rash and other nonspecific skin eruption (08/11/2015), Rash and other nonspecific skin eruption (08/11/2015), Type 1 diabetes mellitus with mild nonproliferative retinopathy and macular edema (Multi) (11/09/2015), Type 1 diabetes mellitus with mild nonproliferative retinopathy and macular edema (Multi) (11/09/2015), Type 1 diabetes mellitus with mild nonproliferative retinopathy and macular edema (Multi) (11/10/2015), Type 1 diabetes mellitus with mild nonproliferative retinopathy and macular edema (Multi) (11/12/2015), Type 2 diabetes mellitus with mild nonproliferative diabetic retinopathy without macular edema, unspecified eye (Multi) (11/09/2015), Type 2 diabetes mellitus with mild nonproliferative diabetic retinopathy without macular edema,  unspecified eye (Multi) (11/09/2015), Type 2 diabetes mellitus with mild nonproliferative diabetic retinopathy without macular edema, unspecified eye (Multi) (11/10/2015), and Type 2 diabetes mellitus with mild nonproliferative diabetic retinopathy without macular edema, unspecified eye (Multi) (11/12/2015).    Surgical History  She has a past surgical history that includes Tubal ligation (08/11/2015); Hernia repair (08/11/2015); and Cervical biopsy w/ loop electrode excision (04/12/2016).     Social History  She reports that she quit smoking about 25 years ago. Her smoking use included cigarettes. She has never used smokeless tobacco. She reports that she does not currently use alcohol. She reports that she does not use drugs.    Family History  Family History   Problem Relation Name Age of Onset    Stroke Mother      Diabetes Mother      Other (cerebrovascular accident) Mother      Heart failure Father      Diabetes Father      Pancreatic cancer Father      Breast cancer Mother's Sister      Other (cardiac disorder) Other      Diabetes Other      Hypertension Other      Cancer Other      Kidney disease Other      Breast cancer Other aunt     Breast cancer Father's Sister      Glaucoma Neg Hx      Macular degeneration Neg Hx          Allergies  Adhesive tape-silicones and Codeine    Medications  Current Outpatient Medications   Medication Sig Dispense Refill    albuterol 90 mcg/actuation inhaler Inhale 2 puffs every 4 hours if needed for wheezing or shortness of breath. 18 g 3    ammonium lactate (Lac-Hydrin) 12 % lotion APPLY 1 APPLICATION ONCE DAILY      apixaban (Eliquis) 5 mg tablet Take 1 tablet (5 mg) by mouth 2 times a day. 180 tablet 3    blood-glucose sensor (Dexcom G7 Sensor) device Change every 10 days 10 each 3    blood-glucose sensor (Dexcom G7 Sensor) device 1 each see administration instructions. Change sensor every 10 days 9 each 1    cholecalciferol (Vitamin D-3) 125 MCG (5000 UT) capsule Take by  "mouth.      cyanocobalamin, vitamin B-12, (Vitamin B-12) 1,000 mcg tablet extended release Take 1 tablet (1,000 mcg) by mouth once daily.      Flonase Allergy Relief 50 mcg/actuation nasal spray Administer 2 sprays into each nostril once daily. 16 g 2    furosemide (Lasix) 20 mg tablet Take 1 tablet (20 mg) by mouth 3 times a week. 3 times a week as needed 36 tablet 3    guaifenesin (MUCUS RELIEF ORAL)       insulin glargine (Basaglar KwikPen U-100 Insulin) 100 unit/mL (3 mL) pen Inject 22 Units under the skin once daily in the morning. Take as directed per insulin instructions.      insulin lispro (HumaLOG) 100 unit/mL injection Inject 60 Units under the skin once daily. As directed in divided doses (max daily dose 60 units) 60 mL 2    nystatin (Mycostatin) ointment Apply topically 2 times a day. 30 g 3    omeprazole (PriLOSEC) 20 mg DR capsule 20 mg by mouth daily 90 capsule 3    OneTouch Ultra Test strip 1 each 3 times a day. 100 each 11    pen needle, diabetic 32 gauge x 5/32\" needle 4 Needles once daily. Use with insulin injections four times daily 400 each 3    tirzepatide (Mounjaro) 7.5 mg/0.5 mL pen injector Inject 7.5 mg under the skin 1 (one) time per week. 2 mL 5    triamcinolone (Kenalog) 0.1 % cream Apply topically 2 times a day. APPLY EXTERNALLY TO THE AFFECTED AREA 2 TO 3 TIMES DAILY 45 g 3    famotidine (Pepcid) 20 mg tablet Take 1 tablet (20 mg) by mouth 2 times a day for 15 days. 30 tablet 0     No current facility-administered medications for this visit.       Review of Systems    REVIEW OF SYSTEMS  GENERAL:  Negative for malaise, significant weight loss, fever  CARDIOVASCULAR: leg swelling   MUSCULOSKELETAL:  Negative for joint pain or swelling, back pain, and muscle pain.  SKIN:  Negative for lesions, rash, and itching  PSYCH:  Negative for sleep disturbance, mood disorder and recent psychosocial stressors  NEURO: Negative, denies any burning, tingling or numbness     Objective:   Vasc: DP and " PT pulses are palpable bilateral.  CFT is less than 3 seconds bilateral.  Skin temperature is warm to cool proximal to distal bilateral.  None pitting pedal edema. No digital hair.      Neuro:  Light touch is intact to the foot bilateral.      Derm: Nails 1-5 bilateral are thickened, elongated and crumbly with subungual debris. Skin is supple with normal texture and turgor noted.  Webspaces are clean, dry and intact bilateral.  There are no hyperkeratoses, ulcerations, verruca or other lesions noted.  Patient has preulcerative lesions on her anterior legs.  Patient has a mild rash present on the medial aspect of her right lower leg.   the rash has intensified.  There are raised lesions.    Ortho: Muscle strength is 5/5 for all pedal groups tested.  Ankle joint, subtalar joint, 1st MPJ and lesser MPJ ROM is full and without pain or crepitus.  The foot type is rectus bilateral off weight bearing.  There are no structural deformities noted.    Assessment/Plan   DM  Painful nail mycosis  Lymphedema improved with lymphedema therapy.  Rash of unknown etiology.  Will start Temovate.  If patient does not improve, will biopsy in 3 weeks.  Toenails are debrided in length and thickness to avoid infection and for pain relief    Continue with lymphedema therapy.  I spent 20 minutes in the professional and overall care of this patient.

## 2024-10-10 ENCOUNTER — APPOINTMENT (OUTPATIENT)
Dept: OTOLARYNGOLOGY | Facility: CLINIC | Age: 59
End: 2024-10-10
Payer: MEDICARE

## 2024-10-24 ENCOUNTER — APPOINTMENT (OUTPATIENT)
Dept: PODIATRY | Facility: CLINIC | Age: 59
End: 2024-10-24
Payer: MEDICARE

## 2024-10-24 DIAGNOSIS — R21 DISCOID RASH: ICD-10-CM

## 2024-10-24 DIAGNOSIS — E11.9 TYPE 2 DIABETES MELLITUS WITHOUT COMPLICATION, WITHOUT LONG-TERM CURRENT USE OF INSULIN (MULTI): Primary | ICD-10-CM

## 2024-10-24 NOTE — PROGRESS NOTES
History Of Present Illness  Holly Chavez is a 59 y.o. female presenting for follow up rash on right lower leg.  It is only present on the distal medial aspect of her lower leg.  Patient uses Temovate for the past 3 weeks.  The rash is actually expanded.  Will do a punch biopsy today.    PCP Johanna Parisi,    last visit 9/24/24     Past Medical History  She has a past medical history of Acute upper respiratory infection, unspecified (11/13/2018), Benign paroxysmal vertigo, unspecified ear (12/04/2017), Cutaneous abscess of groin (12/04/2017), Cutaneous abscess of limb, unspecified (09/20/2018), Morbid (severe) obesity due to excess calories (Multi) (07/08/2019), Personal history of diseases of the skin and subcutaneous tissue (11/20/2015), Personal history of diseases of the skin and subcutaneous tissue (09/27/2018), Personal history of other endocrine, nutritional and metabolic disease, Personal history of other endocrine, nutritional and metabolic disease (08/11/2015), Personal history of other endocrine, nutritional and metabolic disease (04/16/2019), Personal history of other endocrine, nutritional and metabolic disease (06/06/2019), Pneumonia (09/07/2023), Rash and other nonspecific skin eruption (08/11/2015), Rash and other nonspecific skin eruption (08/11/2015), Type 1 diabetes mellitus with mild nonproliferative retinopathy and macular edema (Multi) (11/09/2015), Type 1 diabetes mellitus with mild nonproliferative retinopathy and macular edema (Multi) (11/09/2015), Type 1 diabetes mellitus with mild nonproliferative retinopathy and macular edema (Multi) (11/10/2015), Type 1 diabetes mellitus with mild nonproliferative retinopathy and macular edema (Multi) (11/12/2015), Type 2 diabetes mellitus with mild nonproliferative diabetic retinopathy without macular edema, unspecified eye (Multi) (11/09/2015), Type 2 diabetes mellitus with mild nonproliferative diabetic retinopathy without macular edema, unspecified eye  (Multi) (11/09/2015), Type 2 diabetes mellitus with mild nonproliferative diabetic retinopathy without macular edema, unspecified eye (Multi) (11/10/2015), and Type 2 diabetes mellitus with mild nonproliferative diabetic retinopathy without macular edema, unspecified eye (Multi) (11/12/2015).    Surgical History  She has a past surgical history that includes Tubal ligation (08/11/2015); Hernia repair (08/11/2015); and Cervical biopsy w/ loop electrode excision (04/12/2016).     Social History  She reports that she quit smoking about 25 years ago. Her smoking use included cigarettes. She has never used smokeless tobacco. She reports that she does not currently use alcohol. She reports that she does not use drugs.    Family History  Family History   Problem Relation Name Age of Onset    Stroke Mother      Diabetes Mother      Other (cerebrovascular accident) Mother      Heart failure Father      Diabetes Father      Pancreatic cancer Father      Breast cancer Mother's Sister      Other (cardiac disorder) Other      Diabetes Other      Hypertension Other      Cancer Other      Kidney disease Other      Breast cancer Other aunt     Breast cancer Father's Sister      Glaucoma Neg Hx      Macular degeneration Neg Hx          Allergies  Adhesive tape-silicones and Codeine    Medications  Current Outpatient Medications   Medication Sig Dispense Refill    albuterol 90 mcg/actuation inhaler Inhale 2 puffs every 4 hours if needed for wheezing or shortness of breath. 18 g 3    ammonium lactate (Lac-Hydrin) 12 % lotion APPLY 1 APPLICATION ONCE DAILY      apixaban (Eliquis) 5 mg tablet Take 1 tablet (5 mg) by mouth 2 times a day. 180 tablet 3    blood-glucose sensor (Dexcom G7 Sensor) device Change every 10 days 10 each 3    blood-glucose sensor (Dexcom G7 Sensor) device 1 each see administration instructions. Change sensor every 10 days 9 each 1    cholecalciferol (Vitamin D-3) 125 MCG (5000 UT) capsule Take by mouth.       "cyanocobalamin, vitamin B-12, (Vitamin B-12) 1,000 mcg tablet extended release Take 1 tablet (1,000 mcg) by mouth once daily.      Flonase Allergy Relief 50 mcg/actuation nasal spray Administer 2 sprays into each nostril once daily. 16 g 2    furosemide (Lasix) 20 mg tablet Take 1 tablet (20 mg) by mouth 3 times a week. 3 times a week as needed 36 tablet 3    guaifenesin (MUCUS RELIEF ORAL)       insulin glargine (Basaglar KwikPen U-100 Insulin) 100 unit/mL (3 mL) pen Inject 22 Units under the skin once daily in the morning. Take as directed per insulin instructions.      insulin lispro (HumaLOG) 100 unit/mL injection Inject 60 Units under the skin once daily. As directed in divided doses (max daily dose 60 units) 60 mL 2    nystatin (Mycostatin) ointment Apply topically 2 times a day. 30 g 3    omeprazole (PriLOSEC) 20 mg DR capsule 20 mg by mouth daily 90 capsule 3    OneTouch Ultra Test strip 1 each 3 times a day. 100 each 11    pen needle, diabetic 32 gauge x 5/32\" needle 4 Needles once daily. Use with insulin injections four times daily 400 each 3    tirzepatide (Mounjaro) 7.5 mg/0.5 mL pen injector Inject 7.5 mg under the skin 1 (one) time per week. 2 mL 5    triamcinolone (Kenalog) 0.1 % cream Apply topically 2 times a day. APPLY EXTERNALLY TO THE AFFECTED AREA 2 TO 3 TIMES DAILY 45 g 3    famotidine (Pepcid) 20 mg tablet Take 1 tablet (20 mg) by mouth 2 times a day for 15 days. 30 tablet 0     No current facility-administered medications for this visit.       Review of Systems    REVIEW OF SYSTEMS  GENERAL:  Negative for malaise, significant weight loss, fever  CARDIOVASCULAR: leg swelling   MUSCULOSKELETAL:  Negative for joint pain or swelling, back pain, and muscle pain.  SKIN:  Negative for lesions, rash, and itching  PSYCH:  Negative for sleep disturbance, mood disorder and recent psychosocial stressors  NEURO: Negative, denies any burning, tingling or numbness     Objective:   Vasc: DP and PT pulses are " palpable bilateral.  CFT is less than 3 seconds bilateral.  Skin temperature is warm to cool proximal to distal bilateral.  None pitting pedal edema. No digital hair.      Neuro:  Light touch is intact to the foot bilateral.      Derm: Nails 1-5 bilateral are thickened, elongated and crumbly with subungual debris. Skin is supple with normal texture and turgor noted.  Webspaces are clean, dry and intact bilateral.  There are no hyperkeratoses, ulcerations, verruca or other lesions noted.  Patient has preulcerative lesions on her anterior legs.  Patient has a mild rash present on the medial aspect of her right lower leg.   the rash has intensified.  There are raised lesions.  Surface area has increased.    Ortho: Muscle strength is 5/5 for all pedal groups tested.  Ankle joint, subtalar joint, 1st MPJ and lesser MPJ ROM is full and without pain or crepitus.  The foot type is rectus bilateral off weight bearing.  There are no structural deformities noted.    Assessment/Plan   DM    Lymphedema improved with lymphedema therapy.  Rash of unknown etiology.  Will start Temovate.  Cleansed area on right medial leg with Betadine.  Injected 1 cc of lidocaine with epinephrine.  Utilizing a 3 mm punch one of the lesions is excised.  It is sent to pathology for evaluation.  Compression followed by nitro sticks are applied to the wound.  The wound was then covered with dry dressing.

## 2024-10-28 ENCOUNTER — TELEPHONE (OUTPATIENT)
Dept: PRIMARY CARE | Facility: CLINIC | Age: 59
End: 2024-10-28
Payer: MEDICARE

## 2024-10-28 DIAGNOSIS — T46.6X5A ADVERSE REACTION TO HMG-COA REDUCTASE INHIBITOR: Primary | ICD-10-CM

## 2024-10-28 DIAGNOSIS — E11.39 TYPE 2 DIABETES MELLITUS WITH OTHER OPHTHALMIC COMPLICATION, WITH LONG-TERM CURRENT USE OF INSULIN: ICD-10-CM

## 2024-10-28 DIAGNOSIS — Z79.4 TYPE 2 DIABETES MELLITUS WITH OTHER OPHTHALMIC COMPLICATION, WITH LONG-TERM CURRENT USE OF INSULIN: ICD-10-CM

## 2024-10-28 RX ORDER — TIRZEPATIDE 10 MG/.5ML
10 INJECTION, SOLUTION SUBCUTANEOUS WEEKLY
Qty: 2 ML | Refills: 5 | Status: SHIPPED | OUTPATIENT
Start: 2024-10-28

## 2024-11-04 DIAGNOSIS — R21 DISCOID RASH: Primary | ICD-10-CM

## 2024-11-04 LAB
LABORATORY COMMENT REPORT: NORMAL
PATH REPORT.FINAL DX SPEC: NORMAL
PATH REPORT.GROSS SPEC: NORMAL
PATH REPORT.RELEVANT HX SPEC: NORMAL
PATH REPORT.TOTAL CANCER: NORMAL

## 2024-11-06 ENCOUNTER — APPOINTMENT (OUTPATIENT)
Dept: OTOLARYNGOLOGY | Facility: CLINIC | Age: 59
End: 2024-11-06
Payer: MEDICARE

## 2024-11-06 DIAGNOSIS — H93.13 BILATERAL TINNITUS: Primary | ICD-10-CM

## 2024-11-06 DIAGNOSIS — H93.A1 PULSATILE TINNITUS, RIGHT EAR: ICD-10-CM

## 2024-11-06 PROCEDURE — 3048F LDL-C <100 MG/DL: CPT | Performed by: OTOLARYNGOLOGY

## 2024-11-06 PROCEDURE — 99213 OFFICE O/P EST LOW 20 MIN: CPT | Performed by: OTOLARYNGOLOGY

## 2024-11-06 PROCEDURE — 3060F POS MICROALBUMINURIA REV: CPT | Performed by: OTOLARYNGOLOGY

## 2024-11-06 NOTE — PROGRESS NOTES
Patient returns.  We are seeing her back today follow-up check history of tinnitus which is bilateral right greater than left superimposed with some evident intermittent pulsations on the right which occur with activity.  She has been evaluated by her cardiologist did not find any concerning issues from their vantage point.  The remaining ENT inquiry is clear.  Still having the symptoms.  Nothing extreme.  All remaining head neck inquiry clear.    Exam:  No acute distress.  The external ear structures appear normal. The ear canals patent and the tympanic membranes are intact without evidence of air-fluid levels, retraction, or congenital defects.  Anterior rhinoscopy notes essentially a midline nasal septum. Examination is noted for normal healthy mucosal membranes without any evidence of lesions, polyps, or exudate. The tongue is normally mobile. There are no lesions on the gingiva, buccal, or oral mucosa. There are no oral cavity masses.  The neck is negative for mass lymphadenopathy. The trachea and parotid are clear. The thyroid bed is grossly unremarkable. The salivary gland structures are grossly unremarkable.  Dedicated auscultation neck and periauricular and cranial area is clear.    Assessment and plan:  History of tinnitus with pulsatile component.  The tinnitus is bilateral but the pulsations right side only may relate to turbulent blood flow with activity etc.  It is not present all the time and as such I favor a conservative approach.  I will see her back for a recheck in 6 months and she will contact me sooner with any major issues at which point we will likely check CT angiogram.  All questions were answered in this regard accordingly.

## 2024-11-13 ENCOUNTER — TELEPHONE (OUTPATIENT)
Dept: PRIMARY CARE | Facility: CLINIC | Age: 59
End: 2024-11-13
Payer: MEDICARE

## 2024-11-13 NOTE — TELEPHONE ENCOUNTER
Pt left vm regarding Dexcom G7 refills.  Fax needed sent back to supplier w/chart notes. Documentation needed checking QID and benefitting from Dexcom.  Requesting year supply.  Has 1 sensor left d/t x5 faulty sensors.

## 2024-11-14 DIAGNOSIS — E11.9 TYPE 2 DIABETES MELLITUS WITHOUT COMPLICATION, WITHOUT LONG-TERM CURRENT USE OF INSULIN (MULTI): ICD-10-CM

## 2024-11-14 RX ORDER — BLOOD-GLUCOSE SENSOR
1 EACH MISCELLANEOUS SEE ADMIN INSTRUCTIONS
Qty: 9 EACH | Refills: 3 | Status: SHIPPED | OUTPATIENT
Start: 2024-11-14 | End: 2024-11-15 | Stop reason: SDUPTHER

## 2024-11-14 NOTE — TELEPHONE ENCOUNTER
Are you able to add an addendum to dos 9/24/2024?  Medical [employer] company needs documentation that she is adherent to her cgm use and diabetes treatment plan

## 2024-11-15 DIAGNOSIS — E11.9 TYPE 2 DIABETES MELLITUS WITHOUT COMPLICATION, WITHOUT LONG-TERM CURRENT USE OF INSULIN (MULTI): ICD-10-CM

## 2024-11-15 RX ORDER — BLOOD-GLUCOSE SENSOR
1 EACH MISCELLANEOUS SEE ADMIN INSTRUCTIONS
Qty: 9 EACH | Refills: 3 | Status: SHIPPED | OUTPATIENT
Start: 2024-11-15

## 2024-11-18 ENCOUNTER — APPOINTMENT (OUTPATIENT)
Dept: OPHTHALMOLOGY | Facility: CLINIC | Age: 59
End: 2024-11-18
Payer: MEDICARE

## 2024-11-18 ENCOUNTER — HOSPITAL ENCOUNTER (OUTPATIENT)
Facility: CLINIC | Age: 59
Setting detail: OUTPATIENT SURGERY
End: 2024-11-18
Attending: OPHTHALMOLOGY | Admitting: OPHTHALMOLOGY
Payer: MEDICARE

## 2024-11-18 DIAGNOSIS — E11.3313 MODERATE NONPROLIFERATIVE DIABETIC RETINOPATHY OF BOTH EYES WITH MACULAR EDEMA ASSOCIATED WITH TYPE 2 DIABETES MELLITUS: ICD-10-CM

## 2024-11-18 DIAGNOSIS — H25.812 COMBINED FORMS OF AGE-RELATED CATARACT OF LEFT EYE: Primary | ICD-10-CM

## 2024-11-18 DIAGNOSIS — H25.811 COMBINED FORMS OF AGE-RELATED CATARACT OF RIGHT EYE: ICD-10-CM

## 2024-11-18 DIAGNOSIS — H52.03 HYPEROPIA OF BOTH EYES: ICD-10-CM

## 2024-11-18 PROCEDURE — 99214 OFFICE O/P EST MOD 30 MIN: CPT | Performed by: OPHTHALMOLOGY

## 2024-11-18 PROCEDURE — 92136 OPHTHALMIC BIOMETRY: CPT | Mod: BILATERAL PROCEDURE | Performed by: OPHTHALMOLOGY

## 2024-11-18 PROCEDURE — 3060F POS MICROALBUMINURIA REV: CPT | Performed by: OPHTHALMOLOGY

## 2024-11-18 PROCEDURE — 92136 OPHTHALMIC BIOMETRY: CPT | Performed by: OPHTHALMOLOGY

## 2024-11-18 PROCEDURE — 3048F LDL-C <100 MG/DL: CPT | Performed by: OPHTHALMOLOGY

## 2024-11-18 PROCEDURE — 1036F TOBACCO NON-USER: CPT | Performed by: OPHTHALMOLOGY

## 2024-11-18 PROCEDURE — 92134 CPTRZ OPH DX IMG PST SGM RTA: CPT | Performed by: OPHTHALMOLOGY

## 2024-11-18 RX ORDER — CYCLOPENTOLATE HYDROCHLORIDE 10 MG/ML
1 SOLUTION/ DROPS OPHTHALMIC
OUTPATIENT
Start: 2024-11-18 | End: 2024-11-18

## 2024-11-18 RX ORDER — MANNITOL 20 G/100ML
12.5 INJECTION, SOLUTION INTRAVENOUS ONCE
OUTPATIENT
Start: 2024-11-18 | End: 2024-11-18

## 2024-11-18 RX ORDER — MOXIFLOXACIN 5 MG/ML
1 SOLUTION/ DROPS OPHTHALMIC
OUTPATIENT
Start: 2024-11-18 | End: 2024-11-18

## 2024-11-18 RX ORDER — PHENYLEPHRINE HYDROCHLORIDE 25 MG/ML
1 SOLUTION/ DROPS OPHTHALMIC
OUTPATIENT
Start: 2024-11-18 | End: 2024-11-18

## 2024-11-18 RX ORDER — TETRACAINE HYDROCHLORIDE 5 MG/ML
1 SOLUTION OPHTHALMIC ONCE
OUTPATIENT
Start: 2024-11-18 | End: 2024-11-18

## 2024-11-18 RX ORDER — TROPICAMIDE 10 MG/ML
1 SOLUTION/ DROPS OPHTHALMIC
OUTPATIENT
Start: 2024-11-18 | End: 2024-11-18

## 2024-11-18 ASSESSMENT — REFRACTION_WEARINGRX
OS_ADD: +2.50
OD_ADD: +2.50
OD_SPHERE: +3.00
OS_AXIS: 135
SPECS_TYPE: PAL
OD_AXIS: 155
OD_CYLINDER: +0.50
OS_SPHERE: +4.00
OS_CYLINDER: +0.50

## 2024-11-18 ASSESSMENT — VISUAL ACUITY
METHOD: SNELLEN - LINEAR
OD_BAT_MED: 20/50
OD_CC: 20/40
OS_BAT_MED: 20/50
OS_CC: 20/40
OS_CC+: -1
CORRECTION_TYPE: GLASSES

## 2024-11-18 ASSESSMENT — CONF VISUAL FIELD
OD_NORMAL: 1
OS_SUPERIOR_NASAL_RESTRICTION: 0
OD_SUPERIOR_TEMPORAL_RESTRICTION: 0
OS_NORMAL: 1
METHOD: COUNTING FINGERS
OD_SUPERIOR_NASAL_RESTRICTION: 0
OS_SUPERIOR_TEMPORAL_RESTRICTION: 0
OS_INFERIOR_TEMPORAL_RESTRICTION: 0
OD_INFERIOR_NASAL_RESTRICTION: 0
OS_INFERIOR_NASAL_RESTRICTION: 0
OD_INFERIOR_TEMPORAL_RESTRICTION: 0

## 2024-11-18 ASSESSMENT — REFRACTION_MANIFEST
OD_AXIS: 180
OS_ADD: +2.25
OS_CYLINDER: +0.50
OS_AXIS: 115
OS_SPHERE: +4.75
OD_SPHERE: +4.00
OD_ADD: +2.25
OD_CYLINDER: +0.75

## 2024-11-18 ASSESSMENT — CUP TO DISC RATIO
OS_RATIO: .3
OD_RATIO: .3

## 2024-11-18 ASSESSMENT — TONOMETRY
OS_IOP_MMHG: 17
OD_IOP_MMHG: 17
IOP_METHOD: GOLDMANN APPLANATION

## 2024-11-18 ASSESSMENT — SLIT LAMP EXAM - LIDS
COMMENTS: GOOD POSITION
COMMENTS: GOOD POSITION

## 2024-11-18 ASSESSMENT — EXTERNAL EXAM - RIGHT EYE: OD_EXAM: NORMAL

## 2024-11-18 ASSESSMENT — ENCOUNTER SYMPTOMS: EYES NEGATIVE: 1

## 2024-11-18 ASSESSMENT — EXTERNAL EXAM - LEFT EYE: OS_EXAM: NORMAL

## 2024-11-18 NOTE — PROGRESS NOTES
Assessment/Plan   Diagnoses and all orders for this visit:  Combined forms of age-related cataract of left eye  Combined form of age-related cataract, left eyeH25.812  Visually significant. Pt would like to proceed with surgery.    Visually significant cataract OS. BCVA: 20/30. Glare: 20/50. Symptoms: blurry vision, glare. A change in glasses prescription will not result in significant visual improvement at this time.  Indication for cataract surgery: Input To potentially improve visual acuity and improve quality of life/reduce symptoms.   Based on a comprehensive eye exam performed today, a visually significant cataract appears to be the source of decreased vision, diminished quality of life, and impairment of activities of daily living. Discussed option of cataract surgery vs observation. Patient can no longer function adequately with current best corrected visual acuity and wishes to have cataract surgery at this time. Discussed surgical procedure with patient. As a result of cataract extraction, it is believed that the patient will experience improved vision. Discussed potential risks, benefits, and complications of cataract surgery including but not limited to pain, bleeding, infection, inflammation, edema, increased eye pressure, retinal tear/detachment, lens dislocation, ptosis, iris damage, need for additional surgery, need for glasses after surgery, loss of vision/loss of eye. Patient understands and wishes to proceed. All questions were answered. Will schedule cataract surgery OS. Lenstar done today.   Discussed IOL options (standard monofocal, monofocal with monovision, toric, multifocal). Lens chosen: standard monofocal. Defer/decline toric/multifocal lens at this time. Had thorough discussion with patient re: aim. Discussed that may potentially need glasses for best vision both at distance and at near.     Schedule cataract surgery OS  I personally reviewed the lenstar measurements and will choose the  lens accordingly.  Combined forms of age-related cataract of right eye  Combined forms of age-related cataract of right eyeH25.811  Visually significant. Pt would like to proceed with surgery.    Visually significant cataract OD. BCVA: 20/25. Glare: 20/50. Symptoms: blurry vision, glare. A change in glasses prescription will not result in significant visual improvement at this time.  Indication for cataract surgery: Input To potentially improve visual acuity and improve quality of life/reduce symptoms.   Based on a comprehensive eye exam performed today, a visually significant cataract appears to be the source of decreased vision, diminished quality of life, and impairment of activities of daily living. Discussed option of cataract surgery vs observation. Patient can no longer function adequately with current best corrected visual acuity and wishes to have cataract surgery at this time. Discussed surgical procedure with patient. As a result of cataract extraction, it is believed that the patient will experience improved vision. Discussed potential risks, benefits, and complications of cataract surgery including but not limited to pain, bleeding, infection, inflammation, edema, increased eye pressure, retinal tear/detachment, lens dislocation, ptosis, iris damage, need for additional surgery, need for glasses after surgery, loss of vision/loss of eye. Patient understands and wishes to proceed. All questions were answered. Will schedule cataract surgery OD. Lenstar done today.  Discussed IOL options (standard monofocal, monofocal with monovision, toric, multifocal). Lens chosen: standard monofocal. Defer/decline toric/multifocal lens at this time. Had thorough discussion with patient re: aim. Discussed that may potentially need glasses for best vision both at distance and at near.    Schedule cataract surgery OD  I personally reviewed the lenstar measurements and will choose the lens accordingly.  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 Easton    Hyperopia of both eyes  Short axial length (AL) and shallow AC  Discussed with pt extra challenges associated with CE in a short eye  Will do mannitol preop

## 2024-11-25 ENCOUNTER — APPOINTMENT (OUTPATIENT)
Dept: PRIMARY CARE | Facility: CLINIC | Age: 59
End: 2024-11-25
Payer: MEDICARE

## 2024-11-25 VITALS
DIASTOLIC BLOOD PRESSURE: 52 MMHG | OXYGEN SATURATION: 100 % | SYSTOLIC BLOOD PRESSURE: 120 MMHG | HEART RATE: 74 BPM | WEIGHT: 214 LBS | RESPIRATION RATE: 14 BRPM | HEIGHT: 63 IN | BODY MASS INDEX: 37.92 KG/M2

## 2024-11-25 DIAGNOSIS — E11.39 TYPE 2 DIABETES MELLITUS WITH OTHER OPHTHALMIC COMPLICATION, WITH LONG-TERM CURRENT USE OF INSULIN: Primary | ICD-10-CM

## 2024-11-25 DIAGNOSIS — Z79.4 TYPE 2 DIABETES MELLITUS WITH OTHER SPECIFIED COMPLICATION, WITH LONG-TERM CURRENT USE OF INSULIN: ICD-10-CM

## 2024-11-25 DIAGNOSIS — Z79.4 TYPE 2 DIABETES MELLITUS WITH OTHER OPHTHALMIC COMPLICATION, WITH LONG-TERM CURRENT USE OF INSULIN: Primary | ICD-10-CM

## 2024-11-25 DIAGNOSIS — H93.A9 PULSATILE TINNITUS: ICD-10-CM

## 2024-11-25 DIAGNOSIS — R55 NEAR SYNCOPE: ICD-10-CM

## 2024-11-25 DIAGNOSIS — J06.9 VIRAL URI WITH COUGH: ICD-10-CM

## 2024-11-25 DIAGNOSIS — E11.69 TYPE 2 DIABETES MELLITUS WITH OTHER SPECIFIED COMPLICATION, WITH LONG-TERM CURRENT USE OF INSULIN: ICD-10-CM

## 2024-11-25 LAB — POC HEMOGLOBIN A1C: 6.3 % (ref 4.2–6.5)

## 2024-11-25 PROCEDURE — 3078F DIAST BP <80 MM HG: CPT | Performed by: INTERNAL MEDICINE

## 2024-11-25 PROCEDURE — 3060F POS MICROALBUMINURIA REV: CPT | Performed by: INTERNAL MEDICINE

## 2024-11-25 PROCEDURE — 1036F TOBACCO NON-USER: CPT | Performed by: INTERNAL MEDICINE

## 2024-11-25 PROCEDURE — 83036 HEMOGLOBIN GLYCOSYLATED A1C: CPT | Performed by: INTERNAL MEDICINE

## 2024-11-25 PROCEDURE — 3074F SYST BP LT 130 MM HG: CPT | Performed by: INTERNAL MEDICINE

## 2024-11-25 PROCEDURE — 3048F LDL-C <100 MG/DL: CPT | Performed by: INTERNAL MEDICINE

## 2024-11-25 PROCEDURE — 3008F BODY MASS INDEX DOCD: CPT | Performed by: INTERNAL MEDICINE

## 2024-11-25 PROCEDURE — 99214 OFFICE O/P EST MOD 30 MIN: CPT | Performed by: INTERNAL MEDICINE

## 2024-11-25 RX ORDER — INSULIN GLARGINE 100 [IU]/ML
12 INJECTION, SOLUTION SUBCUTANEOUS EVERY MORNING
Qty: 3 ML | Refills: 0 | Status: SHIPPED | OUTPATIENT
Start: 2024-11-25

## 2024-11-25 RX ORDER — ATORVASTATIN CALCIUM 10 MG/1
10 TABLET, FILM COATED ORAL DAILY
Qty: 100 TABLET | Refills: 3 | Status: SHIPPED | OUTPATIENT
Start: 2024-11-25 | End: 2025-12-30

## 2024-11-25 NOTE — PROGRESS NOTES
"Subjective    Holly Chavez is a 59 y.o. female who presents for Diabetes.  HPI    3 month fu  Blood sugar averaging 190-210  Lows when sleeping.  She will eat a bunch of candy before bed because her alarm will go off at 60  Her daytime blood sugars non fasting are 200  She eats mostly cabs. Mostly processed carbs.  Gaining weight.     Reports dizziness, weakness in legs x 1 month. BP and glucose are normal during these times.   She will  get tunnel vision and weakness. Will resolve . She has whooshing in her right ear.  She has seen ENT    Doesn't drink enough fluids. Maybe 40 ml  She just realized that she has not been taking her atorvastatin  Her anxiety is up    Review of Systems   All other systems reviewed and are negative.        Objective     /52 (BP Location: Left arm, Patient Position: Lying, BP Cuff Size: Adult)   Pulse 74   Resp 14   Ht 1.588 m (5' 2.5\")   Wt 97.1 kg (214 lb)   LMP  (LMP Unknown)   SpO2 100%   BMI 38.52 kg/m²    Physical Exam  Vitals reviewed.   Constitutional:       General: She is not in acute distress.     Appearance: Normal appearance.   Cardiovascular:      Rate and Rhythm: Normal rate and regular rhythm.      Pulses: Normal pulses.      Heart sounds: Normal heart sounds.   Pulmonary:      Effort: Pulmonary effort is normal.      Breath sounds: Normal breath sounds.   Abdominal:      Tenderness: There is no abdominal tenderness.   Musculoskeletal:         General: No swelling.   Skin:     General: Skin is warm and dry.   Neurological:      Mental Status: She is alert.       Health Maintenance Due   Topic Date Due    HIV Screening  Never done    MMR Vaccines (1 of 1 - Standard series) Never done    Hepatitis C Screening  Never done    DTaP/Tdap/Td Vaccines (1 - Tdap) Never done    Zoster Vaccines (1 of 2) Never done    Pneumococcal Vaccine: Pediatrics (0 to 5 Years) and At-Risk Patients (6 to 64 Years) (2 of 2 - PPSV23 or PCV20) 11/22/2018    Colorectal Cancer Screening  " 09/23/2023    RSV High Risk: (Elderly (60+) or Pregnant Population) (1 - Risk 60-74 years 1-dose series) Never done    Medicare Annual Wellness Visit (AWV)  01/23/2025          Assessment/Plan   Problem List Items Addressed This Visit       Type 2 diabetes mellitus - Primary    Relevant Medications    atorvastatin (Lipitor) 10 mg tablet    insulin glargine (Basaglar KwikPen U-100 Insulin) 100 unit/mL (3 mL) pen    Other Relevant Orders    POCT glycosylated hemoglobin (Hb A1C) manually resulted (Completed)    Creatinine, Serum    RESOLVED: Viral URI with cough     Other Visit Diagnoses       Pulsatile tinnitus        Relevant Orders    CT angio head w and wo IV contrast    Near syncope        Relevant Orders    Holter or Event Cardiac Monitor    CT angio head w and wo IV contrast        Near syncope with tunnel vision and pulsatile tinnitus.  Check Holter monitor.   Check ct angio   Would have her decrease her insuline glarganine to 12 units  She is eating sugar to catch up to her lows.  Also would change diet. Increase protein and decrease processed carbs and sugars.  Increase fluids.  follow up in 3 months. Recommend yearly eye exams and self foot exams. call if any problems or questions.   Recommend she restart her atorvastatin 10 mg

## 2024-12-02 ENCOUNTER — APPOINTMENT (OUTPATIENT)
Dept: OPHTHALMOLOGY | Facility: CLINIC | Age: 59
End: 2024-12-02
Payer: MEDICARE

## 2024-12-02 DIAGNOSIS — E11.3411 SEVERE NONPROLIFERATIVE DIABETIC RETINOPATHY OF RIGHT EYE, WITH MACULAR EDEMA, ASSOCIATED WITH TYPE 2 DIABETES MELLITUS: Primary | ICD-10-CM

## 2024-12-02 DIAGNOSIS — E11.3413 DIABETIC VISUAL LOSS: SEVERE VISION IMPAIRMENT OF BOTH EYES, WITH MACULAR EDEMA, WITH SEVERE NONPROLIFERATIVE RETINOPATHY, ASSOCIATED WITH TYPE 2 DIABETES MELLITUS: ICD-10-CM

## 2024-12-02 DIAGNOSIS — H54.2X22 DIABETIC VISUAL LOSS: SEVERE VISION IMPAIRMENT OF BOTH EYES, WITH MACULAR EDEMA, WITH SEVERE NONPROLIFERATIVE RETINOPATHY, ASSOCIATED WITH TYPE 2 DIABETES MELLITUS: ICD-10-CM

## 2024-12-02 PROCEDURE — 92134 CPTRZ OPH DX IMG PST SGM RTA: CPT | Performed by: OPHTHALMOLOGY

## 2024-12-02 PROCEDURE — 67028 INJECTION EYE DRUG: CPT | Mod: BILATERAL PROCEDURE | Performed by: OPHTHALMOLOGY

## 2024-12-02 ASSESSMENT — CONF VISUAL FIELD
OS_SUPERIOR_NASAL_RESTRICTION: 0
OS_NORMAL: 1
OD_SUPERIOR_NASAL_RESTRICTION: 0
OS_INFERIOR_NASAL_RESTRICTION: 0
OD_NORMAL: 1
OS_SUPERIOR_TEMPORAL_RESTRICTION: 0
OD_INFERIOR_TEMPORAL_RESTRICTION: 0
OS_INFERIOR_TEMPORAL_RESTRICTION: 0
OD_INFERIOR_NASAL_RESTRICTION: 0
OD_SUPERIOR_TEMPORAL_RESTRICTION: 0

## 2024-12-02 ASSESSMENT — VISUAL ACUITY
METHOD: SNELLEN - LINEAR
OD_CC: 20/30
OS_CC: 20/40

## 2024-12-02 ASSESSMENT — TONOMETRY
IOP_METHOD: GOLDMANN APPLANATION
OS_IOP_MMHG: 17
OD_IOP_MMHG: 17

## 2024-12-02 ASSESSMENT — ENCOUNTER SYMPTOMS: EYES NEGATIVE: 1

## 2024-12-02 NOTE — PROGRESS NOTES
Assessment/Plan   Assessment/Plan   Diagnoses and all orders for this visit:  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, with macular edema, associated with type 2 diabetes mellitus  -     OCT, Retina - OU - Both Eyes    DIAGNOSTIC PROCEDURE DONE    OCT DONE OD/OS            REASON FOR TEST: will help address and tailor  therapy by detecting subclinical CME SRF     Hi quality OCT  scans obtained  signal good    OCT OD - Normal Foveal Contour Edema, IS/OS Junction Normal  OCT OS - Normal Foveal Contour, Edema, IS/OS Junction Normal    additional commnents:    Today will need therapy ou    Treatment options for DME OU discussed, including observation, anti-VEGF injections (including Avastin, Lucentis, Beovu, Vabysmo and Eylea) and laser. Recommend anti-VEGF injections. Eylea HD done OUtoday in a sterile manner with Betadine 5% for antisepsis.       FU

## 2024-12-06 ENCOUNTER — TELEPHONE (OUTPATIENT)
Dept: OPHTHALMOLOGY | Facility: CLINIC | Age: 59
End: 2024-12-06
Payer: COMMERCIAL

## 2024-12-09 ENCOUNTER — HOSPITAL ENCOUNTER (OUTPATIENT)
Dept: CARDIOLOGY | Facility: CLINIC | Age: 59
Discharge: HOME | End: 2024-12-09
Payer: MEDICARE

## 2024-12-09 ENCOUNTER — OFFICE VISIT (OUTPATIENT)
Dept: OPHTHALMOLOGY | Facility: CLINIC | Age: 59
End: 2024-12-09
Payer: MEDICARE

## 2024-12-09 ENCOUNTER — APPOINTMENT (OUTPATIENT)
Dept: OPHTHALMOLOGY | Facility: CLINIC | Age: 59
End: 2024-12-09
Payer: MEDICARE

## 2024-12-09 DIAGNOSIS — H25.811 COMBINED FORMS OF AGE-RELATED CATARACT OF RIGHT EYE: ICD-10-CM

## 2024-12-09 DIAGNOSIS — H25.812 COMBINED FORMS OF AGE-RELATED CATARACT OF LEFT EYE: Primary | ICD-10-CM

## 2024-12-09 DIAGNOSIS — R55 NEAR SYNCOPE: ICD-10-CM

## 2024-12-09 PROCEDURE — 93225 XTRNL ECG REC<48 HRS REC: CPT

## 2024-12-16 ENCOUNTER — TELEPHONE (OUTPATIENT)
Dept: PRIMARY CARE | Facility: CLINIC | Age: 59
End: 2024-12-16
Payer: MEDICARE

## 2024-12-16 DIAGNOSIS — R05.9 COUGH, UNSPECIFIED TYPE: Primary | ICD-10-CM

## 2024-12-16 RX ORDER — BENZONATATE 100 MG/1
100 CAPSULE ORAL 3 TIMES DAILY PRN
Qty: 42 CAPSULE | Refills: 0 | Status: SHIPPED | OUTPATIENT
Start: 2024-12-16 | End: 2025-01-15

## 2024-12-17 ENCOUNTER — TELEPHONE (OUTPATIENT)
Dept: PRIMARY CARE | Facility: CLINIC | Age: 59
End: 2024-12-17
Payer: MEDICARE

## 2024-12-17 DIAGNOSIS — J40 BRONCHITIS: Primary | ICD-10-CM

## 2024-12-17 RX ORDER — METHYLPREDNISOLONE 4 MG/1
TABLET ORAL
Qty: 21 TABLET | Refills: 0 | Status: SHIPPED | OUTPATIENT
Start: 2024-12-17 | End: 2024-12-23

## 2024-12-17 NOTE — TELEPHONE ENCOUNTER
Went to urgent care today.  Diagnosed with bronchitis  Prescribed abx.   Provider wanted to prescribe steroid but hesitated d/t patient having DM.  Recommended she call and ask PCP to prescribe

## 2024-12-19 ENCOUNTER — APPOINTMENT (OUTPATIENT)
Dept: PODIATRY | Facility: CLINIC | Age: 59
End: 2024-12-19
Payer: MEDICARE

## 2025-01-07 ENCOUNTER — ANESTHESIA EVENT (OUTPATIENT)
Dept: OPERATING ROOM | Facility: CLINIC | Age: 60
End: 2025-01-07

## 2025-01-08 ENCOUNTER — ANESTHESIA (OUTPATIENT)
Dept: OPERATING ROOM | Facility: CLINIC | Age: 60
End: 2025-01-08

## 2025-01-08 ENCOUNTER — APPOINTMENT (OUTPATIENT)
Dept: CARDIOLOGY | Facility: HOSPITAL | Age: 60
End: 2025-01-08
Payer: MEDICARE

## 2025-01-08 ENCOUNTER — APPOINTMENT (OUTPATIENT)
Dept: RADIOLOGY | Facility: HOSPITAL | Age: 60
End: 2025-01-08
Payer: MEDICARE

## 2025-01-08 ENCOUNTER — OFFICE VISIT (OUTPATIENT)
Dept: PRIMARY CARE | Facility: CLINIC | Age: 60
End: 2025-01-08
Payer: MEDICARE

## 2025-01-08 ENCOUNTER — HOSPITAL ENCOUNTER (INPATIENT)
Facility: HOSPITAL | Age: 60
LOS: 1 days | Discharge: ANOTHER HOSPITAL | End: 2025-01-11
Attending: STUDENT IN AN ORGANIZED HEALTH CARE EDUCATION/TRAINING PROGRAM | Admitting: INTERNAL MEDICINE
Payer: MEDICARE

## 2025-01-08 VITALS
WEIGHT: 208 LBS | SYSTOLIC BLOOD PRESSURE: 100 MMHG | OXYGEN SATURATION: 99 % | DIASTOLIC BLOOD PRESSURE: 62 MMHG | BODY MASS INDEX: 36.86 KG/M2 | HEART RATE: 42 BPM | RESPIRATION RATE: 14 BRPM | TEMPERATURE: 97.7 F | HEIGHT: 63 IN

## 2025-01-08 DIAGNOSIS — R00.1 BRADYCARDIA: ICD-10-CM

## 2025-01-08 DIAGNOSIS — R06.02 SOB (SHORTNESS OF BREATH) ON EXERTION: Primary | ICD-10-CM

## 2025-01-08 DIAGNOSIS — M79.662 PAIN IN LEFT LOWER LEG: ICD-10-CM

## 2025-01-08 DIAGNOSIS — I49.3 PVC'S (PREMATURE VENTRICULAR CONTRACTIONS): ICD-10-CM

## 2025-01-08 DIAGNOSIS — I49.9 ARRHYTHMIA: Primary | ICD-10-CM

## 2025-01-08 PROBLEM — I47.29 NSVT (NONSUSTAINED VENTRICULAR TACHYCARDIA) (MULTI): Status: ACTIVE | Noted: 2025-01-08

## 2025-01-08 LAB
ALBUMIN SERPL BCP-MCNC: 4.2 G/DL (ref 3.4–5)
ALP SERPL-CCNC: 46 U/L (ref 33–136)
ALT SERPL W P-5'-P-CCNC: 13 U/L (ref 7–45)
ANION GAP SERPL CALC-SCNC: 12 MMOL/L (ref 10–20)
AST SERPL W P-5'-P-CCNC: 11 U/L (ref 9–39)
ATRIAL RATE: 40 BPM
BASOPHILS # BLD AUTO: 0.02 X10*3/UL (ref 0–0.1)
BASOPHILS NFR BLD AUTO: 0.3 %
BILIRUB SERPL-MCNC: 1 MG/DL (ref 0–1.2)
BNP SERPL-MCNC: 338 PG/ML (ref 0–99)
BUN SERPL-MCNC: 14 MG/DL (ref 6–23)
CALCIUM SERPL-MCNC: 9.4 MG/DL (ref 8.6–10.3)
CARDIAC TROPONIN I PNL SERPL HS: 6 NG/L (ref 0–13)
CARDIAC TROPONIN I PNL SERPL HS: 6 NG/L (ref 0–13)
CHLORIDE SERPL-SCNC: 103 MMOL/L (ref 98–107)
CO2 SERPL-SCNC: 30 MMOL/L (ref 21–32)
CREAT SERPL-MCNC: 0.7 MG/DL (ref 0.5–1.05)
EGFRCR SERPLBLD CKD-EPI 2021: >90 ML/MIN/1.73M*2
EOSINOPHIL # BLD AUTO: 0.22 X10*3/UL (ref 0–0.7)
EOSINOPHIL NFR BLD AUTO: 3.3 %
ERYTHROCYTE [DISTWIDTH] IN BLOOD BY AUTOMATED COUNT: 13 % (ref 11.5–14.5)
GLUCOSE BLD MANUAL STRIP-MCNC: 127 MG/DL (ref 74–99)
GLUCOSE SERPL-MCNC: 129 MG/DL (ref 74–99)
HCT VFR BLD AUTO: 36.2 % (ref 36–46)
HGB BLD-MCNC: 11.2 G/DL (ref 12–16)
HOLD SPECIMEN: NORMAL
IMM GRANULOCYTES # BLD AUTO: 0.03 X10*3/UL (ref 0–0.7)
IMM GRANULOCYTES NFR BLD AUTO: 0.5 % (ref 0–0.9)
LYMPHOCYTES # BLD AUTO: 0.94 X10*3/UL (ref 1.2–4.8)
LYMPHOCYTES NFR BLD AUTO: 14.2 %
MAGNESIUM SERPL-MCNC: 2.23 MG/DL (ref 1.6–2.4)
MCH RBC QN AUTO: 26.3 PG (ref 26–34)
MCHC RBC AUTO-ENTMCNC: 30.9 G/DL (ref 32–36)
MCV RBC AUTO: 85 FL (ref 80–100)
MONOCYTES # BLD AUTO: 0.52 X10*3/UL (ref 0.1–1)
MONOCYTES NFR BLD AUTO: 7.8 %
NEUTROPHILS # BLD AUTO: 4.91 X10*3/UL (ref 1.2–7.7)
NEUTROPHILS NFR BLD AUTO: 73.9 %
NRBC BLD-RTO: 0 /100 WBCS (ref 0–0)
P AXIS: 43 DEGREES
P OFFSET: 198 MS
P ONSET: 145 MS
PLATELET # BLD AUTO: 141 X10*3/UL (ref 150–450)
POTASSIUM SERPL-SCNC: 4.2 MMOL/L (ref 3.5–5.3)
PR INTERVAL: 158 MS
PROT SERPL-MCNC: 7 G/DL (ref 6.4–8.2)
Q ONSET: 224 MS
QRS COUNT: 17 BEATS
QRS DURATION: 78 MS
QT INTERVAL: 378 MS
QTC CALCULATION(BAZETT): 492 MS
QTC FREDERICIA: 451 MS
R AXIS: 38 DEGREES
RBC # BLD AUTO: 4.26 X10*6/UL (ref 4–5.2)
SODIUM SERPL-SCNC: 141 MMOL/L (ref 136–145)
T AXIS: 46 DEGREES
T OFFSET: 413 MS
TSH SERPL-ACNC: 1.75 MIU/L (ref 0.44–3.98)
VENTRICULAR RATE: 102 BPM
WBC # BLD AUTO: 6.6 X10*3/UL (ref 4.4–11.3)

## 2025-01-08 PROCEDURE — 99285 EMERGENCY DEPT VISIT HI MDM: CPT | Mod: 25 | Performed by: STUDENT IN AN ORGANIZED HEALTH CARE EDUCATION/TRAINING PROGRAM

## 2025-01-08 PROCEDURE — G0378 HOSPITAL OBSERVATION PER HR: HCPCS

## 2025-01-08 PROCEDURE — 82947 ASSAY GLUCOSE BLOOD QUANT: CPT

## 2025-01-08 PROCEDURE — 93010 ELECTROCARDIOGRAM REPORT: CPT | Performed by: STUDENT IN AN ORGANIZED HEALTH CARE EDUCATION/TRAINING PROGRAM

## 2025-01-08 PROCEDURE — 93971 EXTREMITY STUDY: CPT

## 2025-01-08 PROCEDURE — 93000 ELECTROCARDIOGRAM COMPLETE: CPT | Performed by: INTERNAL MEDICINE

## 2025-01-08 PROCEDURE — 93005 ELECTROCARDIOGRAM TRACING: CPT

## 2025-01-08 PROCEDURE — 1036F TOBACCO NON-USER: CPT | Performed by: INTERNAL MEDICINE

## 2025-01-08 PROCEDURE — 3074F SYST BP LT 130 MM HG: CPT | Performed by: INTERNAL MEDICINE

## 2025-01-08 PROCEDURE — 2500000001 HC RX 250 WO HCPCS SELF ADMINISTERED DRUGS (ALT 637 FOR MEDICARE OP): Performed by: INTERNAL MEDICINE

## 2025-01-08 PROCEDURE — 84484 ASSAY OF TROPONIN QUANT: CPT | Performed by: EMERGENCY MEDICINE

## 2025-01-08 PROCEDURE — 87636 SARSCOV2 & INF A&B AMP PRB: CPT | Performed by: INTERNAL MEDICINE

## 2025-01-08 PROCEDURE — 71045 X-RAY EXAM CHEST 1 VIEW: CPT | Performed by: RADIOLOGY

## 2025-01-08 PROCEDURE — 99222 1ST HOSP IP/OBS MODERATE 55: CPT | Performed by: NURSE PRACTITIONER

## 2025-01-08 PROCEDURE — 99214 OFFICE O/P EST MOD 30 MIN: CPT | Performed by: INTERNAL MEDICINE

## 2025-01-08 PROCEDURE — 36415 COLL VENOUS BLD VENIPUNCTURE: CPT | Performed by: EMERGENCY MEDICINE

## 2025-01-08 PROCEDURE — 83880 ASSAY OF NATRIURETIC PEPTIDE: CPT | Performed by: EMERGENCY MEDICINE

## 2025-01-08 PROCEDURE — 3078F DIAST BP <80 MM HG: CPT | Performed by: INTERNAL MEDICINE

## 2025-01-08 PROCEDURE — 2500000001 HC RX 250 WO HCPCS SELF ADMINISTERED DRUGS (ALT 637 FOR MEDICARE OP): Performed by: NURSE PRACTITIONER

## 2025-01-08 PROCEDURE — 85025 COMPLETE CBC W/AUTO DIFF WBC: CPT | Performed by: EMERGENCY MEDICINE

## 2025-01-08 PROCEDURE — 71045 X-RAY EXAM CHEST 1 VIEW: CPT

## 2025-01-08 PROCEDURE — 84075 ASSAY ALKALINE PHOSPHATASE: CPT | Performed by: EMERGENCY MEDICINE

## 2025-01-08 PROCEDURE — 85652 RBC SED RATE AUTOMATED: CPT | Performed by: INTERNAL MEDICINE

## 2025-01-08 PROCEDURE — 84443 ASSAY THYROID STIM HORMONE: CPT | Performed by: STUDENT IN AN ORGANIZED HEALTH CARE EDUCATION/TRAINING PROGRAM

## 2025-01-08 PROCEDURE — 99285 EMERGENCY DEPT VISIT HI MDM: CPT | Performed by: STUDENT IN AN ORGANIZED HEALTH CARE EDUCATION/TRAINING PROGRAM

## 2025-01-08 PROCEDURE — 93971 EXTREMITY STUDY: CPT | Performed by: INTERNAL MEDICINE

## 2025-01-08 PROCEDURE — 83735 ASSAY OF MAGNESIUM: CPT | Performed by: STUDENT IN AN ORGANIZED HEALTH CARE EDUCATION/TRAINING PROGRAM

## 2025-01-08 PROCEDURE — 80053 COMPREHEN METABOLIC PANEL: CPT | Performed by: EMERGENCY MEDICINE

## 2025-01-08 PROCEDURE — 86140 C-REACTIVE PROTEIN: CPT | Performed by: INTERNAL MEDICINE

## 2025-01-08 PROCEDURE — 3008F BODY MASS INDEX DOCD: CPT | Performed by: INTERNAL MEDICINE

## 2025-01-08 RX ORDER — ACETAMINOPHEN 325 MG/1
650 TABLET ORAL EVERY 4 HOURS PRN
Status: DISCONTINUED | OUTPATIENT
Start: 2025-01-08 | End: 2025-01-09

## 2025-01-08 RX ORDER — NYSTATIN 100000 [USP'U]/G
1 POWDER TOPICAL 2 TIMES DAILY
Status: DISCONTINUED | OUTPATIENT
Start: 2025-01-08 | End: 2025-01-11 | Stop reason: HOSPADM

## 2025-01-08 RX ORDER — FLUTICASONE PROPIONATE 50 MCG
2 SPRAY, SUSPENSION (ML) NASAL DAILY
Status: DISCONTINUED | OUTPATIENT
Start: 2025-01-08 | End: 2025-01-08

## 2025-01-08 RX ORDER — FAMOTIDINE 20 MG/1
20 TABLET, FILM COATED ORAL DAILY
Status: DISCONTINUED | OUTPATIENT
Start: 2025-01-08 | End: 2025-01-11 | Stop reason: HOSPADM

## 2025-01-08 RX ORDER — ALBUTEROL SULFATE 0.83 MG/ML
2.5 SOLUTION RESPIRATORY (INHALATION) EVERY 4 HOURS PRN
Status: DISCONTINUED | OUTPATIENT
Start: 2025-01-08 | End: 2025-01-11 | Stop reason: HOSPADM

## 2025-01-08 RX ORDER — ALBUTEROL SULFATE 90 UG/1
2 INHALANT RESPIRATORY (INHALATION) EVERY 4 HOURS PRN
Status: DISCONTINUED | OUTPATIENT
Start: 2025-01-08 | End: 2025-01-08

## 2025-01-08 RX ORDER — ACETAMINOPHEN 160 MG/5ML
650 SOLUTION ORAL EVERY 4 HOURS PRN
Status: DISCONTINUED | OUTPATIENT
Start: 2025-01-08 | End: 2025-01-09

## 2025-01-08 RX ORDER — AMOXICILLIN AND CLAVULANATE POTASSIUM 875; 125 MG/1; MG/1
1 TABLET, FILM COATED ORAL EVERY 12 HOURS SCHEDULED
Status: DISCONTINUED | OUTPATIENT
Start: 2025-01-08 | End: 2025-01-11 | Stop reason: HOSPADM

## 2025-01-08 RX ORDER — POLYETHYLENE GLYCOL 3350 17 G/17G
17 POWDER, FOR SOLUTION ORAL DAILY PRN
Status: DISCONTINUED | OUTPATIENT
Start: 2025-01-08 | End: 2025-01-11 | Stop reason: HOSPADM

## 2025-01-08 RX ORDER — ACETAMINOPHEN 500 MG
10 TABLET ORAL NIGHTLY PRN
Status: DISCONTINUED | OUTPATIENT
Start: 2025-01-08 | End: 2025-01-11 | Stop reason: HOSPADM

## 2025-01-08 RX ORDER — DIPHENHYDRAMINE HCL 25 MG
25 TABLET ORAL EVERY 6 HOURS PRN
Status: DISCONTINUED | OUTPATIENT
Start: 2025-01-08 | End: 2025-01-11 | Stop reason: HOSPADM

## 2025-01-08 RX ORDER — ACETAMINOPHEN 650 MG/1
650 SUPPOSITORY RECTAL EVERY 4 HOURS PRN
Status: DISCONTINUED | OUTPATIENT
Start: 2025-01-08 | End: 2025-01-09

## 2025-01-08 RX ORDER — METOPROLOL TARTRATE 1 MG/ML
5 INJECTION, SOLUTION INTRAVENOUS ONCE
Status: COMPLETED | OUTPATIENT
Start: 2025-01-09 | End: 2025-01-09

## 2025-01-08 RX ADMIN — NYSTATIN 1 APPLICATION: 100000 POWDER TOPICAL at 23:20

## 2025-01-08 RX ADMIN — FAMOTIDINE 20 MG: 20 TABLET, FILM COATED ORAL at 17:03

## 2025-01-08 RX ADMIN — AMOXICILLIN AND CLAVULANATE POTASSIUM 1 TABLET: 875; 125 TABLET, FILM COATED ORAL at 21:10

## 2025-01-08 RX ADMIN — APIXABAN 5 MG: 5 TABLET, FILM COATED ORAL at 21:10

## 2025-01-08 SDOH — SOCIAL STABILITY: SOCIAL NETWORK: HOW OFTEN DO YOU ATTEND CHURCH OR RELIGIOUS SERVICES?: NEVER

## 2025-01-08 SDOH — SOCIAL STABILITY: SOCIAL INSECURITY: HAS ANYONE EVER THREATENED TO HURT YOUR FAMILY OR YOUR PETS?: NO

## 2025-01-08 SDOH — SOCIAL STABILITY: SOCIAL INSECURITY: DO YOU FEEL UNSAFE GOING BACK TO THE PLACE WHERE YOU ARE LIVING?: NO

## 2025-01-08 SDOH — ECONOMIC STABILITY: HOUSING INSECURITY: IN THE LAST 12 MONTHS, WAS THERE A TIME WHEN YOU WERE NOT ABLE TO PAY THE MORTGAGE OR RENT ON TIME?: NO

## 2025-01-08 SDOH — ECONOMIC STABILITY: FOOD INSECURITY: WITHIN THE PAST 12 MONTHS, YOU WORRIED THAT YOUR FOOD WOULD RUN OUT BEFORE YOU GOT THE MONEY TO BUY MORE.: NEVER TRUE

## 2025-01-08 SDOH — ECONOMIC STABILITY: TRANSPORTATION INSECURITY: IN THE PAST 12 MONTHS, HAS LACK OF TRANSPORTATION KEPT YOU FROM MEDICAL APPOINTMENTS OR FROM GETTING MEDICATIONS?: NO

## 2025-01-08 SDOH — SOCIAL STABILITY: SOCIAL INSECURITY: WITHIN THE LAST YEAR, HAVE YOU BEEN HUMILIATED OR EMOTIONALLY ABUSED IN OTHER WAYS BY YOUR PARTNER OR EX-PARTNER?: NO

## 2025-01-08 SDOH — SOCIAL STABILITY: SOCIAL INSECURITY: ARE YOU MARRIED, WIDOWED, DIVORCED, SEPARATED, NEVER MARRIED, OR LIVING WITH A PARTNER?: LIVING WITH PARTNER

## 2025-01-08 SDOH — HEALTH STABILITY: PHYSICAL HEALTH: ON AVERAGE, HOW MANY MINUTES DO YOU ENGAGE IN EXERCISE AT THIS LEVEL?: 0 MIN

## 2025-01-08 SDOH — HEALTH STABILITY: MENTAL HEALTH
DO YOU FEEL STRESS - TENSE, RESTLESS, NERVOUS, OR ANXIOUS, OR UNABLE TO SLEEP AT NIGHT BECAUSE YOUR MIND IS TROUBLED ALL THE TIME - THESE DAYS?: ONLY A LITTLE

## 2025-01-08 SDOH — SOCIAL STABILITY: SOCIAL INSECURITY: WERE YOU ABLE TO COMPLETE ALL THE BEHAVIORAL HEALTH SCREENINGS?: YES

## 2025-01-08 SDOH — ECONOMIC STABILITY: HOUSING INSECURITY: AT ANY TIME IN THE PAST 12 MONTHS, WERE YOU HOMELESS OR LIVING IN A SHELTER (INCLUDING NOW)?: NO

## 2025-01-08 SDOH — SOCIAL STABILITY: SOCIAL INSECURITY
WITHIN THE LAST YEAR, HAVE YOU BEEN KICKED, HIT, SLAPPED, OR OTHERWISE PHYSICALLY HURT BY YOUR PARTNER OR EX-PARTNER?: NO

## 2025-01-08 SDOH — SOCIAL STABILITY: SOCIAL INSECURITY: HAVE YOU HAD ANY THOUGHTS OF HARMING ANYONE ELSE?: NO

## 2025-01-08 SDOH — SOCIAL STABILITY: SOCIAL INSECURITY: ARE THERE ANY APPARENT SIGNS OF INJURIES/BEHAVIORS THAT COULD BE RELATED TO ABUSE/NEGLECT?: NO

## 2025-01-08 SDOH — ECONOMIC STABILITY: INCOME INSECURITY: IN THE PAST 12 MONTHS HAS THE ELECTRIC, GAS, OIL, OR WATER COMPANY THREATENED TO SHUT OFF SERVICES IN YOUR HOME?: NO

## 2025-01-08 SDOH — HEALTH STABILITY: PHYSICAL HEALTH: ON AVERAGE, HOW MANY DAYS PER WEEK DO YOU ENGAGE IN MODERATE TO STRENUOUS EXERCISE (LIKE A BRISK WALK)?: 0 DAYS

## 2025-01-08 SDOH — SOCIAL STABILITY: SOCIAL INSECURITY: WITHIN THE LAST YEAR, HAVE YOU BEEN AFRAID OF YOUR PARTNER OR EX-PARTNER?: NO

## 2025-01-08 SDOH — SOCIAL STABILITY: SOCIAL INSECURITY: DO YOU FEEL ANYONE HAS EXPLOITED OR TAKEN ADVANTAGE OF YOU FINANCIALLY OR OF YOUR PERSONAL PROPERTY?: NO

## 2025-01-08 SDOH — SOCIAL STABILITY: SOCIAL INSECURITY: ABUSE: ADULT

## 2025-01-08 SDOH — SOCIAL STABILITY: SOCIAL INSECURITY
WITHIN THE LAST YEAR, HAVE YOU BEEN RAPED OR FORCED TO HAVE ANY KIND OF SEXUAL ACTIVITY BY YOUR PARTNER OR EX-PARTNER?: NO

## 2025-01-08 SDOH — SOCIAL STABILITY: SOCIAL NETWORK: HOW OFTEN DO YOU GET TOGETHER WITH FRIENDS OR RELATIVES?: MORE THAN THREE TIMES A WEEK

## 2025-01-08 SDOH — SOCIAL STABILITY: SOCIAL INSECURITY: HAVE YOU HAD THOUGHTS OF HARMING ANYONE ELSE?: NO

## 2025-01-08 SDOH — SOCIAL STABILITY: SOCIAL NETWORK
DO YOU BELONG TO ANY CLUBS OR ORGANIZATIONS SUCH AS CHURCH GROUPS, UNIONS, FRATERNAL OR ATHLETIC GROUPS, OR SCHOOL GROUPS?: NO

## 2025-01-08 SDOH — ECONOMIC STABILITY: FOOD INSECURITY: WITHIN THE PAST 12 MONTHS, THE FOOD YOU BOUGHT JUST DIDN'T LAST AND YOU DIDN'T HAVE MONEY TO GET MORE.: NEVER TRUE

## 2025-01-08 SDOH — SOCIAL STABILITY: SOCIAL INSECURITY: DOES ANYONE TRY TO KEEP YOU FROM HAVING/CONTACTING OTHER FRIENDS OR DOING THINGS OUTSIDE YOUR HOME?: NO

## 2025-01-08 SDOH — SOCIAL STABILITY: SOCIAL INSECURITY: ARE YOU OR HAVE YOU BEEN THREATENED OR ABUSED PHYSICALLY, EMOTIONALLY, OR SEXUALLY BY ANYONE?: NO

## 2025-01-08 SDOH — SOCIAL STABILITY: SOCIAL NETWORK
IN A TYPICAL WEEK, HOW MANY TIMES DO YOU TALK ON THE PHONE WITH FAMILY, FRIENDS, OR NEIGHBORS?: MORE THAN THREE TIMES A WEEK

## 2025-01-08 SDOH — ECONOMIC STABILITY: HOUSING INSECURITY: IN THE PAST 12 MONTHS, HOW MANY TIMES HAVE YOU MOVED WHERE YOU WERE LIVING?: 0

## 2025-01-08 SDOH — SOCIAL STABILITY: SOCIAL NETWORK: HOW OFTEN DO YOU ATTEND MEETINGS OF THE CLUBS OR ORGANIZATIONS YOU BELONG TO?: NEVER

## 2025-01-08 SDOH — ECONOMIC STABILITY: FOOD INSECURITY: HOW HARD IS IT FOR YOU TO PAY FOR THE VERY BASICS LIKE FOOD, HOUSING, MEDICAL CARE, AND HEATING?: NOT HARD AT ALL

## 2025-01-08 ASSESSMENT — ACTIVITIES OF DAILY LIVING (ADL)
FEEDING YOURSELF: INDEPENDENT
HEARING - RIGHT EAR: FUNCTIONAL
GROOMING: INDEPENDENT
ADEQUATE_TO_COMPLETE_ADL: YES
DRESSING YOURSELF: INDEPENDENT
LACK_OF_TRANSPORTATION: NO
BATHING: INDEPENDENT
TOILETING: INDEPENDENT
LACK_OF_TRANSPORTATION: NO
WALKS IN HOME: INDEPENDENT
HEARING - LEFT EAR: FUNCTIONAL
JUDGMENT_ADEQUATE_SAFELY_COMPLETE_DAILY_ACTIVITIES: YES
PATIENT'S MEMORY ADEQUATE TO SAFELY COMPLETE DAILY ACTIVITIES?: YES

## 2025-01-08 ASSESSMENT — COGNITIVE AND FUNCTIONAL STATUS - GENERAL
MOBILITY SCORE: 23
PATIENT BASELINE BEDBOUND: NO
CLIMB 3 TO 5 STEPS WITH RAILING: A LITTLE
DAILY ACTIVITIY SCORE: 24

## 2025-01-08 ASSESSMENT — PAIN DESCRIPTION - PAIN TYPE: TYPE: ACUTE PAIN

## 2025-01-08 ASSESSMENT — LIFESTYLE VARIABLES
AUDIT-C TOTAL SCORE: 0
HOW OFTEN DO YOU HAVE A DRINK CONTAINING ALCOHOL: NEVER
TOTAL SCORE: 0
SUBSTANCE_ABUSE_PAST_12_MONTHS: NO
HAVE PEOPLE ANNOYED YOU BY CRITICIZING YOUR DRINKING: NO
EVER FELT BAD OR GUILTY ABOUT YOUR DRINKING: NO
SKIP TO QUESTIONS 9-10: 1
PRESCIPTION_ABUSE_PAST_12_MONTHS: NO
HOW OFTEN DO YOU HAVE 6 OR MORE DRINKS ON ONE OCCASION: NEVER
HAVE YOU EVER FELT YOU SHOULD CUT DOWN ON YOUR DRINKING: NO
AUDIT-C TOTAL SCORE: 0
EVER HAD A DRINK FIRST THING IN THE MORNING TO STEADY YOUR NERVES TO GET RID OF A HANGOVER: NO
HOW MANY STANDARD DRINKS CONTAINING ALCOHOL DO YOU HAVE ON A TYPICAL DAY: PATIENT DOES NOT DRINK

## 2025-01-08 ASSESSMENT — ENCOUNTER SYMPTOMS
VOMITING: 0
NAUSEA: 0
ABDOMINAL PAIN: 0
DYSURIA: 0
FREQUENCY: 0
FLANK PAIN: 0
CHILLS: 0
FEVER: 0
CONSTIPATION: 0
DIARRHEA: 0
HEMATURIA: 0
PALPITATIONS: 0
SHORTNESS OF BREATH: 1

## 2025-01-08 ASSESSMENT — PAIN - FUNCTIONAL ASSESSMENT
PAIN_FUNCTIONAL_ASSESSMENT: 0-10
PAIN_FUNCTIONAL_ASSESSMENT: 0-10

## 2025-01-08 ASSESSMENT — PATIENT HEALTH QUESTIONNAIRE - PHQ9
SUM OF ALL RESPONSES TO PHQ9 QUESTIONS 1 & 2: 0
1. LITTLE INTEREST OR PLEASURE IN DOING THINGS: NOT AT ALL
2. FEELING DOWN, DEPRESSED OR HOPELESS: NOT AT ALL

## 2025-01-08 ASSESSMENT — COLUMBIA-SUICIDE SEVERITY RATING SCALE - C-SSRS
1. IN THE PAST MONTH, HAVE YOU WISHED YOU WERE DEAD OR WISHED YOU COULD GO TO SLEEP AND NOT WAKE UP?: NO
6. HAVE YOU EVER DONE ANYTHING, STARTED TO DO ANYTHING, OR PREPARED TO DO ANYTHING TO END YOUR LIFE?: NO
2. HAVE YOU ACTUALLY HAD ANY THOUGHTS OF KILLING YOURSELF?: NO

## 2025-01-08 ASSESSMENT — PAIN SCALES - GENERAL
PAINLEVEL_OUTOF10: 7
PAINLEVEL_OUTOF10: 0 - NO PAIN

## 2025-01-08 NOTE — ED TRIAGE NOTES
Pt is on Eliquis for Afib she had in 2022. She went to PCP today for sinus infection they sent her here for irregular heart rate. Pt denies any chest pain but has shortness of breath with excursion. She does have an inhaler but did not use it today

## 2025-01-08 NOTE — ED PROVIDER NOTES
EMERGENCY DEPARTMENT ENCOUNTER      Pt Name: Holly Chavez  MRN: [id number]  Birthdate 1965  Date of evaluation: 1/8/2025  Provider: Tony Franco MD    CHIEF COMPLAINT       Chief Complaint   Patient presents with    Irregular Heart Beat     Pt was just at PCP for sinus infection. Was sent here for irregular heart beat. Denies any chest pain. Complains of shortness of breath with activity     HISTORY OF PRESENT ILLNESS    HPI  59-year-old female presents emergency department with chief complaint of irregular heartbeat. Patient was at her PCP for a recurrent sinus infection when she was there discovered that she had possible bigeminy fusion complexes.  She was sent to the emergency department for evaluation and management.  The patient has a past medical history of A-fib on Eliquis. Indicates compliance with her meds; she did have a holter monitor at the end of last month. We discussed with on call cardiology consultant, reviewed run of NSVT for 7 beats here and Holter monitor for last month that showed PVC burden 46% and some runs of V-Tach with as many as 10 beats; this was read by an outpatient EP, appears to be chronic problem; patient denies chest pain or palpitations with this.     Negative stress test and normal echo 3 years ago.    Nursing Notes were reviewed.    PAST MEDICAL HISTORY     Past Medical History:   Diagnosis Date    Acute upper respiratory infection, unspecified 11/13/2018    Acute URI    Anxiety     Benign paroxysmal vertigo, unspecified ear 12/04/2017    Benign positional vertigo    Cataract     Cutaneous abscess of groin 12/04/2017    Abscess of groin, left    Cutaneous abscess of limb, unspecified 09/20/2018    Abscess of axilla    GERD (gastroesophageal reflux disease)     Morbid (severe) obesity due to excess calories (Multi) 07/08/2019    Morbid obesity due to excess calories    Personal history of diseases of the skin and subcutaneous tissue 11/20/2015    History of skin pruritus     Personal history of diseases of the skin and subcutaneous tissue 09/27/2018    History of hidradenitis suppurativa    Personal history of other endocrine, nutritional and metabolic disease     History of diabetes mellitus    Personal history of other endocrine, nutritional and metabolic disease 08/11/2015    History of uncontrolled diabetes    Personal history of other endocrine, nutritional and metabolic disease 04/16/2019    History of diabetes mellitus    Personal history of other endocrine, nutritional and metabolic disease 06/06/2019    History of diabetes mellitus    Pneumonia 09/07/2023    Rash and other nonspecific skin eruption 08/11/2015    Rash    Rash and other nonspecific skin eruption 08/11/2015    Rash    Type 2 diabetes mellitus with mild nonproliferative diabetic retinopathy without macular edema, unspecified eye 11/09/2015    Mild nonproliferative diabetic retinopathy without macular edema    Type 2 diabetes mellitus with mild nonproliferative diabetic retinopathy without macular edema, unspecified eye 11/09/2015    Mild nonproliferative diabetic retinopathy without macular edema    Type 2 diabetes mellitus with mild nonproliferative diabetic retinopathy without macular edema, unspecified eye 11/10/2015    Mild nonproliferative diabetic retinopathy without macular edema    Type 2 diabetes mellitus with mild nonproliferative diabetic retinopathy without macular edema, unspecified eye 11/12/2015    Mild nonproliferative diabetic retinopathy without macular edema         SURGICAL HISTORY       Past Surgical History:   Procedure Laterality Date    CERVICAL BIOPSY  W/ LOOP ELECTRODE EXCISION  04/12/2016    Cervical Loop Electrosurgical Excision (LEEP)    HERNIA REPAIR  08/11/2015    Hernia Repair    TUBAL LIGATION  08/11/2015    Tubal Ligation         CURRENT MEDICATIONS       Previous Medications    ALBUTEROL 90 MCG/ACTUATION INHALER    Inhale 2 puffs every 4 hours if needed for wheezing or shortness  "of breath.    AMMONIUM LACTATE (LAC-HYDRIN) 12 % LOTION    APPLY 1 APPLICATION ONCE DAILY    APIXABAN (ELIQUIS) 5 MG TABLET    Take 1 tablet (5 mg) by mouth 2 times a day.    ATORVASTATIN (LIPITOR) 10 MG TABLET    Take 1 tablet (10 mg) by mouth once daily.    BLOOD-GLUCOSE SENSOR (DEXCOM G7 SENSOR) DEVICE    Change every 10 days    BLOOD-GLUCOSE SENSOR (DEXCOM G7 SENSOR) DEVICE    1 each see administration instructions. Change sensor every 10 days    CHOLECALCIFEROL (VITAMIN D-3) 125 MCG (5000 UT) CAPSULE    Take by mouth.    CYANOCOBALAMIN, VITAMIN B-12, (VITAMIN B-12) 1,000 MCG TABLET EXTENDED RELEASE    Take 1 tablet (1,000 mcg) by mouth once daily.    FAMOTIDINE (PEPCID) 20 MG TABLET    Take 1 tablet (20 mg) by mouth once daily.    FLONASE ALLERGY RELIEF 50 MCG/ACTUATION NASAL SPRAY    Administer 2 sprays into each nostril once daily.    FUROSEMIDE (LASIX) 20 MG TABLET    Take 1 tablet (20 mg) by mouth 3 times a week. 3 times a week as needed    GUAIFENESIN (MUCUS RELIEF ORAL)        INSULIN GLARGINE (BASAGLAR KWIKPEN U-100 INSULIN) 100 UNIT/ML (3 ML) PEN    Inject 12 Units under the skin once daily in the morning. Take as directed per insulin instructions.    NYSTATIN (MYCOSTATIN) OINTMENT    Apply topically 2 times a day.    OMEPRAZOLE (PRILOSEC) 20 MG DR CAPSULE    20 mg by mouth daily    ONETOUCH ULTRA TEST STRIP    1 each 3 times a day.    PEN NEEDLE, DIABETIC 32 GAUGE X 5/32\" NEEDLE    4 Needles once daily. Use with insulin injections four times daily    TIRZEPATIDE (MOUNJARO) 10 MG/0.5 ML PEN INJECTOR    Inject 10 mg under the skin 1 (one) time per week.    TRIAMCINOLONE (KENALOG) 0.1 % CREAM    Apply topically 2 times a day. APPLY EXTERNALLY TO THE AFFECTED AREA 2 TO 3 TIMES DAILY       ALLERGIES     Adhesive tape-silicones and Codeine    FAMILY HISTORY       Family History   Problem Relation Name Age of Onset    Stroke Mother      Diabetes Mother      Other (cerebrovascular accident) Mother      Heart " failure Father      Diabetes Father      Pancreatic cancer Father      Breast cancer Mother's Sister      Other (cardiac disorder) Other      Diabetes Other      Hypertension Other      Cancer Other      Kidney disease Other      Breast cancer Other aunt     Breast cancer Father's Sister      Glaucoma Neg Hx      Macular degeneration Neg Hx            SOCIAL HISTORY       Social History     Socioeconomic History    Marital status: Significant Other   Tobacco Use    Smoking status: Former     Current packs/day: 0.00     Types: Cigarettes     Quit date:      Years since quittin.0    Smokeless tobacco: Never   Vaping Use    Vaping status: Never Used   Substance and Sexual Activity    Alcohol use: Not Currently    Drug use: Never    Sexual activity: Defer     Social Drivers of Health     Physical Activity: Inactive (2024)    Exercise Vital Sign     Days of Exercise per Week: 0 days     Minutes of Exercise per Session: 0 min       SCREENINGS                        PHYSICAL EXAM    (up to 7 for level 4, 8 or more for level 5)     ED Triage Vitals [25 1142]   Temperature Heart Rate Respirations BP   36.5 °C (97.7 °F) (!) 41 16 136/64      Pulse Ox Temp Source Heart Rate Source Patient Position   99 % Temporal -- --      BP Location FiO2 (%)     -- --       Physical Exam  Constitutional:       Appearance: Normal appearance.   HENT:      Head: Normocephalic and atraumatic.      Nose: Congestion present.      Mouth/Throat:      Mouth: Mucous membranes are moist.   Eyes:      Extraocular Movements: Extraocular movements intact.      Pupils: Pupils are equal, round, and reactive to light.   Cardiovascular:      Rate and Rhythm: Normal rate. Rhythm irregular.      Pulses: Normal pulses.      Heart sounds: Normal heart sounds.   Pulmonary:      Effort: Pulmonary effort is normal.      Breath sounds: Normal breath sounds.   Abdominal:      General: Abdomen is flat.      Palpations: Abdomen is soft.    Musculoskeletal:         General: Normal range of motion.   Skin:     General: Skin is warm.      Capillary Refill: Capillary refill takes less than 2 seconds.   Neurological:      General: No focal deficit present.      Mental Status: She is alert and oriented to person, place, and time.   Psychiatric:         Mood and Affect: Mood normal.         Behavior: Behavior normal.          DIAGNOSTIC RESULTS     LABS:  Labs Reviewed   CBC WITH AUTO DIFFERENTIAL - Abnormal       Result Value    WBC 6.6      nRBC 0.0      RBC 4.26      Hemoglobin 11.2 (*)     Hematocrit 36.2      MCV 85      MCH 26.3      MCHC 30.9 (*)     RDW 13.0      Platelets 141 (*)     Neutrophils % 73.9      Immature Granulocytes %, Automated 0.5      Lymphocytes % 14.2      Monocytes % 7.8      Eosinophils % 3.3      Basophils % 0.3      Neutrophils Absolute 4.91      Immature Granulocytes Absolute, Automated 0.03      Lymphocytes Absolute 0.94 (*)     Monocytes Absolute 0.52      Eosinophils Absolute 0.22      Basophils Absolute 0.02     COMPREHENSIVE METABOLIC PANEL - Abnormal    Glucose 129 (*)     Sodium 141      Potassium 4.2      Chloride 103      Bicarbonate 30      Anion Gap 12      Urea Nitrogen 14      Creatinine 0.70      eGFR >90      Calcium 9.4      Albumin 4.2      Alkaline Phosphatase 46      Total Protein 7.0      AST 11      Bilirubin, Total 1.0      ALT 13     B-TYPE NATRIURETIC PEPTIDE - Abnormal     (*)     Narrative:        <100 pg/mL - Heart failure unlikely  100-299 pg/mL - Intermediate probability of acute heart                  failure exacerbation. Correlate with clinical                  context and patient history.    >=300 pg/mL - Heart Failure likely. Correlate with clinical                  context and patient history.    BNP testing is performed using different testing methodology at [facility] than at other [facility]. Direct result comparisons should only be made within the same method.       SERIAL TROPONIN-INITIAL - Normal    Troponin I, High Sensitivity 6      Narrative:     Less than 99th percentile of normal range cutoff-  Female and children under 18 years old <14 ng/L; Male <21 ng/L: Negative  Repeat testing should be performed if clinically indicated.     Female and children under 18 years old 14-50 ng/L; Male 21-50 ng/L:  Consistent with possible cardiac damage and possible increased clinical   risk. Serial measurements may help to assess extent of myocardial damage.     >50 ng/L: Consistent with cardiac damage, increased clinical risk and  myocardial infarction. Serial measurements may help assess extent of   myocardial damage.      NOTE: Children less than 1 year old may have higher baseline troponin   levels and results should be interpreted in conjunction with the overall   clinical context.     NOTE: Troponin I testing is performed using a different   testing methodology at [facility] than at other   [facility]. Direct result comparisons should only   be made within the same method.   TROPONIN SERIES- (INITIAL, 1 HR)    Narrative:     The following orders were created for panel order Troponin I Series, High Sensitivity (0, 1 HR).  Procedure                               Abnormality         Status                     ---------                               -----------         ------                     Troponin I, High Sensiti...[[id number]]  Normal              Final result               Troponin, High Sensitivi...[[id number]]                      In process                   Please view results for these tests on the individual orders.   SERIAL TROPONIN, 1 HOUR       All other labs were within normal range or not returned as of this dictation.    Imaging  XR chest 1 view    (Results Pending)        Procedures  Procedures     EMERGENCY DEPARTMENT COURSE/MDM:   Medical Decision Making  59 male presents emergency department chief complaint of irregular heartbeats.   Medical management and treatment emergency department will consist of an ACS workup and rule out.  Patient had a run of V-tach here.  Cardiology was made aware.  They indicate to admit the patient to the hospital service for continued observation and management.  Patient's labs otherwise unremarkable.    ED Course as of 01/08/25 1543   Wed Jan 08, 2025   1439 ECG 12 lead  Repeat EKG shows frequent and consecutive PVCs, concern for couplets/fusion beats, otherwise nonischemic [JH]   1458 We discussed with medicine, they indicated that they would not admit patient until cardiology calls us back.  Page has been placed for cardiology, they will call back within the hour they say [JH]   1521 Nursing is brought to attention the patient just had a run of V. tach. [DS]      ED Course User Index  [DS] Tony Franco MD  [JH] Jaiden Zaragoza MD         Diagnoses as of 01/08/25 1543   Arrhythmia        Patient and or family in agreement and understanding of treatment plan.  All questions answered.      I reviewed the case with the attending ED physician. The attending ED physician agrees with the plan. Patient and/or patient´s representative was counseled regarding labs, imaging, likely diagnosis, and plan. All questions were answered.    ED Medications administered this visit:  Medications - No data to display    New Prescriptions from this visit:    New Prescriptions    No medications on file       Follow-up:  No follow-up provider specified.      Final Impression: No diagnosis found.      (Please note that portions of this note were completed with a voice recognition program.  Efforts were made to edit the dictations but occasionally words are mis-transcribed.)     Tony Franco MD  Resident  01/08/25 1547       Jaiden Zaragoza MD  01/08/25 5923

## 2025-01-08 NOTE — PROGRESS NOTES
"Subjective    Holly Chavez is a 59 y.o. female who presents for URI.  HPI    Recurrent sinusitis   Sore throat, cough,  x 2 days  Denies fever   Dizziness, sob  Felt better while on abx and medrol abdiel (prescribed 12/17/2024)  Had to cancel cataract procedure today  She has been have for two weeks.  She is not feeling any palpitations.   But she has been having chest pain but she is having  more shortness of breath.   She cannot even walk to the  without becoming sob. Had to stop and rest when she was at a [employer] on vacation.   She had a holter was done no results      Review of Systems   All other systems reviewed and are negative.        Objective     /62 (BP Location: Left arm, Patient Position: Sitting, BP Cuff Size: Adult)   Pulse (!) 42   Temp 36.5 °C (97.7 °F) (Skin)   Resp 14   Ht 1.588 m (5' 2.5\")   Wt 94.3 kg (208 lb)   LMP  (LMP Unknown)   SpO2 99%   BMI 37.44 kg/m²    Physical Exam  Vitals reviewed.   Constitutional:       General: She is not in acute distress.     Appearance: Normal appearance.   Cardiovascular:      Rate and Rhythm: Normal rate. Rhythm irregular.      Pulses: Normal pulses.      Heart sounds: Normal heart sounds.   Pulmonary:      Effort: Pulmonary effort is normal.      Breath sounds: Normal breath sounds.   Abdominal:      Tenderness: There is no abdominal tenderness.   Musculoskeletal:         General: No swelling.   Skin:     General: Skin is warm and dry.   Neurological:      Mental Status: She is alert.       Health Maintenance Due   Topic Date Due    HIV Screening  Never done    MMR Vaccines (1 of 1 - Standard series) Never done    Hepatitis C Screening  Never done    DTaP/Tdap/Td Vaccines (1 - Tdap) Never done    Zoster Vaccines (1 of 2) Never done    Pneumococcal Vaccine (2 of 2 - PPSV23) 11/22/2018    Colorectal Cancer Screening  09/23/2023    RSV High Risk: (Elderly (60+) or Pregnant Population) (1 - Risk 60-74 years 1-dose series) Never done    " Medicare Annual Wellness Visit (AWV)  01/23/2025    Mammogram  02/13/2025    Lipid Panel  02/20/2025    Diabetes: Hemoglobin A1C  02/25/2025          Assessment/Plan   Problem List Items Addressed This Visit    None  Visit Diagnoses       SOB (shortness of breath) on exertion    -  Primary    Relevant Orders    Disability Placard    Bradycardia        Relevant Orders    ECG 12 Lead    ECG 12 Lead (Completed)        Pt came in for sinusitis but also complaints of increasing sob with exertion and ankle swelling.  Her EKG is usually NSR her ekgs shows sinus rhythm with frequent PVC. Will refer her to ER to be evaluated.  Call  with any problems or questions.   Follow after ER

## 2025-01-08 NOTE — H&P
History Of Present Illness  Holly Chavez is a 59 y.o. female with a past medical history of afib (on Eliquis) and T2DM who presented to the ED with irregular heartbeat. She was evaluated this morning by her PCP for follow-up for sinusitis when the irregular heartbeat was discovered.  She denies any chest pain, but did endorse some more shortness of breath at the PCP visit. She denies any fever, chills, chest pain, shortness of breath at rest, or GI/ symptoms. She does endorse some dyspnea on exertion, however notes this has been ongoing for a few months. She endorses frontal sinus pain, worse with pressure. She has not tried any nasal sprays. She recently finished a course of doxycycline without improvement.        Past Medical History  Past Medical History:   Diagnosis Date    Acute upper respiratory infection, unspecified 11/13/2018    Acute URI    Anxiety     Benign paroxysmal vertigo, unspecified ear 12/04/2017    Benign positional vertigo    Cataract     Cutaneous abscess of groin 12/04/2017    Abscess of groin, left    Cutaneous abscess of limb, unspecified 09/20/2018    Abscess of axilla    GERD (gastroesophageal reflux disease)     Morbid (severe) obesity due to excess calories (Multi) 07/08/2019    Morbid obesity due to excess calories    Personal history of diseases of the skin and subcutaneous tissue 11/20/2015    History of skin pruritus    Personal history of diseases of the skin and subcutaneous tissue 09/27/2018    History of hidradenitis suppurativa    Personal history of other endocrine, nutritional and metabolic disease     History of diabetes mellitus    Personal history of other endocrine, nutritional and metabolic disease 08/11/2015    History of uncontrolled diabetes    Personal history of other endocrine, nutritional and metabolic disease 04/16/2019    History of diabetes mellitus    Personal history of other endocrine, nutritional and metabolic disease 06/06/2019    History of diabetes  mellitus    Pneumonia 09/07/2023    Rash and other nonspecific skin eruption 08/11/2015    Rash    Rash and other nonspecific skin eruption 08/11/2015    Rash    Type 2 diabetes mellitus with mild nonproliferative diabetic retinopathy without macular edema, unspecified eye 11/09/2015    Mild nonproliferative diabetic retinopathy without macular edema    Type 2 diabetes mellitus with mild nonproliferative diabetic retinopathy without macular edema, unspecified eye 11/09/2015    Mild nonproliferative diabetic retinopathy without macular edema    Type 2 diabetes mellitus with mild nonproliferative diabetic retinopathy without macular edema, unspecified eye 11/10/2015    Mild nonproliferative diabetic retinopathy without macular edema    Type 2 diabetes mellitus with mild nonproliferative diabetic retinopathy without macular edema, unspecified eye 11/12/2015    Mild nonproliferative diabetic retinopathy without macular edema       Surgical History  Past Surgical History:   Procedure Laterality Date    CERVICAL BIOPSY  W/ LOOP ELECTRODE EXCISION  04/12/2016    Cervical Loop Electrosurgical Excision (LEEP)    HERNIA REPAIR  08/11/2015    Hernia Repair    TUBAL LIGATION  08/11/2015    Tubal Ligation        Social History  She reports that she quit smoking about 26 years ago. Her smoking use included cigarettes. She has never used smokeless tobacco. She reports that she does not currently use alcohol. She reports that she does not use drugs.    Family History  Family History   Problem Relation Name Age of Onset    Stroke Mother      Diabetes Mother      Other (cerebrovascular accident) Mother      Heart failure Father      Diabetes Father      Pancreatic cancer Father      Breast cancer Mother's Sister      Other (cardiac disorder) Other      Diabetes Other      Hypertension Other      Cancer Other      Kidney disease Other      Breast cancer Other aunt     Breast cancer Father's Sister      Glaucoma Neg Hx      Macular  "degeneration Neg Hx          Allergies  Adhesive tape-silicones and Codeine    Review of Systems   Constitutional:  Negative for chills and fever.   Respiratory:  Positive for shortness of breath (On exertion).    Cardiovascular:  Negative for chest pain and palpitations.   Gastrointestinal:  Negative for abdominal pain, constipation, diarrhea, nausea and vomiting.   Genitourinary:  Negative for dysuria, flank pain, frequency, hematuria and urgency.   All other systems reviewed and are negative.       Physical Exam  Vitals reviewed.   Constitutional:       Appearance: She is obese.   HENT:      Head: Normocephalic and atraumatic.   Cardiovascular:      Rate and Rhythm: Normal rate. Rhythm irregular.      Heart sounds: Normal heart sounds.   Pulmonary:      Effort: Pulmonary effort is normal.      Breath sounds: Normal air entry.   Abdominal:      General: Bowel sounds are normal.      Palpations: Abdomen is soft.      Tenderness: There is no abdominal tenderness.   Musculoskeletal:         General: No deformity.      Left lower leg: Edema present.   Skin:     General: Skin is warm and dry.   Neurological:      General: No focal deficit present.      Mental Status: She is alert and oriented to person, place, and time.   Psychiatric:         Mood and Affect: Mood normal.         Behavior: Behavior normal.          Last Recorded Vitals  Blood pressure 95/53, pulse 92, temperature 36.5 °C (97.7 °F), temperature source Temporal, resp. rate (!) 23, height 1.575 m (5' 2\"), weight 94.3 kg (208 lb), SpO2 98%.    Relevant Results      Lab Results   Component Value Date    WBC 6.6 01/08/2025    HGB 11.2 (L) 01/08/2025    HCT 36.2 01/08/2025    MCV 85 01/08/2025     (L) 01/08/2025     Lab Results   Component Value Date    GLUCOSE 129 (H) 01/08/2025    CALCIUM 9.4 01/08/2025     01/08/2025    K 4.2 01/08/2025    CO2 30 01/08/2025     01/08/2025    BUN 14 01/08/2025    CREATININE 0.70 01/08/2025     Holter or " Event Cardiac Monitor  Refer to scanned images  Lower extremity venous duplex left  Preliminary Cardiology Report                 [facility]  [id number] [place] [address]      Tel [phone] Fax [phone]            Preliminary Vascular Lab Report     VASC US LOWER EXTREMITY VENOUS DUPLEX LEFT       Patient Name:     MADISYN WEATHERS  Berto Physician:  [id number] Jennifer Collier MD  Study Date:       1/8/2025     Ordering Provider:  [id number] LIAN BLEVINS  MRN/PID:          [id number]     Fellow:  Accession#:       [id number] Technologist:       Gunjan Warren RVT, [facility]  YOB: 1965    Technologist 2:  Gender:           F            Encounter#:         [id number]  Admission Status: Emergency    Location Performed: [facility]       Diagnosis/ICD: Pain in left lower leg-M79.662  Indication:    Limb swelling  CPT Codes:     84064 Peripheral venous duplex scan for DVT Limited       Pertinent History: HTN and Anticoagulation. DM Afib.       PRELIMINARY CONCLUSIONS:     Right Lower Venous: The right common femoral vein demonstrates normal spontaneous and respirophasic flow.  Left Lower Venous: No evidence of acute deep vein thrombus visualized in the left lower extremity.     Imaging & Doppler Findings:     Right   Flow  CFV   Pulsatile       Left                  Compress Thrombus        Flow  Distal External Iliac            None       Pulsatile  CFV                     Yes      None       Pulsatile  PFV                     Yes      None  FV Proximal             Yes      None   Spontaneous/Phasic  FV Mid                  Yes      None  FV Distal               Yes      None  Popliteal               Yes      None   Spontaneous/Phasic  Peroneal                Yes      None  PTV                     Yes      None       VASCULAR PRELIMINARY REPORT  completed by Gunjan Warren RVT on 1/8/2025 at 3:26:59 PM       ** Final **  XR chest 1 view  Narrative: Interpreted By:  Thomas  Funmi,   STUDY:  XR CHEST 1 VIEW 1/8/2025 1:25 pm      INDICATION:  Signs/Symptoms:BRADYCARDIA      COMPARISON:  08/27/2024      ACCESSION NUMBER(S):  [id number]      ORDERING CLINICIAN:  LIAN BLEVINS      TECHNIQUE:  AP erect view of the chest      FINDINGS:  Heart is enlarged with calcified plaque seen in the aortic knob. The  lungs are clear without pleural abnormality. There are degenerative  changes of the thoracic spine.      Impression: Cardiomegaly without acute pulmonary pathology.      Signed by: Funmi Guzman 1/8/2025 1:31 PM  Dictation workstation:   [id number]  ECG 12 lead  Marked sinus bradycardia with frequent and consecutive Premature ventricular complexes and Fusion complexes  Low voltage QRS  Cannot rule out Anterior infarct , age undetermined  Abnormal ECG  When compared with ECG of 27-AUG-2024 13:13,  Fusion complexes are now Present  Premature ventricular complexes are now Present  Borderline criteria for Inferior infarct are no longer Present  Nonspecific T wave abnormality no longer evident in Anterior leads    ED Medication Administration from 01/08/2025 1130 to 01/08/2025 1601         Date/Time Order Dose Route Action Action by     01/08/2025 1543 EST amiodarone (Nexterone) 150 mg in dextrose (iso)  mL 150 mg intravenous Not Given VAN Rothman     01/08/2025 1544 EST amiodarone (Nexterone) 360 mg in dextrose,iso-osm 200 mL (1.8 mg/mL) infusion (premix) 1 mg/min intravenous Not Given VAN Rothman               Assessment/Plan   Assessment & Plan  NSVT (nonsustained ventricular tachycardia) (Multi)    Anxiety    Chronic venous stasis    Diabetic retinopathy (Multi)    Gastroesophageal reflux disease    Paroxysmal atrial fibrillation (Multi)    Type 2 diabetes mellitus      #Arrhythmia  #Afib  -Cardiology contacted by ED  -Amio initially started in ED, DC  -Telemetry  -Ischemic workup negative  -Patient not on rate control medications  -Observe overnight  -Continue  Eliquis    #Sinusitis  -Flonase for symptom relief  -Will start Augmentin    #T2DM  -On Mounjaro outpatient )takes Sundays)  -Will hold off on SSI for now  -Hypoglycemic orders             Wiley Ruano, APRN-CNP

## 2025-01-09 ENCOUNTER — APPOINTMENT (OUTPATIENT)
Dept: CARDIOLOGY | Facility: HOSPITAL | Age: 60
End: 2025-01-09
Payer: MEDICARE

## 2025-01-09 ENCOUNTER — APPOINTMENT (OUTPATIENT)
Dept: OPHTHALMOLOGY | Facility: CLINIC | Age: 60
End: 2025-01-09
Payer: MEDICARE

## 2025-01-09 LAB
ANION GAP SERPL CALC-SCNC: 13 MMOL/L (ref 10–20)
AORTIC VALVE MEAN GRADIENT: 8 MMHG
AORTIC VALVE PEAK VELOCITY: 1.89 M/S
ATRIAL RATE: 40 BPM
ATRIAL RATE: 78 BPM
AV PEAK GRADIENT: 14 MMHG
AVA (PEAK VEL): 1.48 CM2
AVA (VTI): 1.55 CM2
BUN SERPL-MCNC: 14 MG/DL (ref 6–23)
CALCIUM SERPL-MCNC: 8.6 MG/DL (ref 8.6–10.3)
CHLORIDE SERPL-SCNC: 105 MMOL/L (ref 98–107)
CO2 SERPL-SCNC: 26 MMOL/L (ref 21–32)
CREAT SERPL-MCNC: 0.79 MG/DL (ref 0.5–1.05)
CRP SERPL-MCNC: 0.93 MG/DL
EGFRCR SERPLBLD CKD-EPI 2021: 85 ML/MIN/1.73M*2
EJECTION FRACTION APICAL 4 CHAMBER: 34.7
EJECTION FRACTION: 53 %
ERYTHROCYTE [DISTWIDTH] IN BLOOD BY AUTOMATED COUNT: 12.9 % (ref 11.5–14.5)
ERYTHROCYTE [SEDIMENTATION RATE] IN BLOOD BY WESTERGREN METHOD: 40 MM/H (ref 0–30)
FLUAV RNA RESP QL NAA+PROBE: NOT DETECTED
FLUBV RNA RESP QL NAA+PROBE: NOT DETECTED
GLUCOSE BLD MANUAL STRIP-MCNC: 148 MG/DL (ref 74–99)
GLUCOSE SERPL-MCNC: 133 MG/DL (ref 74–99)
HCT VFR BLD AUTO: 34 % (ref 36–46)
HGB BLD-MCNC: 10.6 G/DL (ref 12–16)
LEFT ATRIUM VOLUME AREA LENGTH INDEX BSA: 22.1 ML/M2
LEFT VENTRICLE INTERNAL DIMENSION DIASTOLE: 5.01 CM (ref 3.5–6)
LEFT VENTRICULAR OUTFLOW TRACT DIAMETER: 1.94 CM
LV EJECTION FRACTION BIPLANE: 53 %
MAGNESIUM SERPL-MCNC: 2.27 MG/DL (ref 1.6–2.4)
MCH RBC QN AUTO: 26.7 PG (ref 26–34)
MCHC RBC AUTO-ENTMCNC: 31.2 G/DL (ref 32–36)
MCV RBC AUTO: 86 FL (ref 80–100)
MITRAL VALVE E/A RATIO: 1.33
NRBC BLD-RTO: 0 /100 WBCS (ref 0–0)
P AXIS: 43 DEGREES
P AXIS: 45 DEGREES
P OFFSET: 194 MS
P OFFSET: 196 MS
P ONSET: 142 MS
P ONSET: 144 MS
PHOSPHATE SERPL-MCNC: 4 MG/DL (ref 2.5–4.9)
PLATELET # BLD AUTO: 127 X10*3/UL (ref 150–450)
POTASSIUM SERPL-SCNC: 3.9 MMOL/L (ref 3.5–5.3)
PR INTERVAL: 154 MS
PR INTERVAL: 156 MS
Q ONSET: 220 MS
Q ONSET: 221 MS
QRS COUNT: 14 BEATS
QRS COUNT: 20 BEATS
QRS DURATION: 74 MS
QRS DURATION: 76 MS
QT INTERVAL: 354 MS
QT INTERVAL: 366 MS
QTC CALCULATION(BAZETT): 440 MS
QTC CALCULATION(BAZETT): 497 MS
QTC FREDERICIA: 414 MS
QTC FREDERICIA: 444 MS
R AXIS: 27 DEGREES
R AXIS: 41 DEGREES
RBC # BLD AUTO: 3.97 X10*6/UL (ref 4–5.2)
RIGHT VENTRICLE FREE WALL PEAK S': 14.6 CM/S
RIGHT VENTRICLE PEAK SYSTOLIC PRESSURE: 59.3 MMHG
SARS-COV-2 RNA RESP QL NAA+PROBE: NOT DETECTED
SODIUM SERPL-SCNC: 140 MMOL/L (ref 136–145)
T AXIS: 15 DEGREES
T AXIS: 22 DEGREES
T OFFSET: 398 MS
T OFFSET: 403 MS
TRICUSPID ANNULAR PLANE SYSTOLIC EXCURSION: 2.3 CM
VENTRICULAR RATE: 119 BPM
VENTRICULAR RATE: 87 BPM
WBC # BLD AUTO: 6 X10*3/UL (ref 4.4–11.3)

## 2025-01-09 PROCEDURE — 36415 COLL VENOUS BLD VENIPUNCTURE: CPT | Performed by: NURSE PRACTITIONER

## 2025-01-09 PROCEDURE — 82947 ASSAY GLUCOSE BLOOD QUANT: CPT

## 2025-01-09 PROCEDURE — 93010 ELECTROCARDIOGRAM REPORT: CPT | Performed by: INTERNAL MEDICINE

## 2025-01-09 PROCEDURE — 99233 SBSQ HOSP IP/OBS HIGH 50: CPT | Performed by: INTERNAL MEDICINE

## 2025-01-09 PROCEDURE — 93306 TTE W/DOPPLER COMPLETE: CPT

## 2025-01-09 PROCEDURE — 93306 TTE W/DOPPLER COMPLETE: CPT | Performed by: INTERNAL MEDICINE

## 2025-01-09 PROCEDURE — 93005 ELECTROCARDIOGRAM TRACING: CPT

## 2025-01-09 PROCEDURE — 82374 ASSAY BLOOD CARBON DIOXIDE: CPT | Performed by: NURSE PRACTITIONER

## 2025-01-09 PROCEDURE — 83735 ASSAY OF MAGNESIUM: CPT | Performed by: INTERNAL MEDICINE

## 2025-01-09 PROCEDURE — 99223 1ST HOSP IP/OBS HIGH 75: CPT | Performed by: INTERNAL MEDICINE

## 2025-01-09 PROCEDURE — 2500000001 HC RX 250 WO HCPCS SELF ADMINISTERED DRUGS (ALT 637 FOR MEDICARE OP): Performed by: INTERNAL MEDICINE

## 2025-01-09 PROCEDURE — 2500000004 HC RX 250 GENERAL PHARMACY W/ HCPCS (ALT 636 FOR OP/ED): Performed by: INTERNAL MEDICINE

## 2025-01-09 PROCEDURE — 2500000001 HC RX 250 WO HCPCS SELF ADMINISTERED DRUGS (ALT 637 FOR MEDICARE OP): Performed by: NURSE PRACTITIONER

## 2025-01-09 PROCEDURE — 85027 COMPLETE CBC AUTOMATED: CPT | Performed by: NURSE PRACTITIONER

## 2025-01-09 PROCEDURE — 93010 ELECTROCARDIOGRAM REPORT: CPT | Performed by: STUDENT IN AN ORGANIZED HEALTH CARE EDUCATION/TRAINING PROGRAM

## 2025-01-09 PROCEDURE — 84100 ASSAY OF PHOSPHORUS: CPT | Performed by: INTERNAL MEDICINE

## 2025-01-09 PROCEDURE — G0378 HOSPITAL OBSERVATION PER HR: HCPCS

## 2025-01-09 PROCEDURE — 99223 1ST HOSP IP/OBS HIGH 75: CPT | Performed by: STUDENT IN AN ORGANIZED HEALTH CARE EDUCATION/TRAINING PROGRAM

## 2025-01-09 PROCEDURE — 2500000002 HC RX 250 W HCPCS SELF ADMINISTERED DRUGS (ALT 637 FOR MEDICARE OP, ALT 636 FOR OP/ED): Performed by: INTERNAL MEDICINE

## 2025-01-09 RX ORDER — ACETAMINOPHEN 325 MG/1
975 TABLET ORAL EVERY 8 HOURS PRN
Status: DISCONTINUED | OUTPATIENT
Start: 2025-01-09 | End: 2025-01-11 | Stop reason: HOSPADM

## 2025-01-09 RX ORDER — METOPROLOL TARTRATE 25 MG/1
25 TABLET, FILM COATED ORAL 2 TIMES DAILY
Status: DISCONTINUED | OUTPATIENT
Start: 2025-01-09 | End: 2025-01-11 | Stop reason: HOSPADM

## 2025-01-09 RX ORDER — GUAIFENESIN/DEXTROMETHORPHAN 100-10MG/5
5 SYRUP ORAL EVERY 4 HOURS PRN
Status: DISCONTINUED | OUTPATIENT
Start: 2025-01-09 | End: 2025-01-11 | Stop reason: HOSPADM

## 2025-01-09 RX ORDER — POTASSIUM CHLORIDE 20 MEQ/1
40 TABLET, EXTENDED RELEASE ORAL ONCE
Status: COMPLETED | OUTPATIENT
Start: 2025-01-09 | End: 2025-01-09

## 2025-01-09 RX ADMIN — GUAIFENESIN SYRUP AND DEXTROMETHORPHAN 5 ML: 100; 10 SYRUP ORAL at 15:47

## 2025-01-09 RX ADMIN — GUAIFENESIN SYRUP AND DEXTROMETHORPHAN 5 ML: 100; 10 SYRUP ORAL at 20:38

## 2025-01-09 RX ADMIN — AMOXICILLIN AND CLAVULANATE POTASSIUM 1 TABLET: 875; 125 TABLET, FILM COATED ORAL at 20:38

## 2025-01-09 RX ADMIN — POTASSIUM CHLORIDE 40 MEQ: 1500 TABLET, EXTENDED RELEASE ORAL at 08:34

## 2025-01-09 RX ADMIN — METOPROLOL TARTRATE 5 MG: 5 INJECTION INTRAVENOUS at 00:10

## 2025-01-09 RX ADMIN — AMOXICILLIN AND CLAVULANATE POTASSIUM 1 TABLET: 875; 125 TABLET, FILM COATED ORAL at 08:34

## 2025-01-09 RX ADMIN — FAMOTIDINE 20 MG: 20 TABLET, FILM COATED ORAL at 08:34

## 2025-01-09 RX ADMIN — APIXABAN 5 MG: 5 TABLET, FILM COATED ORAL at 08:35

## 2025-01-09 RX ADMIN — APIXABAN 5 MG: 5 TABLET, FILM COATED ORAL at 20:39

## 2025-01-09 RX ADMIN — EMPAGLIFLOZIN 10 MG: 10 TABLET, FILM COATED ORAL at 20:39

## 2025-01-09 RX ADMIN — NYSTATIN 1 APPLICATION: 100000 POWDER TOPICAL at 08:37

## 2025-01-09 RX ADMIN — METOPROLOL TARTRATE 25 MG: 25 TABLET, FILM COATED ORAL at 08:34

## 2025-01-09 RX ADMIN — METOPROLOL TARTRATE 25 MG: 25 TABLET, FILM COATED ORAL at 20:38

## 2025-01-09 RX ADMIN — NYSTATIN 1 APPLICATION: 100000 POWDER TOPICAL at 20:39

## 2025-01-09 SDOH — ECONOMIC STABILITY: HOUSING INSECURITY: AT ANY TIME IN THE PAST 12 MONTHS, WERE YOU HOMELESS OR LIVING IN A SHELTER (INCLUDING NOW)?: NO

## 2025-01-09 SDOH — ECONOMIC STABILITY: INCOME INSECURITY: IN THE PAST 12 MONTHS HAS THE ELECTRIC, GAS, OIL, OR WATER COMPANY THREATENED TO SHUT OFF SERVICES IN YOUR HOME?: NO

## 2025-01-09 SDOH — ECONOMIC STABILITY: FOOD INSECURITY: WITHIN THE PAST 12 MONTHS, YOU WORRIED THAT YOUR FOOD WOULD RUN OUT BEFORE YOU GOT THE MONEY TO BUY MORE.: NEVER TRUE

## 2025-01-09 SDOH — ECONOMIC STABILITY: TRANSPORTATION INSECURITY: IN THE PAST 12 MONTHS, HAS LACK OF TRANSPORTATION KEPT YOU FROM MEDICAL APPOINTMENTS OR FROM GETTING MEDICATIONS?: NO

## 2025-01-09 SDOH — ECONOMIC STABILITY: FOOD INSECURITY: WITHIN THE PAST 12 MONTHS, THE FOOD YOU BOUGHT JUST DIDN'T LAST AND YOU DIDN'T HAVE MONEY TO GET MORE.: NEVER TRUE

## 2025-01-09 SDOH — ECONOMIC STABILITY: HOUSING INSECURITY: IN THE LAST 12 MONTHS, WAS THERE A TIME WHEN YOU WERE NOT ABLE TO PAY THE MORTGAGE OR RENT ON TIME?: NO

## 2025-01-09 SDOH — ECONOMIC STABILITY: HOUSING INSECURITY: IN THE PAST 12 MONTHS, HOW MANY TIMES HAVE YOU MOVED WHERE YOU WERE LIVING?: 0

## 2025-01-09 SDOH — ECONOMIC STABILITY: FOOD INSECURITY: HOW HARD IS IT FOR YOU TO PAY FOR THE VERY BASICS LIKE FOOD, HOUSING, MEDICAL CARE, AND HEATING?: NOT HARD AT ALL

## 2025-01-09 ASSESSMENT — COGNITIVE AND FUNCTIONAL STATUS - GENERAL
CLIMB 3 TO 5 STEPS WITH RAILING: A LITTLE
DAILY ACTIVITIY SCORE: 24
CLIMB 3 TO 5 STEPS WITH RAILING: A LITTLE
MOBILITY SCORE: 23
DAILY ACTIVITIY SCORE: 24
MOBILITY SCORE: 23

## 2025-01-09 ASSESSMENT — PAIN SCALES - GENERAL
PAINLEVEL_OUTOF10: 0 - NO PAIN

## 2025-01-09 ASSESSMENT — ACTIVITIES OF DAILY LIVING (ADL): LACK_OF_TRANSPORTATION: NO

## 2025-01-09 ASSESSMENT — PAIN - FUNCTIONAL ASSESSMENT
PAIN_FUNCTIONAL_ASSESSMENT: 0-10

## 2025-01-09 NOTE — CARE PLAN
Problem: Safety - Adult  Goal: Free from fall injury  Outcome: Progressing     Problem: Chronic Conditions and Co-morbidities  Goal: Patient's chronic conditions and co-morbidity symptoms are monitored and maintained or improved  Outcome: Progressing     Problem: Arrythmia/Dysrhythmia  Goal: Serial ECG will return to baseline  Outcome: Progressing     Problem: Arrythmia/Dysrhythmia  Goal: Vital signs return to baseline  Outcome: Progressing    The patient's goals for the shift include  to treat her sinusitis.    The clinical goals for the shift include to have an EKG without abnormalities and to have stable vital signs.

## 2025-01-09 NOTE — PROGRESS NOTES
Spiritual Care Visit  Spiritual Care Request    Reason for Visit:  Routine Visit: Introduction     Request Received From:       Focus of Care:  Visited With: Patient and family together         Refer to :          Spiritual Care Assessment    Spiritual Assessment:                      Care Provided:  Intended Effects: Build relationship of care and support, Demonstrate caring and concern, Preserve dignity and respect, Establish rapport and connectedness  Methods: Offer spiritual/Yazidism support  Interventions: Active listening, Ask guided questions, Ask guided questions about ami, Share words of hope and inspiration, Assist someone with Advance Directives    Sense of Community and or Anabaptism Affiliation:  None         Addressed Needs/Concerns and/or Fransisca Through:          Outcome:        Advance Directives:  Advance Directives  Advance Directives Reviewed Date: 01/09/25  Advance Directives Reviewed with: Patient, Significant other  Comment: They want to discuss the second secondary contact more      Spiritual Care Annotation    Annotation:  Began the process, Hopefully will finish before discharge. Adamant about no Yazidism preference

## 2025-01-09 NOTE — CONSULTS
"    Cardiac Electrophysiology Consult     Chief complaint: Nasal congestion lightheadedness, dizziness, near syncope  Referred by: Dr. Petty ROSAS  Holly Chavez is a 59 y.o. year old female patient with h/o HTN, pAF, diabetes, who presented  after she presented to her PCP office for nasal congestion was found to have an irregular rhythm EKG done in the office showed multiple PVCs and short runs of NSVT and patient was transferred to the hospital.  Patient states that starting December she started having some lightheaded dizziness and was seen by her outpatient cardiologist who did a 24-hour monitor which showed significant PVC burden.  She denies any tere syncope but had multiple near syncopal episodes where she would have to hold and brace her self because she thought she would \"collapse\".  She also had some intermittent chest pain occasionally but no sustained discomfort.  Upon presentation patient was found to have sinus rhythm with PVCs in bigeminal fashion and with multiple short runs of NSVT.  Patient denies any episodes of dizziness lightheadedness while in the hospital but has had some \"twinges of pain\" occasionally.  EP being consulted for further management of PVCs/NSVT.    family history includes Breast cancer in her father's sister, mother's sister, and another family member; Cancer in an other family member; Diabetes in her father, mother, and another family member; Heart failure in her father; Hypertension in an other family member; Kidney disease in an other family member; Pancreatic cancer in her father; Stroke in her mother; cardiac disorder in an other family member; cerebrovascular accident in her mother.      Allergies:  Allergies   Allergen Reactions    Adhesive Tape-Silicones Rash    Codeine Nausea/vomiting        Medications:  Current Outpatient Medications   Medication Instructions    albuterol 90 mcg/actuation inhaler 2 puffs, inhalation, Every 4 hours PRN    apixaban (ELIQUIS) 5 mg, " "oral, 2 times daily    atorvastatin (LIPITOR) 10 mg, oral, Daily    Basaglar KwikPen U-100 Insulin 12 Units, subcutaneous, Every morning, Take as directed per insulin instructions.    blood-glucose sensor (Dexcom G7 Sensor) device Change every 10 days    blood-glucose sensor (Dexcom G7 Sensor) device 1 each, miscellaneous, See admin instructions, Change sensor every 10 days    cholecalciferol (VITAMIN D-3) 125 mcg, oral, Daily    cyanocobalamin, vitamin B-12, (Vitamin B-12) 1,000 mcg tablet extended release 1 tablet, oral, Daily    famotidine (PEPCID) 20 mg, oral, Daily    Flonase Allergy Relief 50 mcg/actuation nasal spray 2 sprays, Each Nostril, Daily RT    furosemide (LASIX) 20 mg, oral, 3 times weekly, 3 times a week as needed    Mounjaro 10 mg, subcutaneous, Weekly    nystatin (Mycostatin) ointment Topical, 2 times daily    omeprazole (PriLOSEC) 20 mg DR capsule 20 mg by mouth daily    OneTouch Ultra Test strip 1 each, miscellaneous, 3 times daily    pen needle, diabetic 32 gauge x 5/32\" needle 4 Needles, miscellaneous, Daily, Use with insulin injections four times daily    triamcinolone (Kenalog) 0.1 % cream Topical, 2 times daily, APPLY EXTERNALLY TO THE AFFECTED AREA 2 TO 3 TIMES DAILY      Scheduled medications   Medication Dose Route Frequency    amoxicillin-pot clavulanate  1 tablet oral q12h AICHA    apixaban  5 mg oral BID    famotidine  20 mg oral Daily    metoprolol tartrate  25 mg oral BID    nystatin  1 Application Topical BID        Last Recorded Vitals:      1/9/2025     4:00 AM 1/9/2025     4:02 AM 1/9/2025     8:00 AM 1/9/2025    12:00 PM 1/9/2025    12:26 PM 1/9/2025     4:00 PM 1/9/2025     4:16 PM   Vitals   Systolic  101 91  129  102   Diastolic  50 50  92  65   BP Location  Left arm Left arm  Left arm  Right arm   Heart Rate 114 116 117 76 110 122 132   Temp  36.8 °C (98.2 °F) 37.2 °C (99 °F)  37 °C (98.6 °F)  36.6 °C (97.9 °F)   Resp 27 24 14 26   18       Physical Exam  Vitals reviewed. "   Constitutional:       Appearance: Normal appearance. She is obese.   HENT:      Head: Normocephalic.   Cardiovascular:      Rate and Rhythm: Normal rate and regular rhythm. Frequent Extrasystoles are present.  Pulmonary:      Effort: Pulmonary effort is normal. No respiratory distress.      Breath sounds: No wheezing.   Skin:     General: Skin is warm and dry.      Capillary Refill: Capillary refill takes less than 2 seconds.   Neurological:      Mental Status: She is alert.   Psychiatric:         Mood and Affect: Mood normal.           My Interpretation of Reviewed Study(s):  Echo (February 2022): Normal LV function EF of 60 to 65% no regional wall motion abnormalities mildly dilated left atrium.  No pericardial effusion noted.  24-hour monitor (December 2024): Predominant rhythm sinus with average heart rate 89 bpm.  46% PVC burden predominant 1 morphology.  Multiple episodes of NSVT longest lasting 10 beats  Echo (January 2025): Left ventricular function low normal with an EF of 53% normal right ventricular systolic function.  No evidence of diastolic dysfunction.  Trivial pericardial effusion.  No significant valve pathology.    Assessment/Plan   # PVC/NSVT  PVC morphology appears to be a right bundle branch block with positive concordance in the precordial RS in lead I inferior axis negative in aVR and aVL.  Location is a little bit unusual appears to be likely basal and possibly fascicular in origin.  Patient has no prior history of cardiac disease except for her atrial arrhythmia which she had in the setting of COVID and has not had since then despite being not on any antiarrhythmic medication.    Patient with overall picture it is unclear why patient is having significant PVC burden, her presentation is similar to somebody with active inflammation however her symptoms began in December predating her current upper respiratory infection so unclear if there was myocarditis at some point.  Given the lack of  troponinemia at this current time there is no active inflammation suspected but possibly a residual effect from something that happened a month or so ago.  Patient did take Mounjaro starting in April and states that all of her symptoms began afterwards however this is not a known effect/side effect of the medication.  I had a long discussion with the patient and her  (over the phone) about our next steps evaluation with a cardiac MRI to assess for both inflammation and scar would be really helpful in elucidating the next steps.  Unfortunately we cannot proceed with a cardiac MRI at [facility] and therefore would likely need to transfer the patient to [facility].  Depending on the MRI we may even consider inpatient ablation given her overall PVC burden is significantly elevated.  Transfer to [facility] for cardiac MRI and further management of PVC/NSVT  If MRI completely normal then can consider flecainide with outpatient ablation or inpatient ablation  If MRI shows significant scar burden may need to consider cardiac PET to assess for inflammatory processes, and also consider EP study to assess for need for ICD depending on scar burden +/- PVC/NSVT ablation  Hold antiarrhythmic medication at this time  Metoprolol 25 mg twice daily  If patient has sustained ventricular tachyarrhythmia would recommend initiation of IV amiodarone and emergent transfer to [facility] for MRI plus minus ablation    # Paroxysmal atrial fibrillation  Patient has had atrial fibrillation in the setting of COVID reports that she did not need any cardioversions converted back to sinus rhythm on her own.  Has been taking anticoagulation and has not missed any doses recently.  Has not been previously on antiarrhythmic therapy.  Continue with apixaban 5 mg twice daily -may need to transition to IV heparin in anticipation of possible procedures at the time of transfer  Patient currently in sinus rhythm    Code status: Full Code    I spent 48 minutes in the  professional and overall care of this patient.    Andrew Hou MD [facility]  Cardiac Electrophysiology    Thank you very much for allowing me to participate in the care of this pleasant patient. Please do not hesitate to contact me with any further questions or concerns regarding their care.    **Disclaimer: This note was dictated by speech recognition, and every effort has been made to prevent any error in transcription, however minor errors may be present**

## 2025-01-09 NOTE — PROGRESS NOTES
01/09/25 1117   Discharge Planning   Living Arrangements Spouse/significant other;Children   Support Systems Spouse/significant other;Children   Assistance Needed none   Type of Residence Private residence   Number of Stairs to Enter Residence 1   Number of Stairs Within Residence 0   Do you have animals or pets at home? Yes   Type of Animals or Pets cat   Who is requesting discharge planning? Provider   Home or Post Acute Services None   Expected Discharge Disposition Home   Does the patient need discharge transport arranged? No   Financial Resource Strain   How hard is it for you to pay for the very basics like food, housing, medical care, and heating? Not hard   Housing Stability   In the last 12 months, was there a time when you were not able to pay the mortgage or rent on time? N   In the past 12 months, how many times have you moved where you were living? 0   At any time in the past 12 months, were you homeless or living in a shelter (including now)? N   Transportation Needs   In the past 12 months, has lack of transportation kept you from medical appointments or from getting medications? no   In the past 12 months, has lack of transportation kept you from meetings, work, or from getting things needed for daily living? No   Patient Choice   Patient / Family choosing to utilize agency / facility established prior to hospitalization No   Stroke Family Assessment   Stroke Family Assessment Needed No   Intensity of Service   Intensity of Service 0-30 min     Met with patient at bedside. Introduced self and role in hospital. Verified address and PCP is Dr. Johanna Salas. Uses [facility] in [place] and has no issues obtaining/paying for them and manages all her own medications. Patient does not use any ambulation devices, still drives, does the grocery shopping and is independent with all ADL's. Denies any issues paying bills or getting food into the home and feels that she is able to manage her health at  home. Patient will return home on dischare with no needs. CT team to follow patient for all discharge needs.

## 2025-01-09 NOTE — CONSULTS
Inpatient consult to Cardiology  Consult performed by: Faustino Dove MD  Consult ordered by: Vignesh Keen MD  Reason for consult: bigeminy w/fusion beats, runs of vtach  Assessment/Recommendations:     Impressions:        History Of Present Illness:    Holly Chavez is a 59 y.o. female presenting with PVCs, nonsustained VT.    This 59-year-old diabetic, hyperlipidemic woman with a BMI of 38 is a patient of Dr. Isak Salas, admitted with PVCs and nonsustained VT.      She has a history of PAF, with a [id number] score is  2, placing her at high risk for stroke and thromboembolism. She is appropriately anticoagulated.. The patient's pharmacologic nuclear stress test was negative for ischemia in April 2022   The patient's echocardiogram dated 2/2022  revealed an EF of 60-65%, without significant valvular or pericardial disease.  She has a history of venous insufficiency with reflux.      In November 2024, she saw her primary care physician with a variety of complaints including cough, sinus congestion, near syncope and pulsatile tinnitus.  A Holter and CT angiogram were ordered.  The Holter monitor done between December 9 and December 10, 2024 disclosed frequent PVCs accounting for 46% of recorded beats with 930 runs of nonsustained ventricular tachycardia, the longest being 10 beats in duration.    On January 2025, she was seen by her primary care physician with complaints of continued sinus congestion, cough, shortness of breath and lower extremity edema and EKG disclosed frequent PVCs.  She was sent to the emergency room for further evaluation and care.  Echocardiogram done today revealed an ejection fraction of 53% with a trivial pericardial effusion, mild pulmonary hypertension, and no significant valvular disease.       Last Recorded Vitals:  Vitals:    01/09/25 0000 01/09/25 0400 01/09/25 0402 01/09/25 0800   BP:   101/50 91/50   BP Location:   Left arm Left arm   Patient Position:   Sitting Lying    Pulse: (!) 130 (!) 114 (!) 116 (!) 117   Resp: 19 (!) 27 24 14   Temp:   36.8 °C (98.2 °F) 37.2 °C (99 °F)   TempSrc:   Temporal Temporal   SpO2:   96% 97%   Weight:       Height:           Last Labs:    Results from last 7 days   Lab Units 01/09/25  0536 01/08/25  1151   SODIUM mmol/L 140 141   POTASSIUM mmol/L 3.9 4.2   CHLORIDE mmol/L 105 103   CO2 mmol/L 26 30   BUN mg/dL 14 14   CREATININE mg/dL 0.79 0.70   CALCIUM mg/dL 8.6 9.4   PROTEIN TOTAL g/dL  --  7.0   BILIRUBIN TOTAL mg/dL  --  1.0   ALK PHOS U/L  --  46   ALT U/L  --  13   AST U/L  --  11   GLUCOSE mg/dL 133* 129*        Results for orders placed or performed during the hospital encounter of 01/08/25 (from the past 24 hours)   ECG 12 lead   Result Value Ref Range    Ventricular Rate 102 BPM    Atrial Rate 40 BPM    OH Interval 158 ms    QRS Duration 78 ms    QT Interval 378 ms    QTC Calculation(Bazett) 492 ms    P Axis 43 degrees    R Axis 38 degrees    T Axis 46 degrees    QRS Count 17 beats    Q Onset 224 ms    P Onset 145 ms    P Offset 198 ms    T Offset 413 ms    QTC Fredericia 451 ms   CBC with Differential   Result Value Ref Range    WBC 6.6 4.4 - 11.3 x10*3/uL    nRBC 0.0 0.0 - 0.0 /100 WBCs    RBC 4.26 4.00 - 5.20 x10*6/uL    Hemoglobin 11.2 (L) 12.0 - 16.0 g/dL    Hematocrit 36.2 36.0 - 46.0 %    MCV 85 80 - 100 fL    MCH 26.3 26.0 - 34.0 pg    MCHC 30.9 (L) 32.0 - 36.0 g/dL    RDW 13.0 11.5 - 14.5 %    Platelets 141 (L) 150 - 450 x10*3/uL    Neutrophils % 73.9 40.0 - 80.0 %    Immature Granulocytes %, Automated 0.5 0.0 - 0.9 %    Lymphocytes % 14.2 13.0 - 44.0 %    Monocytes % 7.8 2.0 - 10.0 %    Eosinophils % 3.3 0.0 - 6.0 %    Basophils % 0.3 0.0 - 2.0 %    Neutrophils Absolute 4.91 1.20 - 7.70 x10*3/uL    Immature Granulocytes Absolute, Automated 0.03 0.00 - 0.70 x10*3/uL    Lymphocytes Absolute 0.94 (L) 1.20 - 4.80 x10*3/uL    Monocytes Absolute 0.52 0.10 - 1.00 x10*3/uL    Eosinophils Absolute 0.22 0.00 - 0.70 x10*3/uL    Basophils  Absolute 0.02 0.00 - 0.10 x10*3/uL   Comprehensive Metabolic Panel   Result Value Ref Range    Glucose 129 (H) 74 - 99 mg/dL    Sodium 141 136 - 145 mmol/L    Potassium 4.2 3.5 - 5.3 mmol/L    Chloride 103 98 - 107 mmol/L    Bicarbonate 30 21 - 32 mmol/L    Anion Gap 12 10 - 20 mmol/L    Urea Nitrogen 14 6 - 23 mg/dL    Creatinine 0.70 0.50 - 1.05 mg/dL    eGFR >90 >60 mL/min/1.73m*2    Calcium 9.4 8.6 - 10.3 mg/dL    Albumin 4.2 3.4 - 5.0 g/dL    Alkaline Phosphatase 46 33 - 136 U/L    Total Protein 7.0 6.4 - 8.2 g/dL    AST 11 9 - 39 U/L    Bilirubin, Total 1.0 0.0 - 1.2 mg/dL    ALT 13 7 - 45 U/L   Brain Natriuretic Peptide   Result Value Ref Range     (H) 0 - 99 pg/mL   Troponin I, High Sensitivity, Initial   Result Value Ref Range    Troponin I, High Sensitivity 6 0 - 13 ng/L   Magnesium   Result Value Ref Range    Magnesium 2.23 1.60 - 2.40 mg/dL   Sedimentation rate, automated   Result Value Ref Range    Sedimentation Rate 40 (H) 0 - 30 mm/h   C-reactive protein   Result Value Ref Range    C-Reactive Protein 0.93 <1.00 mg/dL   Light Blue Top   Result Value Ref Range    Extra Tube Hold for add-ons.    Troponin, High Sensitivity, 1 Hour   Result Value Ref Range    Troponin I, High Sensitivity 6 0 - 13 ng/L   TSH with reflex to Free T4 if abnormal   Result Value Ref Range    Thyroid Stimulating Hormone 1.75 0.44 - 3.98 mIU/L   POCT GLUCOSE   Result Value Ref Range    POCT Glucose 127 (H) 74 - 99 mg/dL   Sars-CoV-2 and Influenza A/B PCR   Result Value Ref Range    Flu A Result Not Detected Not Detected    Flu B Result Not Detected Not Detected    Coronavirus 2019, PCR Not Detected Not Detected   CBC   Result Value Ref Range    WBC 6.0 4.4 - 11.3 x10*3/uL    nRBC 0.0 0.0 - 0.0 /100 WBCs    RBC 3.97 (L) 4.00 - 5.20 x10*6/uL    Hemoglobin 10.6 (L) 12.0 - 16.0 g/dL    Hematocrit 34.0 (L) 36.0 - 46.0 %    MCV 86 80 - 100 fL    MCH 26.7 26.0 - 34.0 pg    MCHC 31.2 (L) 32.0 - 36.0 g/dL    RDW 12.9 11.5 - 14.5 %     Platelets 127 (L) 150 - 450 x10*3/uL   Basic metabolic panel   Result Value Ref Range    Glucose 133 (H) 74 - 99 mg/dL    Sodium 140 136 - 145 mmol/L    Potassium 3.9 3.5 - 5.3 mmol/L    Chloride 105 98 - 107 mmol/L    Bicarbonate 26 21 - 32 mmol/L    Anion Gap 13 10 - 20 mmol/L    Urea Nitrogen 14 6 - 23 mg/dL    Creatinine 0.79 0.50 - 1.05 mg/dL    eGFR 85 >60 mL/min/1.73m*2    Calcium 8.6 8.6 - 10.3 mg/dL   Phosphorus   Result Value Ref Range    Phosphorus 4.0 2.5 - 4.9 mg/dL   Magnesium   Result Value Ref Range    Magnesium 2.27 1.60 - 2.40 mg/dL     *Note: Due to a large number of results and/or encounters for the requested time period, some results have not been displayed. A complete set of results can be found in Results Review.         PTT - No results in last year.  1.4   15.3 _     Troponin I, High Sensitivity   Date/Time Value Ref Range Status   01/08/2025 12:51 PM 6 0 - 13 ng/L Final   01/08/2025 11:51 AM 6 0 - 13 ng/L Final   08/27/2024 01:16 PM 4 0 - 13 ng/L Final     BNP   Date/Time Value Ref Range Status   01/08/2025 11:51  (H) 0 - 99 pg/mL Final   08/19/2023 09:50  (H) 0 - 99 pg/mL Final     Comment:     .  <100 pg/mL - Heart failure unlikely  100-299 pg/mL - Intermediate probability of acute heart  .               failure exacerbation. Correlate with clinical  .               context and patient history.    >=300 pg/mL - Heart Failure likely. Correlate with clinical  .               context and patient history.  BNP testing is performed using different testing   methodology at [facility] than at other   [facility] hospitals. Direct result comparisons should   only be made within the same method.     06/12/2023 09:59 AM 59 0 - 99 pg/mL Final     Comment:     .  <100 pg/mL - Heart failure unlikely  100-299 pg/mL - Intermediate probability of acute heart  .               failure exacerbation. Correlate with clinical  .               context and patient history.    >=300 pg/mL  - Heart Failure likely. Correlate with clinical  .               context and patient history.   Biotin interference may cause falsely decreased results.   Patients taking a Biotin dose of up to 5 mg/day should   refrain from taking Biotin for 24 hours before sample   collection. Providers may contact their local laboratory   for further information.     POC HEMOGLOBIN A1c   Date/Time Value Ref Range Status   11/25/2024 09:48 AM 6.3 4.2 - 6.5 % Final   08/05/2024 01:09 PM 6.1 4.2 - 6.5 % Final     LDL Calculated   Date/Time Value Ref Range Status   02/20/2024 08:04 AM 56 <=99 mg/dL Final     Comment:                                 Near   Borderline      AGE      Desirable  Optimal    High     High     Very High     0-19 Y     0 - 109     ---    110-129   >/= 130     ----    20-24 Y     0 - 119     ---    120-159   >/= 160     ----      >24 Y     0 -  99   100-129  130-159   160-189     >/=190       VLDL   Date/Time Value Ref Range Status   02/20/2024 08:04 AM 16 0 - 40 mg/dL Final   02/09/2023 09:02 AM 15 0 - 40 mg/dL Final   05/02/2022 09:51 AM 22 0 - 40 mg/dL Final   10/23/2021 10:21 AM 18 0 - 40 mg/dL Final      Last I/O:  I/O last 3 completed shifts:  In: 400 (4.2 mL/kg) [P.O.:400]  Out: - (0 mL/kg)   Weight: 94.3 kg     Past Medical History:  She has a past medical history of Acute upper respiratory infection, unspecified (11/13/2018), Anxiety, Benign paroxysmal vertigo, unspecified ear (12/04/2017), Cataract, Cutaneous abscess of groin (12/04/2017), Cutaneous abscess of limb, unspecified (09/20/2018), GERD (gastroesophageal reflux disease), Morbid (severe) obesity due to excess calories (Multi) (07/08/2019), Personal history of diseases of the skin and subcutaneous tissue (11/20/2015), Personal history of diseases of the skin and subcutaneous tissue (09/27/2018), Personal history of other endocrine, nutritional and metabolic disease, Personal history of other endocrine, nutritional and metabolic disease  (08/11/2015), Personal history of other endocrine, nutritional and metabolic disease (04/16/2019), Personal history of other endocrine, nutritional and metabolic disease (06/06/2019), Pneumonia (09/07/2023), Rash and other nonspecific skin eruption (08/11/2015), Rash and other nonspecific skin eruption (08/11/2015), Type 2 diabetes mellitus with mild nonproliferative diabetic retinopathy without macular edema, unspecified eye (11/09/2015), Type 2 diabetes mellitus with mild nonproliferative diabetic retinopathy without macular edema, unspecified eye (11/09/2015), Type 2 diabetes mellitus with mild nonproliferative diabetic retinopathy without macular edema, unspecified eye (11/10/2015), and Type 2 diabetes mellitus with mild nonproliferative diabetic retinopathy without macular edema, unspecified eye (11/12/2015).    Past Surgical History:  She has a past surgical history that includes Tubal ligation (08/11/2015); Hernia repair (08/11/2015); and Cervical biopsy w/ loop electrode excision (04/12/2016).      Social History:  She reports that she quit smoking about 26 years ago. Her smoking use included cigarettes. She has never used smokeless tobacco. She reports that she does not currently use alcohol. She reports that she does not use drugs.    Family History:  Family History   Problem Relation Name Age of Onset    Stroke Mother      Diabetes Mother      Other (cerebrovascular accident) Mother      Heart failure Father      Diabetes Father      Pancreatic cancer Father      Breast cancer Mother's Sister      Other (cardiac disorder) Other      Diabetes Other      Hypertension Other      Cancer Other      Kidney disease Other      Breast cancer Other aunt     Breast cancer Father's Sister      Glaucoma Neg Hx      Macular degeneration Neg Hx          Review of Systems:  ROS is positive for episodic cough.  Patient denies fevers, chills, nausea, vomiting, diarrhea, constipation, hematuria, dysuria, abdominal pain,   "red blood per rectum,  all other review of systems is negative.    Allergies:  Adhesive tape-silicones and Codeine    Inpatient Medications:  Scheduled medications   Medication Dose Route Frequency    amoxicillin-pot clavulanate  1 tablet oral q12h AICHA    apixaban  5 mg oral BID    famotidine  20 mg oral Daily    metoprolol tartrate  25 mg oral BID    nystatin  1 Application Topical BID     PRN medications   Medication    acetaminophen    albuterol    diphenhydrAMINE    melatonin    polyethylene glycol     Continuous Medications   Medication Dose Last Rate     Outpatient Medications:  Current Outpatient Medications   Medication Instructions    albuterol 90 mcg/actuation inhaler 2 puffs, inhalation, Every 4 hours PRN    apixaban (ELIQUIS) 5 mg, oral, 2 times daily    atorvastatin (LIPITOR) 10 mg, oral, Daily    Basaglar KwikPen U-100 Insulin 12 Units, subcutaneous, Every morning, Take as directed per insulin instructions.    blood-glucose sensor (Dexcom G7 Sensor) device Change every 10 days    blood-glucose sensor (Dexcom G7 Sensor) device 1 each, miscellaneous, See admin instructions, Change sensor every 10 days    cholecalciferol (VITAMIN D-3) 125 mcg, oral, Daily    cyanocobalamin, vitamin B-12, (Vitamin B-12) 1,000 mcg tablet extended release 1 tablet, oral, Daily    famotidine (PEPCID) 20 mg, oral, Daily    Flonase Allergy Relief 50 mcg/actuation nasal spray 2 sprays, Each Nostril, Daily RT    furosemide (LASIX) 20 mg, oral, 3 times weekly, 3 times a week as needed    Mounjaro 10 mg, subcutaneous, Weekly    nystatin (Mycostatin) ointment Topical, 2 times daily    omeprazole (PriLOSEC) 20 mg DR capsule 20 mg by mouth daily    OneTouch Ultra Test strip 1 each, miscellaneous, 3 times daily    pen needle, diabetic 32 gauge x 5/32\" needle 4 Needles, miscellaneous, Daily, Use with insulin injections four times daily    triamcinolone (Kenalog) 0.1 % cream Topical, 2 times daily, APPLY EXTERNALLY TO THE AFFECTED AREA 2 " TO 3 TIMES DAILY       Current Imaging  ECG 12 lead    Result Date: 1/8/2025  Marked sinus bradycardia with frequent and consecutive Premature ventricular complexes and Fusion complexes Low voltage QRS Cannot rule out Anterior infarct , age undetermined When compared with ECG of 27-AUG-2024 13:13, Premature ventricular complexes are now Present Nonspecific T wave abnormality no longer evident in Anterior leads Reconfirmed by Nathaniel Rodriguez ([id number]) on 1/8/2025 7:36:54 PM    Lower extremity venous duplex left    Result Date: 1/8/2025            [facility] [address] [address] [place] [address]     Tel [phone] Fax [phone]  Vascular Lab Report  [facility] US LOWER EXTREMITY VENOUS DUPLEX LEFT Patient Name:      MADISYN Thomson Physician:  [id number] Jennifer Collier MD Study Date:        1/8/2025             Ordering Provider:  [id number] LIAN BLEVINS MRN/PID:           [id number]             Fellow: Accession#:        [id number]         Technologist:       Gunjan Warren RVT, RDMS Date of Birth/Age: 1965 / 59 years Technologist 2: Gender:            F                    Encounter#:         [id number] Admission Status:  Emergency            Location Performed: [facility]  Diagnosis/ICD: Pain in left lower leg-M79.662 Indication:    Limb swelling CPT Codes:     55546 Peripheral venous duplex scan for DVT Limited  Pertinent History: HTN and Anticoagulation. DM Afib.  CONCLUSIONS: Left Lower Venous: No evidence of acute deep vein thrombus visualized in the left lower extremity. Calf veins visualized in segments. Additional Findings: Pulsatile waveforms are suggestive of possible underlying volume overload.  Comparison: Compared with study from 12/14/2022, no significant change.No evidence of DVT.  Imaging & Doppler Findings:  Right   Flow CFV   Pulsatile  Left                   Compress Thrombus   Flow Distal External Iliac            None   Pulsatile CFV                     Yes      None   Pulsatile PFV                     Yes      None FV Proximal             Yes      None   Pulsatile FV Mid                  Yes      None FV Distal               Yes      None Popliteal               Yes      None   Pulsatile Peroneal                Yes      None PTV                     Yes      None  [id number] Jennifer Collier MD Electronically signed by [id number] Jennifer Collier MD on 1/8/2025 at 6:37:01 PM  ** Final **     ECG 12 Lead    Result Date: 1/8/2025  EKG sinus rhythm with frequnt pvc. Poor r wave progression    XR chest 1 view    Result Date: 1/8/2025  Interpreted By:  Funmi Guzman, STUDY: XR CHEST 1 VIEW 1/8/2025 1:25 pm   INDICATION: Signs/Symptoms:BRADYCARDIA   COMPARISON: 08/27/2024   ACCESSION NUMBER(S): [id number]   ORDERING CLINICIAN: LIAN BLEVINS   TECHNIQUE: AP erect view of the chest   FINDINGS: Heart is enlarged with calcified plaque seen in the aortic knob. The lungs are clear without pleural abnormality. There are degenerative changes of the thoracic spine.       Cardiomegaly without acute pulmonary pathology.   Signed by: Funmi Guzman 1/8/2025 1:31 PM Dictation workstation:   [id number]    I have reviewed the patient's EKGs.       Physical Exam:  GENERAL:  pleasant 59 year-old  HEENT: No xanthelasma  NECK: Supple, no palpable adenopathy or thyromegaly  CHEST: Clear to auscultation, respiratory effort unlabored  CARDIAC: RRR, normal S1 and S2, no audible murmur, rub, gallop, carotids are brisk, PMI is not displaced  ABD: Active bowel sounds, nontender, no organomegaly, no evidence of ascites  EXT: No clubbing, cyanosis,  or tenderness; there is 1+ lower extremity edema  NEURO: Awake, alert, appropriate, speech is fluent       Assessment/Plan   1.  Frequent, high burden PVCs on recent monitor study; nonsustained ventricular tachycardia, occurring in the setting of recent  respiratory illness, in the context of new pericardial effusion.  Presentation raises question of possible myocarditis.  2.  Acute diastolic heart failure  3.  Trivial pericardial effusion  4.  Paroxysmal atrial fibrillation  5.  Diabetes mellitus  6.  Hyperlipidemia  7.  BMI 38    Plan:  1.  EP consult  2.  Add Jardiance  3.  Patient education, sodium restriction  4.  Would not initiate colchicine at present as she is somewhat thrombocytopenic already, and has no evidence of acute hemodynamic compromise from her pericardial effusion which is trivial.  5.  Continued anticoagulation  6.  I have not yet spoken to the patient about potential contraindications, but would consider cardiac MRI if no such contraindications exist.    Code Status:  Full Code      Faustino Dove MD

## 2025-01-09 NOTE — PROGRESS NOTES
Holly Weathers is a 59 y.o. female on day 0 of admission presenting with NSVT (nonsustained ventricular tachycardia) (Multi).      Subjective   Patient feels better from sinus standpoint. HR noted to be fast overnight but she states she is asymptomatic and denies CP/SOB.       Objective     Last Recorded Vitals  BP 91/50 (BP Location: Left arm, Patient Position: Lying)   Pulse (!) 117   Temp 37.2 °C (99 °F) (Temporal)   Resp 14   Wt 94.3 kg (208 lb)   SpO2 97%   Intake/Output last 3 Shifts:    Intake/Output Summary (Last 24 hours) at 1/9/2025 0959  Last data filed at 1/9/2025 0700  Gross per 24 hour   Intake 400 ml   Output 100 ml   Net 300 ml       Admission Weight  Weight: 94.3 kg (208 lb) (01/08/25 1142)    Daily Weight  01/08/25 : 94.3 kg (208 lb)    Image Results  ECG 12 lead  Marked sinus bradycardia with frequent and consecutive Premature ventricular complexes and Fusion complexes  Low voltage QRS  Cannot rule out Anterior infarct , age undetermined  When compared with ECG of 27-AUG-2024 13:13,  Premature ventricular complexes are now Present  Nonspecific T wave abnormality no longer evident in Anterior leads  Reconfirmed by Nathaniel Rodriguez ([id number]) on 1/8/2025 7:36:54 PM  Lower extremity venous duplex left              [facility]  [address]. [place] [id number]      Tel [phone] Fax [phone]       V[facility] Lab Report     [facility]    Patient Name:      HOLLY WEATHERS          Reading Physician:  [id number] Jennifer Collier MD  Study Date:        1/8/2025             Ordering Provider:  [id number] LIAN BLEVINS  MRN/PID:           [id number]             Fellow:  Accession#:        [id number]         Technologist:       Gunjan Warren RVT, RDMS  Date of Birth/Age: 1965 / 59 years Technologist 2:  Gender:            F                     Encounter#:         [id number]  Admission Status:  Emergency            Location Performed: [facility]       Diagnosis/ICD: Pain in left lower leg-M79.662  Indication:    Limb swelling  CPT Codes:     06254 Peripheral venous duplex scan for DVT Limited       Pertinent History: HTN and Anticoagulation. DM Afib.       CONCLUSIONS:  Left Lower Venous: No evidence of acute deep vein thrombus visualized in the left lower extremity. Calf veins visualized in segments.    Additional Findings:  Pulsatile waveforms are suggestive of possible underlying volume overload.       Comparison:  Compared with study from 12/14/2022, no significant change.No evidence of DVT.     Imaging & Doppler Findings:     Right   Flow  CFV   Pulsatile       Left                  Compress Thrombus   Flow  Distal External Iliac            None   Pulsatile  CFV                     Yes      None   Pulsatile  PFV                     Yes      None  FV Proximal             Yes      None   Pulsatile  FV Mid                  Yes      None  FV Distal               Yes      None  Popliteal               Yes      None   Pulsatile  Peroneal                Yes      None  PTV                     Yes      None       [id number] Jennifer Collier MD  Electronically signed by [id number] Jennifer Collier MD on 1/8/2025 at 6:37:01 PM       ** Final **  ECG 12 Lead  EKG sinus rhythm with frequnt pvc.   Poor r wave progression  Holter or Event Cardiac Monitor  Refer to scanned images  XR chest 1 view  Narrative: Interpreted By:  Fumni Guzman,   STUDY:  XR CHEST 1 VIEW 1/8/2025 1:25 pm      INDICATION:  Signs/Symptoms:BRADYCARDIA      COMPARISON:  08/27/2024      ACCESSION NUMBER(S):  [id number]      ORDERING CLINICIAN:  LIAN BLEVINS      TECHNIQUE:  AP erect view of the chest      FINDINGS:  Heart is enlarged with calcified plaque seen in the aortic knob. The  lungs are clear without pleural abnormality. There are degenerative  changes of the thoracic spine.       Impression: Cardiomegaly without acute pulmonary pathology.      Signed by: Funmi Guzman 1/8/2025 1:31 PM  Dictation workstation:   [id number]      Physical Exam  Constitutional:       General: She is not in acute distress.     Appearance: Normal appearance.   HENT:      Head: Normocephalic and atraumatic.      Comments: TTP in maxillary and ethmoid sinuses     Mouth/Throat:      Mouth: Mucous membranes are moist.   Eyes:      Extraocular Movements: Extraocular movements intact.      Conjunctiva/sclera: Conjunctivae normal.   Cardiovascular:      Rate and Rhythm: Tachycardia present. Rhythm irregular.      Heart sounds: Normal heart sounds.   Pulmonary:      Effort: Pulmonary effort is normal.      Breath sounds: Normal breath sounds. No wheezing, rhonchi or rales.   Abdominal:      General: Abdomen is flat. Bowel sounds are normal.      Palpations: Abdomen is soft.   Musculoskeletal:         General: No swelling or tenderness. Normal range of motion.      Cervical back: Normal range of motion and neck supple.   Skin:     General: Skin is warm and dry.      Findings: No rash.   Neurological:      General: No focal deficit present.      Mental Status: She is alert and oriented to person, place, and time.      Cranial Nerves: No cranial nerve deficit.      Sensory: No sensory deficit.   Psychiatric:         Mood and Affect: Mood normal.         Behavior: Behavior normal.         Relevant Results  Scheduled medications  amoxicillin-pot clavulanate, 1 tablet, oral, q12h AICHA  apixaban, 5 mg, oral, BID  famotidine, 20 mg, oral, Daily  metoprolol tartrate, 25 mg, oral, BID  nystatin, 1 Application, Topical, BID      Continuous medications     PRN medications  PRN medications: acetaminophen, albuterol, diphenhydrAMINE, melatonin, polyethylene glycol    Results for orders placed or performed during the hospital encounter of 01/08/25 (from the past 24 hours)   ECG 12 lead   Result Value Ref Range    Ventricular Rate 102 BPM     Atrial Rate 40 BPM    AL Interval 158 ms    QRS Duration 78 ms    QT Interval 378 ms    QTC Calculation(Bazett) 492 ms    P Axis 43 degrees    R Axis 38 degrees    T Axis 46 degrees    QRS Count 17 beats    Q Onset 224 ms    P Onset 145 ms    P Offset 198 ms    T Offset 413 ms    QTC Fredericia 451 ms   CBC with Differential   Result Value Ref Range    WBC 6.6 4.4 - 11.3 x10*3/uL    nRBC 0.0 0.0 - 0.0 /100 WBCs    RBC 4.26 4.00 - 5.20 x10*6/uL    Hemoglobin 11.2 (L) 12.0 - 16.0 g/dL    Hematocrit 36.2 36.0 - 46.0 %    MCV 85 80 - 100 fL    MCH 26.3 26.0 - 34.0 pg    MCHC 30.9 (L) 32.0 - 36.0 g/dL    RDW 13.0 11.5 - 14.5 %    Platelets 141 (L) 150 - 450 x10*3/uL    Neutrophils % 73.9 40.0 - 80.0 %    Immature Granulocytes %, Automated 0.5 0.0 - 0.9 %    Lymphocytes % 14.2 13.0 - 44.0 %    Monocytes % 7.8 2.0 - 10.0 %    Eosinophils % 3.3 0.0 - 6.0 %    Basophils % 0.3 0.0 - 2.0 %    Neutrophils Absolute 4.91 1.20 - 7.70 x10*3/uL    Immature Granulocytes Absolute, Automated 0.03 0.00 - 0.70 x10*3/uL    Lymphocytes Absolute 0.94 (L) 1.20 - 4.80 x10*3/uL    Monocytes Absolute 0.52 0.10 - 1.00 x10*3/uL    Eosinophils Absolute 0.22 0.00 - 0.70 x10*3/uL    Basophils Absolute 0.02 0.00 - 0.10 x10*3/uL   Comprehensive Metabolic Panel   Result Value Ref Range    Glucose 129 (H) 74 - 99 mg/dL    Sodium 141 136 - 145 mmol/L    Potassium 4.2 3.5 - 5.3 mmol/L    Chloride 103 98 - 107 mmol/L    Bicarbonate 30 21 - 32 mmol/L    Anion Gap 12 10 - 20 mmol/L    Urea Nitrogen 14 6 - 23 mg/dL    Creatinine 0.70 0.50 - 1.05 mg/dL    eGFR >90 >60 mL/min/1.73m*2    Calcium 9.4 8.6 - 10.3 mg/dL    Albumin 4.2 3.4 - 5.0 g/dL    Alkaline Phosphatase 46 33 - 136 U/L    Total Protein 7.0 6.4 - 8.2 g/dL    AST 11 9 - 39 U/L    Bilirubin, Total 1.0 0.0 - 1.2 mg/dL    ALT 13 7 - 45 U/L   Brain Natriuretic Peptide   Result Value Ref Range     (H) 0 - 99 pg/mL   Troponin I, High Sensitivity, Initial   Result Value Ref Range    Troponin I,  High Sensitivity 6 0 - 13 ng/L   Magnesium   Result Value Ref Range    Magnesium 2.23 1.60 - 2.40 mg/dL   Sedimentation rate, automated   Result Value Ref Range    Sedimentation Rate 40 (H) 0 - 30 mm/h   C-reactive protein   Result Value Ref Range    C-Reactive Protein 0.93 <1.00 mg/dL   Light Blue Top   Result Value Ref Range    Extra Tube Hold for add-ons.    Troponin, High Sensitivity, 1 Hour   Result Value Ref Range    Troponin I, High Sensitivity 6 0 - 13 ng/L   TSH with reflex to Free T4 if abnormal   Result Value Ref Range    Thyroid Stimulating Hormone 1.75 0.44 - 3.98 mIU/L   POCT GLUCOSE   Result Value Ref Range    POCT Glucose 127 (H) 74 - 99 mg/dL   Sars-CoV-2 and Influenza A/B PCR   Result Value Ref Range    Flu A Result Not Detected Not Detected    Flu B Result Not Detected Not Detected    Coronavirus 2019, PCR Not Detected Not Detected   CBC   Result Value Ref Range    WBC 6.0 4.4 - 11.3 x10*3/uL    nRBC 0.0 0.0 - 0.0 /100 WBCs    RBC 3.97 (L) 4.00 - 5.20 x10*6/uL    Hemoglobin 10.6 (L) 12.0 - 16.0 g/dL    Hematocrit 34.0 (L) 36.0 - 46.0 %    MCV 86 80 - 100 fL    MCH 26.7 26.0 - 34.0 pg    MCHC 31.2 (L) 32.0 - 36.0 g/dL    RDW 12.9 11.5 - 14.5 %    Platelets 127 (L) 150 - 450 x10*3/uL   Basic metabolic panel   Result Value Ref Range    Glucose 133 (H) 74 - 99 mg/dL    Sodium 140 136 - 145 mmol/L    Potassium 3.9 3.5 - 5.3 mmol/L    Chloride 105 98 - 107 mmol/L    Bicarbonate 26 21 - 32 mmol/L    Anion Gap 13 10 - 20 mmol/L    Urea Nitrogen 14 6 - 23 mg/dL    Creatinine 0.79 0.50 - 1.05 mg/dL    eGFR 85 >60 mL/min/1.73m*2    Calcium 8.6 8.6 - 10.3 mg/dL   Phosphorus   Result Value Ref Range    Phosphorus 4.0 2.5 - 4.9 mg/dL   Magnesium   Result Value Ref Range    Magnesium 2.27 1.60 - 2.40 mg/dL     *Note: Due to a large number of results and/or encounters for the requested time period, some results have not been displayed. A complete set of results can be found in Results Review.       ECG 12  lead    Result Date: 1/8/2025  Marked sinus bradycardia with frequent and consecutive Premature ventricular complexes and Fusion complexes Low voltage QRS Cannot rule out Anterior infarct , age undetermined When compared with ECG of 27-AUG-2024 13:13, Premature ventricular complexes are now Present Nonspecific T wave abnormality no longer evident in Anterior leads Reconfirmed by Nathaniel Rodriguez ([id number]) on 1/8/2025 7:36:54 PM    Lower extremity venous duplex left    Result Date: 1/8/2025            [facility] [address] [place] [address]     Tel [phone] Fax [phone]  Vascular Lab Report  VASC US LOWER EXTREMITY VENOUS DUPLEX LEFT Patient Name:      MADISYN Thomson Physician:  [id number] Jennifer Collier MD Study Date:        1/8/2025             Ordering Provider:  [id number] LIAN BLEVINS MRN/PID:           [id number]             Fellow: Accession#:        [id number]         Technologist:       Gunjan Warren RVT, [facility] Date of Birth/Age: 1965 / 59 years Technologist 2: Gender:            F                    Encounter#:         [id number] Admission Status:  Emergency            Location Performed: [facility]  Diagnosis/ICD: Pain in left lower leg-M79.662 Indication:    Limb swelling CPT Codes:     61296 Peripheral venous duplex scan for DVT Limited  Pertinent History: HTN and Anticoagulation. DM Afib.  CONCLUSIONS: Left Lower Venous: No evidence of acute deep vein thrombus visualized in the left lower extremity. Calf veins visualized in segments. Additional Findings: Pulsatile waveforms are suggestive of possible underlying volume overload.  Comparison: Compared with study from 12/14/2022, no significant change.No evidence of DVT.  Imaging & Doppler Findings:  Right   Flow CFV   Pulsatile  Left                  Compress Thrombus   Flow Distal  External Iliac            None   Pulsatile CFV                     Yes      None   Pulsatile PFV                     Yes      None FV Proximal             Yes      None   Pulsatile FV Mid                  Yes      None FV Distal               Yes      None Popliteal               Yes      None   Pulsatile Peroneal                Yes      None PTV                     Yes      None  [id number] Jennifer Collier MD Electronically signed by [id number] Jennifer Collier MD on 1/8/2025 at 6:37:01 PM  ** Final **     ECG 12 Lead    Result Date: 1/8/2025  EKG sinus rhythm with frequnt pvc. Poor r wave progression    XR chest 1 view    Result Date: 1/8/2025  Interpreted By:  Funmi Guzman, STUDY: XR CHEST 1 VIEW 1/8/2025 1:25 pm   INDICATION: Signs/Symptoms:BRADYCARDIA   COMPARISON: 08/27/2024   ACCESSION NUMBER(S): [id number]   ORDERING CLINICIAN: LIAN BLEVINS   TECHNIQUE: AP erect view of the chest   FINDINGS: Heart is enlarged with calcified plaque seen in the aortic knob. The lungs are clear without pleural abnormality. There are degenerative changes of the thoracic spine.       Cardiomegaly without acute pulmonary pathology.   Signed by: Funmi Guzman 1/8/2025 1:31 PM Dictation workstation:   [id number]       Assessment/Plan   Assessment & Plan  NSVT (nonsustained ventricular tachycardia) (Multi)    Anxiety    Chronic venous stasis    Diabetic retinopathy (Multi)    Gastroesophageal reflux disease    Paroxysmal atrial fibrillation (Multi)    Type 2 diabetes mellitus    Arrhythmia      59-year-old female with acute sinusitis which is improving has history of paroxysmal atrial fibrillation and is currently in bigeminy with tachycardia which may represent atrial fibrillation with aberrancy and also noted to have nonsustained V. tach is an overall hemodynamically stable condition.    -Patient with NSVT in emergency department  -Patient continues to remain in bigeminy  -Patient had Holter monitor entire month of December but had no episodes  of lightheadedness which she has had previously  -Troponin is not elevated  -EKG not overly concerning for ischemia at this time; cardiology consulted and has ordered EKG and 2D echo; appreciate input  -Electrolytes are being optimized  -Patient currently dealing with sinus infection; was started on doxycycline in outpatient setting and completed the course; have started her on Augmentin and Flonase and she feels better so we will continue this therapy  -Remainder of home medications resumed as appropriate    Code: Full  DVT prophylaxis: Eliquis  GI prophylaxis: Not indicated  Diet: Cardiac    Dispo: Possible discharge later today if heart rate controlled and cleared by cardiology versus tomorrow to home with no needs.    Vignesh Keen MD

## 2025-01-10 ENCOUNTER — APPOINTMENT (OUTPATIENT)
Dept: CARDIOLOGY | Facility: HOSPITAL | Age: 60
End: 2025-01-10
Payer: MEDICARE

## 2025-01-10 LAB
ANION GAP SERPL CALC-SCNC: 13 MMOL/L (ref 10–20)
ATRIAL RATE: 46 BPM
BUN SERPL-MCNC: 14 MG/DL (ref 6–23)
CALCIUM SERPL-MCNC: 8.7 MG/DL (ref 8.6–10.3)
CHLORIDE SERPL-SCNC: 108 MMOL/L (ref 98–107)
CO2 SERPL-SCNC: 26 MMOL/L (ref 21–32)
CREAT SERPL-MCNC: 0.81 MG/DL (ref 0.5–1.05)
EGFRCR SERPLBLD CKD-EPI 2021: 83 ML/MIN/1.73M*2
ERYTHROCYTE [DISTWIDTH] IN BLOOD BY AUTOMATED COUNT: 12.9 % (ref 11.5–14.5)
GLUCOSE SERPL-MCNC: 118 MG/DL (ref 74–99)
HCT VFR BLD AUTO: 33.5 % (ref 36–46)
HGB BLD-MCNC: 10.4 G/DL (ref 12–16)
MAGNESIUM SERPL-MCNC: 2.39 MG/DL (ref 1.6–2.4)
MCH RBC QN AUTO: 26.7 PG (ref 26–34)
MCHC RBC AUTO-ENTMCNC: 31 G/DL (ref 32–36)
MCV RBC AUTO: 86 FL (ref 80–100)
NRBC BLD-RTO: 0 /100 WBCS (ref 0–0)
P AXIS: 37 DEGREES
P OFFSET: 195 MS
P ONSET: 146 MS
PHOSPHATE SERPL-MCNC: 4.3 MG/DL (ref 2.5–4.9)
PLATELET # BLD AUTO: 106 X10*3/UL (ref 150–450)
POTASSIUM SERPL-SCNC: 4.1 MMOL/L (ref 3.5–5.3)
PR INTERVAL: 154 MS
Q ONSET: 223 MS
QRS COUNT: 16 BEATS
QRS DURATION: 76 MS
QT INTERVAL: 370 MS
QTC CALCULATION(BAZETT): 469 MS
QTC FREDERICIA: 434 MS
R AXIS: 41 DEGREES
RBC # BLD AUTO: 3.89 X10*6/UL (ref 4–5.2)
SODIUM SERPL-SCNC: 143 MMOL/L (ref 136–145)
T AXIS: 40 DEGREES
T OFFSET: 408 MS
VENTRICULAR RATE: 97 BPM
WBC # BLD AUTO: 4.5 X10*3/UL (ref 4.4–11.3)

## 2025-01-10 PROCEDURE — 2500000004 HC RX 250 GENERAL PHARMACY W/ HCPCS (ALT 636 FOR OP/ED): Performed by: INTERNAL MEDICINE

## 2025-01-10 PROCEDURE — 2500000001 HC RX 250 WO HCPCS SELF ADMINISTERED DRUGS (ALT 637 FOR MEDICARE OP): Performed by: INTERNAL MEDICINE

## 2025-01-10 PROCEDURE — 2500000004 HC RX 250 GENERAL PHARMACY W/ HCPCS (ALT 636 FOR OP/ED): Performed by: STUDENT IN AN ORGANIZED HEALTH CARE EDUCATION/TRAINING PROGRAM

## 2025-01-10 PROCEDURE — 93010 ELECTROCARDIOGRAM REPORT: CPT | Performed by: INTERNAL MEDICINE

## 2025-01-10 PROCEDURE — 93005 ELECTROCARDIOGRAM TRACING: CPT

## 2025-01-10 PROCEDURE — 99233 SBSQ HOSP IP/OBS HIGH 50: CPT | Performed by: INTERNAL MEDICINE

## 2025-01-10 PROCEDURE — 36415 COLL VENOUS BLD VENIPUNCTURE: CPT | Performed by: INTERNAL MEDICINE

## 2025-01-10 PROCEDURE — 84100 ASSAY OF PHOSPHORUS: CPT | Performed by: INTERNAL MEDICINE

## 2025-01-10 PROCEDURE — 2500000001 HC RX 250 WO HCPCS SELF ADMINISTERED DRUGS (ALT 637 FOR MEDICARE OP): Performed by: NURSE PRACTITIONER

## 2025-01-10 PROCEDURE — 80048 BASIC METABOLIC PNL TOTAL CA: CPT | Performed by: INTERNAL MEDICINE

## 2025-01-10 PROCEDURE — 85027 COMPLETE CBC AUTOMATED: CPT | Performed by: INTERNAL MEDICINE

## 2025-01-10 PROCEDURE — 83735 ASSAY OF MAGNESIUM: CPT | Performed by: INTERNAL MEDICINE

## 2025-01-10 PROCEDURE — 2060000001 HC INTERMEDIATE ICU ROOM DAILY

## 2025-01-10 RX ORDER — SODIUM CHLORIDE 9 MG/ML
100 INJECTION, SOLUTION INTRAVENOUS CONTINUOUS
Status: ACTIVE | OUTPATIENT
Start: 2025-01-10 | End: 2025-01-10

## 2025-01-10 RX ORDER — TRIAMCINOLONE ACETONIDE 1 MG/G
OINTMENT TOPICAL 2 TIMES DAILY
Status: DISCONTINUED | OUTPATIENT
Start: 2025-01-10 | End: 2025-01-11 | Stop reason: HOSPADM

## 2025-01-10 RX ADMIN — APIXABAN 5 MG: 5 TABLET, FILM COATED ORAL at 20:30

## 2025-01-10 RX ADMIN — AMIODARONE HYDROCHLORIDE 150 MG: 1.5 INJECTION, SOLUTION INTRAVENOUS at 23:14

## 2025-01-10 RX ADMIN — NYSTATIN 1 APPLICATION: 100000 POWDER TOPICAL at 09:07

## 2025-01-10 RX ADMIN — TRIAMCINOLONE ACETONIDE: 1 OINTMENT TOPICAL at 09:07

## 2025-01-10 RX ADMIN — APIXABAN 5 MG: 5 TABLET, FILM COATED ORAL at 09:06

## 2025-01-10 RX ADMIN — GUAIFENESIN SYRUP AND DEXTROMETHORPHAN 5 ML: 100; 10 SYRUP ORAL at 20:30

## 2025-01-10 RX ADMIN — AMIODARONE HYDROCHLORIDE 1 MG/MIN: 1.8 INJECTION, SOLUTION INTRAVENOUS at 23:47

## 2025-01-10 RX ADMIN — AMOXICILLIN AND CLAVULANATE POTASSIUM 1 TABLET: 875; 125 TABLET, FILM COATED ORAL at 09:06

## 2025-01-10 RX ADMIN — FAMOTIDINE 20 MG: 20 TABLET, FILM COATED ORAL at 09:06

## 2025-01-10 RX ADMIN — GUAIFENESIN SYRUP AND DEXTROMETHORPHAN 5 ML: 100; 10 SYRUP ORAL at 04:08

## 2025-01-10 RX ADMIN — METOPROLOL TARTRATE 25 MG: 25 TABLET, FILM COATED ORAL at 09:06

## 2025-01-10 RX ADMIN — NYSTATIN 1 APPLICATION: 100000 POWDER TOPICAL at 20:30

## 2025-01-10 RX ADMIN — TRIAMCINOLONE ACETONIDE 1 APPLICATION: 1 OINTMENT TOPICAL at 20:31

## 2025-01-10 RX ADMIN — AMOXICILLIN AND CLAVULANATE POTASSIUM 1 TABLET: 875; 125 TABLET, FILM COATED ORAL at 20:30

## 2025-01-10 RX ADMIN — METOPROLOL TARTRATE 25 MG: 25 TABLET, FILM COATED ORAL at 20:30

## 2025-01-10 RX ADMIN — EMPAGLIFLOZIN 10 MG: 10 TABLET, FILM COATED ORAL at 09:06

## 2025-01-10 RX ADMIN — SODIUM CHLORIDE 100 ML/HR: 9 INJECTION, SOLUTION INTRAVENOUS at 01:41

## 2025-01-10 ASSESSMENT — COGNITIVE AND FUNCTIONAL STATUS - GENERAL
MOBILITY SCORE: 24
DAILY ACTIVITIY SCORE: 24

## 2025-01-10 ASSESSMENT — PAIN - FUNCTIONAL ASSESSMENT
PAIN_FUNCTIONAL_ASSESSMENT: 0-10

## 2025-01-10 ASSESSMENT — PAIN SCALES - GENERAL
PAINLEVEL_OUTOF10: 0 - NO PAIN

## 2025-01-10 NOTE — PROGRESS NOTES
Subjective:  The patient reports she feels a bit better.  She denies symptoms of angina or dyspnea.  She is not aware of having symptoms of palpitations, and has had no further lightheaded spells..    Overnight Events:  Telemetry continues to show frequent PVCs and runs of nonsustained ventricular tachycardia.     Last Recorded Vitals:  Vitals:    01/10/25 0409 01/10/25 0800 01/10/25 1111 01/10/25 1200   BP: 132/58 96/51 116/56    BP Location: Right arm Right arm Right arm    Patient Position: Sitting Sitting Sitting    Pulse: 92 74 72 80   Resp: 20 18 18    Temp: 36.8 °C (98.2 °F) 36.4 °C (97.5 °F) 36.6 °C (97.9 °F)    TempSrc: Temporal Temporal Temporal    SpO2: 98% 98% 96%    Weight:       Height:           Last Labs:    Results from last 7 days   Lab Units 01/10/25  0627 01/09/25  0536 01/08/25  1151   SODIUM mmol/L 143   < > 141   POTASSIUM mmol/L 4.1   < > 4.2   CHLORIDE mmol/L 108*   < > 103   CO2 mmol/L 26   < > 30   BUN mg/dL 14   < > 14   CREATININE mg/dL 0.81   < > 0.70   CALCIUM mg/dL 8.7   < > 9.4   PROTEIN TOTAL g/dL  --   --  7.0   BILIRUBIN TOTAL mg/dL  --   --  1.0   ALK PHOS U/L  --   --  46   ALT U/L  --   --  13   AST U/L  --   --  11   GLUCOSE mg/dL 118*   < > 129*    < > = values in this interval not displayed.        Results for orders placed or performed during the hospital encounter of 01/08/25 (from the past 24 hours)   Transthoracic Echo (TTE) Complete   Result Value Ref Range    AV pk marguerite 1.89 m/s    LV Biplane EF 53 %    LVOT diam 1.94 cm    MV E/A ratio 1.33     Tricuspid annular plane systolic excursion 2.3 cm    LA vol index A/L 22.1 ml/m2    AV mn grad 8 mmHg    LV EF 53 %    RV free wall pk S' 14.60 cm/s    RVSP 59.3 mmHg    LVIDd 5.01 cm    Aortic Valve Area by Continuity of Peak Velocity 1.48 cm2    AV pk grad 14 mmHg    Aortic Valve Area by Continuity of VTI 1.55 cm2    LV A4C EF 34.7    POCT GLUCOSE   Result Value Ref Range    POCT Glucose 148 (H) 74 - 99 mg/dL   CBC   Result  Value Ref Range    WBC 4.5 4.4 - 11.3 x10*3/uL    nRBC 0.0 0.0 - 0.0 /100 WBCs    RBC 3.89 (L) 4.00 - 5.20 x10*6/uL    Hemoglobin 10.4 (L) 12.0 - 16.0 g/dL    Hematocrit 33.5 (L) 36.0 - 46.0 %    MCV 86 80 - 100 fL    MCH 26.7 26.0 - 34.0 pg    MCHC 31.0 (L) 32.0 - 36.0 g/dL    RDW 12.9 11.5 - 14.5 %    Platelets 106 (L) 150 - 450 x10*3/uL   Basic Metabolic Panel   Result Value Ref Range    Glucose 118 (H) 74 - 99 mg/dL    Sodium 143 136 - 145 mmol/L    Potassium 4.1 3.5 - 5.3 mmol/L    Chloride 108 (H) 98 - 107 mmol/L    Bicarbonate 26 21 - 32 mmol/L    Anion Gap 13 10 - 20 mmol/L    Urea Nitrogen 14 6 - 23 mg/dL    Creatinine 0.81 0.50 - 1.05 mg/dL    eGFR 83 >60 mL/min/1.73m*2    Calcium 8.7 8.6 - 10.3 mg/dL   Magnesium   Result Value Ref Range    Magnesium 2.39 1.60 - 2.40 mg/dL   Phosphorus   Result Value Ref Range    Phosphorus 4.3 2.5 - 4.9 mg/dL     *Note: Due to a large number of results and/or encounters for the requested time period, some results have not been displayed. A complete set of results can be found in Results Review.         PTT - No results in last year.  1.4   15.3 _     Troponin I, High Sensitivity   Date/Time Value Ref Range Status   01/08/2025 12:51 PM 6 0 - 13 ng/L Final   01/08/2025 11:51 AM 6 0 - 13 ng/L Final   08/27/2024 01:16 PM 4 0 - 13 ng/L Final     BNP   Date/Time Value Ref Range Status   01/08/2025 11:51  (H) 0 - 99 pg/mL Final   08/19/2023 09:50  (H) 0 - 99 pg/mL Final     Comment:     .  <100 pg/mL - Heart failure unlikely  100-299 pg/mL - Intermediate probability of acute heart  .               failure exacerbation. Correlate with clinical  .               context and patient history.    >=300 pg/mL - Heart Failure likely. Correlate with clinical  .               context and patient history.  BNP testing is performed using different testing   methodology at [facility] than at other   [facility] hospitals. Direct result comparisons should   only be made  within the same method.     06/12/2023 09:59 AM 59 0 - 99 pg/mL Final     Comment:     .  <100 pg/mL - Heart failure unlikely  100-299 pg/mL - Intermediate probability of acute heart  .               failure exacerbation. Correlate with clinical  .               context and patient history.    >=300 pg/mL - Heart Failure likely. Correlate with clinical  .               context and patient history.   Biotin interference may cause falsely decreased results.   Patients taking a Biotin dose of up to 5 mg/day should   refrain from taking Biotin for 24 hours before sample   collection. Providers may contact their local laboratory   for further information.     POC HEMOGLOBIN A1c   Date/Time Value Ref Range Status   11/25/2024 09:48 AM 6.3 4.2 - 6.5 % Final   08/05/2024 01:09 PM 6.1 4.2 - 6.5 % Final     LDL Calculated   Date/Time Value Ref Range Status   02/20/2024 08:04 AM 56 <=99 mg/dL Final     Comment:                                 Near   Borderline      AGE      Desirable  Optimal    High     High     Very High     0-19 Y     0 - 109     ---    110-129   >/= 130     ----    20-24 Y     0 - 119     ---    120-159   >/= 160     ----      >24 Y     0 -  99   100-129  130-159   160-189     >/=190       VLDL   Date/Time Value Ref Range Status   02/20/2024 08:04 AM 16 0 - 40 mg/dL Final   02/09/2023 09:02 AM 15 0 - 40 mg/dL Final   05/02/2022 09:51 AM 22 0 - 40 mg/dL Final   10/23/2021 10:21 AM 18 0 - 40 mg/dL Final      Last I/O:  I/O last 3 completed shifts:  In: 1241.7 (13.2 mL/kg) [P.O.:810; I.V.:431.7 (4.6 mL/kg)]  Out: 825 (8.7 mL/kg) [Urine:825 (0.2 mL/kg/hr)]  Weight: 94.3 kg     Allergies:  Adhesive tape-silicones and Codeine    Inpatient Medications:  Scheduled medications   Medication Dose Route Frequency    amoxicillin-pot clavulanate  1 tablet oral q12h AICHA    apixaban  5 mg oral BID    empagliflozin  10 mg oral Daily    famotidine  20 mg oral Daily    metoprolol tartrate  25 mg oral BID    nystatin  1  "Application Topical BID    triamcinolone   Topical BID     PRN medications   Medication    acetaminophen    albuterol    dextromethorphan-guaifenesin    diphenhydrAMINE    melatonin    polyethylene glycol     Continuous Medications   Medication Dose Last Rate     Outpatient Medications:  Current Outpatient Medications   Medication Instructions    albuterol 90 mcg/actuation inhaler 2 puffs, inhalation, Every 4 hours PRN    apixaban (ELIQUIS) 5 mg, oral, 2 times daily    atorvastatin (LIPITOR) 10 mg, oral, Daily    Basaglar KwikPen U-100 Insulin 12 Units, subcutaneous, Every morning, Take as directed per insulin instructions.    blood-glucose sensor (Dexcom G7 Sensor) device Change every 10 days    blood-glucose sensor (Dexcom G7 Sensor) device 1 each, miscellaneous, See admin instructions, Change sensor every 10 days    cholecalciferol (VITAMIN D-3) 125 mcg, oral, Daily    cyanocobalamin, vitamin B-12, (Vitamin B-12) 1,000 mcg tablet extended release 1 tablet, oral, Daily    famotidine (PEPCID) 20 mg, oral, Daily    Flonase Allergy Relief 50 mcg/actuation nasal spray 2 sprays, Each Nostril, Daily RT    furosemide (LASIX) 20 mg, oral, 3 times weekly, 3 times a week as needed    Mounjaro 10 mg, subcutaneous, Weekly    nystatin (Mycostatin) ointment Topical, 2 times daily    omeprazole (PriLOSEC) 20 mg DR capsule 20 mg by mouth daily    OneTouch Ultra Test strip 1 each, miscellaneous, 3 times daily    pen needle, diabetic 32 gauge x 5/32\" needle 4 Needles, miscellaneous, Daily, Use with insulin injections four times daily    triamcinolone (Kenalog) 0.1 % cream Topical, 2 times daily, APPLY EXTERNALLY TO THE AFFECTED AREA 2 TO 3 TIMES DAILY       Current Imaging  ECG 12 lead    Result Date: 1/10/2025  Marked sinus bradycardia with frequent and consecutive Premature ventricular complexes and Possible Premature atrial complexes with Aberrant conduction Low voltage QRS Cannot rule out Anterior infarct (cited on or before " 08-JAN-2025) When compared with ECG of 08-JAN-2025 11:39, frequent PVCs couplets and fusion beats present Reconfirmed by Nathaniel Rodriguez ([id number]) on 1/10/2025 12:52:18 PM    ECG 12 Lead    Result Date: 1/9/2025  Sinus rhythm with frequent Premature ventricular complexes Low voltage QRS Cannot rule out Anterior infarct , age undetermined Abnormal ECG When compared with ECG of 09-JAN-2025 00:07, (unconfirmed) No significant change was found Confirmed by Andrew Hou ([id number]) on 1/9/2025 6:30:16 PM    Transthoracic Echo (TTE) Complete    Result Date: 1/9/2025            [facility] [address] [address]    Tel [phone] Fax [phone] TRANSTHORACIC ECHOCARDIOGRAM REPORT Patient Name:       MADISYN WEATHERS          Reading Physician:    Gayathri Blanton MD Study Date:         1/9/2025             Ordering Provider:    Gayathri BLANTON MRN/PID:            [id number]             Fellow: Accession#:         [id number]         Nurse: Date of Birth/Age:  1965 / 59 years Sonographer:          Gracie Roman RD Gender Assigned at  F                    Additional Staff: Birth: Height:             157.48 cm            Admit Date:           1/8/2025 Weight:             94.80 kg             Admission Status:     Inpatient -                                                                Routine BSA / BMI:          1.95 m2 / 38.23      Department Location:  [facility] Echo Lab                     kg/m2 Blood Pressure: 129 /92 mmHg Study Type:    TRANSTHORACIC ECHO (TTE) COMPLETE Diagnosis/ICD: Ventricular premature depolarization-I49.3 Indication:    PVC CPT Codes:     Echo Complete w Full Doppler-[id number] Patient History: Diabetes:          Yes BMI:               Obese >30 Pertinent History: A-Fib and LE Edema. H/O EDDI; previous echocardiogram                    2-.  Study Detail: The following Echo studies were performed: 2D, M-Mode, Doppler and               color flow. Technically challenging study due to body habitus.  PHYSICIAN INTERPRETATION: Left Ventricle: The left ventricular systolic function is low normal, with a Enriquez's biplane calculated ejection fraction of 53%. There are no regional wall motion abnormalities. The left ventricular cavity size is normal. Spectral Doppler shows a normal pattern of left ventricular diastolic filling. Left Atrium: The left atrium is normal in size. Right Ventricle: The right ventricle is normal in size. There is normal right ventricular global systolic function. Right Atrium: The right atrium is normal in size. Aortic Valve: The aortic valve is trileaflet. The aortic valve dimensionless index is 0.53. There is no evidence of aortic valve regurgitation. The peak instantaneous gradient of the aortic valve is 14 mmHg. The mean gradient of the aortic valve is 8 mmHg. Mitral Valve: The mitral valve is normal in structure. The peak instantaneous gradient of the mitral valve is 6 mmHg. There is trace mitral valve regurgitation. Tricuspid Valve: The tricuspid valve is structurally normal. There is mild tricuspid regurgitation. The estimated RVSP is 59 mm. Pulmonic Valve: The pulmonic valve is structurally normal. There is trace pulmonic valve regurgitation. Pericardium: Trivial pericardial effusion. There is no evidence of diastolic chamber collapse. Aorta: The aortic root is normal. Systemic Veins: The inferior vena cava appears dilated, with IVC inspiratory collapse less than 50%.  CONCLUSIONS:  1. The left ventricular systolic function is low normal, with a Enriquez's biplane calculated ejection fraction of 53%.  2. There is normal right ventricular global systolic function.  3. There is no evidence of diastolic chamber collapse.  4. The estimated RVSP is 59 mm. QUANTITATIVE DATA SUMMARY:  2D MEASUREMENTS:           Normal Ranges: LAs:              4.25 cm   (2.7-4.0cm) IVSd:            1.00 cm   (0.6-1.1cm) LVPWd:           0.84 cm   (0.6-1.1cm) LVIDd:           5.01 cm   (3.9-5.9cm) LVIDs:           3.75 cm LV Mass Index:   83.6 g/m2 LV % FS          25.1 %  LA VOLUME:                    Normal Ranges: LA Vol A4C:        39.6 ml    (22+/-6mL/m2) LA Vol A2C:        44.4 ml LA Vol BP:         43.0 ml LA Vol Index A4C:  20.3 ml/m2 LA Vol Index A2C:  22.8 ml/m2 LA Vol Index BP:   22.1 ml/m2 LA Area A4C:       16.3 cm2 LA Area A2C:       17.7 cm2 LA Major Axis A4C: 5.7 cm LA Major Axis A2C: 6.0 cm LA Volume Index:   21.9 ml/m2 LA Vol A4C:        36.4 ml LA Vol A2C:        41.9 ml LA Vol Index BSA:  20.1 ml/m2  AORTA MEASUREMENTS:         Normal Ranges: Ao Sinus, d:        3.00 cm (2.1-3.5cm) Ao STJ, d:          2.50 cm (1.7-3.4cm) Asc Ao, d:          2.90 cm (2.1-3.4cm)  LV SYSTOLIC FUNCTION BY 2D PLANIMETRY (MOD):                      Normal Ranges: EF-A4C View:    35 % (>=55%) EF-A2C View:    65 % EF-Biplane:     53 % LV EF Reported: 53 %  LV DIASTOLIC FUNCTION:             Normal Ranges: MV Peak E:             1.24 m/s    (0.7-1.2 m/s) MV Peak A:             0.93 m/s    (0.42-0.7 m/s) E/A Ratio:             1.33        (1.0-2.2) MV medial e'           0.08 m/s PulmV Sys Galen:         57.10 cm/s PulmV Gusman Galen:        46.88 cm/s PulmV S/D Galen:         1.22 PulmV A Revs Galen:      24.86 cm/s PulmV A Revs Dur:      121.79 msec  MITRAL VALVE:          Normal Ranges: MV Vmax:      1.26 m/s (<=1.3m/s) MV peak P.4 mmHg (<5mmHg) MV mean P.5 mmHg (<48mmHg) MV VTI:       39.91 cm (10-13cm) MV DT:        254 msec (150-240msec)  AORTIC VALVE:                      Normal Ranges: AoV Vmax:                1.89 m/s  (<=1.7m/s) AoV Peak P.3 mmHg (<20mmHg) AoV Mean P.8 mmHg  (1.7-11.5mmHg) LVOT Max Galen:            0.95 m/s  (<=1.1m/s) AoV VTI:                 38.28 cm  (18-25cm) LVOT VTI:                20.10 cm LVOT Diameter:            1.94 cm   (1.8-2.4cm) AoV Area, VTI:           1.55 cm2  (2.5-5.5cm2) AoV Area,Vmax:           1.48 cm2  (2.5-4.5cm2) AoV Dimensionless Index: 0.53  RIGHT VENTRICLE: RV Basal 3.86 cm RV Mid   3.00 cm RV Major 7.0 cm TAPSE:   23.1 mm RV s'    0.15 m/s  TRICUSPID VALVE/RVSP:          Normal Ranges: Peak TR Velocity:     3.21 m/s RV Syst Pressure:     59 mmHg  (< 30mmHg) IVC Diam:             2.52 cm  PULMONIC VALVE:          Normal Ranges: PV Accel Time:  108 msec (>120ms) PV Max Galen:     0.9 m/s  (0.6-0.9m/s) PV Max PG:      3.4 mmHg  Pulmonary Veins: PulmV A Revs Dur: 121.79 msec PulmV A Revs Galen: 24.86 cm/s PulmV Gusman Galen:   46.88 cm/s PulmV S/D Galen:    1.22 PulmV Sys Galen:    57.10 cm/s  AORTA: Asc Ao Diam 2.89 cm  [id number] Faustino Dove MD Electronically signed on 1/9/2025 at 6:24:24 PM  ** Final **     Electrocardiogram, 12-lead PRN ACS symptoms    Result Date: 1/9/2025  Marked sinus bradycardia with frequent and consecutive Premature ventricular complexes Low voltage QRS Abnormal ECG When compared with ECG of 08-JAN-2025 11:39, Fusion complexes are no longer Present Minimal criteria for Anterior infarct are no longer Present    Lower extremity venous duplex left    Result Date: 1/8/2025            [facility] [address] [place] [address]     Tel [phone] Fax [phone]  Vascular Lab Report  [facility] LOWER EXTREMITY VENOUS DUPLEX LEFT Patient Name:      MADISYN Thomson Physician:  [id number] Jennifer Collier MD Study Date:        1/8/2025             Ordering Provider:  [id number] LIAN BLEVINS MRN/PID:           [id number]             Fellow: Accession#:        [id number]         Technologist:       Gunjan FOSTERT, MS Date of Birth/Age: 1965 / 59 years Technologist 2: Gender:            F                    Encounter#:         [id number]  Admission Status:  Emergency            Location Performed: [facility]  Diagnosis/ICD: Pain in left lower leg-M79.662 Indication:    Limb swelling CPT Codes:     55338 Peripheral venous duplex scan for DVT Limited  Pertinent History: HTN and Anticoagulation. DM Afib.  CONCLUSIONS: Left Lower Venous: No evidence of acute deep vein thrombus visualized in the left lower extremity. Calf veins visualized in segments. Additional Findings: Pulsatile waveforms are suggestive of possible underlying volume overload.  Comparison: Compared with study from 12/14/2022, no significant change.No evidence of DVT.  Imaging & Doppler Findings:  Right   Flow CFV   Pulsatile  Left                  Compress Thrombus   Flow Distal External Iliac            None   Pulsatile CFV                     Yes      None   Pulsatile PFV                     Yes      None FV Proximal             Yes      None   Pulsatile FV Mid                  Yes      None FV Distal               Yes      None Popliteal               Yes      None   Pulsatile Peroneal                Yes      None PTV                     Yes      None  [id number] Jennifer Collier MD Electronically signed by [id number] Jennifer Collier MD on 1/8/2025 at 6:37:01 PM  ** Final **       Physical Exam:  HEENT: No xanthelasma  NECK: Supple, no palpable adenopathy or thyromegaly  CHEST: Clear to auscultation, respiratory effort unlabored  CARDIAC: RRR, normal S1 and S2, no audible murmur, rub, gallop, carotids are brisk, PMI is not displaced  ABD: Active bowel sounds, nontender, no organomegaly, no evidence of ascites  EXT: No clubbing, cyanosis, edema, or tenderness  NEURO: Awake, alert, appropriate, speech is fluent       Assessment/Plan   1.  Frequent, high burden PVCs on recent monitor study; nonsustained ventricular tachycardia, occurring in the setting of recent respiratory illness, in the context of new pericardial effusion.  Presentation raises question of possible myocarditis.  2.   Acute diastolic heart failure  3.  Trivial pericardial effusion  4.  Paroxysmal atrial fibrillation  5.  Diabetes mellitus  6.  Hyperlipidemia  7.  BMI 38    Plan:  1.  Continue medical therapy for heart failure  2.  Imminent transfer to [facility] for cardiac MRI-guided arrhythmia management.    Code Status:  Full Code      Faustino Dove MD

## 2025-01-10 NOTE — PROGRESS NOTES
Holly Chavez is a 59 y.o. female on day 0 of admission presenting with NSVT (nonsustained ventricular tachycardia) (Multi).      Subjective   Patient anxious today. Feels claustrophobic and wants to go for a walk. Encouraged her to do so.       Objective     Last Recorded Vitals  /56 (BP Location: Right arm, Patient Position: Sitting)   Pulse 80   Temp 36.6 °C (97.9 °F) (Temporal)   Resp 18   Wt 94.3 kg (208 lb)   SpO2 96%   Intake/Output last 3 Shifts:    Intake/Output Summary (Last 24 hours) at 1/10/2025 1209  Last data filed at 1/10/2025 0900  Gross per 24 hour   Intake 1021.67 ml   Output 725 ml   Net 296.67 ml       Admission Weight  Weight: 94.3 kg (208 lb) (01/08/25 1142)    Daily Weight  01/08/25 : 94.3 kg (208 lb)    Image Results  ECG 12 lead  Marked sinus bradycardia with frequent and consecutive Premature ventricular complexes and Possible Premature atrial complexes with Aberrant conduction  Low voltage QRS  Cannot rule out Anterior infarct (cited on or before 08-JAN-2025)  Abnormal ECG  When compared with ECG of 08-JAN-2025 11:39,  Fusion complexes are no longer Present  Aberrant conduction is now Present      Physical Exam  Constitutional:       General: She is not in acute distress.     Appearance: Normal appearance.   HENT:      Head: Normocephalic and atraumatic.      Comments: TTP in maxillary and ethmoid sinuses     Mouth/Throat:      Mouth: Mucous membranes are moist.   Eyes:      Extraocular Movements: Extraocular movements intact.      Conjunctiva/sclera: Conjunctivae normal.   Cardiovascular:      Rate and Rhythm: Tachycardia present. Rhythm irregular.      Heart sounds: Normal heart sounds.   Pulmonary:      Effort: Pulmonary effort is normal.      Breath sounds: Normal breath sounds. No wheezing, rhonchi or rales.   Abdominal:      General: Abdomen is flat. Bowel sounds are normal.      Palpations: Abdomen is soft.   Musculoskeletal:         General: No swelling or tenderness.  Normal range of motion.      Cervical back: Normal range of motion and neck supple.   Skin:     General: Skin is warm and dry.      Findings: No rash.   Neurological:      General: No focal deficit present.      Mental Status: She is alert and oriented to person, place, and time.      Cranial Nerves: No cranial nerve deficit.      Sensory: No sensory deficit.   Psychiatric:         Mood and Affect: Mood normal.         Behavior: Behavior normal.         Relevant Results  Scheduled medications  amoxicillin-pot clavulanate, 1 tablet, oral, q12h AICHA  apixaban, 5 mg, oral, BID  empagliflozin, 10 mg, oral, Daily  famotidine, 20 mg, oral, Daily  metoprolol tartrate, 25 mg, oral, BID  nystatin, 1 Application, Topical, BID  triamcinolone, , Topical, BID      Continuous medications     PRN medications  PRN medications: acetaminophen, albuterol, dextromethorphan-guaifenesin, diphenhydrAMINE, melatonin, polyethylene glycol    Results for orders placed or performed during the hospital encounter of 01/08/25 (from the past 24 hours)   Transthoracic Echo (TTE) Complete   Result Value Ref Range    AV pk marguerite 1.89 m/s    LV Biplane EF 53 %    LVOT diam 1.94 cm    MV E/A ratio 1.33     Tricuspid annular plane systolic excursion 2.3 cm    LA vol index A/L 22.1 ml/m2    AV mn grad 8 mmHg    LV EF 53 %    RV free wall pk S' 14.60 cm/s    RVSP 59.3 mmHg    LVIDd 5.01 cm    Aortic Valve Area by Continuity of Peak Velocity 1.48 cm2    AV pk grad 14 mmHg    Aortic Valve Area by Continuity of VTI 1.55 cm2    LV A4C EF 34.7    POCT GLUCOSE   Result Value Ref Range    POCT Glucose 148 (H) 74 - 99 mg/dL   CBC   Result Value Ref Range    WBC 4.5 4.4 - 11.3 x10*3/uL    nRBC 0.0 0.0 - 0.0 /100 WBCs    RBC 3.89 (L) 4.00 - 5.20 x10*6/uL    Hemoglobin 10.4 (L) 12.0 - 16.0 g/dL    Hematocrit 33.5 (L) 36.0 - 46.0 %    MCV 86 80 - 100 fL    MCH 26.7 26.0 - 34.0 pg    MCHC 31.0 (L) 32.0 - 36.0 g/dL    RDW 12.9 11.5 - 14.5 %    Platelets 106 (L) 150 - 450  x10*3/uL   Basic Metabolic Panel   Result Value Ref Range    Glucose 118 (H) 74 - 99 mg/dL    Sodium 143 136 - 145 mmol/L    Potassium 4.1 3.5 - 5.3 mmol/L    Chloride 108 (H) 98 - 107 mmol/L    Bicarbonate 26 21 - 32 mmol/L    Anion Gap 13 10 - 20 mmol/L    Urea Nitrogen 14 6 - 23 mg/dL    Creatinine 0.81 0.50 - 1.05 mg/dL    eGFR 83 >60 mL/min/1.73m*2    Calcium 8.7 8.6 - 10.3 mg/dL   Magnesium   Result Value Ref Range    Magnesium 2.39 1.60 - 2.40 mg/dL   Phosphorus   Result Value Ref Range    Phosphorus 4.3 2.5 - 4.9 mg/dL     *Note: Due to a large number of results and/or encounters for the requested time period, some results have not been displayed. A complete set of results can be found in Results Review.       ECG 12 lead    Result Date: 1/8/2025  Marked sinus bradycardia with frequent and consecutive Premature ventricular complexes and Fusion complexes Low voltage QRS Cannot rule out Anterior infarct , age undetermined When compared with ECG of 27-AUG-2024 13:13, Premature ventricular complexes are now Present Nonspecific T wave abnormality no longer evident in Anterior leads Reconfirmed by Nathaniel Rodriguez ([id number]) on 1/8/2025 7:36:54 PM    Lower extremity venous duplex left    Result Date: 1/8/2025            [facility] [id number] [place] [place] [address]     Tel [phone] Fax [phone]  Vascular Lab Report  [facility] US LOWER EXTREMITY VENOUS DUPLEX LEFT Patient Name:      MADISYN Thomson Physician:  [id number] Jennifer Collier MD Study Date:        1/8/2025             Ordering Provider:  [id number] LIAN BLEVINS MRN/PID:           [id number]             Fellow: Accession#:        [id number]         Technologist:       Gunjan Warren RVT, MS Date of Birth/Age: 1965 / 59 years Technologist 2: Gender:            F                    Encounter#:         [id number]  Admission Status:  Emergency            Location Performed: [facility]  Diagnosis/ICD: Pain in left lower leg-M79.662 Indication:    Limb swelling CPT Codes:     39298 Peripheral venous duplex scan for DVT Limited  Pertinent History: HTN and Anticoagulation. DM Afib.  CONCLUSIONS: Left Lower Venous: No evidence of acute deep vein thrombus visualized in the left lower extremity. Calf veins visualized in segments. Additional Findings: Pulsatile waveforms are suggestive of possible underlying volume overload.  Comparison: Compared with study from 12/14/2022, no significant change.No evidence of DVT.  Imaging & Doppler Findings:  Right   Flow CFV   Pulsatile  Left                  Compress Thrombus   Flow Distal External Iliac            None   Pulsatile CFV                     Yes      None   Pulsatile PFV                     Yes      None FV Proximal             Yes      None   Pulsatile FV Mid                  Yes      None FV Distal               Yes      None Popliteal               Yes      None   Pulsatile Peroneal                Yes      None PTV                     Yes      None  [id number] Jennifer Collier MD Electronically signed by [id number] Jennifer Collier MD on 1/8/2025 at 6:37:01 PM  ** Final **     ECG 12 Lead    Result Date: 1/8/2025  EKG sinus rhythm with frequnt pvc. Poor r wave progression    XR chest 1 view    Result Date: 1/8/2025  Interpreted By:  Funmi Guzman, STUDY: XR CHEST 1 VIEW 1/8/2025 1:25 pm   INDICATION: Signs/Symptoms:BRADYCARDIA   COMPARISON: 08/27/2024   ACCESSION NUMBER(S): [id number]   ORDERING CLINICIAN: LIAN LBEVINS   TECHNIQUE: AP erect view of the chest   FINDINGS: Heart is enlarged with calcified plaque seen in the aortic knob. The lungs are clear without pleural abnormality. There are degenerative changes of the thoracic spine.       Cardiomegaly without acute pulmonary pathology.   Signed by: Funmi Guzman 1/8/2025 1:31 PM Dictation workstation:   [id number]        Assessment/Plan   Assessment & Plan  NSVT (nonsustained ventricular tachycardia) (Multi)    Anxiety    Chronic venous stasis    Diabetic retinopathy (Multi)    Gastroesophageal reflux disease    Paroxysmal atrial fibrillation (Multi)    Type 2 diabetes mellitus    Arrhythmia      59-year-old female with acute sinusitis which is improving has history of paroxysmal atrial fibrillation and is currently in bigeminy with tachycardia which may represent atrial fibrillation with aberrancy and also noted to have nonsustained V. tach is an overall hemodynamically stable condition.    -Patient with NSVT in emergency department  -Patient continues to remain in bigeminy  -Patient had Holter monitor entire month of December but had no episodes of lightheadedness which she has had previously  -Troponin is not elevated  -EKG not overly concerning for ischemia at this time; cardiology consulted and has ordered EKG and 2D echo; EP consulted; plan for transfer downtown; appreciate input  -Electrolytes stable  -Patient currently dealing with sinus infection; was started on doxycycline in outpatient setting and completed the course; have started her on Augmentin and Flonase and she feels better so we will continue this therapy  -Remainder of home medications resumed as appropriate    Code: Full  DVT prophylaxis: Eliquis  GI prophylaxis: Not indicated  Diet: Cardiac    Dispo: Discharge pending bed availability at [facility].    Vignesh Keen MD

## 2025-01-10 NOTE — HOSPITAL COURSE
History Of Present Illness  Holly Chavez is a 59 y.o. female with a past medical history of afib (on Eliquis) and T2DM who presented to the ED with irregular heartbeat. She was evaluated this morning by her PCP for follow-up for sinusitis when the irregular heartbeat was discovered.  She denies any chest pain, but did endorse some more shortness of breath at the PCP visit. She denies any fever, chills, chest pain, shortness of breath at rest, or GI/ symptoms. She does endorse some dyspnea on exertion, however notes this has been ongoing for a few months. She endorses frontal sinus pain, worse with pressure. She has not tried any nasal sprays. She recently finished a course of doxycycline without improvement.     Hospital course: Patient was admitted to the hospital with roxana seen at PCPs office.  Patient was also dealing with sinusitis and was placed on Augmentin and did have improvement from this.  However patient was tachycardic and seen by cardiology and electrophysiology especially in light of nonsustained ventricular tachycardia being seen on telemetry.  Recommendation was for patient to be transferred downtown to [facility].  Patient was accepted for transfer and plan is for cardiac MRI, possible PET/CT of heart, and rate control under the management of cardiology team there.  Patient being transferred in stable condition.    Discharge time greater than 35 minutes. Greater than 50% of time spent on counseling patient, coordination of care, medication reconciliation, transmitting medications to pharmacy and clarifying plan of care with nursing staff and case management.

## 2025-01-10 NOTE — CARE PLAN
Problem: Pain - Adult  Goal: Verbalizes/displays adequate comfort level or baseline comfort level  Outcome: Met     Problem: Safety - Adult  Goal: Free from fall injury  Outcome: Met     Problem: Skin  Goal: Participates in plan/prevention/treatment measures  Outcome: Met       Pt remained HDS throughout shift. Pt remained in NSR w/ bigeminy, HR 70s-90s and blood pressures remained stable this shift. Pt had no complaints of chest pain or discomfort this shift. Pt awaiting bed placement at [facility] for cardiac [place]. Safety maintained. Alarms on.

## 2025-01-10 NOTE — CARE PLAN
Problem: Arrythmia/Dysrhythmia  Goal: Vital signs return to baseline  Outcome: Progressing     Problem: Arrythmia/Dysrhythmia  Goal: Serial ECG will return to baseline  Outcome: Progressing     Problem: Safety - Adult  Goal: Free from fall injury  Outcome: Progressing     Problem: Pain - Adult  Goal: Verbalizes/displays adequate comfort level or baseline comfort level  Outcome: Progressing    The patient's goals for the shift include to understand what is wrong with her heart and to decide what plan she will choose to help fix the electrical activity of the heart.    The clinical goals for the shift include to have a controlled heart rate below 100 beats per minute.

## 2025-01-11 ENCOUNTER — HOSPITAL ENCOUNTER (INPATIENT)
Facility: HOSPITAL | Age: 60
End: 2025-01-11
Attending: INTERNAL MEDICINE | Admitting: INTERNAL MEDICINE
Payer: MEDICARE

## 2025-01-11 ENCOUNTER — APPOINTMENT (OUTPATIENT)
Dept: CARDIOLOGY | Facility: HOSPITAL | Age: 60
End: 2025-01-11
Payer: MEDICARE

## 2025-01-11 VITALS
HEIGHT: 62 IN | RESPIRATION RATE: 18 BRPM | DIASTOLIC BLOOD PRESSURE: 57 MMHG | WEIGHT: 214.51 LBS | BODY MASS INDEX: 39.47 KG/M2 | TEMPERATURE: 98.6 F | SYSTOLIC BLOOD PRESSURE: 116 MMHG | OXYGEN SATURATION: 98 % | HEART RATE: 56 BPM

## 2025-01-11 DIAGNOSIS — I47.29 NSVT (NONSUSTAINED VENTRICULAR TACHYCARDIA) (MULTI): Primary | ICD-10-CM

## 2025-01-11 DIAGNOSIS — Z79.4 TYPE 2 DIABETES MELLITUS WITH OTHER OPHTHALMIC COMPLICATION, WITH LONG-TERM CURRENT USE OF INSULIN: ICD-10-CM

## 2025-01-11 DIAGNOSIS — I48.0 PAROXYSMAL ATRIAL FIBRILLATION (MULTI): ICD-10-CM

## 2025-01-11 DIAGNOSIS — I48.91 ATRIAL FIBRILLATION, UNSPECIFIED TYPE (MULTI): ICD-10-CM

## 2025-01-11 DIAGNOSIS — E11.39 TYPE 2 DIABETES MELLITUS WITH OTHER OPHTHALMIC COMPLICATION, WITH LONG-TERM CURRENT USE OF INSULIN: ICD-10-CM

## 2025-01-11 LAB
ABO GROUP (TYPE) IN BLOOD: NORMAL
ALBUMIN SERPL BCP-MCNC: 4 G/DL (ref 3.4–5)
ALP SERPL-CCNC: 51 U/L (ref 33–136)
ALT SERPL W P-5'-P-CCNC: 10 U/L (ref 7–45)
ANION GAP SERPL CALC-SCNC: 11 MMOL/L (ref 10–20)
ANTIBODY SCREEN: NORMAL
APTT PPP: 52 SECONDS (ref 27–38)
AST SERPL W P-5'-P-CCNC: 12 U/L (ref 9–39)
ATRIAL RATE: 55 BPM
ATRIAL RATE: 79 BPM
BASOPHILS # BLD AUTO: 0.02 X10*3/UL (ref 0–0.1)
BASOPHILS NFR BLD AUTO: 0.4 %
BILIRUB SERPL-MCNC: 0.6 MG/DL (ref 0–1.2)
BUN SERPL-MCNC: 15 MG/DL (ref 6–23)
CALCIUM SERPL-MCNC: 9.4 MG/DL (ref 8.6–10.6)
CHLORIDE SERPL-SCNC: 106 MMOL/L (ref 98–107)
CO2 SERPL-SCNC: 28 MMOL/L (ref 21–32)
CREAT SERPL-MCNC: 0.78 MG/DL (ref 0.5–1.05)
EGFRCR SERPLBLD CKD-EPI 2021: 87 ML/MIN/1.73M*2
EOSINOPHIL # BLD AUTO: 0.21 X10*3/UL (ref 0–0.7)
EOSINOPHIL NFR BLD AUTO: 4 %
ERYTHROCYTE [DISTWIDTH] IN BLOOD BY AUTOMATED COUNT: 12.7 % (ref 11.5–14.5)
GLUCOSE BLD MANUAL STRIP-MCNC: 112 MG/DL (ref 74–99)
GLUCOSE BLD MANUAL STRIP-MCNC: 128 MG/DL (ref 74–99)
GLUCOSE BLD MANUAL STRIP-MCNC: 142 MG/DL (ref 74–99)
GLUCOSE SERPL-MCNC: 124 MG/DL (ref 74–99)
HCT VFR BLD AUTO: 35.2 % (ref 36–46)
HGB BLD-MCNC: 11.2 G/DL (ref 12–16)
IMM GRANULOCYTES # BLD AUTO: 0.02 X10*3/UL (ref 0–0.7)
IMM GRANULOCYTES NFR BLD AUTO: 0.4 % (ref 0–0.9)
INR PPP: 1.7 (ref 0.9–1.1)
LYMPHOCYTES # BLD AUTO: 1.32 X10*3/UL (ref 1.2–4.8)
LYMPHOCYTES NFR BLD AUTO: 25.3 %
MAGNESIUM SERPL-MCNC: 2.54 MG/DL (ref 1.6–2.4)
MCH RBC QN AUTO: 26.8 PG (ref 26–34)
MCHC RBC AUTO-ENTMCNC: 31.8 G/DL (ref 32–36)
MCV RBC AUTO: 84 FL (ref 80–100)
MONOCYTES # BLD AUTO: 0.31 X10*3/UL (ref 0.1–1)
MONOCYTES NFR BLD AUTO: 6 %
NEUTROPHILS # BLD AUTO: 3.33 X10*3/UL (ref 1.2–7.7)
NEUTROPHILS NFR BLD AUTO: 63.9 %
NRBC BLD-RTO: 0 /100 WBCS (ref 0–0)
P AXIS: 48 DEGREES
P AXIS: 49 DEGREES
P OFFSET: 168 MS
P OFFSET: 196 MS
P ONSET: 142 MS
P ONSET: 143 MS
PHOSPHATE SERPL-MCNC: 5.2 MG/DL (ref 2.5–4.9)
PLATELET # BLD AUTO: 134 X10*3/UL (ref 150–450)
POTASSIUM SERPL-SCNC: 4.3 MMOL/L (ref 3.5–5.3)
PR INTERVAL: 154 MS
PROT SERPL-MCNC: 6.9 G/DL (ref 6.4–8.2)
PROTHROMBIN TIME: 18.9 SECONDS (ref 9.8–12.8)
Q ONSET: 212 MS
Q ONSET: 219 MS
QRS COUNT: 19 BEATS
QRS COUNT: 9 BEATS
QRS DURATION: 74 MS
QRS DURATION: 78 MS
QT INTERVAL: 318 MS
QT INTERVAL: 446 MS
QTC CALCULATION(BAZETT): 426 MS
QTC CALCULATION(BAZETT): 439 MS
QTC FREDERICIA: 394 MS
QTC FREDERICIA: 433 MS
R AXIS: 54 DEGREES
R AXIS: 58 DEGREES
RBC # BLD AUTO: 4.18 X10*6/UL (ref 4–5.2)
RH FACTOR (ANTIGEN D): NORMAL
SODIUM SERPL-SCNC: 141 MMOL/L (ref 136–145)
T AXIS: 35 DEGREES
T AXIS: 51 DEGREES
T OFFSET: 371 MS
T OFFSET: 442 MS
VENTRICULAR RATE: 115 BPM
VENTRICULAR RATE: 55 BPM
WBC # BLD AUTO: 5.2 X10*3/UL (ref 4.4–11.3)

## 2025-01-11 PROCEDURE — 2500000001 HC RX 250 WO HCPCS SELF ADMINISTERED DRUGS (ALT 637 FOR MEDICARE OP): Performed by: INTERNAL MEDICINE

## 2025-01-11 PROCEDURE — 84075 ASSAY ALKALINE PHOSPHATASE: CPT

## 2025-01-11 PROCEDURE — 93010 ELECTROCARDIOGRAM REPORT: CPT | Performed by: INTERNAL MEDICINE

## 2025-01-11 PROCEDURE — 2500000004 HC RX 250 GENERAL PHARMACY W/ HCPCS (ALT 636 FOR OP/ED): Performed by: STUDENT IN AN ORGANIZED HEALTH CARE EDUCATION/TRAINING PROGRAM

## 2025-01-11 PROCEDURE — 36415 COLL VENOUS BLD VENIPUNCTURE: CPT

## 2025-01-11 PROCEDURE — 84100 ASSAY OF PHOSPHORUS: CPT

## 2025-01-11 PROCEDURE — 86901 BLOOD TYPING SEROLOGIC RH(D): CPT

## 2025-01-11 PROCEDURE — 99239 HOSP IP/OBS DSCHRG MGMT >30: CPT | Performed by: INTERNAL MEDICINE

## 2025-01-11 PROCEDURE — 93005 ELECTROCARDIOGRAM TRACING: CPT

## 2025-01-11 PROCEDURE — 80053 COMPREHEN METABOLIC PANEL: CPT

## 2025-01-11 PROCEDURE — 99291 CRITICAL CARE FIRST HOUR: CPT

## 2025-01-11 PROCEDURE — 86900 BLOOD TYPING SEROLOGIC ABO: CPT

## 2025-01-11 PROCEDURE — 83735 ASSAY OF MAGNESIUM: CPT

## 2025-01-11 PROCEDURE — 82947 ASSAY GLUCOSE BLOOD QUANT: CPT

## 2025-01-11 PROCEDURE — 85610 PROTHROMBIN TIME: CPT

## 2025-01-11 PROCEDURE — 2500000001 HC RX 250 WO HCPCS SELF ADMINISTERED DRUGS (ALT 637 FOR MEDICARE OP): Performed by: NURSE PRACTITIONER

## 2025-01-11 PROCEDURE — 2500000004 HC RX 250 GENERAL PHARMACY W/ HCPCS (ALT 636 FOR OP/ED)

## 2025-01-11 PROCEDURE — 2020000001 HC ICU ROOM DAILY

## 2025-01-11 PROCEDURE — 85025 COMPLETE CBC W/AUTO DIFF WBC: CPT

## 2025-01-11 PROCEDURE — 2500000001 HC RX 250 WO HCPCS SELF ADMINISTERED DRUGS (ALT 637 FOR MEDICARE OP)

## 2025-01-11 PROCEDURE — 85730 THROMBOPLASTIN TIME PARTIAL: CPT

## 2025-01-11 RX ORDER — GUAIFENESIN/DEXTROMETHORPHAN 100-10MG/5
5 SYRUP ORAL EVERY 4 HOURS PRN
Status: DISPENSED | OUTPATIENT
Start: 2025-01-11

## 2025-01-11 RX ORDER — INSULIN GLARGINE 100 [IU]/ML
6 INJECTION, SOLUTION SUBCUTANEOUS DAILY
Status: DISPENSED | OUTPATIENT
Start: 2025-01-12

## 2025-01-11 RX ORDER — LOPERAMIDE HYDROCHLORIDE 2 MG/1
2 CAPSULE ORAL 4 TIMES DAILY PRN
Status: DISCONTINUED | OUTPATIENT
Start: 2025-01-11 | End: 2025-01-11

## 2025-01-11 RX ORDER — ALBUTEROL SULFATE 90 UG/1
2 INHALANT RESPIRATORY (INHALATION) EVERY 4 HOURS PRN
Status: DISPENSED | OUTPATIENT
Start: 2025-01-11

## 2025-01-11 RX ORDER — POLYETHYLENE GLYCOL 3350 17 G/17G
17 POWDER, FOR SOLUTION ORAL DAILY PRN
Status: ACTIVE | OUTPATIENT
Start: 2025-01-11

## 2025-01-11 RX ORDER — CHOLECALCIFEROL (VITAMIN D3) 25 MCG
2000 TABLET ORAL DAILY
Status: DISPENSED | OUTPATIENT
Start: 2025-01-11

## 2025-01-11 RX ORDER — INSULIN LISPRO 100 [IU]/ML
0-5 INJECTION, SOLUTION INTRAVENOUS; SUBCUTANEOUS
Status: DISPENSED | OUTPATIENT
Start: 2025-01-11

## 2025-01-11 RX ORDER — AMOXICILLIN AND CLAVULANATE POTASSIUM 875; 125 MG/1; MG/1
1 TABLET, FILM COATED ORAL EVERY 12 HOURS SCHEDULED
Status: DISCONTINUED | OUTPATIENT
Start: 2025-01-11 | End: 2025-01-12

## 2025-01-11 RX ORDER — DEXTROSE 50 % IN WATER (D50W) INTRAVENOUS SYRINGE
25
Status: ACTIVE | OUTPATIENT
Start: 2025-01-11

## 2025-01-11 RX ORDER — METOPROLOL TARTRATE 25 MG/1
25 TABLET, FILM COATED ORAL 2 TIMES DAILY
Status: ACTIVE | OUTPATIENT
Start: 2025-01-11

## 2025-01-11 RX ORDER — ALPRAZOLAM 0.25 MG/1
0.25 TABLET ORAL ONCE
Status: COMPLETED | OUTPATIENT
Start: 2025-01-11 | End: 2025-01-11

## 2025-01-11 RX ORDER — DIPHENHYDRAMINE HCL 25 MG
25 CAPSULE ORAL EVERY 6 HOURS PRN
Status: ACTIVE | OUTPATIENT
Start: 2025-01-11

## 2025-01-11 RX ORDER — ATORVASTATIN CALCIUM 10 MG/1
10 TABLET, FILM COATED ORAL DAILY
Status: DISPENSED | OUTPATIENT
Start: 2025-01-11

## 2025-01-11 RX ORDER — ACETAMINOPHEN 500 MG
10 TABLET ORAL NIGHTLY PRN
Status: DISPENSED | OUTPATIENT
Start: 2025-01-11

## 2025-01-11 RX ORDER — ACETAMINOPHEN 325 MG/1
975 TABLET ORAL EVERY 8 HOURS PRN
Status: ACTIVE | OUTPATIENT
Start: 2025-01-11

## 2025-01-11 RX ORDER — TRIAMCINOLONE ACETONIDE 1 MG/G
OINTMENT TOPICAL 2 TIMES DAILY
Status: DISPENSED | OUTPATIENT
Start: 2025-01-11

## 2025-01-11 RX ORDER — FAMOTIDINE 20 MG/1
20 TABLET, FILM COATED ORAL DAILY
Status: DISPENSED | OUTPATIENT
Start: 2025-01-12

## 2025-01-11 RX ORDER — DEXTROSE 50 % IN WATER (D50W) INTRAVENOUS SYRINGE
12.5
Status: ACTIVE | OUTPATIENT
Start: 2025-01-11

## 2025-01-11 RX ORDER — LANOLIN ALCOHOL/MO/W.PET/CERES
1000 CREAM (GRAM) TOPICAL DAILY
Status: DISPENSED | OUTPATIENT
Start: 2025-01-11

## 2025-01-11 RX ORDER — NYSTATIN 100000 [USP'U]/G
1 POWDER TOPICAL 2 TIMES DAILY
Status: DISPENSED | OUTPATIENT
Start: 2025-01-11

## 2025-01-11 RX ADMIN — CYANOCOBALAMIN TAB 1000 MCG 1000 MCG: 1000 TAB at 20:11

## 2025-01-11 RX ADMIN — AMIODARONE HYDROCHLORIDE 0.5 MG/MIN: 1.8 INJECTION, SOLUTION INTRAVENOUS at 05:46

## 2025-01-11 RX ADMIN — GUAIFENESIN SYRUP AND DEXTROMETHORPHAN 5 ML: 100; 10 SYRUP ORAL at 08:50

## 2025-01-11 RX ADMIN — APIXABAN 5 MG: 5 TABLET, FILM COATED ORAL at 20:08

## 2025-01-11 RX ADMIN — AMIODARONE HYDROCHLORIDE 0.5 MG/MIN: 1.8 INJECTION, SOLUTION INTRAVENOUS at 14:45

## 2025-01-11 RX ADMIN — EMPAGLIFLOZIN 10 MG: 10 TABLET, FILM COATED ORAL at 08:48

## 2025-01-11 RX ADMIN — NYSTATIN 1 APPLICATION: 100000 POWDER TOPICAL at 20:09

## 2025-01-11 RX ADMIN — AMOXICILLIN AND CLAVULANATE POTASSIUM 1 TABLET: 875; 125 TABLET, FILM COATED ORAL at 08:47

## 2025-01-11 RX ADMIN — AMIODARONE HYDROCHLORIDE 0.5 MG/MIN: 1.8 INJECTION, SOLUTION INTRAVENOUS at 17:15

## 2025-01-11 RX ADMIN — AMOXICILLIN AND CLAVULANATE POTASSIUM 1 TABLET: 875; 125 TABLET, FILM COATED ORAL at 20:09

## 2025-01-11 RX ADMIN — ATORVASTATIN CALCIUM 10 MG: 20 TABLET, FILM COATED ORAL at 20:08

## 2025-01-11 RX ADMIN — ALPRAZOLAM 0.25 MG: 0.25 TABLET ORAL at 08:47

## 2025-01-11 RX ADMIN — TRIAMCINOLONE ACETONIDE: 1 OINTMENT TOPICAL at 08:47

## 2025-01-11 RX ADMIN — NYSTATIN 1 APPLICATION: 100000 POWDER TOPICAL at 08:47

## 2025-01-11 RX ADMIN — APIXABAN 5 MG: 5 TABLET, FILM COATED ORAL at 08:47

## 2025-01-11 RX ADMIN — FAMOTIDINE 20 MG: 20 TABLET, FILM COATED ORAL at 08:47

## 2025-01-11 RX ADMIN — Medication 2000 UNITS: at 20:10

## 2025-01-11 RX ADMIN — TRIAMCINOLONE ACETONIDE: 1 OINTMENT TOPICAL at 20:08

## 2025-01-11 SDOH — SOCIAL STABILITY: SOCIAL INSECURITY: WITHIN THE LAST YEAR, HAVE YOU BEEN HUMILIATED OR EMOTIONALLY ABUSED IN OTHER WAYS BY YOUR PARTNER OR EX-PARTNER?: NO

## 2025-01-11 SDOH — ECONOMIC STABILITY: FOOD INSECURITY: WITHIN THE PAST 12 MONTHS, YOU WORRIED THAT YOUR FOOD WOULD RUN OUT BEFORE YOU GOT THE MONEY TO BUY MORE.: NEVER TRUE

## 2025-01-11 SDOH — SOCIAL STABILITY: SOCIAL INSECURITY: DOES ANYONE TRY TO KEEP YOU FROM HAVING/CONTACTING OTHER FRIENDS OR DOING THINGS OUTSIDE YOUR HOME?: NO

## 2025-01-11 SDOH — SOCIAL STABILITY: SOCIAL INSECURITY: WITHIN THE LAST YEAR, HAVE YOU BEEN AFRAID OF YOUR PARTNER OR EX-PARTNER?: NO

## 2025-01-11 SDOH — SOCIAL STABILITY: SOCIAL INSECURITY: ARE THERE ANY APPARENT SIGNS OF INJURIES/BEHAVIORS THAT COULD BE RELATED TO ABUSE/NEGLECT?: NO

## 2025-01-11 SDOH — SOCIAL STABILITY: SOCIAL INSECURITY: ABUSE: ADULT

## 2025-01-11 SDOH — SOCIAL STABILITY: SOCIAL INSECURITY: ARE YOU OR HAVE YOU BEEN THREATENED OR ABUSED PHYSICALLY, EMOTIONALLY, OR SEXUALLY BY ANYONE?: NO

## 2025-01-11 SDOH — SOCIAL STABILITY: SOCIAL INSECURITY: DO YOU FEEL ANYONE HAS EXPLOITED OR TAKEN ADVANTAGE OF YOU FINANCIALLY OR OF YOUR PERSONAL PROPERTY?: NO

## 2025-01-11 SDOH — ECONOMIC STABILITY: INCOME INSECURITY: IN THE PAST 12 MONTHS HAS THE ELECTRIC, GAS, OIL, OR WATER COMPANY THREATENED TO SHUT OFF SERVICES IN YOUR HOME?: NO

## 2025-01-11 SDOH — SOCIAL STABILITY: SOCIAL INSECURITY: HAS ANYONE EVER THREATENED TO HURT YOUR FAMILY OR YOUR PETS?: NO

## 2025-01-11 SDOH — ECONOMIC STABILITY: FOOD INSECURITY: WITHIN THE PAST 12 MONTHS, THE FOOD YOU BOUGHT JUST DIDN'T LAST AND YOU DIDN'T HAVE MONEY TO GET MORE.: NEVER TRUE

## 2025-01-11 SDOH — SOCIAL STABILITY: SOCIAL INSECURITY: HAVE YOU HAD THOUGHTS OF HARMING ANYONE ELSE?: YES

## 2025-01-11 SDOH — SOCIAL STABILITY: SOCIAL INSECURITY: DO YOU FEEL UNSAFE GOING BACK TO THE PLACE WHERE YOU ARE LIVING?: NO

## 2025-01-11 SDOH — SOCIAL STABILITY: SOCIAL INSECURITY: WERE YOU ABLE TO COMPLETE ALL THE BEHAVIORAL HEALTH SCREENINGS?: YES

## 2025-01-11 SDOH — SOCIAL STABILITY: SOCIAL INSECURITY: HAVE YOU HAD ANY THOUGHTS OF HARMING ANYONE ELSE?: NO

## 2025-01-11 ASSESSMENT — ACTIVITIES OF DAILY LIVING (ADL)
HEARING - RIGHT EAR: FUNCTIONAL
ADEQUATE_TO_COMPLETE_ADL: YES
PATIENT'S MEMORY ADEQUATE TO SAFELY COMPLETE DAILY ACTIVITIES?: YES
WALKS IN HOME: INDEPENDENT
TOILETING: INDEPENDENT
DRESSING YOURSELF: INDEPENDENT
FEEDING YOURSELF: INDEPENDENT
LACK_OF_TRANSPORTATION: NO
JUDGMENT_ADEQUATE_SAFELY_COMPLETE_DAILY_ACTIVITIES: YES
BATHING: INDEPENDENT
GROOMING: INDEPENDENT
HEARING - LEFT EAR: FUNCTIONAL
ASSISTIVE_DEVICE: EYEGLASSES

## 2025-01-11 ASSESSMENT — PATIENT HEALTH QUESTIONNAIRE - PHQ9
2. FEELING DOWN, DEPRESSED OR HOPELESS: NOT AT ALL
SUM OF ALL RESPONSES TO PHQ9 QUESTIONS 1 & 2: 0
1. LITTLE INTEREST OR PLEASURE IN DOING THINGS: NOT AT ALL

## 2025-01-11 ASSESSMENT — COLUMBIA-SUICIDE SEVERITY RATING SCALE - C-SSRS
2. HAVE YOU ACTUALLY HAD ANY THOUGHTS OF KILLING YOURSELF?: NO
1. IN THE PAST MONTH, HAVE YOU WISHED YOU WERE DEAD OR WISHED YOU COULD GO TO SLEEP AND NOT WAKE UP?: NO
6. HAVE YOU EVER DONE ANYTHING, STARTED TO DO ANYTHING, OR PREPARED TO DO ANYTHING TO END YOUR LIFE?: NO

## 2025-01-11 ASSESSMENT — COGNITIVE AND FUNCTIONAL STATUS - GENERAL
DAILY ACTIVITIY SCORE: 24
PATIENT BASELINE BEDBOUND: NO
DAILY ACTIVITIY SCORE: 24
CLIMB 3 TO 5 STEPS WITH RAILING: A LITTLE
MOBILITY SCORE: 23
MOBILITY SCORE: 24

## 2025-01-11 ASSESSMENT — PAIN - FUNCTIONAL ASSESSMENT
PAIN_FUNCTIONAL_ASSESSMENT: 0-10

## 2025-01-11 ASSESSMENT — PAIN SCALES - GENERAL
PAINLEVEL_OUTOF10: 0 - NO PAIN

## 2025-01-11 ASSESSMENT — LIFESTYLE VARIABLES
SKIP TO QUESTIONS 9-10: 1
AUDIT-C TOTAL SCORE: 0
HOW OFTEN DO YOU HAVE A DRINK CONTAINING ALCOHOL: NEVER
HOW OFTEN DO YOU HAVE 6 OR MORE DRINKS ON ONE OCCASION: NEVER
HOW MANY STANDARD DRINKS CONTAINING ALCOHOL DO YOU HAVE ON A TYPICAL DAY: PATIENT DOES NOT DRINK
AUDIT-C TOTAL SCORE: 0

## 2025-01-11 NOTE — CARE PLAN
HR did not sustain < 120 during beginning of shift. Upon first assessment, HR was 90s-low 100s with bigeminal PVCs. EKG obtained, read undetermined rhythm, provider on unit to read EKG. Shortly after, HR sustaining 120s-140s with continued bigeminy. Provider notified, amiodarone gtt ordered, bolus and gtt administered per order. Pt converted to SB around [id number], EKG obtained, read SB. Provider notified and EKG sent, amio gtt kept on per provider order. Titrated from 1mg/min to 0.5mg/min per order. Pt currently sustaining SB in mid 50s. Pt given PRN robitussin for cough x1. VS otherwise stable. Maintained room air. No complaints of pain during the night. Up to restroom with standby/mostly independent several times, tolerated well. C/o upset stomach this am, refused antinausea med. Compliant with medications. Safety maintained.    Problem: Pain - Adult  Goal: Verbalizes/displays adequate comfort level or baseline comfort level  Recent Flowsheet Documentation  Taken 1/10/2025 2212 by Natalia Tobin RN  Verbalizes/displays adequate comfort level or baseline comfort level:   Encourage patient to monitor pain and request assistance   Administer analgesics based on type and severity of pain and evaluate response   Assess pain using appropriate pain scale     Problem: Safety - Adult  Goal: Free from fall injury  Recent Flowsheet Documentation  Taken 1/10/2025 2212 by Natalia Tobin RN  Free from fall injury: Instruct family/caregiver on patient safety     Problem: Discharge Planning  Goal: Discharge to home or other facility with appropriate resources  1/11/2025 0552 by Natalia Tobin RN  Outcome: Progressing     Problem: Chronic Conditions and Co-morbidities  Goal: Patient's chronic conditions and co-morbidity symptoms are monitored and maintained or improved  1/11/2025 0552 by Natalia Tobin RN  Outcome: Progressing  1/10/2025 2212 by Natalia Tobin RN  Flowsheets (Taken 1/10/2025 2212)  Care Plan - Patient's  Chronic Conditions and Co-Morbidity Symptoms are Monitored and Maintained or Improved:   Monitor and assess patient's chronic conditions and comorbid symptoms for stability, deterioration, or improvement   Collaborate with multidisciplinary team to address chronic and comorbid conditions and prevent exacerbation or deterioration     Problem: Safety - Adult  Goal: Free from fall injury  1/11/2025 0552 by Natalia Tobin RN  Outcome: Met     Problem: Arrythmia/Dysrhythmia  Goal: Serial ECG will return to baseline  1/11/2025 0552 by Natalia Tobin RN  Outcome: Progressing     Problem: Arrythmia/Dysrhythmia  Goal: Vital signs return to baseline  1/11/2025 0552 by Natalia Tobin RN  Outcome: Progressing     Problem: Arrythmia/Dysrhythmia  Goal: Lab values return to normal range  1/11/2025 0552 by Natalia Tobin RN  Outcome: Progressing     Problem: Arrythmia/Dysrhythmia  Goal: Verbalize understanding of procedures/devices  Outcome: Progressing     Problem: Skin  Goal: Decreased wound size/increased tissue granulation at next dressing change  1/11/2025 0552 by Natalia Tobin RN  Outcome: Progressing     Problem: Skin  Goal: Prevent/manage excess moisture  1/11/2025 0552 by Natalia Tobin RN  Outcome: Progressing     Problem: Skin  Goal: Prevent/minimize sheer/friction injuries  1/11/2025 0552 by Natalia Tobin RN  Outcome: Progressing     Problem: Skin  Goal: Promote/optimize nutrition  1/11/2025 0552 by Natalia Tobin RN  Outcome: Progressing     Problem: Fall/Injury  Goal: Not fall by end of shift  Outcome: Met     Problem: Fall/Injury  Goal: Be free from injury by end of the shift  Outcome: Met     Problem: Fall/Injury  Goal: Verbalize understanding of personal risk factors for fall in the hospital  Outcome: Progressing   The patient's goals for the shift include Understand what is going on with my heart    The clinical goals for the shift include Pt will maintain HR < 120 during the shift.

## 2025-01-11 NOTE — Clinical Note
Sheath was exchanged in the right femoral vein with INTRODUCER, AGILIS, MEDIUM CURL, 8.5FR X 71CM, DUAL. Flap Type: Z-plasty

## 2025-01-11 NOTE — SIGNIFICANT EVENT
Patient discharged via [facility] with no issues, A&Ox4, RA. Pt left on amiodarone drip at 0.5mg/min. Pt and  left with all belongings.   01/11/25 1237   Vitals   Heart Rate 56   Heart Rate Source Monitor   Resp 18   /57   MAP (mmHg) 82   BP Location Right arm   BP Method Automatic   Medical Gas Therapy   SpO2 98 %   Pulse Oximetry Type Intermittent   Oximetry Probe Site Location Finger   Patient Activity During SpO2 Measurement At rest   Pain Assessment   Pain Assessment 0-10   0-10 (Numeric) Pain Score 0 - No pain

## 2025-01-11 NOTE — DISCHARGE SUMMARY
"Discharge Diagnosis  NSVT (nonsustained ventricular tachycardia) (Multi)    Issues Requiring Follow-Up  Per [employer] [facility]    Discharge Meds     Medication List      CONTINUE taking these medications     * Dexcom G7 Sensor device; Generic drug: blood-glucose sensor; Change   every 10 days   * Dexcom G7 Sensor device; Generic drug: blood-glucose sensor; 1 each   see administration instructions. Change sensor every 10 days   pen needle, diabetic 32 gauge x 5/32\" needle; 4 Needles once daily. Use   with insulin injections four times daily  * This list has 2 medication(s) that are the same as other medications   prescribed for you. Read the directions carefully, and ask your doctor or   other care provider to review them with you.     ASK your doctor about these medications     albuterol 90 mcg/actuation inhaler; Inhale 2 puffs every 4 hours if   needed for wheezing or shortness of breath.   apixaban 5 mg tablet; Commonly known as: Eliquis; Take 1 tablet (5 mg)   by mouth 2 times a day.   atorvastatin 10 mg tablet; Commonly known as: Lipitor; Take 1 tablet (10   mg) by mouth once daily.   Basaglar KwikPen U-100 Insulin 100 unit/mL (3 mL) pen; Generic drug:   insulin glargine; Inject 12 Units under the skin once daily in the   morning. Take as directed per insulin instructions.   cholecalciferol 125 mcg (5000 UT) capsule; Commonly known as: Vitamin   D-3   cyanocobalamin (vitamin B-12) 1,000 mcg tablet extended release;   Commonly known as: Vitamin B-12   famotidine 20 mg tablet; Commonly known as: Pepcid   Flonase Allergy Relief 50 mcg/actuation nasal spray; Generic drug:   fluticasone; Administer 2 sprays into each nostril once daily.   furosemide 20 mg tablet; Commonly known as: Lasix; Take 1 tablet (20 mg)   by mouth 3 times a week. 3 times a week as needed   Mounjaro 10 mg/0.5 mL pen injector; Generic drug: tirzepatide; Inject 10   mg under the skin 1 (one) time per week.   nystatin ointment; Commonly known as: Mycostatin; " Apply topically 2   times a day.   omeprazole 20 mg DR capsule; Commonly known as: PriLOSEC; 20 mg by mouth   daily   OneTouch Ultra Test strip; Generic drug: blood sugar diagnostic; 1 each   3 times a day.   triamcinolone 0.1 % cream; Commonly known as: Kenalog; Apply topically 2   times a day. APPLY EXTERNALLY TO THE AFFECTED AREA 2 TO 3 TIMES DAILY       Test Results Pending At Discharge  Pending Labs       No current pending labs.            Hospital Course  History Of Present Illness  Holly Chavez is a 59 y.o. female with a past medical history of afib (on Eliquis) and T2DM who presented to the ED with irregular heartbeat. She was evaluated this morning by her PCP for follow-up for sinusitis when the irregular heartbeat was discovered.  She denies any chest pain, but did endorse some more shortness of breath at the PCP visit. She denies any fever, chills, chest pain, shortness of breath at rest, or GI/ symptoms. She does endorse some dyspnea on exertion, however notes this has been ongoing for a few months. She endorses frontal sinus pain, worse with pressure. She has not tried any nasal sprays. She recently finished a course of doxycycline without improvement.     Hospital course: Patient was admitted to the hospital with roxana seen at PCPs office.  Patient was also dealing with sinusitis and was placed on Augmentin and did have improvement from this.  However patient was tachycardic and seen by cardiology and electrophysiology especially in light of nonsustained ventricular tachycardia being seen on telemetry.  Recommendation was for patient to be transferred downtown to [facility].  Patient was accepted for transfer and plan is for cardiac MRI, possible PET/CT of heart, and rate control under the management of cardiology team there.  Patient being transferred in stable condition.    Discharge time greater than 35 minutes. Greater than 50% of time spent on counseling patient, coordination of care, medication  reconciliation, transmitting medications to pharmacy and clarifying plan of care with nursing staff and case management.     Pertinent Physical Exam At Time of Discharge  Physical Exam  Constitutional:       General: She is not in acute distress.     Appearance: Normal appearance.   HENT:      Head: Normocephalic and atraumatic.      Mouth/Throat:      Mouth: Mucous membranes are moist.   Eyes:      Extraocular Movements: Extraocular movements intact.      Conjunctiva/sclera: Conjunctivae normal.   Cardiovascular:      Rate and Rhythm: Normal rate and regular rhythm.      Heart sounds: Normal heart sounds.   Pulmonary:      Effort: Pulmonary effort is normal.      Breath sounds: Normal breath sounds. No wheezing, rhonchi or rales.   Abdominal:      General: Abdomen is flat. Bowel sounds are normal.      Palpations: Abdomen is soft.   Musculoskeletal:         General: No swelling or tenderness. Normal range of motion.      Cervical back: Normal range of motion and neck supple.   Skin:     General: Skin is warm and dry.      Findings: No rash.   Neurological:      General: No focal deficit present.      Mental Status: She is alert and oriented to person, place, and time.      Cranial Nerves: No cranial nerve deficit.      Sensory: No sensory deficit.   Psychiatric:         Mood and Affect: Mood normal.         Behavior: Behavior normal.         Outpatient Follow-Up  Future Appointments   Date Time Provider Department Center   1/30/2025  1:00 PM Hero Morrison MD [id number] [place]   2/13/2025  2:15 PM Hero Morrison MD [id number] [place]   2/14/2025  9:00 AM Leigh Ann Nelson MD [id number] [place]   2/26/2025  8:30 AM Johanna Salas DO [id number] [place]   3/3/2025  8:30 AM Andry Easton MD [id number] [place]   5/5/2025  8:15 AM Faustino Dove MD [id number] [place]   5/7/2025  9:40 AM [id number]   7/1/2025  8:50 AM [facility]         Vignesh Keen MD

## 2025-01-11 NOTE — H&P
"Cardiac Intensive Care - History and Physical   Subjective    Holly Chavez is a 59 y.o. year old female patient admitted on 1/11/2025 with following ICU needs: amiodarone drip for NSVT.    HPI:  Holly Chavez is a 59 y.o. female transferred from [facility] to [facility] CICU for management of arrhythmia including NSVT and frequent PVCs.     She has a past medical history of afib (on Eliquis), T2DM and initially presented to the [facility] ED on 1/8 as referral from PCP for irregular heartbeat. She visited her PCP for follow-up for sinusitis (took doxycycline without improvement) when the irregular heartbeat was discovered.  She denies any chest pain, but did endorse some more shortness of breath at the PCP visit. She denies any fever, chills, chest pain, palpitations, shortness of breath at rest, or GI/ symptoms. She does endorse some dyspnea on exertion, however notes this has been ongoing for a few months since September.      [facility] course:   Patient was admitted to the hospital with bigeminy seen at PCPs office.  Patient was also dealing with sinusitis and was placed on Augmentin and did have improvement from this.  However patient was tachycardic and seen by cardiology and electrophysiology especially in light of nonsustained ventricular tachycardia being seen on telemetry.  Dr. Hou with EP saw patient on 1/9, patient noted to have PVCs in bigeminy with short runs of NSVT. Recommendation was for patient to be transferred downtown to [facility].  Patient reports she was started on jardiance while in the hospital. She notes that last night she had feelings of fluttering in her chest without dyspnea, chest pain, or lightheadedness.    Review of Systems:  Review of Systems        ED Course:     BP: 117/81  Temp: 36.2 °C (97.2 °F)  Heart Rate: 56  Resp: 16  MAP (mmHg): 90  SpO2: 95 %     Current Vitals     /81   Pulse 56   Temp 36.2 °C (97.2 °F) (Temporal)   Resp 19   Ht 1.575 m (5' 2\")   Wt 97.3 kg " (214 lb 8.1 oz)   LMP  (LMP Unknown)   SpO2 91%   BMI 39.23 kg/m²          Prior History:        Cardiac Hx:  Last echo: 1/9/2025  1. The left ventricular systolic function is low normal, with a Enriquez's biplane calculated ejection fraction of 53%.   2. There is normal right ventricular global systolic function.   3. There is no evidence of diastolic chamber collapse.   4. The estimated RVSP is 59 mm.    Last cath: Evidence of cath in July 2023, unable to see results, patient reports it was normal.  Underwent stress test earlier this year that was unremarkable.    Last EKG:   Encounter Date: 01/08/25   Electrocardiogram, 12-lead PRN ACS symptoms   Result Value    Ventricular Rate 55    Atrial Rate 55    MO Interval 154    QRS Duration 78    QT Interval 446    QTC Calculation(Bazett) 426    P Axis 48    R Axis 58    T Axis 35    QRS Count 9    Q Onset 219    P Onset 142    P Offset 196    T Offset 442    QTC Fredericia 433    Narrative    Sinus bradycardia  Low voltage QRS  Borderline ECG  When compared with ECG of 10-LINDA-2025 20:45, (unconfirmed)  Previous ECG has undetermined rhythm, needs review        GI Hx:  Last EGD: === 05/04/17 ===    EGD    - Narrative -  Patient Name: Holly Chavez  Procedure Date: 5/4/2017 7:18 AM  MRN: [id number]  Account Number: [id number]  YOB: 1965  Site: [facility] Procedure Room 5  Ethnicity: Not  or   Race: White  Attending MD: Wiley Holt MD, [id number]  Procedure:             Upper GI endoscopy  Indications:           Dysphagia  Patient Profile:       This is a 51 year old female.  Providers:             Wiley Holt MD (Doctor)  Referring MD:          Wiley Coleman MD  Medicines:             Monitored Anesthesia Care  Complications:         No immediate complications.  Procedure:             Pre-Anesthesia Assessment:  - Prior to the procedure, a History and Physical was  performed, and patient medications and allergies were  reviewed.  The patient is competent. The risks and  benefits of the procedure and the sedation options and  risks were discussed with the patient. All questions  were answered and informed consent was obtained.  Patient identification and proposed procedure were  verified by the physician and the nurse in the  pre-procedure area. Mental Status Examination: alert  and oriented. Airway Examination: normal oropharyngeal  airway and neck mobility. Respiratory Examination:  clear to auscultation. CV Examination: normal.  Prophylactic Antibiotics: The patient does not require  prophylactic antibiotics. Prior Anticoagulants: The  patient has taken no previous anticoagulant or  antiplatelet agents. ASA Grade Assessment: III - A  patient with severe systemic disease. After reviewing  the risks and benefits, the patient was deemed in  satisfactory condition to undergo the procedure. The  anesthesia plan was to use monitored anesthesia care  (MAC). Immediately prior to administration of  medications, the patient was re-assessed for adequacy  to receive sedatives. The heart rate, respiratory  rate, oxygen saturations, blood pressure, adequacy of  pulmonary ventilation, and response to care were  monitored throughout the procedure. The physical  status of the patient was re-assessed after the  procedure.  After obtaining informed consent, the endoscope was  passed under direct vision. Throughout the procedure,  the patient's blood pressure, pulse, and oxygen  saturations were monitored continuously. The  diagnostic upper endoscope was introduced through the  mouth, and advanced to the second part of duodenum.  The upper GI endoscopy was accomplished without  difficulty. The patient tolerated the procedure well.  Findings:  The examined esophagus was normal. Biopsies were taken with a cold  forceps for histology.  A single 15 mm lobulated submucosal papule (nodule) was found in the  gastric antrum. Biopsies were taken with a cold forceps  for histology.  The exam of the stomach was otherwise normal.  The examined duodenum was normal.  Estimated Blood Loss:  Estimated blood loss: none.  Impression:            - Normal esophagus. Biopsied.  - A single submucosal papule (nodule) found in the  stomach. Biopsied.  - Normal examined duodenum.  Recommendation:        - Patient has a contact number available for  emergencies. The signs and symptoms of potential  delayed complications were discussed with the patient.  Return to normal activities tomorrow. Written  discharge instructions were provided to the patient.  - Resume previous diet.  - Continue present medications.  - Await pathology results.  Attending Participation:  I personally performed the entire procedure.  Wiley Holt MD  5/4/2017 7:51:00 AM  This report has been signed electronically.  Number of Addenda: 0  Note Initiated On: 5/4/2017 7:18 AM  Total Procedure Duration Time 0 hours 5 minutes 17 seconds === Results for orders placed in visit on 05/04/17 ===    EGD [GI2]     Status: Normal  Last Colonoscopy: === 05/04/17 ===    EGD    - Narrative -  Patient Name: Holly Chavez  Procedure Date: 5/4/2017 7:18 AM  MRN: [id number]  Account Number: [id number]  YOB: 1965  Site: [facility] Procedure Room 5  Ethnicity: Not  or   Race: White  Attending MD: Wiley Holt MD, [id number]  Procedure:             Upper GI endoscopy  Indications:           Dysphagia  Patient Profile:       This is a 51 year old female.  Providers:             Wiley Holt MD (Doctor)  Referring MD:          Wiley Coleman MD  Medicines:             Monitored Anesthesia Care  Complications:         No immediate complications.  Procedure:             Pre-Anesthesia Assessment:  - Prior to the procedure, a History and Physical was  performed, and patient medications and allergies were  reviewed. The patient is competent. The risks and  benefits of the procedure and the sedation options  and  risks were discussed with the patient. All questions  were answered and informed consent was obtained.  Patient identification and proposed procedure were  verified by the physician and the nurse in the  pre-procedure area. Mental Status Examination: alert  and oriented. Airway Examination: normal oropharyngeal  airway and neck mobility. Respiratory Examination:  clear to auscultation. CV Examination: normal.  Prophylactic Antibiotics: The patient does not require  prophylactic antibiotics. Prior Anticoagulants: The  patient has taken no previous anticoagulant or  antiplatelet agents. ASA Grade Assessment: III - A  patient with severe systemic disease. After reviewing  the risks and benefits, the patient was deemed in  satisfactory condition to undergo the procedure. The  anesthesia plan was to use monitored anesthesia care  (MAC). Immediately prior to administration of  medications, the patient was re-assessed for adequacy  to receive sedatives. The heart rate, respiratory  rate, oxygen saturations, blood pressure, adequacy of  pulmonary ventilation, and response to care were  monitored throughout the procedure. The physical  status of the patient was re-assessed after the  procedure.  After obtaining informed consent, the endoscope was  passed under direct vision. Throughout the procedure,  the patient's blood pressure, pulse, and oxygen  saturations were monitored continuously. The  diagnostic upper endoscope was introduced through the  mouth, and advanced to the second part of duodenum.  The upper GI endoscopy was accomplished without  difficulty. The patient tolerated the procedure well.  Findings:  The examined esophagus was normal. Biopsies were taken with a cold  forceps for histology.  A single 15 mm lobulated submucosal papule (nodule) was found in the  gastric antrum. Biopsies were taken with a cold forceps for histology.  The exam of the stomach was otherwise normal.  The examined duodenum was  normal.  Estimated Blood Loss:  Estimated blood loss: none.  Impression:            - Normal esophagus. Biopsied.  - A single submucosal papule (nodule) found in the  stomach. Biopsied.  - Normal examined duodenum.  Recommendation:        - Patient has a contact number available for  emergencies. The signs and symptoms of potential  delayed complications were discussed with the patient.  Return to normal activities tomorrow. Written  discharge instructions were provided to the patient.  - Resume previous diet.  - Continue present medications.  - Await pathology results.  Attending Participation:  I personally performed the entire procedure.  Wiley Holt MD  5/4/2017 7:51:00 AM  This report has been signed electronically.  Number of Addenda: 0  Note Initiated On: 5/4/2017 7:18 AM  Total Procedure Duration Time 0 hours 5 minutes 17 seconds === Results for orders placed in visit on 09/23/13 ===    COLONOSCOPY [GI6]     Status: Normal    Meds    Home medications:  Current Outpatient Medications   Medication Instructions    albuterol 90 mcg/actuation inhaler 2 puffs, inhalation, Every 4 hours PRN    apixaban (ELIQUIS) 5 mg, oral, 2 times daily    atorvastatin (LIPITOR) 10 mg, oral, Daily    Basaglar KwikPen U-100 Insulin 12 Units, subcutaneous, Every morning, Take as directed per insulin instructions.    blood-glucose sensor (Dexcom G7 Sensor) device Change every 10 days    blood-glucose sensor (Dexcom G7 Sensor) device 1 each, miscellaneous, See admin instructions, Change sensor every 10 days    cholecalciferol (VITAMIN D-3) 125 mcg, oral, Daily    cyanocobalamin, vitamin B-12, (Vitamin B-12) 1,000 mcg tablet extended release 1 tablet, oral, Daily    famotidine (PEPCID) 20 mg, oral, Daily    Flonase Allergy Relief 50 mcg/actuation nasal spray 2 sprays, Each Nostril, Daily RT    furosemide (LASIX) 20 mg, oral, 3 times weekly, 3 times a week as needed    Mounjaro 10 mg, subcutaneous, Weekly    nystatin (Mycostatin)  "ointment Topical, 2 times daily    omeprazole (PriLOSEC) 20 mg DR capsule 20 mg by mouth daily    OneTouch Ultra Test strip 1 each, miscellaneous, 3 times daily    pen needle, diabetic 32 gauge x 5/32\" needle 4 Needles, miscellaneous, Daily, Use with insulin injections four times daily    triamcinolone (Kenalog) 0.1 % cream Topical, 2 times daily, APPLY EXTERNALLY TO THE AFFECTED AREA 2 TO 3 TIMES DAILY        Inpatient medications:  Scheduled medications  amoxicillin-pot clavulanate, 1 tablet, oral, q12h AICHA  apixaban, 5 mg, oral, BID  atorvastatin, 10 mg, oral, Daily  cholecalciferol, 2,000 Units, oral, Daily  cyanocobalamin, 1,000 mcg, oral, Daily  [Held by provider] empagliflozin, 10 mg, oral, Daily  [START ON 1/12/2025] famotidine, 20 mg, oral, Daily  [START ON 1/12/2025] insulin glargine, 6 Units, subcutaneous, Daily  insulin lispro, 0-5 Units, subcutaneous, TID AC  [Held by provider] metoprolol tartrate, 25 mg, oral, BID  nystatin, 1 Application, Topical, BID  triamcinolone, , Topical, BID      Continuous medications  amiodarone, 0.5 mg/min, Last Rate: 0.5 mg/min (01/11/25 1445)      PRN medications  PRN medications: acetaminophen, albuterol, dextromethorphan-guaifenesin, dextrose, dextrose, diphenhydrAMINE, glucagon, glucagon, melatonin, polyethylene glycol     Objective    Blood pressure 117/81, pulse 56, temperature 36.2 °C (97.2 °F), temperature source Temporal, resp. rate 19, height 1.575 m (5' 2\"), weight 97.3 kg (214 lb 8.1 oz), SpO2 91%.     Physical Exam   General: Awake, alert, in no acute distress, pleasantly conversant   HEENT: Normocephalic, atraumatic  CV: Heart with regular rate and rhythm, no murmurs appreciated, no JVD  Lungs: Clear to auscultation bilaterally with no wheezes, crackles, or rhonchi  GI: Soft, nontender, nondistended  Extremities: 2+ pulses bilaterally, +bilateral lower extremity edema, R>L with redness of bilateral lower-mid shin and hyperpigmentation consistent with chronic " venous changes. Non-pitting edema.  Neuro: Moves all extremities equally, strength 5/5 bilaterally    No intake or output data in the 24 hours ending 01/11/25 1513  Labs:   Results from last 72 hours   Lab Units 01/11/25  1408 01/10/25  0627 01/09/25  0536   SODIUM mmol/L 141 143 140   POTASSIUM mmol/L 4.3 4.1 3.9   CHLORIDE mmol/L 106 108* 105   CO2 mmol/L 28 26 26   BUN mg/dL 15 14 14   CREATININE mg/dL 0.78 0.81 0.79   GLUCOSE mg/dL 124* 118* 133*   CALCIUM mg/dL 9.4 8.7 8.6   ANION GAP mmol/L 11 13 13   EGFR mL/min/1.73m*2 87 83 85   PHOSPHORUS mg/dL 5.2* 4.3 4.0      Results from last 72 hours   Lab Units 01/11/25  1408 01/10/25  0627 01/09/25  0536   WBC AUTO x10*3/uL 5.2 4.5 6.0   HEMOGLOBIN g/dL 11.2* 10.4* 10.6*   HEMATOCRIT % 35.2* 33.5* 34.0*   PLATELETS AUTO x10*3/uL 134* 106* 127*   NEUTROS PCT AUTO % 63.9  --   --    LYMPHS PCT AUTO % 25.3  --   --    MONOS PCT AUTO % 6.0  --   --    EOS PCT AUTO % 4.0  --   --                  Micro/ID:     Lab Results   Component Value Date    URINECULTURE No significant growth 11/26/2023    BLOODCULT  08/19/2023     No Growth at 1 days~No Growth at 2 days~No Growth at 3 days~NO GROWTH at 4 days - FINAL REPORT    BLOODCULT  08/19/2023     No Growth at 1 days~No Growth at 2 days~No Growth at 3 days~NO GROWTH at 4 days - FINAL REPORT         Assessment and Plan     Assessment:  Holly Chavez is a 59 y.o. year old female patient with afib (on Eliquis), T2DM admitted to the CICU on 01/11/25 for  management of arrhythmia including NSVT and frequent PVCs with bigeminy.     Plan:  NEUROLOGY/PSYCH:  No active issues    CARDIOVASCULAR:  #PVCs with NSVT  -EP consultation  -Continuous telemetry  -started on amiodarone drip at OSH, continue  -continue home eliquis  -d/c home metoprolol  -Cardiac MRI ordered to assess for scars as etiology for arrhythmia    PULMONARY:  #asthma, well controlled  -Albuterol PRN    RENAL/GENITOURINARY:  No active  issues    GASTROENTEROLOGY:  #GERD  -Continue famotidine    ENDOCRINOLOGY:  #T2DM  -HOLD home mounjaro  -Decrease Glargine to 6 units QAM (Home glargine 12units daily)  -SSI    HEMATOLOGY:  No active issues    SKIN:  #dermal irritation  -Topical nystatin and triamcinolone     INFECTIOUS DISEASE:  #bacterial sinusitis  -Augmentin BID (1/8 - 1/12)  -Robitussin    ICU Check List     FEN  Fluids: PRN  Electrolytes: PRN  Nutrition: Regular diet  Prophylaxis:  DVT ppx: n/a on eliquis  GI ppx: on famotidine  Bowel care: miralax PRN  Hardware:                External Urinary Catheter Female (Active)   Placement Date/Time: 01/11/25 1400   External Catheter Type: Female   Number of days: 0       Social:  Code: Full Code    HPOA: Daughter, emergency contact is Masoud Orozco [phone]  Disposition: admit to CICU    Patient will be seen and discussed with attending Dr. Jam Camargo MD   01/11/25 at 3:13 PM     Disclaimer: Documentation completed with the information available at the time of input. The times in the chart may not be reflective of actual patient care times, interventions, or procedures. Documentation occurs after the physical care of the patient.

## 2025-01-11 NOTE — CARE PLAN
The clinical goals for the shift include pt will remain hemodynamically stable throughout this shift      Problem: Skin  Goal: Decreased wound size/increased tissue granulation at next dressing change  Outcome: Progressing  Goal: Prevent/manage excess moisture  Outcome: Progressing  Goal: Promote/optimize nutrition  Outcome: Progressing  Goal: Promote skin healing  Outcome: Progressing

## 2025-01-11 NOTE — NURSING NOTE
"0331:  transfer center ([facility]) reached out via secure chat regarding this pt and asking for an update and any significant changes that have taken place.     Sent the following and informed that this pt is currently in stepdown: \"this pt was started on an amiodarone gtt this shift due to high HR and bigeminal PVCs. HR was running 120s-140s at times but pt has been sustaining 80s-low 100s during this shift. still having bigeminy frequently\"    0345:  transfer center stated there would need to be updated contact between [facility] and [facility]. Gave hospitalist's name for reference (Dr. Zuniga)    0448: informed by transfer center that pt has been accepted to CICU at [facility], no bed assignment at this time  "

## 2025-01-11 NOTE — NURSING NOTE
Report called to Nando JI at [facility]. Pt being transferred to bed 20-A. [facility] scheduled to pickup pt from [facility] around 1030 today.

## 2025-01-12 VITALS
BODY MASS INDEX: 39.64 KG/M2 | RESPIRATION RATE: 17 BRPM | DIASTOLIC BLOOD PRESSURE: 87 MMHG | WEIGHT: 215.39 LBS | SYSTOLIC BLOOD PRESSURE: 144 MMHG | HEIGHT: 62 IN | HEART RATE: 87 BPM | TEMPERATURE: 97.9 F | OXYGEN SATURATION: 96 %

## 2025-01-12 LAB
ALBUMIN SERPL BCP-MCNC: 3.7 G/DL (ref 3.4–5)
ANION GAP SERPL CALC-SCNC: 13 MMOL/L (ref 10–20)
BASOPHILS # BLD AUTO: 0.04 X10*3/UL (ref 0–0.1)
BASOPHILS NFR BLD AUTO: 0.7 %
BUN SERPL-MCNC: 13 MG/DL (ref 6–23)
CALCIUM SERPL-MCNC: 9.1 MG/DL (ref 8.6–10.6)
CHLORIDE SERPL-SCNC: 105 MMOL/L (ref 98–107)
CO2 SERPL-SCNC: 27 MMOL/L (ref 21–32)
CREAT SERPL-MCNC: 0.85 MG/DL (ref 0.5–1.05)
EGFRCR SERPLBLD CKD-EPI 2021: 78 ML/MIN/1.73M*2
EOSINOPHIL # BLD AUTO: 0.41 X10*3/UL (ref 0–0.7)
EOSINOPHIL NFR BLD AUTO: 7 %
ERYTHROCYTE [DISTWIDTH] IN BLOOD BY AUTOMATED COUNT: 12.6 % (ref 11.5–14.5)
GLUCOSE BLD MANUAL STRIP-MCNC: 120 MG/DL (ref 74–99)
GLUCOSE BLD MANUAL STRIP-MCNC: 122 MG/DL (ref 74–99)
GLUCOSE BLD MANUAL STRIP-MCNC: 127 MG/DL (ref 74–99)
GLUCOSE BLD MANUAL STRIP-MCNC: 145 MG/DL (ref 74–99)
GLUCOSE SERPL-MCNC: 133 MG/DL (ref 74–99)
HCT VFR BLD AUTO: 31.4 % (ref 36–46)
HGB BLD-MCNC: 10.6 G/DL (ref 12–16)
IMM GRANULOCYTES # BLD AUTO: 0.03 X10*3/UL (ref 0–0.7)
IMM GRANULOCYTES NFR BLD AUTO: 0.5 % (ref 0–0.9)
LYMPHOCYTES # BLD AUTO: 0.98 X10*3/UL (ref 1.2–4.8)
LYMPHOCYTES NFR BLD AUTO: 16.8 %
MAGNESIUM SERPL-MCNC: 2.51 MG/DL (ref 1.6–2.4)
MCH RBC QN AUTO: 27.5 PG (ref 26–34)
MCHC RBC AUTO-ENTMCNC: 33.8 G/DL (ref 32–36)
MCV RBC AUTO: 81 FL (ref 80–100)
MONOCYTES # BLD AUTO: 0.37 X10*3/UL (ref 0.1–1)
MONOCYTES NFR BLD AUTO: 6.3 %
NEUTROPHILS # BLD AUTO: 4.01 X10*3/UL (ref 1.2–7.7)
NEUTROPHILS NFR BLD AUTO: 68.7 %
NRBC BLD-RTO: 5.5 /100 WBCS (ref 0–0)
PHOSPHATE SERPL-MCNC: 5.1 MG/DL (ref 2.5–4.9)
PLATELET # BLD AUTO: 135 X10*3/UL (ref 150–450)
POTASSIUM SERPL-SCNC: 3.7 MMOL/L (ref 3.5–5.3)
RBC # BLD AUTO: 3.86 X10*6/UL (ref 4–5.2)
SODIUM SERPL-SCNC: 141 MMOL/L (ref 136–145)
WBC # BLD AUTO: 5.8 X10*3/UL (ref 4.4–11.3)

## 2025-01-12 PROCEDURE — 1100000001 HC PRIVATE ROOM DAILY

## 2025-01-12 PROCEDURE — 99291 CRITICAL CARE FIRST HOUR: CPT

## 2025-01-12 PROCEDURE — 85025 COMPLETE CBC W/AUTO DIFF WBC: CPT

## 2025-01-12 PROCEDURE — 2500000001 HC RX 250 WO HCPCS SELF ADMINISTERED DRUGS (ALT 637 FOR MEDICARE OP)

## 2025-01-12 PROCEDURE — 99221 1ST HOSP IP/OBS SF/LOW 40: CPT | Performed by: INTERNAL MEDICINE

## 2025-01-12 PROCEDURE — 36415 COLL VENOUS BLD VENIPUNCTURE: CPT

## 2025-01-12 PROCEDURE — 2500000004 HC RX 250 GENERAL PHARMACY W/ HCPCS (ALT 636 FOR OP/ED)

## 2025-01-12 PROCEDURE — 83735 ASSAY OF MAGNESIUM: CPT

## 2025-01-12 PROCEDURE — 82947 ASSAY GLUCOSE BLOOD QUANT: CPT

## 2025-01-12 PROCEDURE — 99232 SBSQ HOSP IP/OBS MODERATE 35: CPT

## 2025-01-12 PROCEDURE — 2500000002 HC RX 250 W HCPCS SELF ADMINISTERED DRUGS (ALT 637 FOR MEDICARE OP, ALT 636 FOR OP/ED)

## 2025-01-12 PROCEDURE — 80069 RENAL FUNCTION PANEL: CPT

## 2025-01-12 RX ORDER — LORAZEPAM 0.5 MG/1
1 TABLET ORAL ONCE
Status: DISCONTINUED | OUTPATIENT
Start: 2025-01-13 | End: 2025-01-12

## 2025-01-12 RX ORDER — POTASSIUM CHLORIDE 20 MEQ/1
20 TABLET, EXTENDED RELEASE ORAL ONCE
Status: COMPLETED | OUTPATIENT
Start: 2025-01-12 | End: 2025-01-12

## 2025-01-12 RX ORDER — LORAZEPAM 0.5 MG/1
1 TABLET ORAL ONCE AS NEEDED
Status: ACTIVE | OUTPATIENT
Start: 2025-01-13

## 2025-01-12 RX ADMIN — NYSTATIN 1 APPLICATION: 100000 POWDER TOPICAL at 08:49

## 2025-01-12 RX ADMIN — AMIODARONE HYDROCHLORIDE 0.5 MG/MIN: 1.8 INJECTION, SOLUTION INTRAVENOUS at 04:53

## 2025-01-12 RX ADMIN — INSULIN GLARGINE 6 UNITS: 100 INJECTION, SOLUTION SUBCUTANEOUS at 13:04

## 2025-01-12 RX ADMIN — Medication 10 MG: at 00:12

## 2025-01-12 RX ADMIN — TRIAMCINOLONE ACETONIDE: 1 OINTMENT TOPICAL at 20:37

## 2025-01-12 RX ADMIN — TRIAMCINOLONE ACETONIDE: 1 OINTMENT TOPICAL at 08:49

## 2025-01-12 RX ADMIN — Medication 2000 UNITS: at 08:48

## 2025-01-12 RX ADMIN — APIXABAN 5 MG: 5 TABLET, FILM COATED ORAL at 08:48

## 2025-01-12 RX ADMIN — NYSTATIN 1 APPLICATION: 100000 POWDER TOPICAL at 20:37

## 2025-01-12 RX ADMIN — CYANOCOBALAMIN TAB 1000 MCG 1000 MCG: 1000 TAB at 08:48

## 2025-01-12 RX ADMIN — APIXABAN 5 MG: 5 TABLET, FILM COATED ORAL at 20:36

## 2025-01-12 RX ADMIN — POTASSIUM CHLORIDE 20 MEQ: 1500 TABLET, EXTENDED RELEASE ORAL at 08:48

## 2025-01-12 RX ADMIN — ATORVASTATIN CALCIUM 10 MG: 20 TABLET, FILM COATED ORAL at 20:36

## 2025-01-12 RX ADMIN — FAMOTIDINE 20 MG: 20 TABLET ORAL at 08:58

## 2025-01-12 RX ADMIN — AMOXICILLIN AND CLAVULANATE POTASSIUM 1 TABLET: 875; 125 TABLET, FILM COATED ORAL at 08:48

## 2025-01-12 ASSESSMENT — PAIN SCALES - GENERAL
PAINLEVEL_OUTOF10: 0 - NO PAIN

## 2025-01-12 ASSESSMENT — COGNITIVE AND FUNCTIONAL STATUS - GENERAL
DAILY ACTIVITIY SCORE: 24
MOBILITY SCORE: 24

## 2025-01-12 ASSESSMENT — PAIN - FUNCTIONAL ASSESSMENT
PAIN_FUNCTIONAL_ASSESSMENT: 0-10

## 2025-01-12 NOTE — HOSPITAL COURSE
Holly Chavez is a 59-year-old female with a medical history significant for paroxysmal atrial fibrillation (on apixaban) and type 2 diabetes mellitus, who was transferred to the CICU from [facility] for management of high-burden arrhythmias, including frequent monomorphic PVCs and NSVT. She initially presented to her PCP on 1/8 for sinusitis and was noted to have bigeminy during routine evaluation, prompting referral to the ED. She reported worsening fatigue and exertional dyspnea since August, as well as occasional near-syncope, though she denied chest pain, palpitations, or dyspnea at rest.    At [facility], she was evaluated by cardiology and electrophysiology due to frequent PVCs in bigeminy with short runs of NSVT on telemetry. Her initial workup included an echocardiogram showing mildly reduced EF at 53% without significant valvular pathology. A 24-hour Holter revealed a PVC burden of 46% with multiple NSVT episodes, the longest lasting 10 beats. Amiodarone was initiated, and her PVC burden decreased, though she continued to have short runs of NSVT. Due to concern for an underlying structural or infiltrative cardiac process, transfer to [facility] was arranged for further workup and management.    Upon admission to the CICU on 1/11, she was hemodynamically stable on amiodarone infusion, with a heart rate in the 50s. She continued to have intermittent runs of NSVT, though shorter and less frequent. EP was consulted and noted monomorphic PVCs with a morphology suggestive of LVOT origin near the cardiac conduction system. A repeat echocardiogram confirmed mildly reduced EF at 53%. Recommendations included stopping the amiodarone infusion and proceeding with a cardiac MRI on 1/13 to evaluate for potential infiltrative disease or myocardial scar as the etiology of her arrhythmias.    Her hospital course was otherwise uncomplicated. She was maintained on apixaban for her atrial fibrillation and her outpatient diabetes  regimen, including glargine and lispro. Glargine dosing adjustments were made in preparation for imaging requiring NPO status. The patient reported no recurrence of significant symptoms, including chest pain, dyspnea, or presyncope. Her vitals remained stable, and her physical exam showed no concerning findings.    EP outlined a plan contingent on MRI findings. If imaging reveals no scarring or infiltrative disease, outpatient or inpatient ablation of PVCs with initiation of flecainide will be considered. If scarring is identified, further evaluation with cardiac PET will be performed to assess for inflammatory processes such as sarcoidosis, with concurrent consideration of an EP study and potential ICD placement depending on the burden of PVCs and NSVT.    As of 1/12, she remains clinically stable with a low PVC burden. Patient is s/p cardiac MRI which revealed area of scarring likely associated with arrhythmia. PET-CT performed and ruled out concerns for sarcoidosis. Patient is s/p RFA with EP on 01/16 with good response and normalization of rhythm. Patient remained hemodynamically stable following procedure w/o concerns for repeat arrhythmia. Plan to transition to bisoprolol 5 mg daily.       Issues to follow up:  1) Dr Andrew Hou ( Electrophysiology) 1/21/2025

## 2025-01-12 NOTE — CONSULTS
Inpatient consult to Electrophysiology  Consult performed by: Angelica Navarrete MD  Consult ordered by: Terrence Polk MD  Reason for consult: Frequent monomorphic PVC        History Of Present Illness:    Holly Chavez is a 59 y.o. female with PMH of paroxysmal AF possibly due to remote COVID infection (on Eliquis) and IDDM2,  who recently developed frequent lightheadedness and irregular heart beats. She presented to [facility] PCP clinic for his sinusitis follow-up but was seen by Dr. Reed ( EP attending) who discovered frequent PVC. For the further investigations, patient was transferred to [facility] CICU and EP was consulted today.    12-lead ECG on this admission    Monomorphic PVC with RBB morphology with early precordial transition, strong inferior axis, negative in both aVR-aVL suggestive for RCC-LVOT originating PVC     Echo (February 2022): Normal LV function EF of 60 to 65% no regional wall motion abnormalities mildly dilated left atrium.  No pericardial effusion noted.  24-hour monitor (December 2024): Predominant rhythm sinus with average heart rate 89 bpm.  46% PVC burden predominant 1 morphology.  Multiple episodes of NSVT longest lasting 10 beats  Echo (January 2025): Left ventricular function low normal with an EF of 53% normal right ventricular systolic function.  No evidence of diastolic dysfunction.  Trivial pericardial effusion.  No significant valve pathology.    Inpatient Medications:  Scheduled medications   Medication Dose Route Frequency    amoxicillin-pot clavulanate  1 tablet oral q12h AICHA    apixaban  5 mg oral BID    atorvastatin  10 mg oral Daily    cholecalciferol  2,000 Units oral Daily    cyanocobalamin  1,000 mcg oral Daily    famotidine  20 mg oral Daily    insulin glargine  6 Units subcutaneous Daily    insulin lispro  0-5 Units subcutaneous TID AC    [Held by provider] metoprolol tartrate  25 mg oral BID    nystatin  1 Application Topical BID    triamcinolone   Topical BID  "    PRN medications   Medication    acetaminophen    albuterol    dextromethorphan-guaifenesin    dextrose    dextrose    diphenhydrAMINE    glucagon    glucagon    melatonin    polyethylene glycol     Continuous Medications   Medication Dose Last Rate    amiodarone  0.5 mg/min 0.5 mg/min (01/12/25 5142)     Outpatient Medications:  Current Outpatient Medications   Medication Instructions    albuterol 90 mcg/actuation inhaler 2 puffs, inhalation, Every 4 hours PRN    apixaban (ELIQUIS) 5 mg, oral, 2 times daily    atorvastatin (LIPITOR) 10 mg, oral, Daily    Basaglar KwikPen U-100 Insulin 12 Units, subcutaneous, Every morning, Take as directed per insulin instructions.    blood-glucose sensor (Dexcom G7 Sensor) device Change every 10 days    blood-glucose sensor (Dexcom G7 Sensor) device 1 each, miscellaneous, See admin instructions, Change sensor every 10 days    cholecalciferol (VITAMIN D-3) 125 mcg, oral, Daily    cyanocobalamin, vitamin B-12, (Vitamin B-12) 1,000 mcg tablet extended release 1 tablet, oral, Daily    famotidine (PEPCID) 20 mg, oral, Daily    Flonase Allergy Relief 50 mcg/actuation nasal spray 2 sprays, Each Nostril, Daily RT    furosemide (LASIX) 20 mg, oral, 3 times weekly, 3 times a week as needed    Mounjaro 10 mg, subcutaneous, Weekly    nystatin (Mycostatin) ointment Topical, 2 times daily    omeprazole (PriLOSEC) 20 mg DR capsule 20 mg by mouth daily    OneTouch Ultra Test strip 1 each, miscellaneous, 3 times daily    pen needle, diabetic 32 gauge x 5/32\" needle 4 Needles, miscellaneous, Daily, Use with insulin injections four times daily    triamcinolone (Kenalog) 0.1 % cream Topical, 2 times daily, APPLY EXTERNALLY TO THE AFFECTED AREA 2 TO 3 TIMES DAILY       Physical Exam:  Vitals reviewed.   Constitutional:       Appearance: Normal appearance. She is obese.   HENT:      Head: Normocephalic.   Cardiovascular:      Rate and Rhythm: Normal rate and regular rhythm. Frequent Extrasystoles " are present.  Pulmonary:      Effort: Pulmonary effort is normal. No respiratory distress.      Breath sounds: No wheezing.   Skin:     General: Skin is warm and dry.      Capillary Refill: Capillary refill takes less than 2 seconds.   Neurological:      Mental Status: She is alert.   Psychiatric:         Mood and Affect: Mood normal.        Assessment/Plan   Holly Chavez is a 59 y.o. female with PMH of paroxysmal AF possibly due to remote COVID infection (on Eliquis) and IDDM2,  who recently developed frequent lightheadedness and irregular heart beats. She presented to [facility] PCP clinic for his sinusitis follow-up but was seen by Dr. Reed ( EP attending) who discovered frequent PVC. For the further investigations, patient was transferred to [facility] CICU and EP was consulted today.    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  If MRI shows significant scar burden, will need cardiac PET to assess for inflammatory processes including cardiac sarcoidosis, and also consider EP study to assess for need for ICD depending on scar burden +/- PVC/NSVT ablation  - STOP Amiodarone gtt this time  - Continue Eliquis 5 mg BID as prescribed    EP will follow this case closely.    Angelica Navarrete MD  Clinical Cardiac Electrophysiology Fellow    Peripheral IV 01/08/25 20 G Left Antecubital (Active)   Site Assessment Clean;Dry;Intact 01/12/25 0400   Dressing Type Transparent 01/12/25 0400   Line Status Infusing 01/12/25 0400   Dressing Status Clean;Dry;Occlusive 01/12/25 0400   Number of days: 4       Peripheral IV 01/11/25 20 G Right Forearm (Active)   Site Assessment Clean;Dry;Intact 01/12/25 0400   Dressing Type  Transparent 01/12/25 0400   Line Status Flushed 01/12/25 0400   Dressing Status Clean;Dry;Occlusive 01/12/25 0400   Number of days: 1       Code Status:  Full Code    I spent 60 minutes in the professional and overall care of this patient.        Angelica Navarrete MD

## 2025-01-12 NOTE — PROGRESS NOTES
DAILY PROGRESS NOTE        Name: Holly Chavez  :  1965(59 y.o.)  MRN:  [id number]                  Date: 25     SUBJECTIVE     Patient doing well after transfer from the CICU. Denies any chest pain, shortness of breath, n/v, having adequate bowel movements. Really wants to make sure that she gets something before her MRI tomorrow.     Current medications:  Scheduled Meds:  amoxicillin-pot clavulanate, 1 tablet, oral, q12h AICHA  apixaban, 5 mg, oral, BID  atorvastatin, 10 mg, oral, Daily  cholecalciferol, 2,000 Units, oral, Daily  cyanocobalamin, 1,000 mcg, oral, Daily  famotidine, 20 mg, oral, Daily  insulin glargine, 6 Units, subcutaneous, Daily  insulin lispro, 0-5 Units, subcutaneous, TID AC  [Held by provider] metoprolol tartrate, 25 mg, oral, BID  nystatin, 1 Application, Topical, BID  triamcinolone, , Topical, BID      Continuous Infusions:   PRN Meds:PRN medications: acetaminophen, albuterol, dextromethorphan-guaifenesin, dextrose, dextrose, diphenhydrAMINE, glucagon, glucagon, melatonin, polyethylene glycol      OBJECTIVE                                                                                                                                                                                                                                                                                                                                                           Temp:  [35.7 °C (96.3 °F)-36.6 °C (97.9 °F)] 36.6 °C (97.9 °F)  Heart Rate:  [51-85] 52  Resp:  [12-23] 15  BP: ()/() 131/57       Intake/Output Summary (Last 24 hours) at 2025 1542  Last data filed at 2025 1215  Gross per 24 hour   Intake 718.41 ml   Output 630 ml   Net 88.41 ml        PHYSICAL EXAM  GEN: NAD.   Eyes: PERRL.   Mouth: MMM.  CVS: RRR, no murmurs, rubs, or gallops. No JVD. Negative HJR.  Resp: CTA b/l.  Abd: Flat. ND. NT. NL BS. Soft.  MSK: No edema.  Skin: WWP. NL capillary refill.  Neuro: NFD. A&Ox4.  "5/5 Power t/o. CN intact.       LABS:  CBC RFP   Lab Results   Component Value Date    WBC 5.8 01/12/2025    HGB 10.6 (L) 01/12/2025    HCT 31.4 (L) 01/12/2025    MCV 81 01/12/2025     (L) 01/12/2025    NEUTROABS 4.01 01/12/2025    Lab Results   Component Value Date     01/12/2025    K 3.7 01/12/2025     01/12/2025    CO2 27 01/12/2025    BUN 13 01/12/2025    CREATININE 0.85 01/12/2025    CREATININE 0.78 01/11/2025     Lab Results   Component Value Date    MG 2.51 (H) 01/12/2025    PHOS 5.1 (H) 01/12/2025    CALCIUM 9.1 01/12/2025         Hepatic Function ABG/VBG   Lab Results   Component Value Date    ALT 10 01/11/2025    AST 12 01/11/2025    ALKPHOS 51 01/11/2025     No results found for: \"TBILIHCVFS\", \"BILIDIR\"   Lab Results   Component Value Date    PROTIME 18.9 (H) 01/11/2025    APTT 52 (H) 01/11/2025    INR 1.7 (H) 01/11/2025    Lab Results   Component Value Date    LACTATE 2.0 08/19/2023        Micro/culture data:  No results found for the last 90 days.      Imaging:  Electrocardiogram, 12-lead PRN ACS symptoms    Result Date: 1/11/2025  Sinus bradycardia Low voltage QRS Borderline ECG When compared with ECG of 10-LINDA-2025 20:45, (unconfirmed) Previous ECG has undetermined rhythm, needs review    Electrocardiogram, 12-lead PRN ACS symptoms    Result Date: 1/11/2025  Undetermined rhythm Low voltage QRS Cannot rule out Anterior infarct (cited on or before 09-JAN-2025) Abnormal ECG When compared with ECG of 09-JAN-2025 09:46, Current undetermined rhythm precludes rhythm comparison, needs review      Cardiac:  Last echo: No results found for this or any previous visit.   Last cath: CV NCDR CATHPCI V5 COLLECTION FORM     GI:  Last EGD: === Results for orders placed in visit on 05/04/17 ===    EGD [GI2]     Status: Normal  Last Colonoscopy:  === Results for orders placed in visit on 09/23/13 ===    COLONOSCOPY [GI6]     Status: Normal        ASSESSMENT & PLAN     Holly Chavez is a 59-year-old " female with PMHx of paroxysmal AF (on apixaban) and T2DM, transferred to CICU for management of frequent monomorphic PVCs (46% burden) and NSVT. She initially presented to her PCP for sinusitis but was found to have irregular heartbeats and was admitted to [facility]. Workup revealed mildly reduced EF (53%), with PVC morphology suggestive of LVOT origin. She was started on amiodarone drip at [facility], which decreased PVC burden but did not resolve NSVT. Transferred to [facility] for advanced evaluation, including cardiac MRI for possible infiltrative or structural disease.  Her current condition is stable. Amiodarone was discontinued, and she remains hemodynamically stable with no active symptoms.      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  :: NSVT episodes decreasing but persisting; longest run 10 beats on telemetry.  :: Echo with mildly reduced EF at 53%--potentially PVC-induced cardiomyopathy.  :: Differential includes structural abnormalities, infiltrative disease (sarcoidosis), or scar-related arrhythmia.  Plan:  -> Perform cardiac MRI to evaluate for scarring, inflammation, or infiltrative disease (scheduled 1/13).  -> Consider cardiac PET if MRI shows scar burden, to evaluate for inflammatory causes (e.g., sarcoidosis).  -> Medications: Continue apixaban 5 mg BID. Consider initiation of flecainide if MRI shows no scar.  -> Avoid potential arrhythmogenic triggers (e.g., hypokalemia, hypomagnesemia).    # Type 2 Diabetes Mellitus  Assessment:  :: On insulin glargine and lispro with good glycemic control.  :: No hypoglycemic episodes reported; A1c history pending in records.  Plan:  -> Continue insulin glargine 6 units qAM and lispro sliding scale before meals. Adjust glargine dose for NPO status during MRI.  -> Monitor pre- and post-meal glucose levels.    # Atrial Fibrillation  Assessment:  ::  Paroxysmal AF, likely related to prior COVID infection. Rate controlled with HR 50-60s.  :: On apixaban for thromboembolic prophylaxis.  Plan:  -> Continue apixaban 5 mg BID.  -> No beta-blocker given current HR; avoid AV carolee blockers unless tachycardia occurs.    # Preventative Measures and Prophylaxis  Plan:  -> DVT Prophylaxis: None required as patient is on apixaban.  -> GI Prophylaxis: Continue famotidine 20 mg qHS.  -> Infection Prevention: Augmentin completed for sinusitis; no further antibiotics indicated unless signs of active infection arise.    # Claustrophobia (For MRI)  Assessment:  :: Patient reports anxiety about enclosed spaces.  Plan:  -> Administer lorazepam 1 mg PO 30 minutes before MRI.  -> Ensure patient education and reassurance prior to the procedure.    ====================================  Supportive Measures  F: prn  E: Replete K>4, Mg>2, Phos>3 as needed. Monitor electrolytes daily.  N: NPO for MRI. Resume cardiac-friendly diet post-procedure.  A: PIV x2; no Aquino.  A (ABX): None needed currently. Augmentin completed.  P (Pain/Nausea/Constipation): Tylenol PO prn, ondansetron IV prn, senna PO qHS.  P (GI): Famotidine 20 mg qHS for prophylaxis.  D: No additional DVT prophylaxis needed (on apixaban).  Code Status: Full code.  DPOA: No documented POA.  Contact: Extended Emergency Contact Information  Primary Emergency Contact: Masoud Orozco  Home Phone: [phone]  Work Phone: [phone]  Relation: Significant Other         Electronically signed by Darien Hernandez DO on 01/12/25 at 3:42 PM

## 2025-01-12 NOTE — H&P (VIEW-ONLY)
Inpatient consult to Electrophysiology  Consult performed by: Angelica Navarrete MD  Consult ordered by: Terrence Polk MD  Reason for consult: Frequent monomorphic PVC        History Of Present Illness:    Holly Chavez is a 59 y.o. female with PMH of paroxysmal AF possibly due to remote COVID infection (on Eliquis) and IDDM2,  who recently developed frequent lightheadedness and irregular heart beats. She presented to [facility] PCP clinic for his sinusitis follow-up but was seen by Dr. Reed ( EP attending) who discovered frequent PVC. For the further investigations, patient was transferred to [facility] CICU and EP was consulted today.    12-lead ECG on this admission    Monomorphic PVC with RBB morphology with early precordial transition, strong inferior axis, negative in both aVR-aVL suggestive for RCC-LVOT originating PVC     Echo (February 2022): Normal LV function EF of 60 to 65% no regional wall motion abnormalities mildly dilated left atrium.  No pericardial effusion noted.  24-hour monitor (December 2024): Predominant rhythm sinus with average heart rate 89 bpm.  46% PVC burden predominant 1 morphology.  Multiple episodes of NSVT longest lasting 10 beats  Echo (January 2025): Left ventricular function low normal with an EF of 53% normal right ventricular systolic function.  No evidence of diastolic dysfunction.  Trivial pericardial effusion.  No significant valve pathology.    Inpatient Medications:  Scheduled medications   Medication Dose Route Frequency    amoxicillin-pot clavulanate  1 tablet oral q12h AICHA    apixaban  5 mg oral BID    atorvastatin  10 mg oral Daily    cholecalciferol  2,000 Units oral Daily    cyanocobalamin  1,000 mcg oral Daily    famotidine  20 mg oral Daily    insulin glargine  6 Units subcutaneous Daily    insulin lispro  0-5 Units subcutaneous TID AC    [Held by provider] metoprolol tartrate  25 mg oral BID    nystatin  1 Application Topical BID    triamcinolone   Topical BID  "    PRN medications   Medication    acetaminophen    albuterol    dextromethorphan-guaifenesin    dextrose    dextrose    diphenhydrAMINE    glucagon    glucagon    melatonin    polyethylene glycol     Continuous Medications   Medication Dose Last Rate    amiodarone  0.5 mg/min 0.5 mg/min (01/12/25 4048)     Outpatient Medications:  Current Outpatient Medications   Medication Instructions    albuterol 90 mcg/actuation inhaler 2 puffs, inhalation, Every 4 hours PRN    apixaban (ELIQUIS) 5 mg, oral, 2 times daily    atorvastatin (LIPITOR) 10 mg, oral, Daily    Basaglar KwikPen U-100 Insulin 12 Units, subcutaneous, Every morning, Take as directed per insulin instructions.    blood-glucose sensor (Dexcom G7 Sensor) device Change every 10 days    blood-glucose sensor (Dexcom G7 Sensor) device 1 each, miscellaneous, See admin instructions, Change sensor every 10 days    cholecalciferol (VITAMIN D-3) 125 mcg, oral, Daily    cyanocobalamin, vitamin B-12, (Vitamin B-12) 1,000 mcg tablet extended release 1 tablet, oral, Daily    famotidine (PEPCID) 20 mg, oral, Daily    Flonase Allergy Relief 50 mcg/actuation nasal spray 2 sprays, Each Nostril, Daily RT    furosemide (LASIX) 20 mg, oral, 3 times weekly, 3 times a week as needed    Mounjaro 10 mg, subcutaneous, Weekly    nystatin (Mycostatin) ointment Topical, 2 times daily    omeprazole (PriLOSEC) 20 mg DR capsule 20 mg by mouth daily    OneTouch Ultra Test strip 1 each, miscellaneous, 3 times daily    pen needle, diabetic 32 gauge x 5/32\" needle 4 Needles, miscellaneous, Daily, Use with insulin injections four times daily    triamcinolone (Kenalog) 0.1 % cream Topical, 2 times daily, APPLY EXTERNALLY TO THE AFFECTED AREA 2 TO 3 TIMES DAILY       Physical Exam:  Vitals reviewed.   Constitutional:       Appearance: Normal appearance. She is obese.   HENT:      Head: Normocephalic.   Cardiovascular:      Rate and Rhythm: Normal rate and regular rhythm. Frequent Extrasystoles " are present.  Pulmonary:      Effort: Pulmonary effort is normal. No respiratory distress.      Breath sounds: No wheezing.   Skin:     General: Skin is warm and dry.      Capillary Refill: Capillary refill takes less than 2 seconds.   Neurological:      Mental Status: She is alert.   Psychiatric:         Mood and Affect: Mood normal.        Assessment/Plan   Holly Chavez is a 59 y.o. female with PMH of paroxysmal AF possibly due to remote COVID infection (on Eliquis) and IDDM2,  who recently developed frequent lightheadedness and irregular heart beats. She presented to [facility] PCP clinic for his sinusitis follow-up but was seen by Dr. Reed ( EP attending) who discovered frequent PVC. For the further investigations, patient was transferred to [facility] CICU and EP was consulted today.    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  If MRI shows significant scar burden, will need cardiac PET to assess for inflammatory processes including cardiac sarcoidosis, and also consider EP study to assess for need for ICD depending on scar burden +/- PVC/NSVT ablation  - STOP Amiodarone gtt this time  - Continue Eliquis 5 mg BID as prescribed    EP will follow this case closely.    Angelica Navarrete MD  Clinical Cardiac Electrophysiology Fellow    Peripheral IV 01/08/25 20 G Left Antecubital (Active)   Site Assessment Clean;Dry;Intact 01/12/25 0400   Dressing Type Transparent 01/12/25 0400   Line Status Infusing 01/12/25 0400   Dressing Status Clean;Dry;Occlusive 01/12/25 0400   Number of days: 4       Peripheral IV 01/11/25 20 G Right Forearm (Active)   Site Assessment Clean;Dry;Intact 01/12/25 0400   Dressing Type  Transparent 01/12/25 0400   Line Status Flushed 01/12/25 0400   Dressing Status Clean;Dry;Occlusive 01/12/25 0400   Number of days: 1       Code Status:  Full Code    I spent 60 minutes in the professional and overall care of this patient.        Angelica Navarrete MD

## 2025-01-12 NOTE — PROGRESS NOTES
Holly Chavez is a 59 y.o. female on day 1 of admission presenting with NSVT (nonsustained ventricular tachycardia) (Multi).      Subjective   Patient is feeling well this morning, no palpitations, chest pain, or dizziness.  Placed on nasal cannula O2 overnight for desats while asleep. She reports she attempted a sleep study many years ago, unable to complete it. She doesn't snore, is continuously fatigued during the day and sometimes wakes up short of breath.   Lower extremity leg swelling is at its baseline per patient.    On review of telemetry overnight, patient about 4 episodes of NSVT (3-4 beats per episode). No significant burden of PVCs noted.       Objective     Last Recorded Vitals  BP (!) 146/105   Pulse 52   Temp 35.7 °C (96.3 °F)   Resp 16   Wt 97.7 kg (215 lb 6.2 oz)   SpO2 99%   Intake/Output last 3 Shifts:    Intake/Output Summary (Last 24 hours) at 1/12/2025 0940  Last data filed at 1/12/2025 0600  Gross per 24 hour   Intake 254.22 ml   Output 400 ml   Net -145.78 ml       Admission Weight  Weight: 97.3 kg (214 lb 8.1 oz) (01/11/25 1358)    Daily Weight  01/12/25 : 97.7 kg (215 lb 6.2 oz)    Image Results  Electrocardiogram, 12-lead PRN ACS symptoms  Sinus bradycardia  Low voltage QRS  Borderline ECG  When compared with ECG of 10-LINDA-2025 20:45, (unconfirmed)  Previous ECG has undetermined rhythm, needs review  Electrocardiogram, 12-lead PRN ACS symptoms  Undetermined rhythm  Low voltage QRS  Cannot rule out Anterior infarct (cited on or before 09-JAN-2025)  Abnormal ECG  When compared with ECG of 09-JAN-2025 09:46,  Current undetermined rhythm precludes rhythm comparison, needs review      Physical Exam  General: Awake, alert, in no acute distress, pleasantly conversant   HEENT: Normocephalic, atraumatic  CV: Heart with regular rate and rhythm, no murmurs appreciated, no JVD  Lungs: Clear to auscultation bilaterally with no wheezes, crackles, or rhonchi, saturating well on nasal cannula  GI:  Soft, nontender, nondistended  Extremities: 2+ pulses bilaterally, +bilateral lower extremity edema, R>L with redness of bilateral lower-mid shin and hyperpigmentation consistent with chronic venous changes. Non-pitting edema.  Neuro: Moves all extremities equally, strength 5/5 bilaterally    Relevant Results               Assessment & Plan  NSVT (nonsustained ventricular tachycardia) (Multi)    Holly Chavez is a 59 y.o. year old female patient with afib (on Eliquis), T2DM admitted to the CICU on 01/11/25 for  management of arrhythmia including NSVT and frequent PVCs with bigeminy. PVC burden on amiodarone drip is improved without bigeminy overnight. She has short runs of asymptomatic NSVT. EP consulted, recommend discontinuing amiodarone and pending cardiac MRI. Plan to transfer to general cardiology service today, see hospital course below.    Plan:  NEUROLOGY/PSYCH:  #claustrophobia  -Give ativan or hydroxyzine for MRI tomorrow     CARDIOVASCULAR:  #PVCs with NSVT  -EP consultation  -Continuous telemetry  -started on amiodarone drip at OSH, discontinue per EP recommendations  -continue home eliquis  -d/c metoprolol started at OSH while on amiodarone  -Started on Jardiance at OSH, no evidence of HF, will discontinue  -Cardiac MRI ordered to assess for scars as etiology for arrhythmia, NPO prior to imaging  -Pending EP recommendations and cardiac MRI results, may need ablation inpatient vs outpatient     PULMONARY:  #asthma, well controlled  -Albuterol PRN     RENAL/GENITOURINARY:  No active issues     GASTROENTEROLOGY:  #GERD  -Continue famotidine     ENDOCRINOLOGY:  #T2DM  -HOLD home mounjaro  -Decrease Glargine to 6 units QAM (Home glargine 12units daily) - HOLD tomorrow's dose when NPO  -SSI     HEMATOLOGY:  No active issues     SKIN:  #dermal irritation  -Topical nystatin and triamcinolone      INFECTIOUS DISEASE:  #bacterial sinusitis  -Augmentin BID (1/8 - 1/12)  -Robitussin    FEN  Fluids: PRN  Electrolytes:  PRN  Nutrition: Regular diet - NPO at 12am  Prophylaxis:  DVT ppx: n/a on eliquis  GI ppx: on famotidine  Bowel care: miralax PRN  Hardware:                     External Urinary Catheter Female (Active)   Placement Date/Time: 01/11/25 1400   External Catheter Type: Female   Number of days: 0       Social:  Code: Full Code    HPOA: Daughter is BIANCA, emergency contact is Masoud Orozco, partner of >10 years [phone] (SW consult placed for assistance in HCPOA documentation)  Disposition: admit to CICU      HOSPITAL COURSE TO DATE:   Holly Chavez is a 59 y.o. female transferred from [facility] to [facility] CICU for management of arrhythmia including NSVT and frequent PVCs.      She has a past medical history of afib (on Eliquis), T2DM and initially presented to the [facility] ED on 1/8 as referral from PCP for irregular heartbeat. She visited her PCP for follow-up for sinusitis (took doxycycline without improvement) when the irregular heartbeat was discovered.  She has noticed fatigue and dyspnea with minimal exertion since August of unknown etiology. She sometimes feels so weak she feels she will pass out. She does not regularly feel palpitations, chest pain, or dyspnea at rest. Lower extremity edema has been similar from baseline for last few years.     [facility] course:   Patient was admitted to the hospital with bigeminy seen at PCPs office.  Patient was also dealing with sinusitis and was placed on Augmentin and did have improvement from this.  However patient was tachycardic and seen by cardiology and electrophysiology especially in light of nonsustained ventricular tachycardia being seen on telemetry.  Dr. Hou with EP saw patient on 1/9, patient noted to have PVCs in bigeminy with short runs of NSVT. Recommendation was for patient to be transferred downtown to [facility].  Patient reports she was started on jardiance while in the hospital. She notes that last night she had feelings of fluttering in her chest  without dyspnea, chest pain, or lightheadedness.    CICU Course:  Admitted to [facility] CICU on 1/11 in hemodynamically stable condition on an amiodarone drip. Her heart rate has remained stable in the 50s and she has been without palpitations, chest pain, or lightheadedness. PVC burden decreased, she continues to have several runs of NSVT which are only 3-4 beats in duration. EP was consulted and recommended discontinuation of amiodarone drip, cardiac MRI to assess for scars or inflammatory process. EP also considering outpatient or inpatient ablation pending results of MRI. Plan for cardiac MRI on 1/13 to assess for infiltrative disease or scarring to explain high burden of PVCs.     Follow up:  [ ] give anxiolytic prior to MRI for claustrophobia  [ ] follow up EP recommendations and MRI results  [ ] resume glargine and diet post-MRI       Patient was seen and discussed with attending Dr. Jam Camargo MD  Internal Medicine and Pediatrics, PGY3

## 2025-01-12 NOTE — CARE PLAN
The clinical goals for the shift include Pt. will remain HDS        Problem: Skin  Goal: Prevent/manage excess moisture  Flowsheets (Taken 1/11/2025 1914)  Prevent/manage excess moisture:   Moisturize dry skin   Monitor for/manage infection if present  Goal: Promote/optimize nutrition  Flowsheets (Taken 1/11/2025 1914)  Promote/optimize nutrition: Monitor/record intake including meals  Goal: Promote skin healing  Flowsheets (Taken 1/11/2025 1914)  Promote skin healing: Assess skin/pad under line(s)/device(s)

## 2025-01-12 NOTE — CARE PLAN
Problem: Fall/Injury  Goal: Not fall by end of shift  Outcome: Progressing  Goal: Be free from injury by end of the shift  Outcome: Progressing  Goal: Verbalize understanding of personal risk factors for fall in the hospital  Outcome: Progressing  Goal: Verbalize understanding of risk factor reduction measures to prevent injury from fall in the home  Outcome: Progressing  Goal: Pace activities to prevent fatigue by end of the shift  Outcome: Progressing   The patient's goals for the shift include patient will remain safe without falls.     The clinical goals for the shift include Pt. will remain HDS

## 2025-01-13 ENCOUNTER — APPOINTMENT (OUTPATIENT)
Dept: RADIOLOGY | Facility: HOSPITAL | Age: 60
End: 2025-01-13
Payer: MEDICARE

## 2025-01-13 LAB
ALBUMIN SERPL BCP-MCNC: 4 G/DL (ref 3.4–5)
ANION GAP SERPL CALC-SCNC: 14 MMOL/L (ref 10–20)
ATRIAL RATE: 40 BPM
ATRIAL RATE: 55 BPM
ATRIAL RATE: 79 BPM
BASOPHILS # BLD AUTO: 0.03 X10*3/UL (ref 0–0.1)
BASOPHILS NFR BLD AUTO: 0.4 %
BUN SERPL-MCNC: 12 MG/DL (ref 6–23)
CALCIUM SERPL-MCNC: 9.3 MG/DL (ref 8.6–10.6)
CHLORIDE SERPL-SCNC: 104 MMOL/L (ref 98–107)
CO2 SERPL-SCNC: 27 MMOL/L (ref 21–32)
CREAT SERPL-MCNC: 0.71 MG/DL (ref 0.5–1.05)
EGFRCR SERPLBLD CKD-EPI 2021: >90 ML/MIN/1.73M*2
EOSINOPHIL # BLD AUTO: 0.19 X10*3/UL (ref 0–0.7)
EOSINOPHIL NFR BLD AUTO: 2.8 %
ERYTHROCYTE [DISTWIDTH] IN BLOOD BY AUTOMATED COUNT: 12.5 % (ref 11.5–14.5)
GLUCOSE BLD MANUAL STRIP-MCNC: 143 MG/DL (ref 74–99)
GLUCOSE SERPL-MCNC: 134 MG/DL (ref 74–99)
HCT VFR BLD AUTO: 36.9 % (ref 36–46)
HGB BLD-MCNC: 12.1 G/DL (ref 12–16)
IMM GRANULOCYTES # BLD AUTO: 0.02 X10*3/UL (ref 0–0.7)
IMM GRANULOCYTES NFR BLD AUTO: 0.3 % (ref 0–0.9)
LYMPHOCYTES # BLD AUTO: 1.22 X10*3/UL (ref 1.2–4.8)
LYMPHOCYTES NFR BLD AUTO: 18 %
MAGNESIUM SERPL-MCNC: 2.34 MG/DL (ref 1.6–2.4)
MCH RBC QN AUTO: 27 PG (ref 26–34)
MCHC RBC AUTO-ENTMCNC: 32.8 G/DL (ref 32–36)
MCV RBC AUTO: 82 FL (ref 80–100)
MONOCYTES # BLD AUTO: 0.36 X10*3/UL (ref 0.1–1)
MONOCYTES NFR BLD AUTO: 5.3 %
NEUTROPHILS # BLD AUTO: 4.97 X10*3/UL (ref 1.2–7.7)
NEUTROPHILS NFR BLD AUTO: 73.2 %
NRBC BLD-RTO: 0 /100 WBCS (ref 0–0)
P AXIS: 43 DEGREES
P AXIS: 48 DEGREES
P AXIS: 49 DEGREES
P OFFSET: 168 MS
P OFFSET: 196 MS
P OFFSET: 196 MS
P ONSET: 142 MS
P ONSET: 143 MS
P ONSET: 144 MS
PHOSPHATE SERPL-MCNC: 4.6 MG/DL (ref 2.5–4.9)
PLATELET # BLD AUTO: 168 X10*3/UL (ref 150–450)
POTASSIUM SERPL-SCNC: 4.4 MMOL/L (ref 3.5–5.3)
PR INTERVAL: 154 MS
PR INTERVAL: 154 MS
Q ONSET: 212 MS
Q ONSET: 219 MS
Q ONSET: 221 MS
QRS COUNT: 19 BEATS
QRS COUNT: 20 BEATS
QRS COUNT: 9 BEATS
QRS DURATION: 74 MS
QRS DURATION: 74 MS
QRS DURATION: 78 MS
QT INTERVAL: 318 MS
QT INTERVAL: 354 MS
QT INTERVAL: 446 MS
QTC CALCULATION(BAZETT): 426 MS
QTC CALCULATION(BAZETT): 439 MS
QTC CALCULATION(BAZETT): 497 MS
QTC FREDERICIA: 394 MS
QTC FREDERICIA: 433 MS
QTC FREDERICIA: 444 MS
R AXIS: 27 DEGREES
R AXIS: 54 DEGREES
R AXIS: 58 DEGREES
RBC # BLD AUTO: 4.48 X10*6/UL (ref 4–5.2)
SODIUM SERPL-SCNC: 141 MMOL/L (ref 136–145)
T AXIS: 22 DEGREES
T AXIS: 35 DEGREES
T AXIS: 51 DEGREES
T OFFSET: 371 MS
T OFFSET: 398 MS
T OFFSET: 442 MS
VENTRICULAR RATE: 115 BPM
VENTRICULAR RATE: 119 BPM
VENTRICULAR RATE: 55 BPM
WBC # BLD AUTO: 6.8 X10*3/UL (ref 4.4–11.3)

## 2025-01-13 PROCEDURE — 99232 SBSQ HOSP IP/OBS MODERATE 35: CPT

## 2025-01-13 PROCEDURE — 80069 RENAL FUNCTION PANEL: CPT

## 2025-01-13 PROCEDURE — A9575 INJ GADOTERATE MEGLUMI 0.1ML: HCPCS

## 2025-01-13 PROCEDURE — 36415 COLL VENOUS BLD VENIPUNCTURE: CPT

## 2025-01-13 PROCEDURE — 75561 CARDIAC MRI FOR MORPH W/DYE: CPT

## 2025-01-13 PROCEDURE — 2550000001 HC RX 255 CONTRASTS

## 2025-01-13 PROCEDURE — 85025 COMPLETE CBC W/AUTO DIFF WBC: CPT

## 2025-01-13 PROCEDURE — 75561 CARDIAC MRI FOR MORPH W/DYE: CPT | Performed by: INTERNAL MEDICINE

## 2025-01-13 PROCEDURE — 83735 ASSAY OF MAGNESIUM: CPT

## 2025-01-13 PROCEDURE — 2500000001 HC RX 250 WO HCPCS SELF ADMINISTERED DRUGS (ALT 637 FOR MEDICARE OP)

## 2025-01-13 PROCEDURE — 1100000001 HC PRIVATE ROOM DAILY

## 2025-01-13 PROCEDURE — 99233 SBSQ HOSP IP/OBS HIGH 50: CPT | Performed by: STUDENT IN AN ORGANIZED HEALTH CARE EDUCATION/TRAINING PROGRAM

## 2025-01-13 PROCEDURE — 82947 ASSAY GLUCOSE BLOOD QUANT: CPT

## 2025-01-13 RX ORDER — METOPROLOL TARTRATE 25 MG/1
12.5 TABLET, FILM COATED ORAL 2 TIMES DAILY
Status: DISCONTINUED | OUTPATIENT
Start: 2025-01-13 | End: 2025-01-13

## 2025-01-13 RX ORDER — METOPROLOL TARTRATE 25 MG/1
12.5 TABLET, FILM COATED ORAL ONCE
Status: COMPLETED | OUTPATIENT
Start: 2025-01-13 | End: 2025-01-13

## 2025-01-13 RX ORDER — METOPROLOL TARTRATE 25 MG/1
25 TABLET, FILM COATED ORAL 2 TIMES DAILY
Status: DISCONTINUED | OUTPATIENT
Start: 2025-01-13 | End: 2025-01-17

## 2025-01-13 RX ORDER — GADOTERATE MEGLUMINE 376.9 MG/ML
37 INJECTION INTRAVENOUS
Status: COMPLETED | OUTPATIENT
Start: 2025-01-13 | End: 2025-01-13

## 2025-01-13 RX ADMIN — GADOTERATE MEGLUMINE 37 ML: 376.9 INJECTION INTRAVENOUS at 14:04

## 2025-01-13 RX ADMIN — METOPROLOL TARTRATE 12.5 MG: 25 TABLET, FILM COATED ORAL at 08:54

## 2025-01-13 RX ADMIN — FAMOTIDINE 20 MG: 20 TABLET ORAL at 08:54

## 2025-01-13 RX ADMIN — APIXABAN 5 MG: 5 TABLET, FILM COATED ORAL at 20:22

## 2025-01-13 RX ADMIN — NYSTATIN 1 APPLICATION: 100000 POWDER TOPICAL at 20:22

## 2025-01-13 RX ADMIN — APIXABAN 5 MG: 5 TABLET, FILM COATED ORAL at 08:55

## 2025-01-13 RX ADMIN — METOPROLOL TARTRATE 25 MG: 25 TABLET, FILM COATED ORAL at 20:22

## 2025-01-13 RX ADMIN — TRIAMCINOLONE ACETONIDE: 1 OINTMENT TOPICAL at 20:22

## 2025-01-13 RX ADMIN — Medication 2000 UNITS: at 08:54

## 2025-01-13 RX ADMIN — TRIAMCINOLONE ACETONIDE: 1 OINTMENT TOPICAL at 08:55

## 2025-01-13 RX ADMIN — METOPROLOL TARTRATE 12.5 MG: 25 TABLET, FILM COATED ORAL at 10:08

## 2025-01-13 RX ADMIN — NYSTATIN 1 APPLICATION: 100000 POWDER TOPICAL at 08:55

## 2025-01-13 RX ADMIN — ATORVASTATIN CALCIUM 10 MG: 20 TABLET, FILM COATED ORAL at 20:22

## 2025-01-13 RX ADMIN — LORAZEPAM 1 MG: 0.5 TABLET ORAL at 12:51

## 2025-01-13 RX ADMIN — CYANOCOBALAMIN TAB 1000 MCG 1000 MCG: 1000 TAB at 08:54

## 2025-01-13 ASSESSMENT — COGNITIVE AND FUNCTIONAL STATUS - GENERAL
MOBILITY SCORE: 24
DAILY ACTIVITIY SCORE: 24

## 2025-01-13 ASSESSMENT — ACTIVITIES OF DAILY LIVING (ADL): LACK_OF_TRANSPORTATION: NO

## 2025-01-13 ASSESSMENT — PAIN SCALES - GENERAL: PAINLEVEL_OUTOF10: 0 - NO PAIN

## 2025-01-13 NOTE — PROGRESS NOTES
01/13/25 0829   Discharge Planning   Living Arrangements Spouse/significant other;Children   Support Systems Spouse/significant other;Children   Assistance Needed none   Type of Residence Private residence   Number of Stairs to Enter Residence 1   Number of Stairs Within Residence 0   Do you have animals or pets at home? Yes   Type of Animals or Pets cat   Who is requesting discharge planning? Provider   Home or Post Acute Services None   Expected Discharge Disposition Home   Does the patient need discharge transport arranged? No   RoundTrip coordination needed? No   Financial Resource Strain   How hard is it for you to pay for the very basics like food, housing, medical care, and heating? Not very   Housing Stability   In the last 12 months, was there a time when you were not able to pay the mortgage or rent on time? N   At any time in the past 12 months, were you homeless or living in a shelter (including now)? N   Transportation Needs   In the past 12 months, has lack of transportation kept you from medical appointments or from getting medications? no   In the past 12 months, has lack of transportation kept you from meetings, work, or from getting things needed for daily living? No   Patient Choice   Provider Choice list and CMS website (https://medicare.gov/care-compare#search) for post-acute Quality and Resource Measure Data were provided and reviewed with: Patient   Stroke Family Assessment   Stroke Family Assessment Needed No     Transitional Care Coordination Progress Note:  Patient discussed during interdisciplinary rounds.   Team members present: RN CRIS DIAMOND MD   Plan per Medical/Surgical team: MRI Possible ablation   Payor: MEDICARE   Discharge disposition: Home   Potential Barriers: None   ADOD: 1-     Previous Home Care: None   DME: None   Pharmacy: [facility]   Falls: None   PCP:  KASSIE PAZ  Dialysis: None

## 2025-01-13 NOTE — PROGRESS NOTES
DAILY PROGRESS NOTE        Name: Holly Chavez  :  1965(59 y.o.)  MRN:  [id number]                  Date: 25     SUBJECTIVE     Overnight: NAEON.     This morning, patient states that she slept poorly due to anxiety regarding her upcoming cardiac MRI. Otherwise she offers no concerns. She does not have any chest pain, palpitations, SOB, headaches, or dizziness.     Current medications:  Scheduled Meds:  apixaban, 5 mg, oral, BID  atorvastatin, 10 mg, oral, Daily  cholecalciferol, 2,000 Units, oral, Daily  cyanocobalamin, 1,000 mcg, oral, Daily  famotidine, 20 mg, oral, Daily  [Held by provider] insulin glargine, 6 Units, subcutaneous, Daily  insulin lispro, 0-5 Units, subcutaneous, TID AC  metoprolol tartrate, 12.5 mg, oral, BID  nystatin, 1 Application, Topical, BID  triamcinolone, , Topical, BID      Continuous Infusions:   PRN Meds:PRN medications: acetaminophen, albuterol, dextromethorphan-guaifenesin, dextrose, dextrose, diphenhydrAMINE, glucagon, glucagon, LORazepam, melatonin, polyethylene glycol      OBJECTIVE                                                                                                                                                                                                                                                                                                                                                           Temp:  [36.3 °C (97.3 °F)-37.2 °C (99 °F)] 37.2 °C (99 °F)  Heart Rate:  [51-97] 97  Resp:  [14-23] 19  BP: (113-146)/(49-96) 120/49       Intake/Output Summary (Last 24 hours) at 2025 0939  Last data filed at 2025 0426  Gross per 24 hour   Intake 294.18 ml   Output 1710 ml   Net -1415.82 ml        PHYSICAL EXAM  Constitutional: Alert, sitting in bed, no acute distress  HEENT: normocephalic, atraumatic, conjunctiva normal   Respiratory: effort normal and appropriate, breath sounds CTA throughout all fields bilaterally  Cardiovascular: RRR, s1  "and s2 noted, no murmurs, rub, or gallops appreciated, distal pulses 2+ throughout, no JVD  Abdominal: flat, soft, non-tender, normal bowel sounds   MSK/Derm: skin warm and dry, muscle tone and strength appropriate  Neurological: at baseline, AOx4  Psych: Appropriate mood and behavior      LABS:  CBC RFP   Lab Results   Component Value Date    WBC 6.8 01/13/2025    HGB 12.1 01/13/2025    HCT 36.9 01/13/2025    MCV 82 01/13/2025     01/13/2025    NEUTROABS 4.97 01/13/2025    Lab Results   Component Value Date     01/13/2025    K 4.4 01/13/2025     01/13/2025    CO2 27 01/13/2025    BUN 12 01/13/2025    CREATININE 0.71 01/13/2025    CREATININE 0.85 01/12/2025     Lab Results   Component Value Date    MG 2.34 01/13/2025    PHOS 4.6 01/13/2025    CALCIUM 9.3 01/13/2025         Hepatic Function ABG/VBG   Lab Results   Component Value Date    ALT 10 01/11/2025    AST 12 01/11/2025    ALKPHOS 51 01/11/2025     No results found for: \"TBILIHCVFS\", \"BILIDIR\"   Lab Results   Component Value Date    PROTIME 18.9 (H) 01/11/2025    APTT 52 (H) 01/11/2025    INR 1.7 (H) 01/11/2025    Lab Results   Component Value Date    LACTATE 2.0 08/19/2023        Micro/culture data:  No results found for the last 90 days.    Imaging:  No results found.     Cardiac:  Last echo: No results found for this or any previous visit.   Last cath: CV NCDR CATHPCI V5 COLLECTION FORM     GI:  Last EGD: === Results for orders placed in visit on 05/04/17 ===    EGD [GI2]     Status: Normal  Last Colonoscopy:  === Results for orders placed in visit on 09/23/13 ===    COLONOSCOPY [GI6]     Status: Normal        ASSESSMENT & PLAN     Holly Chavez is a 59-year-old female with PMHx of paroxysmal AF (on apixaban) and T2DM, transferred to CICU for management of frequent monomorphic PVCs (46% burden) and NSVT. She initially presented to her PCP for sinusitis but was found to have irregular heartbeats and was admitted to [facility]. Workup " revealed mildly reduced EF (53%), with PVC morphology suggestive of LVOT origin. She was started on amiodarone drip at [facility], which decreased PVC burden but did not resolve NSVT. Transferred to [place] for advanced evaluation, including cardiac MRI for possible infiltrative or structural disease. Her current condition is stable. Amiodarone was discontinued, and she remains hemodynamically stable with no active symptoms. Plan for cardiac MRI 1/13.       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  :: NSVT episodes decreasing but persisting; longest run 10 beats on telemetry.  :: Echo with mildly reduced EF at 53%--potentially PVC-induced cardiomyopathy.  :: Differential includes structural abnormalities, infiltrative disease (sarcoidosis), or scar-related arrhythmia.  Plan:  -> Perform cardiac MRI to evaluate for scarring, inflammation, or infiltrative disease (scheduled 1/13).  -> Consider cardiac PET if MRI shows scar burden, to evaluate for inflammatory causes (e.g., sarcoidosis).  -> Medications: Lopressor 25 mg BID. Consider initiation of flecainide if MRI shows no scar.  -> Avoid potential arrhythmogenic triggers (e.g., hypokalemia, hypomagnesemia).    # Type 2 Diabetes Mellitus  Assessment:  :: On insulin glargine and lispro with good glycemic control.  :: No hypoglycemic episodes reported; A1c history pending in records.  Plan:  -> Continue insulin glargine 6 units qAM and lispro sliding scale before meals, adjusted while NPO  -> Monitor pre- and post-meal glucose levels.    # Atrial Fibrillation  Assessment:  :: Paroxysmal AF, likely related to prior COVID infection. Rate controlled with HR 50-60s.  :: On apixaban for thromboembolic prophylaxis.  Plan:  -> Continue apixaban 5 mg BID.  -> No beta-blocker given current HR; avoid AV carolee blockers unless tachycardia occurs.    # Preventative Measures and  Prophylaxis  Plan:  -> DVT Prophylaxis: None required as patient is on apixaban.  -> GI Prophylaxis: Continue famotidine 20 mg qHS.  -> Infection Prevention: Augmentin completed for sinusitis; no further antibiotics indicated unless signs of active infection arise.    # Claustrophobia (For MRI)  Assessment:  :: Patient reports anxiety about enclosed spaces.  Plan:  -> Administer lorazepam 1 mg PO 30 minutes before MRI.  -> Patient education and reassurance prior to the procedure.    ====================================  Supportive Measures  F: prn  E: Replete K>4, Mg>2, Phos>3 as needed. Monitor electrolytes daily.  N: NPO for MRI. Resume cardiac-friendly diet post-procedure.  A: PIV x2; no Aquino.  A (ABX): None needed currently. Augmentin completed.  P (Pain/Nausea/Constipation): Tylenol PO prn, ondansetron IV prn, senna PO qHS.  P (GI): Famotidine 20 mg qHS for prophylaxis.  D: No additional DVT prophylaxis needed (on apixaban).  Code Status: Full code.  DPOA: No documented POA.  Contact: Extended Emergency Contact Information  Primary Emergency Contact: Masoud Orozco  Home Phone: [phone]  Work Phone: [phone]  Relation: Significant Other     Electronically signed by Po Rousseau MD on 01/13/25 at 8:06 AM

## 2025-01-14 ENCOUNTER — APPOINTMENT (OUTPATIENT)
Dept: RADIOLOGY | Facility: HOSPITAL | Age: 60
End: 2025-01-14
Payer: MEDICARE

## 2025-01-14 LAB
ALBUMIN SERPL BCP-MCNC: 3.9 G/DL (ref 3.4–5)
ANION GAP SERPL CALC-SCNC: 13 MMOL/L (ref 10–20)
ATRIAL RATE: 56 BPM
BASOPHILS # BLD AUTO: 0.03 X10*3/UL (ref 0–0.1)
BASOPHILS NFR BLD AUTO: 0.4 %
BUN SERPL-MCNC: 13 MG/DL (ref 6–23)
CALCIUM SERPL-MCNC: 9.4 MG/DL (ref 8.6–10.6)
CHLORIDE SERPL-SCNC: 103 MMOL/L (ref 98–107)
CO2 SERPL-SCNC: 30 MMOL/L (ref 21–32)
CREAT SERPL-MCNC: 0.81 MG/DL (ref 0.5–1.05)
EGFRCR SERPLBLD CKD-EPI 2021: 83 ML/MIN/1.73M*2
EOSINOPHIL # BLD AUTO: 0.19 X10*3/UL (ref 0–0.7)
EOSINOPHIL NFR BLD AUTO: 2.6 %
ERYTHROCYTE [DISTWIDTH] IN BLOOD BY AUTOMATED COUNT: 12.6 % (ref 11.5–14.5)
GLUCOSE BLD MANUAL STRIP-MCNC: 109 MG/DL (ref 74–99)
GLUCOSE BLD MANUAL STRIP-MCNC: 113 MG/DL (ref 74–99)
GLUCOSE BLD MANUAL STRIP-MCNC: 115 MG/DL (ref 74–99)
GLUCOSE BLD MANUAL STRIP-MCNC: 99 MG/DL (ref 74–99)
GLUCOSE SERPL-MCNC: 103 MG/DL (ref 74–99)
HCT VFR BLD AUTO: 36.9 % (ref 36–46)
HGB BLD-MCNC: 11.8 G/DL (ref 12–16)
IMM GRANULOCYTES # BLD AUTO: 0.02 X10*3/UL (ref 0–0.7)
IMM GRANULOCYTES NFR BLD AUTO: 0.3 % (ref 0–0.9)
LYMPHOCYTES # BLD AUTO: 2.05 X10*3/UL (ref 1.2–4.8)
LYMPHOCYTES NFR BLD AUTO: 27.7 %
MAGNESIUM SERPL-MCNC: 2.43 MG/DL (ref 1.6–2.4)
MCH RBC QN AUTO: 26.9 PG (ref 26–34)
MCHC RBC AUTO-ENTMCNC: 32 G/DL (ref 32–36)
MCV RBC AUTO: 84 FL (ref 80–100)
MONOCYTES # BLD AUTO: 0.57 X10*3/UL (ref 0.1–1)
MONOCYTES NFR BLD AUTO: 7.7 %
NEUTROPHILS # BLD AUTO: 4.55 X10*3/UL (ref 1.2–7.7)
NEUTROPHILS NFR BLD AUTO: 61.3 %
NRBC BLD-RTO: 0 /100 WBCS (ref 0–0)
P AXIS: 37 DEGREES
P OFFSET: 195 MS
P ONSET: 138 MS
PHOSPHATE SERPL-MCNC: 4.5 MG/DL (ref 2.5–4.9)
PLATELET # BLD AUTO: 178 X10*3/UL (ref 150–450)
POTASSIUM SERPL-SCNC: 3.8 MMOL/L (ref 3.5–5.3)
PR INTERVAL: 158 MS
Q ONSET: 217 MS
QRS COUNT: 9 BEATS
QRS DURATION: 80 MS
QT INTERVAL: 464 MS
QTC CALCULATION(BAZETT): 447 MS
QTC FREDERICIA: 453 MS
R AXIS: 45 DEGREES
RBC # BLD AUTO: 4.38 X10*6/UL (ref 4–5.2)
SODIUM SERPL-SCNC: 142 MMOL/L (ref 136–145)
T AXIS: 50 DEGREES
T OFFSET: 449 MS
VENTRICULAR RATE: 56 BPM
WBC # BLD AUTO: 7.4 X10*3/UL (ref 4.4–11.3)

## 2025-01-14 PROCEDURE — A9552 F18 FDG: HCPCS | Performed by: STUDENT IN AN ORGANIZED HEALTH CARE EDUCATION/TRAINING PROGRAM

## 2025-01-14 PROCEDURE — A9526 NITROGEN N-13 AMMONIA: HCPCS | Performed by: STUDENT IN AN ORGANIZED HEALTH CARE EDUCATION/TRAINING PROGRAM

## 2025-01-14 PROCEDURE — 2500000001 HC RX 250 WO HCPCS SELF ADMINISTERED DRUGS (ALT 637 FOR MEDICARE OP)

## 2025-01-14 PROCEDURE — 36415 COLL VENOUS BLD VENIPUNCTURE: CPT

## 2025-01-14 PROCEDURE — 85025 COMPLETE CBC W/AUTO DIFF WBC: CPT

## 2025-01-14 PROCEDURE — 83735 ASSAY OF MAGNESIUM: CPT

## 2025-01-14 PROCEDURE — 80069 RENAL FUNCTION PANEL: CPT

## 2025-01-14 PROCEDURE — 1100000001 HC PRIVATE ROOM DAILY

## 2025-01-14 PROCEDURE — 99232 SBSQ HOSP IP/OBS MODERATE 35: CPT

## 2025-01-14 PROCEDURE — 3430000001 HC RX 343 DIAGNOSTIC RADIOPHARMACEUTICALS: Performed by: STUDENT IN AN ORGANIZED HEALTH CARE EDUCATION/TRAINING PROGRAM

## 2025-01-14 PROCEDURE — 78433 MYOCRD IMG PET 2RTRACER CT: CPT | Performed by: STUDENT IN AN ORGANIZED HEALTH CARE EDUCATION/TRAINING PROGRAM

## 2025-01-14 PROCEDURE — 78432 MYOCRD IMG PET 2RTRACER: CPT

## 2025-01-14 PROCEDURE — 82947 ASSAY GLUCOSE BLOOD QUANT: CPT

## 2025-01-14 RX ORDER — AMMONIA N-13 37.5 MCI/ML
11.5 INJECTION INTRAVENOUS
Status: COMPLETED | OUTPATIENT
Start: 2025-01-14 | End: 2025-01-14

## 2025-01-14 RX ORDER — LORAZEPAM 0.5 MG/1
0.5 TABLET ORAL ONCE AS NEEDED
Status: COMPLETED | OUTPATIENT
Start: 2025-01-14 | End: 2025-01-14

## 2025-01-14 RX ORDER — FLUDEOXYGLUCOSE F 18 200 MCI/ML
10.8 INJECTION, SOLUTION INTRAVENOUS
Status: COMPLETED | OUTPATIENT
Start: 2025-01-14 | End: 2025-01-14

## 2025-01-14 RX ORDER — LORAZEPAM 0.5 MG/1
0.5 TABLET ORAL ONCE AS NEEDED
Status: DISCONTINUED | OUTPATIENT
Start: 2025-01-14 | End: 2025-01-17 | Stop reason: HOSPADM

## 2025-01-14 RX ORDER — LORAZEPAM 0.5 MG/1
1 TABLET ORAL ONCE AS NEEDED
Status: DISCONTINUED | OUTPATIENT
Start: 2025-01-14 | End: 2025-01-14

## 2025-01-14 RX ADMIN — LORAZEPAM 0.5 MG: 0.5 TABLET ORAL at 10:51

## 2025-01-14 RX ADMIN — TRIAMCINOLONE ACETONIDE: 1 OINTMENT TOPICAL at 08:08

## 2025-01-14 RX ADMIN — NYSTATIN 1 APPLICATION: 100000 POWDER TOPICAL at 08:08

## 2025-01-14 RX ADMIN — AMMONIA N-13 11.5 MILLICURIE: 37.5 INJECTION INTRAVENOUS at 14:46

## 2025-01-14 RX ADMIN — CYANOCOBALAMIN TAB 1000 MCG 1000 MCG: 1000 TAB at 08:08

## 2025-01-14 RX ADMIN — Medication 2000 UNITS: at 08:08

## 2025-01-14 RX ADMIN — TRIAMCINOLONE ACETONIDE: 1 OINTMENT TOPICAL at 20:27

## 2025-01-14 RX ADMIN — METOPROLOL TARTRATE 25 MG: 25 TABLET, FILM COATED ORAL at 20:26

## 2025-01-14 RX ADMIN — METOPROLOL TARTRATE 25 MG: 25 TABLET, FILM COATED ORAL at 08:08

## 2025-01-14 RX ADMIN — APIXABAN 5 MG: 5 TABLET, FILM COATED ORAL at 20:26

## 2025-01-14 RX ADMIN — ATORVASTATIN CALCIUM 10 MG: 20 TABLET, FILM COATED ORAL at 20:26

## 2025-01-14 RX ADMIN — FLUDEOXYGLUCOSE F 18 10.8 MILLICURIE: 200 INJECTION, SOLUTION INTRAVENOUS at 14:46

## 2025-01-14 RX ADMIN — FAMOTIDINE 20 MG: 20 TABLET ORAL at 08:08

## 2025-01-14 RX ADMIN — APIXABAN 5 MG: 5 TABLET, FILM COATED ORAL at 08:08

## 2025-01-14 RX ADMIN — LORAZEPAM 0.5 MG: 0.5 TABLET ORAL at 13:18

## 2025-01-14 RX ADMIN — NYSTATIN 1 APPLICATION: 100000 POWDER TOPICAL at 20:27

## 2025-01-14 NOTE — PROGRESS NOTES
DAILY PROGRESS NOTE        Name: Holly Chavez  :  1965(59 y.o.)  MRN:  [id number]                  Date: 25     SUBJECTIVE     Overnight: Cardiac MRI performed. Recommend PET-CT of heart. Patient made NPO yesterday evening, insulin doses held.     This morning, patient states she feels okay. She reports being okay with the MRI, but had concerns that she was given too much ativan. She would like half of her previous dose if she needs it again for her PET scan. She also endorses anxiety about her current condition and the potential need for an ICD. Questions and concerns addressed.     Current medications:  Scheduled Meds:  apixaban, 5 mg, oral, BID  atorvastatin, 10 mg, oral, Daily  cholecalciferol, 2,000 Units, oral, Daily  cyanocobalamin, 1,000 mcg, oral, Daily  famotidine, 20 mg, oral, Daily  [Held by provider] insulin glargine, 6 Units, subcutaneous, Daily  [Held by provider] insulin lispro, 0-5 Units, subcutaneous, TID AC  metoprolol tartrate, 25 mg, oral, BID  nystatin, 1 Application, Topical, BID  triamcinolone, , Topical, BID      Continuous Infusions:   PRN Meds:PRN medications: acetaminophen, albuterol, [Held by provider] dextromethorphan-guaifenesin, [Held by provider] dextrose, [Held by provider] dextrose, diphenhydrAMINE, [Held by provider] glucagon, [Held by provider] glucagon, LORazepam, melatonin, polyethylene glycol      OBJECTIVE                                                                                                                                                                                                                                                                                                                                                           Temp:  [36.5 °C (97.7 °F)-36.8 °C (98.2 °F)] 36.6 °C (97.9 °F)  Heart Rate:  [96] 96  Resp:  [18] 18  BP: (112-120)/(50-68) 115/58       Intake/Output Summary (Last 24 hours) at 2025 0851  Last data filed at 2025  "0721  Gross per 24 hour   Intake 240 ml   Output 700 ml   Net -460 ml        PHYSICAL EXAM  Constitutional: Alert, sitting in bed, no acute distress  HEENT: normocephalic, atraumatic, conjunctiva normal   Respiratory: effort normal and appropriate, breath sounds CTA throughout all fields bilaterally  Cardiovascular: RRR, s1 and s2 noted, no murmurs, rub, or gallops appreciated, distal pulses 2+ throughout, no JVD  Abdominal: flat, soft, non-tender, normal bowel sounds   MSK/Derm: skin warm and dry, muscle tone and strength appropriate  Neurological: at baseline, AOx4  Psych: Appropriate mood and behavior    LABS:  CBC RFP   Lab Results   Component Value Date    WBC 7.4 01/14/2025    HGB 11.8 (L) 01/14/2025    HCT 36.9 01/14/2025    MCV 84 01/14/2025     01/14/2025    NEUTROABS 4.55 01/14/2025    Lab Results   Component Value Date     01/14/2025    K 3.8 01/14/2025     01/14/2025    CO2 30 01/14/2025    BUN 13 01/14/2025    CREATININE 0.81 01/14/2025    CREATININE 0.71 01/13/2025     Lab Results   Component Value Date    MG 2.43 (H) 01/14/2025    PHOS 4.5 01/14/2025    CALCIUM 9.4 01/14/2025         Hepatic Function ABG/VBG   Lab Results   Component Value Date    ALT 10 01/11/2025    AST 12 01/11/2025    ALKPHOS 51 01/11/2025     No results found for: \"TBILIHCVFS\", \"BILIDIR\"   Lab Results   Component Value Date    PROTIME 18.9 (H) 01/11/2025    APTT 52 (H) 01/11/2025    INR 1.7 (H) 01/11/2025    Lab Results   Component Value Date    LACTATE 2.0 08/19/2023        Micro/culture data:  No results found for the last 90 days.    Imaging:  MR cardiac morphology and function w and wo IV contrast    Result Date: 1/13/2025  Interpreted By:  Moncho Cabrera,  and Sadia Ashton STUDY: MR CARDIAC MORPHOLOGY AND FUNCTION W AND WO IV CONTRAST;  1/13/2025 2:04 pm   INDICATION: Signs/Symptoms:Frequent PVC, need to r/o myocardial scar, structural heart disease, or inflammatory process.   COMPARISON: None.   ACCESSION " NUMBER(S): [id number]   ORDERING CLINICIAN: ANABEL HITCHCOCK   TECHNIQUE: Siemens 1.5  Angela MRI scanner. Turbo spin echo and balanced steady state free precession (bSSFP) imaging for anatomic definition. Dynamic cine bSSFP for cardiac chamber and wall-motion analysis, and valvular analysis. Flow quantification sequences for hemodynamics. Delayed gadolinium enhancement analysis after injection of gadolinium-chelate (Dotarem, 0.2 mmol/kg).   HT- 157; WT-91; BSA- 1.99 m2   FINDINGS: Normal atrioventricular and ventriculoarterial concordance   LEFT ATRIUM: Normal size (TAQUERIA - 44.39 ml/m2).   RIGHT ATRIUM: Mildly dilated (RA area max 4ch - 19.54 cm2)   INTERATRIAL SEPTUM: Intact.   LEFT VENTRICLE: 1. Normal LV size (EDVi 73 ml/m2) with low-normal systolic function (LVEF 51%). 2. No regional wall motion abnormalities. 3. Normal LV wall thickness and normal LV indexed mass (LVMi 47 g/m2). 4. Focal regions of elevated myocardial T2 in the mid-apical lateral wall (55-60 ms). 5. Borderline increased ECV 30 %. 6. No evidence of LV thrombus. 7. Following administration of gadolinium, in the late phase, there is patchy mid wall enhancement of the basal-mid inferior/inferolateral segments (nonspecific, nonischemic pattern).   Quantitative left ventricular functional values are as follows: EDV = 145.31 cc; EDVi = 72.94 cc/m2 ESV = 70.74 cc; ESVi = 35.51 cc/m2 Absolute Cardiac Output = 6.45 l/min.; COi = 3.24 l/min/m2 LV mass = 93.72 gm; LVMi = 47.05 gm/m2 Stroke volume = 74.57 cc; SVi = 37.43 cc/m2 LVEF = 51 %   LV septal wall thickness (anterobasal): 0.8 cm LV postero-inferior wall thickness: 0.7 cm LVEDD: 5.25 cm LVESD: 4.2 cm     RIGHT VENTRICLE: 1. Normal RV size (EDVi 65 ml/m2) and systolic function (RVEF 52%). 2. No regional wall motion abnormalities. 3. No abnormal delayed enhancement in the myocardium.   Quantitative right ventricular functional values are as follows: RVEDV = 128.57 ml; RVEDVi = 64.54 ml/m2 RVESV = 62.18  ml; RVESVi = 31.21 ml/m2 RVSV = 66.38 ml; RVSVi = 33.32 ml/m2 RVEF = 52.00 % RVCO = 5.74 l/min; RVCI = 2.88 l/min/m2   INTERVENTRICULAR SEPTUM: Intact.   AORTIC VALVE: The aortic valve is trileaflet. There is quantitatively mild aortic regurgitation. Flow quantification through the ascending aorta: Forward volume = 83.00 cc/beat Reverse volume = -6.77 cc/beat Net forward volume = 76.23 cc/beat Aortic regurgitant fraction = 8 %   MITRAL VALVE: The mitral valve leaflets appear mildly thickened. There is no mitral regurgitation.   TRICUSPID VALVE: There is qualitatively trivial tricuspid regurgitation.   PULMONARY VALVE: Not assessed.   PERICARDIUM: The pericardium is normal. There is trivial pericardial effusion.   THORACIC AORTA: The thoracic aorta appears normal in course and contour. The aortic root is normal in size. There is no evidence for acute aortic pathology. The arch vessel branching pattern is  normal.   All the arch branch vessels appear widely patent in their proximal portions.   AORTIC ROOT DIMENSIONS: Annulus: 2.3 cm Aortic root(sinus of valsalva): 2.9 cm Sinotubular junction: 2.4 cm   PULMONARY ARTERIES: The central pulmonary arteries appear normal (MPA-2.9 cm, RPA-1.9 cm, LPA-2.4 cm).   SYSTEMIC AND PULMONARY VEINS: Normal systemic venous and pulmonary venous return. The SVC is of normal caliber. IVC appears normal Normal pulmonary venous anatomy.   CHEST: The chest wall is normal. Limited imaging through the lungs reveals no gross abnormalities. No pleural effusion.   UPPER ABDOMEN: Limited imaging through the upper abdomen reveals no abnormalities of the visualized organs.       1. Normal LV size (EDVi 73 ml/m2) with low-normal systolic function (LVEF 51%). 2. No definite regional wall motion abnormalities. 3. Focally elevated myocardial T2 values of the mid-apical lateral wall, uncertain significance but can be seen in the context of myocardial edema/inflammation. If clinically indicated consider  further evaluation with FDG PET-CT. 4. Patchy mid-wall LGE/fibrosis of the basal-mid inferior/inferolateral segments (nonspecific/nonischemic pattern). No evidence of prior infarction. 5. Normal RV size (EDVi 65 ml/m2) and systolic function (RVEF 52%). No regional wall motion abnormalities.       MACRO: None   Signed by: Moncho Cabrera 1/13/2025 6:49 PM Dictation workstation:   [id number]      Cardiac:  Last echo: No results found for this or any previous visit.   Last cath: CV NCDR CATHPCI V5 COLLECTION FORM         ASSESSMENT & PLAN     Holly Chavez is a 59-year-old female with PMHx of paroxysmal AF (on apixaban) and T2DM, transferred to CICU for management of frequent monomorphic PVCs (46% burden) and NSVT. She initially presented to her PCP for sinusitis but was found to have irregular heartbeats and was admitted to [facility]. Workup revealed mildly reduced EF (53%), with PVC morphology suggestive of LVOT origin. She was started on amiodarone drip at [facility], which decreased PVC burden but did not resolve NSVT. Transferred to [facility] for advanced evaluation, including cardiac MRI for possible infiltrative or structural disease. Her current condition is stable. Amiodarone was discontinued, and she remains hemodynamically stable with no active symptoms. Cardiac MRI showing focal elevated myocardial T2 values to be correlated with PET-CT. Plan for PET-CT today to rule out  inflammation etc..         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  :: NSVT episodes decreasing but persisting; longest run 10 beats on telemetry.  :: Echo with mildly reduced EF at 53%--potentially PVC-induced cardiomyopathy.  :: Differential includes structural abnormalities, infiltrative disease (sarcoidosis), or scar-related arrhythmia.  Plan:  -> Cardiac MRI completed  -> Plan for PET-CT today  -> Consider cardiac PET if MRI  shows scar burden, to evaluate for inflammatory causes (e.g., sarcoidosis).  -> Medications: Lopressor 25 mg BID. Consider initiation of flecainide if MRI shows no scar.  -> Avoid potential arrhythmogenic triggers (e.g., hypokalemia, hypomagnesemia).    # Type 2 Diabetes Mellitus  Assessment:  :: On insulin glargine and lispro with good glycemic control.  :: No hypoglycemic episodes reported; A1c history pending in records.  Plan:  -> Continue insulin glargine 6 units qAM and lispro sliding scale before meals, adjusted while NPO  -> Monitor pre- and post-meal glucose levels.    # Atrial Fibrillation  Assessment:  :: Paroxysmal AF, likely related to prior COVID infection. Rate controlled with HR 50-60s.  :: On apixaban for thromboembolic prophylaxis.  Plan:  -> Continue apixaban 5 mg BID.  -> No beta-blocker given current HR; avoid AV carolee blockers unless tachycardia occurs.    # Preventative Measures and Prophylaxis  Plan:  -> DVT Prophylaxis: None required as patient is on apixaban.  -> GI Prophylaxis: Continue famotidine 20 mg qHS.  -> Infection Prevention: Augmentin completed for sinusitis; no further antibiotics indicated unless signs of active infection arise.    # Claustrophobia (For MRI)  Assessment:  :: Patient reports anxiety about enclosed spaces.  Plan:  -> Administer lorazepam 1 mg PO 30 minutes before MRI.  -> Patient education and reassurance prior to the procedure.    ====================================  Supportive Measures  F: prn  E: Replete K>4, Mg>2, Phos>3 as needed. Monitor electrolytes daily.  N: NPO for MRI. Resume cardiac-friendly diet post-procedure.  A: PIV x2; no Aquino.  A (ABX): None needed currently. Augmentin completed.  P (Pain/Nausea/Constipation): Tylenol PO prn, ondansetron IV prn, senna PO qHS.  P (GI): Famotidine 20 mg qHS for prophylaxis.  D: No additional DVT prophylaxis needed (on apixaban).  Code Status: Full code.  DPOA: No documented POA.  Contact: Extended Emergency  Contact Information  Primary Emergency Contact: Masoud Orozco  Home Phone: [phone]  Work Phone: [phone]  Relation: Significant Other     Electronically signed by Po Rousseau MD on 01/14/25 at 8:33 AM

## 2025-01-14 NOTE — PROGRESS NOTES
Subjective Data:  No new concerns. MRI revealed patchy LGE.     Overnight Events:    NAEO. AF and HDS. Rates increased since amiodarone dc.      Objective Data:  Last Recorded Vitals:  Vitals:    01/13/25 1456 01/13/25 2044 01/14/25 0015 01/14/25 0405   BP: 117/68 118/59 120/62 115/58   BP Location: Left arm      Patient Position:       Pulse:       Resp:  18 18 18   Temp: 36.6 °C (97.9 °F) 36.6 °C (97.9 °F) 36.5 °C (97.7 °F) 36.6 °C (97.9 °F)   TempSrc: Temporal      SpO2: 97% 97% 97% 98%   Weight:       Height:           Last Labs:  CBC - 1/14/2025:  5:35 AM  7.4 11.8 178    36.9      CMP - 1/14/2025:  5:35 AM  9.4 6.9 12 --- 0.6   4.5 3.9 10 51      PTT - 1/11/2025:  2:08 PM  1.7   18.9 52     TROPHS   Date/Time Value Ref Range Status   01/08/2025 12:51 PM 6 0 - 13 ng/L Final   01/08/2025 11:51 AM 6 0 - 13 ng/L Final   08/27/2024 01:16 PM 4 0 - 13 ng/L Final     BNP   Date/Time Value Ref Range Status   01/08/2025 11:51  0 - 99 pg/mL Final   08/19/2023 09:50  0 - 99 pg/mL Final     Comment:     .  <100 pg/mL - Heart failure unlikely  100-299 pg/mL - Intermediate probability of acute heart  .               failure exacerbation. Correlate with clinical  .               context and patient history.    >=300 pg/mL - Heart Failure likely. Correlate with clinical  .               context and patient history.  BNP testing is performed using different testing   methodology at [facility] than at other   [facility] hospitals. Direct result comparisons should   only be made within the same method.     06/12/2023 09:59 AM 59 0 - 99 pg/mL Final     Comment:     .  <100 pg/mL - Heart failure unlikely  100-299 pg/mL - Intermediate probability of acute heart  .               failure exacerbation. Correlate with clinical  .               context and patient history.    >=300 pg/mL - Heart Failure likely. Correlate with clinical  .               context and patient history.   Biotin interference may cause  falsely decreased results.   Patients taking a Biotin dose of up to 5 mg/day should   refrain from taking Biotin for 24 hours before sample   collection. Providers may contact their local laboratory   for further information.     HGBA1C   Date/Time Value Ref Range Status   11/25/2024 09:48 AM 6.3 4.2 - 6.5 % Final   08/05/2024 01:09 PM 6.1 4.2 - 6.5 % Final     LDLCALC   Date/Time Value Ref Range Status   02/20/2024 08:04 AM 56 <=99 mg/dL Final     Comment:                                 Near   Borderline      AGE      Desirable  Optimal    High     High     Very High     0-19 Y     0 - 109     ---    110-129   >/= 130     ----    20-24 Y     0 - 119     ---    120-159   >/= 160     ----      >24 Y     0 -  99   100-129  130-159   160-189     >/=190       VLDL   Date/Time Value Ref Range Status   02/20/2024 08:04 AM 16 0 - 40 mg/dL Final   02/09/2023 09:02 AM 15 0 - 40 mg/dL Final   05/02/2022 09:51 AM 22 0 - 40 mg/dL Final   10/23/2021 10:21 AM 18 0 - 40 mg/dL Final      Last I/O:  I/O last 3 completed shifts:  In: 240 (2.6 mL/kg) [P.O.:240]  Out: 1500 (16.4 mL/kg) [Urine:1500 (0.5 mL/kg/hr)]  Weight: 91.5 kg     Past Cardiology Tests (Last 3 Years):  EKG:   Nsr with frequent pvcs/bigeminy - outflow tract, likely amc region     Echo:  Transthoracic Echo (TTE) Complete 01/09/2025    Ejection Fractions:  EF   Date/Time Value Ref Range Status   01/09/2025 02:09 PM 53 %      Cath:  No results found for this or any previous visit from the past 1095 days.     Cardiac Imaging:  MR cardiac morphology and function w and wo IV contrast 01/13/2025  IMPRESSION:  1. Normal LV size (EDVi 73 ml/m2) with low-normal systolic function  (LVEF 51%).  2. No definite regional wall motion abnormalities.  3. Focally elevated myocardial T2 values of the mid-apical lateral  wall, uncertain significance but can be seen in the context of  myocardial edema/inflammation. If clinically indicated consider  further evaluation with FDG PET-CT.  4.  Patchy mid-wall LGE/fibrosis of the basal-mid  inferior/inferolateral segments (nonspecific/nonischemic pattern). No  evidence of prior infarction.  5. Normal RV size (EDVi 65 ml/m2) and systolic function (RVEF 52%).  No regional wall motion abnormalities.      Inpatient Medications:  Scheduled medications   Medication Dose Route Frequency    apixaban  5 mg oral BID    atorvastatin  10 mg oral Daily    cholecalciferol  2,000 Units oral Daily    cyanocobalamin  1,000 mcg oral Daily    famotidine  20 mg oral Daily    [Held by provider] insulin glargine  6 Units subcutaneous Daily    [Held by provider] insulin lispro  0-5 Units subcutaneous TID AC    metoprolol tartrate  25 mg oral BID    nystatin  1 Application Topical BID    triamcinolone   Topical BID     PRN medications   Medication    acetaminophen    albuterol    [Held by provider] dextromethorphan-guaifenesin    [Held by provider] dextrose    [Held by provider] dextrose    diphenhydrAMINE    [Held by provider] glucagon    [Held by provider] glucagon    LORazepam    melatonin    polyethylene glycol     Continuous Medications   Medication Dose Last Rate     Physical Exam  Constitutional:       General: She is not in acute distress.     Appearance: Normal appearance. She is obese. She is not toxic-appearing.   HENT:      Head: Normocephalic and atraumatic.      Mouth/Throat:      Mouth: Mucous membranes are moist.      Pharynx: Oropharynx is clear.   Eyes:      Extraocular Movements: Extraocular movements intact.      Conjunctiva/sclera: Conjunctivae normal.      Pupils: Pupils are equal, round, and reactive to light.   Cardiovascular:      Rate and Rhythm: Normal rate and regular rhythm. Extrasystoles are present.     Heart sounds: No murmur heard.     No friction rub. No gallop.   Pulmonary:      Effort: Pulmonary effort is normal. No respiratory distress.      Breath sounds: Normal breath sounds. No wheezing or rales.   Abdominal:      General: Abdomen is flat.  Bowel sounds are normal. There is no distension.      Tenderness: There is no abdominal tenderness. There is no guarding.   Musculoskeletal:         General: No swelling. Normal range of motion.      Cervical back: Normal range of motion.   Skin:     General: Skin is warm and dry.      Capillary Refill: Capillary refill takes less than 2 seconds.   Neurological:      General: No focal deficit present.      Mental Status: She is alert and oriented to person, place, and time. Mental status is at baseline.   Psychiatric:         Mood and Affect: Mood normal.         Behavior: Behavior normal.         Thought Content: Thought content normal.         Judgment: Judgment normal.            Assessment/Plan   Holly Chavez is a 59 y.o. female with PMH of paroxysmal AF possibly due to remote COVID infection (on Eliquis) and IDDM2,  who recently developed frequent lightheadedness and irregular heart beat. She presented to [facility] PCP clinic for his sinusitis follow-up but was seen by Dr. Hou ( EP attending) who discovered frequent PVCs with nearly 50% burden.    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 This will guide potential RFA. Please keep npo mn on night of pet scan for potential add on procedure next day (earliest likely 1/16).   - continue to hold Amiodarone   - Continue Eliquis 5 mg BID as prescribed    Peripheral IV 01/08/25 20 G Left Antecubital (Active)   Site Assessment Clean;Dry;Intact 01/14/25 0748   Dressing Type Transparent 01/14/25 0748   Line Status Flushed 01/14/25 0748   Dressing Status Clean;Dry;Occlusive 01/14/25 0748   Number of days: 6       Peripheral IV 01/11/25 20 G Right Forearm (Active)   Site Assessment Clean;Dry;Intact 01/14/25 0748   Dressing Type Transparent 01/14/25 0748   Line Status Flushed  01/14/25 0748   Dressing Status Clean;Dry;Occlusive 01/14/25 0748   Number of days: 3       Code Status:  Full Code    I spent 45 minutes in the professional and overall care of this patient.        John Good MD

## 2025-01-15 LAB
ALBUMIN SERPL BCP-MCNC: 4 G/DL (ref 3.4–5)
ANION GAP SERPL CALC-SCNC: 12 MMOL/L (ref 10–20)
APTT PPP: 32 SECONDS (ref 27–38)
BASOPHILS # BLD AUTO: 0.04 X10*3/UL (ref 0–0.1)
BASOPHILS NFR BLD AUTO: 0.4 %
BUN SERPL-MCNC: 16 MG/DL (ref 6–23)
CALCIUM SERPL-MCNC: 9.5 MG/DL (ref 8.6–10.6)
CHLORIDE SERPL-SCNC: 105 MMOL/L (ref 98–107)
CO2 SERPL-SCNC: 28 MMOL/L (ref 21–32)
CREAT SERPL-MCNC: 0.83 MG/DL (ref 0.5–1.05)
EGFRCR SERPLBLD CKD-EPI 2021: 80 ML/MIN/1.73M*2
EOSINOPHIL # BLD AUTO: 0.26 X10*3/UL (ref 0–0.7)
EOSINOPHIL NFR BLD AUTO: 2.9 %
ERYTHROCYTE [DISTWIDTH] IN BLOOD BY AUTOMATED COUNT: 12.6 % (ref 11.5–14.5)
GLUCOSE BLD MANUAL STRIP-MCNC: 151 MG/DL (ref 74–99)
GLUCOSE BLD MANUAL STRIP-MCNC: 152 MG/DL (ref 74–99)
GLUCOSE BLD MANUAL STRIP-MCNC: 158 MG/DL (ref 74–99)
GLUCOSE BLD MANUAL STRIP-MCNC: 162 MG/DL (ref 74–99)
GLUCOSE SERPL-MCNC: 138 MG/DL (ref 74–99)
HCT VFR BLD AUTO: 38.3 % (ref 36–46)
HGB BLD-MCNC: 12.5 G/DL (ref 12–16)
IMM GRANULOCYTES # BLD AUTO: 0.04 X10*3/UL (ref 0–0.7)
IMM GRANULOCYTES NFR BLD AUTO: 0.4 % (ref 0–0.9)
LYMPHOCYTES # BLD AUTO: 2.06 X10*3/UL (ref 1.2–4.8)
LYMPHOCYTES NFR BLD AUTO: 23 %
MAGNESIUM SERPL-MCNC: 2.46 MG/DL (ref 1.6–2.4)
MCH RBC QN AUTO: 27.3 PG (ref 26–34)
MCHC RBC AUTO-ENTMCNC: 32.6 G/DL (ref 32–36)
MCV RBC AUTO: 84 FL (ref 80–100)
MONOCYTES # BLD AUTO: 0.63 X10*3/UL (ref 0.1–1)
MONOCYTES NFR BLD AUTO: 7 %
NEUTROPHILS # BLD AUTO: 5.91 X10*3/UL (ref 1.2–7.7)
NEUTROPHILS NFR BLD AUTO: 66.3 %
NRBC BLD-RTO: 0 /100 WBCS (ref 0–0)
PHOSPHATE SERPL-MCNC: 4.3 MG/DL (ref 2.5–4.9)
PLATELET # BLD AUTO: 209 X10*3/UL (ref 150–450)
POTASSIUM SERPL-SCNC: 3.8 MMOL/L (ref 3.5–5.3)
RBC # BLD AUTO: 4.58 X10*6/UL (ref 4–5.2)
SODIUM SERPL-SCNC: 141 MMOL/L (ref 136–145)
UFH PPP CHRO-ACNC: 1.1 IU/ML
UFH PPP CHRO-ACNC: 1.8 IU/ML
WBC # BLD AUTO: 8.9 X10*3/UL (ref 4.4–11.3)

## 2025-01-15 PROCEDURE — 2500000002 HC RX 250 W HCPCS SELF ADMINISTERED DRUGS (ALT 637 FOR MEDICARE OP, ALT 636 FOR OP/ED)

## 2025-01-15 PROCEDURE — 82947 ASSAY GLUCOSE BLOOD QUANT: CPT

## 2025-01-15 PROCEDURE — 2500000001 HC RX 250 WO HCPCS SELF ADMINISTERED DRUGS (ALT 637 FOR MEDICARE OP)

## 2025-01-15 PROCEDURE — 80069 RENAL FUNCTION PANEL: CPT

## 2025-01-15 PROCEDURE — 36415 COLL VENOUS BLD VENIPUNCTURE: CPT

## 2025-01-15 PROCEDURE — 85520 HEPARIN ASSAY: CPT

## 2025-01-15 PROCEDURE — 99233 SBSQ HOSP IP/OBS HIGH 50: CPT | Performed by: STUDENT IN AN ORGANIZED HEALTH CARE EDUCATION/TRAINING PROGRAM

## 2025-01-15 PROCEDURE — 83735 ASSAY OF MAGNESIUM: CPT

## 2025-01-15 PROCEDURE — 99233 SBSQ HOSP IP/OBS HIGH 50: CPT

## 2025-01-15 PROCEDURE — 85730 THROMBOPLASTIN TIME PARTIAL: CPT

## 2025-01-15 PROCEDURE — 85025 COMPLETE CBC W/AUTO DIFF WBC: CPT

## 2025-01-15 PROCEDURE — 1100000001 HC PRIVATE ROOM DAILY

## 2025-01-15 RX ORDER — LOPERAMIDE HYDROCHLORIDE 2 MG/1
2 CAPSULE ORAL ONCE AS NEEDED
Status: COMPLETED | OUTPATIENT
Start: 2025-01-15 | End: 2025-01-15

## 2025-01-15 RX ORDER — HEPARIN SODIUM 10000 [USP'U]/100ML
0-4500 INJECTION, SOLUTION INTRAVENOUS CONTINUOUS
Status: DISCONTINUED | OUTPATIENT
Start: 2025-01-15 | End: 2025-01-17

## 2025-01-15 RX ORDER — POTASSIUM CHLORIDE 20 MEQ/1
20 TABLET, EXTENDED RELEASE ORAL ONCE
Status: COMPLETED | OUTPATIENT
Start: 2025-01-15 | End: 2025-01-15

## 2025-01-15 RX ADMIN — METOPROLOL TARTRATE 25 MG: 25 TABLET, FILM COATED ORAL at 21:02

## 2025-01-15 RX ADMIN — LOPERAMIDE HYDROCHLORIDE 2 MG: 2 CAPSULE ORAL at 06:59

## 2025-01-15 RX ADMIN — TRIAMCINOLONE ACETONIDE: 1 OINTMENT TOPICAL at 21:02

## 2025-01-15 RX ADMIN — ATORVASTATIN CALCIUM 10 MG: 20 TABLET, FILM COATED ORAL at 21:02

## 2025-01-15 RX ADMIN — METOPROLOL TARTRATE 25 MG: 25 TABLET, FILM COATED ORAL at 08:50

## 2025-01-15 RX ADMIN — Medication 2000 UNITS: at 08:50

## 2025-01-15 RX ADMIN — POTASSIUM CHLORIDE 20 MEQ: 1500 TABLET, EXTENDED RELEASE ORAL at 15:44

## 2025-01-15 RX ADMIN — FAMOTIDINE 20 MG: 20 TABLET ORAL at 08:50

## 2025-01-15 RX ADMIN — CYANOCOBALAMIN TAB 1000 MCG 1000 MCG: 1000 TAB at 08:50

## 2025-01-15 RX ADMIN — LOPERAMIDE HYDROCHLORIDE 2 MG: 2 CAPSULE ORAL at 08:50

## 2025-01-15 RX ADMIN — APIXABAN 5 MG: 5 TABLET, FILM COATED ORAL at 08:50

## 2025-01-15 NOTE — PROGRESS NOTES
DAILY PROGRESS NOTE        Name: Holly Chavez  :  1965(59 y.o.)  MRN:  [id number]                  Date: 01/15/25     SUBJECTIVE     Overnight: NAEON.     This morning, patient feels well.  Offers no concerns.  Has questions about her PET scan results.  Informed that they are pending at this time.    Current medications:  Scheduled Meds:  [Held by provider] apixaban, 5 mg, oral, BID  atorvastatin, 10 mg, oral, Daily  cholecalciferol, 2,000 Units, oral, Daily  cyanocobalamin, 1,000 mcg, oral, Daily  famotidine, 20 mg, oral, Daily  insulin glargine, 6 Units, subcutaneous, Daily  insulin lispro, 0-5 Units, subcutaneous, TID AC  metoprolol tartrate, 25 mg, oral, BID  nystatin, 1 Application, Topical, BID  potassium chloride CR, 20 mEq, oral, Once  triamcinolone, , Topical, BID      Continuous Infusions:heparin, 0-4,500 Units/hr      PRN Meds:PRN medications: acetaminophen, albuterol, dextromethorphan-guaifenesin, dextrose, dextrose, diphenhydrAMINE, glucagon, glucagon, heparin, LORazepam, melatonin, polyethylene glycol      OBJECTIVE                                                                                                                                                                                                                                                                                                                                                           Temp:  [36.2 °C (97.2 °F)-36.6 °C (97.9 °F)] 36.6 °C (97.9 °F)  Heart Rate:  [] 103  Resp:  [16-22] 17  BP: ()/(51-81) 116/51       Intake/Output Summary (Last 24 hours) at 1/15/2025 1002  Last data filed at 1/15/2025 0420  Gross per 24 hour   Intake --   Output 1000 ml   Net -1000 ml        PHYSICAL EXAM  Constitutional: Alert, sitting in bed, no acute distress  HEENT: normocephalic, atraumatic, conjunctiva normal   Respiratory: effort normal and appropriate, breath sounds CTA throughout all fields bilaterally  Cardiovascular: RRR,  "s1 and s2 noted, no murmurs, rub, or gallops appreciated, distal pulses 2+ throughout, no JVD  Abdominal: flat, soft, non-tender, normal bowel sounds   MSK/Derm: skin warm and dry, muscle tone and strength appropriate  Neurological: at baseline, AOx4  Psych: Appropriate mood and behavior    LABS:  CBC RFP   Lab Results   Component Value Date    WBC 8.9 01/15/2025    HGB 12.5 01/15/2025    HCT 38.3 01/15/2025    MCV 84 01/15/2025     01/15/2025    NEUTROABS 5.91 01/15/2025    Lab Results   Component Value Date     01/15/2025    K 3.8 01/15/2025     01/15/2025    CO2 28 01/15/2025    BUN 16 01/15/2025    CREATININE 0.83 01/15/2025    CREATININE 0.81 01/14/2025     Lab Results   Component Value Date    MG 2.46 (H) 01/15/2025    PHOS 4.3 01/15/2025    CALCIUM 9.5 01/15/2025         Hepatic Function ABG/VBG   Lab Results   Component Value Date    ALT 10 01/11/2025    AST 12 01/11/2025    ALKPHOS 51 01/11/2025     No results found for: \"TBILIHCVFS\", \"BILIDIR\"   Lab Results   Component Value Date    PROTIME 18.9 (H) 01/11/2025    APTT 52 (H) 01/11/2025    INR 1.7 (H) 01/11/2025    Lab Results   Component Value Date    LACTATE 2.0 08/19/2023        Micro/culture data:  No results found for the last 90 days.    Imaging:  MR cardiac morphology and function w and wo IV contrast    Result Date: 1/13/2025  Interpreted By:  Moncho Cabrera  and Sadia Ashton STUDY: MR CARDIAC MORPHOLOGY AND FUNCTION W AND WO IV CONTRAST;  1/13/2025 2:04 pm   INDICATION: Signs/Symptoms:Frequent PVC, need to r/o myocardial scar, structural heart disease, or inflammatory process.   COMPARISON: None.   ACCESSION NUMBER(S): [id number]   ORDERING CLINICIAN: ANABEL HITCHCOCK   TECHNIQUE: Siemens 1.5  Angela MRI scanner. Turbo spin echo and balanced steady state free precession (bSSFP) imaging for anatomic definition. Dynamic cine bSSFP for cardiac chamber and wall-motion analysis, and valvular analysis. Flow quantification sequences for " hemodynamics. Delayed gadolinium enhancement analysis after injection of gadolinium-chelate (Dotarem, 0.2 mmol/kg).   HT- 157; WT-91; BSA- 1.99 m2   FINDINGS: Normal atrioventricular and ventriculoarterial concordance   LEFT ATRIUM: Normal size (TAQUERIA - 44.39 ml/m2).   RIGHT ATRIUM: Mildly dilated (RA area max 4ch - 19.54 cm2)   INTERATRIAL SEPTUM: Intact.   LEFT VENTRICLE: 1. Normal LV size (EDVi 73 ml/m2) with low-normal systolic function (LVEF 51%). 2. No regional wall motion abnormalities. 3. Normal LV wall thickness and normal LV indexed mass (LVMi 47 g/m2). 4. Focal regions of elevated myocardial T2 in the mid-apical lateral wall (55-60 ms). 5. Borderline increased ECV 30 %. 6. No evidence of LV thrombus. 7. Following administration of gadolinium, in the late phase, there is patchy mid wall enhancement of the basal-mid inferior/inferolateral segments (nonspecific, nonischemic pattern).   Quantitative left ventricular functional values are as follows: EDV = 145.31 cc; EDVi = 72.94 cc/m2 ESV = 70.74 cc; ESVi = 35.51 cc/m2 Absolute Cardiac Output = 6.45 l/min.; COi = 3.24 l/min/m2 LV mass = 93.72 gm; LVMi = 47.05 gm/m2 Stroke volume = 74.57 cc; SVi = 37.43 cc/m2 LVEF = 51 %   LV septal wall thickness (anterobasal): 0.8 cm LV postero-inferior wall thickness: 0.7 cm LVEDD: 5.25 cm LVESD: 4.2 cm     RIGHT VENTRICLE: 1. Normal RV size (EDVi 65 ml/m2) and systolic function (RVEF 52%). 2. No regional wall motion abnormalities. 3. No abnormal delayed enhancement in the myocardium.   Quantitative right ventricular functional values are as follows: RVEDV = 128.57 ml; RVEDVi = 64.54 ml/m2 RVESV = 62.18 ml; RVESVi = 31.21 ml/m2 RVSV = 66.38 ml; RVSVi = 33.32 ml/m2 RVEF = 52.00 % RVCO = 5.74 l/min; RVCI = 2.88 l/min/m2   INTERVENTRICULAR SEPTUM: Intact.   AORTIC VALVE: The aortic valve is trileaflet. There is quantitatively mild aortic regurgitation. Flow quantification through the ascending aorta: Forward volume = 83.00  cc/beat Reverse volume = -6.77 cc/beat Net forward volume = 76.23 cc/beat Aortic regurgitant fraction = 8 %   MITRAL VALVE: The mitral valve leaflets appear mildly thickened. There is no mitral regurgitation.   TRICUSPID VALVE: There is qualitatively trivial tricuspid regurgitation.   PULMONARY VALVE: Not assessed.   PERICARDIUM: The pericardium is normal. There is trivial pericardial effusion.   THORACIC AORTA: The thoracic aorta appears normal in course and contour. The aortic root is normal in size. There is no evidence for acute aortic pathology. The arch vessel branching pattern is  normal.   All the arch branch vessels appear widely patent in their proximal portions.   AORTIC ROOT DIMENSIONS: Annulus: 2.3 cm Aortic root(sinus of valsalva): 2.9 cm Sinotubular junction: 2.4 cm   PULMONARY ARTERIES: The central pulmonary arteries appear normal (MPA-2.9 cm, RPA-1.9 cm, LPA-2.4 cm).   SYSTEMIC AND PULMONARY VEINS: Normal systemic venous and pulmonary venous return. The SVC is of normal caliber. IVC appears normal Normal pulmonary venous anatomy.   CHEST: The chest wall is normal. Limited imaging through the lungs reveals no gross abnormalities. No pleural effusion.   UPPER ABDOMEN: Limited imaging through the upper abdomen reveals no abnormalities of the visualized organs.       1. Normal LV size (EDVi 73 ml/m2) with low-normal systolic function (LVEF 51%). 2. No definite regional wall motion abnormalities. 3. Focally elevated myocardial T2 values of the mid-apical lateral wall, uncertain significance but can be seen in the context of myocardial edema/inflammation. If clinically indicated consider further evaluation with FDG PET-CT. 4. Patchy mid-wall LGE/fibrosis of the basal-mid inferior/inferolateral segments (nonspecific/nonischemic pattern). No evidence of prior infarction. 5. Normal RV size (EDVi 65 ml/m2) and systolic function (RVEF 52%). No regional wall motion abnormalities.       MACRO: None   Signed by:  Moncho Cabrera 1/13/2025 6:49 PM Dictation workstation:   [id number]        ASSESSMENT & PLAN     Holly Chavez is a 59-year-old female with PMHx of paroxysmal AF (on apixaban) and T2DM, transferred to CICU for management of frequent monomorphic PVCs (46% burden) and NSVT. She initially presented to her PCP for sinusitis but was found to have irregular heartbeats and was admitted to [facility]. Workup revealed mildly reduced EF (53%), with PVC morphology suggestive of LVOT origin. She was started on amiodarone drip at [facility], which decreased PVC burden but did not resolve NSVT. Transferred to [facility] [place] for advanced evaluation, including cardiac MRI for possible infiltrative or structural disease. Her current condition is stable. Amiodarone was discontinued, and she remains hemodynamically stable with no active symptoms. Cardiac MRI showing focal elevated myocardial T2 values to be correlated with PET-CT. Pending read for PET-CT. Potential RFA w/ EP n 01/16 if PET not revealing for correlated increased metabolic activity.       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  :: NSVT episodes decreasing but persisting; longest run 10 beats on telemetry.  :: Echo with mildly reduced EF at 53%--potentially PVC-induced cardiomyopathy.  :: Differential includes structural abnormalities, infiltrative disease (sarcoidosis), or scar-related arrhythmia.  Plan:  -> Pending PET-CT read  -> Potential RFA w/ EP on 01/16, will be NPO at midnight, transitioned from eliquis to heparin   -> Consider cardiac PET if MRI shows scar burden, to evaluate for inflammatory causes (e.g., sarcoidosis).  -> Medications: Lopressor 25 mg BID  -> Avoid potential arrhythmogenic triggers (e.g., hypokalemia, hypomagnesemia).    # Type 2 Diabetes Mellitus  Assessment:  :: On insulin glargine and lispro with good glycemic control.  :: No hypoglycemic  episodes reported; A1c history pending in records.  Plan:  -> Continue insulin glargine 6 units qAM and lispro sliding scale before meals, adjust if NPO  -> Monitor pre- and post-meal glucose levels.    # Atrial Fibrillation  Assessment:  :: Paroxysmal AF, likely related to prior COVID infection. Rate controlled with HR 50-60s.  :: On apixaban for thromboembolic prophylaxis.  Plan:  -> transition to heparin in anticipation of procedure  -> No beta-blocker given current HR; avoid AV carolee blockers unless tachycardia occurs.    # Preventative Measures and Prophylaxis  Plan:  -> DVT Prophylaxis: None required as patient is on apixaban.  -> GI Prophylaxis: Continue famotidine 20 mg qHS.  -> Infection Prevention: Augmentin completed for sinusitis; no further antibiotics indicated unless signs of active infection arise.    ====================================  Supportive Measures  F: prn  E: Replete K>4, Mg>2, Phos>3 as needed. Monitor electrolytes daily.  N: NPO for MRI. Resume cardiac-friendly diet post-procedure.  A: PIV x2; no Aquino.  A (ABX): None needed currently. Augmentin completed.  P (Pain/Nausea/Constipation): Tylenol PO prn, ondansetron IV prn, senna PO qHS.  P (GI): Famotidine 20 mg qHS for prophylaxis.  D: No additional DVT prophylaxis needed (on apixaban).  Code Status: Full code.  DPOA: No documented POA.  Contact: Extended Emergency Contact Information  Primary Emergency Contact: Masoud Orozco  Home Phone: [phone]  Work Phone: [phone]  Relation: Significant Other     Electronically signed by Po Rousseau MD on 01/15/25 at 10:01 AM

## 2025-01-16 ENCOUNTER — ANESTHESIA (OUTPATIENT)
Dept: CARDIOLOGY | Facility: HOSPITAL | Age: 60
End: 2025-01-16
Payer: MEDICARE

## 2025-01-16 ENCOUNTER — ANESTHESIA EVENT (OUTPATIENT)
Dept: CARDIOLOGY | Facility: HOSPITAL | Age: 60
End: 2025-01-16
Payer: MEDICARE

## 2025-01-16 LAB
ALBUMIN SERPL BCP-MCNC: 3.9 G/DL (ref 3.4–5)
ANION GAP SERPL CALC-SCNC: 15 MMOL/L (ref 10–20)
BASOPHILS # BLD AUTO: 0.04 X10*3/UL (ref 0–0.1)
BASOPHILS NFR BLD AUTO: 0.5 %
BUN SERPL-MCNC: 19 MG/DL (ref 6–23)
CALCIUM SERPL-MCNC: 9.6 MG/DL (ref 8.6–10.6)
CHLORIDE SERPL-SCNC: 106 MMOL/L (ref 98–107)
CO2 SERPL-SCNC: 28 MMOL/L (ref 21–32)
CREAT SERPL-MCNC: 0.96 MG/DL (ref 0.5–1.05)
EGFRCR SERPLBLD CKD-EPI 2021: 67 ML/MIN/1.73M*2
EOSINOPHIL # BLD AUTO: 0.23 X10*3/UL (ref 0–0.7)
EOSINOPHIL NFR BLD AUTO: 2.9 %
ERYTHROCYTE [DISTWIDTH] IN BLOOD BY AUTOMATED COUNT: 12.9 % (ref 11.5–14.5)
GLUCOSE BLD MANUAL STRIP-MCNC: 137 MG/DL (ref 74–99)
GLUCOSE SERPL-MCNC: 117 MG/DL (ref 74–99)
HCT VFR BLD AUTO: 39 % (ref 36–46)
HGB BLD-MCNC: 12.5 G/DL (ref 12–16)
IMM GRANULOCYTES # BLD AUTO: 0.03 X10*3/UL (ref 0–0.7)
IMM GRANULOCYTES NFR BLD AUTO: 0.4 % (ref 0–0.9)
LYMPHOCYTES # BLD AUTO: 2.16 X10*3/UL (ref 1.2–4.8)
LYMPHOCYTES NFR BLD AUTO: 27.6 %
MAGNESIUM SERPL-MCNC: 2.38 MG/DL (ref 1.6–2.4)
MCH RBC QN AUTO: 26.8 PG (ref 26–34)
MCHC RBC AUTO-ENTMCNC: 32.1 G/DL (ref 32–36)
MCV RBC AUTO: 84 FL (ref 80–100)
MONOCYTES # BLD AUTO: 0.47 X10*3/UL (ref 0.1–1)
MONOCYTES NFR BLD AUTO: 6 %
NEUTROPHILS # BLD AUTO: 4.89 X10*3/UL (ref 1.2–7.7)
NEUTROPHILS NFR BLD AUTO: 62.6 %
NRBC BLD-RTO: 0 /100 WBCS (ref 0–0)
PHOSPHATE SERPL-MCNC: 3.8 MG/DL (ref 2.5–4.9)
PLATELET # BLD AUTO: 182 X10*3/UL (ref 150–450)
POTASSIUM SERPL-SCNC: 4.5 MMOL/L (ref 3.5–5.3)
RBC # BLD AUTO: 4.66 X10*6/UL (ref 4–5.2)
SODIUM SERPL-SCNC: 144 MMOL/L (ref 136–145)
UFH PPP CHRO-ACNC: 0.4 IU/ML
WBC # BLD AUTO: 7.8 X10*3/UL (ref 4.4–11.3)

## 2025-01-16 PROCEDURE — 2500000001 HC RX 250 WO HCPCS SELF ADMINISTERED DRUGS (ALT 637 FOR MEDICARE OP)

## 2025-01-16 PROCEDURE — 76937 US GUIDE VASCULAR ACCESS: CPT | Performed by: INTERNAL MEDICINE

## 2025-01-16 PROCEDURE — 93600 BUNDLE OF HIS RECORDING: CPT | Performed by: INTERNAL MEDICINE

## 2025-01-16 PROCEDURE — 85520 HEPARIN ASSAY: CPT

## 2025-01-16 PROCEDURE — 2500000004 HC RX 250 GENERAL PHARMACY W/ HCPCS (ALT 636 FOR OP/ED): Performed by: INTERNAL MEDICINE

## 2025-01-16 PROCEDURE — C1760 CLOSURE DEV, VASC: HCPCS | Performed by: INTERNAL MEDICINE

## 2025-01-16 PROCEDURE — C1766 INTRO/SHEATH,STRBLE,NON-PEEL: HCPCS | Performed by: INTERNAL MEDICINE

## 2025-01-16 PROCEDURE — 93654 COMPRE EP EVAL TX VT: CPT | Performed by: INTERNAL MEDICINE

## 2025-01-16 PROCEDURE — C1730 CATH, EP, 19 OR FEW ELECT: HCPCS | Performed by: INTERNAL MEDICINE

## 2025-01-16 PROCEDURE — 85347 COAGULATION TIME ACTIVATED: CPT | Performed by: INTERNAL MEDICINE

## 2025-01-16 PROCEDURE — 36415 COLL VENOUS BLD VENIPUNCTURE: CPT

## 2025-01-16 PROCEDURE — 02K83ZZ MAP CONDUCTION MECHANISM, PERCUTANEOUS APPROACH: ICD-10-PCS | Performed by: INTERNAL MEDICINE

## 2025-01-16 PROCEDURE — 2500000004 HC RX 250 GENERAL PHARMACY W/ HCPCS (ALT 636 FOR OP/ED): Mod: TB | Performed by: STUDENT IN AN ORGANIZED HEALTH CARE EDUCATION/TRAINING PROGRAM

## 2025-01-16 PROCEDURE — 85347 COAGULATION TIME ACTIVATED: CPT

## 2025-01-16 PROCEDURE — C1732 CATH, EP, DIAG/ABL, 3D/VECT: HCPCS | Performed by: INTERNAL MEDICINE

## 2025-01-16 PROCEDURE — 1200000002 HC GENERAL ROOM WITH TELEMETRY DAILY

## 2025-01-16 PROCEDURE — 02583ZZ DESTRUCTION OF CONDUCTION MECHANISM, PERCUTANEOUS APPROACH: ICD-10-PCS | Performed by: INTERNAL MEDICINE

## 2025-01-16 PROCEDURE — 99233 SBSQ HOSP IP/OBS HIGH 50: CPT

## 2025-01-16 PROCEDURE — G0269 OCCLUSIVE DEVICE IN VEIN ART: HCPCS | Performed by: INTERNAL MEDICINE

## 2025-01-16 PROCEDURE — 85025 COMPLETE CBC W/AUTO DIFF WBC: CPT

## 2025-01-16 PROCEDURE — 93622 COMP EP EVAL L VENTR PAC&REC: CPT | Performed by: INTERNAL MEDICINE

## 2025-01-16 PROCEDURE — 80069 RENAL FUNCTION PANEL: CPT

## 2025-01-16 PROCEDURE — 83735 ASSAY OF MAGNESIUM: CPT

## 2025-01-16 PROCEDURE — 82947 ASSAY GLUCOSE BLOOD QUANT: CPT

## 2025-01-16 PROCEDURE — 7100000009 HC PHASE TWO TIME - INITIAL BASE CHARGE: Performed by: INTERNAL MEDICINE

## 2025-01-16 PROCEDURE — 7100000010 HC PHASE TWO TIME - EACH INCREMENTAL 1 MINUTE: Performed by: INTERNAL MEDICINE

## 2025-01-16 PROCEDURE — 2780000003 HC OR 278 NO HCPCS: Performed by: INTERNAL MEDICINE

## 2025-01-16 PROCEDURE — C1759 CATH, INTRA ECHOCARDIOGRAPHY: HCPCS | Performed by: INTERNAL MEDICINE

## 2025-01-16 PROCEDURE — 2720000007 HC OR 272 NO HCPCS: Performed by: INTERNAL MEDICINE

## 2025-01-16 PROCEDURE — 3700000001 HC GENERAL ANESTHESIA TIME - INITIAL BASE CHARGE: Performed by: INTERNAL MEDICINE

## 2025-01-16 PROCEDURE — 2500000004 HC RX 250 GENERAL PHARMACY W/ HCPCS (ALT 636 FOR OP/ED): Performed by: ANESTHESIOLOGIST ASSISTANT

## 2025-01-16 PROCEDURE — 3700000002 HC GENERAL ANESTHESIA TIME - EACH INCREMENTAL 1 MINUTE: Performed by: INTERNAL MEDICINE

## 2025-01-16 PROCEDURE — 7100000001 HC RECOVERY ROOM TIME - INITIAL BASE CHARGE: Performed by: INTERNAL MEDICINE

## 2025-01-16 DEVICE — MYNX CONTROL™ VENOUS VASCULAR CLOSURE DEVICE 6F-12F
Type: IMPLANTABLE DEVICE | Site: GROIN | Status: FUNCTIONAL
Brand: MYNX CONTROL™ VENOUS VASCULAR CLOSURE DEVICE 6F-12F

## 2025-01-16 RX ORDER — ACETAMINOPHEN 10 MG/ML
INJECTION, SOLUTION INTRAVENOUS AS NEEDED
Status: DISCONTINUED | OUTPATIENT
Start: 2025-01-16 | End: 2025-01-16

## 2025-01-16 RX ORDER — BUPIVACAINE HYDROCHLORIDE 5 MG/ML
INJECTION, SOLUTION EPIDURAL; INTRACAUDAL AS NEEDED
Status: DISCONTINUED | OUTPATIENT
Start: 2025-01-16 | End: 2025-01-16 | Stop reason: HOSPADM

## 2025-01-16 RX ORDER — KETAMINE HYDROCHLORIDE 10 MG/ML
INJECTION, SOLUTION INTRAMUSCULAR; INTRAVENOUS AS NEEDED
Status: DISCONTINUED | OUTPATIENT
Start: 2025-01-16 | End: 2025-01-16

## 2025-01-16 RX ORDER — HEPARIN SODIUM 1000 [USP'U]/ML
INJECTION, SOLUTION INTRAVENOUS; SUBCUTANEOUS AS NEEDED
Status: DISCONTINUED | OUTPATIENT
Start: 2025-01-16 | End: 2025-01-16 | Stop reason: HOSPADM

## 2025-01-16 RX ORDER — PROTAMINE SULFATE 10 MG/ML
INJECTION, SOLUTION INTRAVENOUS AS NEEDED
Status: DISCONTINUED | OUTPATIENT
Start: 2025-01-16 | End: 2025-01-16

## 2025-01-16 RX ORDER — MIDAZOLAM HYDROCHLORIDE 1 MG/ML
INJECTION INTRAMUSCULAR; INTRAVENOUS CONTINUOUS PRN
Status: DISCONTINUED | OUTPATIENT
Start: 2025-01-16 | End: 2025-01-16

## 2025-01-16 RX ORDER — PROPOFOL 10 MG/ML
INJECTION, EMULSION INTRAVENOUS CONTINUOUS PRN
Status: DISCONTINUED | OUTPATIENT
Start: 2025-01-16 | End: 2025-01-16

## 2025-01-16 RX ORDER — DEXMEDETOMIDINE HYDROCHLORIDE 4 UG/ML
INJECTION, SOLUTION INTRAVENOUS CONTINUOUS PRN
Status: DISCONTINUED | OUTPATIENT
Start: 2025-01-16 | End: 2025-01-16

## 2025-01-16 RX ORDER — FENTANYL CITRATE 50 UG/ML
INJECTION, SOLUTION INTRAMUSCULAR; INTRAVENOUS AS NEEDED
Status: DISCONTINUED | OUTPATIENT
Start: 2025-01-16 | End: 2025-01-16

## 2025-01-16 RX ORDER — PHENYLEPHRINE HCL IN 0.9% NACL 1 MG/10 ML
SYRINGE (ML) INTRAVENOUS AS NEEDED
Status: DISCONTINUED | OUTPATIENT
Start: 2025-01-16 | End: 2025-01-16

## 2025-01-16 RX ADMIN — APIXABAN 5 MG: 5 TABLET, FILM COATED ORAL at 20:50

## 2025-01-16 RX ADMIN — ACETAMINOPHEN 1000 MG: 10 INJECTION, SOLUTION INTRAVENOUS at 11:57

## 2025-01-16 RX ADMIN — Medication 20 MG: at 11:59

## 2025-01-16 RX ADMIN — ACETAMINOPHEN 975 MG: 325 TABLET ORAL at 18:13

## 2025-01-16 RX ADMIN — TRIAMCINOLONE ACETONIDE: 1 OINTMENT TOPICAL at 20:51

## 2025-01-16 RX ADMIN — FAMOTIDINE 20 MG: 20 TABLET ORAL at 09:12

## 2025-01-16 RX ADMIN — PROPOFOL 50 MCG/KG/MIN: 10 INJECTION, EMULSION INTRAVENOUS at 11:57

## 2025-01-16 RX ADMIN — Medication 40 MCG: at 13:35

## 2025-01-16 RX ADMIN — Medication 40 MCG: at 13:49

## 2025-01-16 RX ADMIN — FENTANYL CITRATE 25 MCG: 50 INJECTION, SOLUTION INTRAMUSCULAR; INTRAVENOUS at 14:42

## 2025-01-16 RX ADMIN — NYSTATIN 1 APPLICATION: 100000 POWDER TOPICAL at 20:51

## 2025-01-16 RX ADMIN — METOPROLOL TARTRATE 25 MG: 25 TABLET, FILM COATED ORAL at 20:50

## 2025-01-16 RX ADMIN — FENTANYL CITRATE 25 MCG: 50 INJECTION, SOLUTION INTRAMUSCULAR; INTRAVENOUS at 12:20

## 2025-01-16 RX ADMIN — Medication 5 MG: at 13:41

## 2025-01-16 RX ADMIN — PROTAMINE SULFATE 30 MG: 10 INJECTION, SOLUTION INTRAVENOUS at 14:31

## 2025-01-16 RX ADMIN — FENTANYL CITRATE 25 MCG: 50 INJECTION, SOLUTION INTRAMUSCULAR; INTRAVENOUS at 14:31

## 2025-01-16 RX ADMIN — Medication 5 MG: at 14:31

## 2025-01-16 RX ADMIN — CYANOCOBALAMIN TAB 1000 MCG 1000 MCG: 1000 TAB at 09:12

## 2025-01-16 RX ADMIN — Medication 5 MG: at 12:58

## 2025-01-16 RX ADMIN — Medication 120 MCG: at 13:20

## 2025-01-16 RX ADMIN — Medication 40 MCG: at 14:32

## 2025-01-16 RX ADMIN — Medication 5 MG: at 14:42

## 2025-01-16 RX ADMIN — ATORVASTATIN CALCIUM 10 MG: 20 TABLET, FILM COATED ORAL at 20:50

## 2025-01-16 RX ADMIN — METOPROLOL TARTRATE 25 MG: 25 TABLET, FILM COATED ORAL at 09:12

## 2025-01-16 RX ADMIN — FENTANYL CITRATE 25 MCG: 50 INJECTION, SOLUTION INTRAMUSCULAR; INTRAVENOUS at 12:52

## 2025-01-16 RX ADMIN — DEXMEDETOMIDINE HYDROCHLORIDE 0.7 MCG/KG/HR: 4 INJECTION, SOLUTION INTRAVENOUS at 11:57

## 2025-01-16 RX ADMIN — Medication 2000 UNITS: at 09:12

## 2025-01-16 RX ADMIN — SODIUM CHLORIDE, SODIUM LACTATE, POTASSIUM CHLORIDE, AND CALCIUM CHLORIDE: 600; 310; 30; 20 INJECTION, SOLUTION INTRAVENOUS at 10:54

## 2025-01-16 RX ADMIN — Medication 10 MG: at 12:19

## 2025-01-16 ASSESSMENT — COLUMBIA-SUICIDE SEVERITY RATING SCALE - C-SSRS
1. IN THE PAST MONTH, HAVE YOU WISHED YOU WERE DEAD OR WISHED YOU COULD GO TO SLEEP AND NOT WAKE UP?: NO
2. HAVE YOU ACTUALLY HAD ANY THOUGHTS OF KILLING YOURSELF?: NO
6. HAVE YOU EVER DONE ANYTHING, STARTED TO DO ANYTHING, OR PREPARED TO DO ANYTHING TO END YOUR LIFE?: NO

## 2025-01-16 ASSESSMENT — PAIN - FUNCTIONAL ASSESSMENT: PAIN_FUNCTIONAL_ASSESSMENT: 0-10

## 2025-01-16 ASSESSMENT — PAIN SCALES - GENERAL: PAINLEVEL_OUTOF10: 0 - NO PAIN

## 2025-01-16 NOTE — ANESTHESIA POSTPROCEDURE EVALUATION
Patient: Holly Chavez    Procedure Summary       Date: 01/16/25 [place] [facility] [place] [facility] [place] [facility] Cardiac Cath Lab    Anesthesia Start: 1134 Anesthesia Stop:     Procedure: Ablation PVC/NSVT (07617) Diagnosis:       NSVT (nonsustained ventricular tachycardia) (Multi)      Paroxysmal atrial fibrillation (Multi)    Providers: Tania Rodriguez MD Responsible Provider: Florentin Shane MD    Anesthesia Type: MAC ASA Status: 3            Anesthesia Type: MAC    Vitals Value Taken Time   /58 01/16/25 1458   Temp 97.7 01/16/25 1458   Pulse 70 01/16/25 1458   Resp 10 01/16/25 1458   SpO2 100 01/16/25 1458       Anesthesia Post Evaluation    Patient location during evaluation: PACU  Patient participation: complete - patient participated  Level of consciousness: sleepy but conscious  Pain management: adequate  Airway patency: patent  Cardiovascular status: acceptable  Respiratory status: acceptable  Hydration status: acceptable  Postoperative Nausea and Vomiting: none  Comments: Patient SV on 8 L/min O2 with SFM.  VSS.  Report given to nurse.        No notable events documented.

## 2025-01-16 NOTE — ANESTHESIA PREPROCEDURE EVALUATION
Patient: Holly Chavez    Procedure Information       Date/Time: 01/16/25 1100    Procedure: Ablation PVC/NSVT (98992) - Stereotaxis setup in Stereo Room    Location: [facility] [place] [facility] / [facility] [place] MAT 3527 Cardiac Cath Lab    Providers: Tania Rodriguez MD            Relevant Problems   Cardiac  NSVT   (+) Arrhythmia   (+) Paroxysmal atrial fibrillation (Multi)   (+) Primary hypertension      Neuro   (+) Anxiety      GI   (+) Dysphagia   (+) Gastroesophageal reflux disease      Endocrine   (+) Diabetic retinopathy (Multi)   (+) NPDR with macular edema   (+) 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, with macular edema, associated with type 2 diabetes mellitus   (+) Type 2 diabetes mellitus      HEENT   (+) Bilateral high frequency sensorineural hearing loss      ID   (+) Yeast infection involving the vagina and surrounding area      Skin   (+) Rash, skin       Clinical information reviewed:    Allergies  Meds               NPO Detail:  No data recorded     Physical Exam    Airway  Mallampati: III     Cardiovascular   Rhythm: regular  Rate: normal     Dental   (+) upper dentures, lower dentures     Pulmonary    Abdominal            Anesthesia Plan    History of general anesthesia?: yes  History of complications of general anesthesia?: no    ASA 3     MAC     intravenous induction

## 2025-01-16 NOTE — DISCHARGE INSTRUCTIONS
INSTRUCTIONS AFTER ABLATION PROCEDURE:    * In the first week after ablation you should take it easy. No heavy lifting or heavy exertion, no treadmill.  You can use the stairs if needed but go slowly and minimize the number of times up and down.    * please do not skip doses of your blood thinner medication (apixaban, also known as Eliquis) for 30 days after ablation unless it is an emergency    * Some minor bruising is common at each groin access site with minor soreness as if you had banged the area. Bruising may occasionally be seen to extend down the leg. This is normal as is an occasional small quarter sized bump in the area. If larger swelling or more significant pain occurs at the area, please contact the office or go the nearest Emergency Room.    * All medications will generally remain the same unless you are told otherwise.  Resume taking your home medications today as listed on the discharge instructions.    *Continue to follow up with your primary cardiologist, primary care physician, and any other specialists you normally see.    *No driving for 2 days post procedure (IF you were driving prior to procedure).    *Diet: Heart healthy    Call Provider If:  Breathing faster than normal.     Fever of 100.4 F (38 C) or higher.     Chills.     Any new concerning symptoms.     Passing out.     Patient Instructions, Next 24 hours:  DO NOT drive a car, operate machinery or power tools.  It is recommended that a responsible adult be with you for the first 24 hours.     DO NOT drink any alcoholic drinks or take any non-prescriptive medications that contain alcohol for the first 24 hours.     DO NOT make any important decisions for the first 24 hours.    Activity:  You are advised to go directly home from the hospital.     DO NOT lift anything heavier than 10 pounds for one week, this allows for proper healing of the groin.     No excessive exercise or treadmill use for one week. You may walk and do stairs, slowly.      No sexual activities for 24 hours after you arrive home.    Wound Care:  If slight bleeding should occur at site, lie down and have someone apply firm pressure just above the puncture site for 5 minutes.  If it continues or is profuse, call 911. Always notify your doctor if bleeding occurs.     Keep site clean and dry. Let air dry or you may use a simple bandaid.     Gently cleanse the puncture site in your groin with soap and water only.     You may experience some tenderness, bruising or minimal inflammation.  If you have any concerns, you may contact the Cath Lab or if any of these symptoms become excessive, contact your cardiologist or go to the emergency room.     No tub baths, soaking, or swimming for one week.     May shower the next day after your procedure.    If you have any questions about the effects of the sedative drugs or groin care, please call the physician who performed your procedure.    FOLLOW UP:   1) Dr Andrew Hou ( Electrophysiology) 1/21/2025   will notify you of appointment. If you haven't heard in 1 week, please call [phone]

## 2025-01-16 NOTE — PROGRESS NOTES
Subjective Data:  PET negative. Awaiting ablation    Overnight Events:    NAEO. AF and HDS. Bigeminy on tele.     Objective Data:  Last Recorded Vitals:  Vitals:    01/15/25 0810 01/15/25 1230 01/15/25 1630 01/15/25 2135   BP: 116/51 104/56 116/53 109/52   BP Location: Right arm Right arm Right arm Right arm   Patient Position: Sitting Sitting Sitting Sitting   Pulse: 103 102 97 87   Resp: 17 17 15 20   Temp: 36.6 °C (97.9 °F) 36.6 °C (97.9 °F) 36.8 °C (98.2 °F) 36.4 °C (97.5 °F)   TempSrc: Temporal Temporal Temporal Temporal   SpO2: 94% 97% 96% 94%   Weight:       Height:           Last Labs:  CBC - 1/15/2025:  6:06 AM  8.9 12.5 209    38.3      CMP - 1/15/2025:  6:06 AM  9.5 6.9 12 --- 0.6   4.3 4.0 10 51      PTT - 1/15/2025:  3:51 PM  1.7   18.9 32     TROPHS   Date/Time Value Ref Range Status   01/08/2025 12:51 PM 6 0 - 13 ng/L Final   01/08/2025 11:51 AM 6 0 - 13 ng/L Final   08/27/2024 01:16 PM 4 0 - 13 ng/L Final     BNP   Date/Time Value Ref Range Status   01/08/2025 11:51  0 - 99 pg/mL Final   08/19/2023 09:50  0 - 99 pg/mL Final     Comment:     .  <100 pg/mL - Heart failure unlikely  100-299 pg/mL - Intermediate probability of acute heart  .               failure exacerbation. Correlate with clinical  .               context and patient history.    >=300 pg/mL - Heart Failure likely. Correlate with clinical  .               context and patient history.  BNP testing is performed using different testing   methodology at [facility] than at other   [facility] hospitals. Direct result comparisons should   only be made within the same method.     06/12/2023 09:59 AM 59 0 - 99 pg/mL Final     Comment:     .  <100 pg/mL - Heart failure unlikely  100-299 pg/mL - Intermediate probability of acute heart  .               failure exacerbation. Correlate with clinical  .               context and patient history.    >=300 pg/mL - Heart Failure likely. Correlate with clinical  .                context and patient history.   Biotin interference may cause falsely decreased results.   Patients taking a Biotin dose of up to 5 mg/day should   refrain from taking Biotin for 24 hours before sample   collection. Providers may contact their local laboratory   for further information.     HGBA1C   Date/Time Value Ref Range Status   11/25/2024 09:48 AM 6.3 4.2 - 6.5 % Final   08/05/2024 01:09 PM 6.1 4.2 - 6.5 % Final     LDLCALC   Date/Time Value Ref Range Status   02/20/2024 08:04 AM 56 <=99 mg/dL Final     Comment:                                 Near   Borderline      AGE      Desirable  Optimal    High     High     Very High     0-19 Y     0 - 109     ---    110-129   >/= 130     ----    20-24 Y     0 - 119     ---    120-159   >/= 160     ----      >24 Y     0 -  99   100-129  130-159   160-189     >/=190       VLDL   Date/Time Value Ref Range Status   02/20/2024 08:04 AM 16 0 - 40 mg/dL Final   02/09/2023 09:02 AM 15 0 - 40 mg/dL Final   05/02/2022 09:51 AM 22 0 - 40 mg/dL Final   10/23/2021 10:21 AM 18 0 - 40 mg/dL Final      Last I/O:  I/O last 3 completed shifts:  In: - (0 mL/kg)   Out: 1800 (20 mL/kg) [Urine:1800 (0.6 mL/kg/hr)]  Weight: 90 kg     Past Cardiology Tests (Last 3 Years):  EKG:   Nsr with frequent pvcs/bigeminy - outflow tract, likely amc region     Echo:  Transthoracic Echo (TTE) Complete 01/09/2025    Ejection Fractions:  EF   Date/Time Value Ref Range Status   01/09/2025 02:09 PM 53 %      Cath:  No results found for this or any previous visit from the past 1095 days.     Cardiac Imaging:  MR cardiac morphology and function w and wo IV contrast 01/13/2025  IMPRESSION:  1. Normal LV size (EDVi 73 ml/m2) with low-normal systolic function  (LVEF 51%).  2. No definite regional wall motion abnormalities.  3. Focally elevated myocardial T2 values of the mid-apical lateral  wall, uncertain significance but can be seen in the context of  myocardial edema/inflammation. If clinically indicated  consider  further evaluation with FDG PET-CT.  4. Patchy mid-wall LGE/fibrosis of the basal-mid  inferior/inferolateral segments (nonspecific/nonischemic pattern). No  evidence of prior infarction.  5. Normal RV size (EDVi 65 ml/m2) and systolic function (RVEF 52%).  No regional wall motion abnormalities.    PET 1/14  IMPRESSION:  1. Normal perfusion throughout the left ventricle.  2. Left ventricle is normal in size.  3. Normal LV wall motion with LV EF estimated at 49%.  4. No focal FDG uptake within the left ventricle or elsewhere in the  body to suggest sarcoid.          Inpatient Medications:  Scheduled medications   Medication Dose Route Frequency    [Held by provider] apixaban  5 mg oral BID    atorvastatin  10 mg oral Daily    cholecalciferol  2,000 Units oral Daily    cyanocobalamin  1,000 mcg oral Daily    famotidine  20 mg oral Daily    [Held by provider] insulin glargine  6 Units subcutaneous Daily    insulin lispro  0-5 Units subcutaneous TID AC    metoprolol tartrate  25 mg oral BID    nystatin  1 Application Topical BID    triamcinolone   Topical BID     PRN medications   Medication    acetaminophen    albuterol    dextromethorphan-guaifenesin    dextrose    dextrose    diphenhydrAMINE    glucagon    glucagon    heparin    LORazepam    melatonin    polyethylene glycol     Continuous Medications   Medication Dose Last Rate    heparin  0-4,500 Units/hr       Physical Exam  Constitutional:       General: She is not in acute distress.     Appearance: Normal appearance. She is obese. She is not toxic-appearing.   HENT:      Head: Normocephalic and atraumatic.      Mouth/Throat:      Mouth: Mucous membranes are moist.      Pharynx: Oropharynx is clear.   Eyes:      Extraocular Movements: Extraocular movements intact.      Conjunctiva/sclera: Conjunctivae normal.      Pupils: Pupils are equal, round, and reactive to light.   Cardiovascular:      Rate and Rhythm: Normal rate and regular rhythm. Extrasystoles  are present.     Heart sounds: No murmur heard.     No friction rub. No gallop.   Pulmonary:      Effort: Pulmonary effort is normal. No respiratory distress.      Breath sounds: Normal breath sounds. No wheezing or rales.   Abdominal:      General: Abdomen is flat. Bowel sounds are normal. There is no distension.      Tenderness: There is no abdominal tenderness. There is no guarding.   Musculoskeletal:         General: No swelling. Normal range of motion.      Cervical back: Normal range of motion.   Skin:     General: Skin is warm and dry.      Capillary Refill: Capillary refill takes less than 2 seconds.   Neurological:      General: No focal deficit present.      Mental Status: She is alert and oriented to person, place, and time. Mental status is at baseline.   Psychiatric:         Mood and Affect: Mood normal.         Behavior: Behavior normal.         Thought Content: Thought content normal.         Judgment: Judgment normal.            Assessment/Plan   Holly Chavez is a 59 y.o. female with PMH of paroxysmal AF possibly due to remote COVID infection (on Eliquis) and IDDM2,  who recently developed frequent lightheadedness and irregular heart beat. She presented to [facility] PCP clinic for his sinusitis follow-up but was seen by Dr. Hou ( EP attending) who discovered frequent PVCs with nearly 50% burden.      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 Rodriguez.  - continue to hold Amiodarone   - Continue Eliquis 5 mg BID as prescribed, hold am dose.    Peripheral IV 01/08/25 20 G Left Antecubital (Active)   Site Assessment Clean;Dry;Intact 01/14/25 0748   Dressing Type Transparent 01/14/25 0748   Line Status Flushed 01/14/25 0748   Dressing Status  Clean;Dry;Occlusive 01/14/25 0748   Number of days: 6       Peripheral IV 01/11/25 20 G Right Forearm (Active)   Site Assessment Clean;Dry;Intact 01/14/25 0748   Dressing Type Transparent 01/14/25 0748   Line Status Flushed 01/14/25 0748   Dressing Status Clean;Dry;Occlusive 01/14/25 0748   Number of days: 3       Code Status:  Full Code    I spent 45 minutes in the professional and overall care of this patient.        John Good MD

## 2025-01-16 NOTE — PROGRESS NOTES
DAILY PROGRESS NOTE        Name: Holly Chavez  :  1965(59 y.o.)  MRN:  [id number]                  Date: 25     SUBJECTIVE     Overnight: NAEON.     This morning, feels well. No concerns overall. Plan for procedure with EP.     Current medications:  Scheduled Meds:  [Held by provider] apixaban, 5 mg, oral, BID  atorvastatin, 10 mg, oral, Daily  cholecalciferol, 2,000 Units, oral, Daily  cyanocobalamin, 1,000 mcg, oral, Daily  famotidine, 20 mg, oral, Daily  [Held by provider] insulin glargine, 6 Units, subcutaneous, Daily  insulin lispro, 0-5 Units, subcutaneous, TID AC  metoprolol tartrate, 25 mg, oral, BID  nystatin, 1 Application, Topical, BID  triamcinolone, , Topical, BID      Continuous Infusions:[Held by provider] heparin, 0-4,500 Units/hr, Last Rate: Stopped (25 1025)      PRN Meds:PRN medications: acetaminophen, albuterol, dextromethorphan-guaifenesin, dextrose, dextrose, diphenhydrAMINE, glucagon, glucagon, heparin, LORazepam, melatonin, polyethylene glycol      OBJECTIVE                                                                                                                                                                                                                                                                                                                                                           Temp:  [36.2 °C (97.2 °F)-36.8 °C (98.2 °F)] 36.3 °C (97.3 °F)  Heart Rate:  [] 83  Resp:  [15-30] 30  BP: (104-130)/(52-84) 119/71     No intake or output data in the 24 hours ending 25 1126     PHYSICAL EXAM  Constitutional: Alert, sitting in bed, no acute distress  HEENT: normocephalic, atraumatic, conjunctiva normal   Respiratory: effort normal and appropriate, breath sounds CTA throughout all fields bilaterally  Cardiovascular: RRR, s1 and s2 noted, no murmurs, rub, or gallops appreciated, distal pulses 2+ throughout, no JVD  Abdominal: flat, soft, non-tender,  "normal bowel sounds   MSK/Derm: skin warm and dry, muscle tone and strength appropriate  Neurological: at baseline, AOx4  Psych: Appropriate mood and behavior    LABS:  CBC RFP   Lab Results   Component Value Date    WBC 7.8 01/16/2025    HGB 12.5 01/16/2025    HCT 39.0 01/16/2025    MCV 84 01/16/2025     01/16/2025    NEUTROABS 4.89 01/16/2025    Lab Results   Component Value Date     01/16/2025    K 4.5 01/16/2025     01/16/2025    CO2 28 01/16/2025    BUN 19 01/16/2025    CREATININE 0.96 01/16/2025    CREATININE 0.83 01/15/2025     Lab Results   Component Value Date    MG 2.38 01/16/2025    PHOS 3.8 01/16/2025    CALCIUM 9.6 01/16/2025         Hepatic Function ABG/VBG   Lab Results   Component Value Date    ALT 10 01/11/2025    AST 12 01/11/2025    ALKPHOS 51 01/11/2025     No results found for: \"TBILIHCVFS\", \"BILIDIR\"   Lab Results   Component Value Date    PROTIME 18.9 (H) 01/11/2025    APTT 32 01/15/2025    INR 1.7 (H) 01/11/2025    Lab Results   Component Value Date    LACTATE 2.0 08/19/2023        Micro/culture data:  No results found for the last 90 days.    Imaging:  NM PET CT cardiac sarcoid    Result Date: 1/15/2025  Interpreted By:  Dima Noriega, STUDY: NM PET CT CARDIAC SARCOID;  1/14/2025 3:10 pm   INDICATION: Signs/Symptoms:MRI showed concern for sarcoid.     COMPARISON: MR cardiac dated 01/13/2025..   ACCESSION NUMBER(S): [id number]   ORDERING CLINICIAN: ANT GAMA   TECHNIQUE: DIVISION OF NUCLEAR MEDICINE POSITRON EMISSION TOMOGRAPHY (PET) / CT MYOCARDIAL VIABILITY; SARCOID PROTOCOL   The patient fasted for at least 16 hours. The patient then received an intravenous dose of 11.5 mCi of NH3. Positron emission tomographic (PET) images of the heart were then acquired to assess regional myocardial perfusion. Following this, an intravenous dose of 10.8 mCi of Fluorine-18 fluorodeoxyglucose (FDG) was administered. Positron emission tomographic images of the heart were then " repeated to assess regional myocardial metabolism. Low-dose non-contrast CT of the thorax was performed before each PET acquisition for purposes of attenuation correction of the PET data utilizing a combined PET/CT scanner unit. Positron emission tomographic images were obtained from the skull base through the mid thigh, and fused with a non-contrast low-dose CT for attenuation correction and anatomic localization.   FINDINGS: HEART: Analysis of the perfusion images reveals normal perfusion throughout the left ventricle..   Left ventricle is normal in size with EDV 89 mL.   EKG gated images demonstrate normal LV wall motion with  LV EF estimated at 49%.   Analysis of the FDG images reveals no corresponding increased FDG uptake within the left ventricle.   HEAD/NECK: Analysis of the images reveals no definite areas of abnormal metabolic activity within neck. Opacification of right maxillary sinus with mild diffuse FDG uptake, sinusitis.   THORAX: Analysis of the images reveals no definite areas of abnormal metabolic activity. No concerning pulmonary nodules. No metabolically active mediastinal hilar or axillary lymph nodes. Note is made of small hiatal hernia.   ABDOMEN/PELVIS: Analysis of the images reveals metabolically active precaval and senia hepatis lymph nodes with a SUV max 3.1, non-specific.   MUSCULOSKELETAL SYSTEM: Analysis of the images reveals no definite areas of abnormal hypermetabolic activity suggestive of sarcoid.       1. Normal perfusion throughout the left ventricle. 2. Left ventricle is normal in size. 3. Normal LV wall motion with LV EF estimated at 49%. 4. No focal FDG uptake within the left ventricle or elsewhere in the body to suggest sarcoid.     I personally reviewed the images/study and I agree with the findings as stated by Dima Noriega MD.  This study was interpreted at [facility], [place], [place].   MACRO: None     Dictation workstation:    [id number]        ASSESSMENT & PLAN     Holly Chavez is a 59-year-old female with PMHx of paroxysmal AF (on apixaban) and T2DM, transferred to CICU for management of frequent monomorphic PVCs (46% burden) and NSVT. She initially presented to her PCP for sinusitis but was found to have irregular heartbeats and was admitted to [facility]. Workup revealed mildly reduced EF (53%), with PVC morphology suggestive of LVOT origin. She was started on amiodarone drip at [facility], which decreased PVC burden but did not resolve NSVT. Transferred to [facility] for advanced evaluation, including cardiac MRI for possible infiltrative or structural disease. Her current condition is stable. Amiodarone was discontinued, and she remains hemodynamically stable with no active symptoms. Cardiac MRI showing focal elevated myocardial T2 values to be correlated with PET-CT. Pending read for PET-CT. Plan for RFA w/ EP today; potential discharge after monitoring for 24 hours.       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  :: NSVT episodes decreasing but persisting; longest run 10 beats on telemetry.  :: Echo with mildly reduced EF at 53%--potentially PVC-induced cardiomyopathy.  :: Differential includes structural abnormalities, infiltrative disease (sarcoidosis), or scar-related arrhythmia.  :: PET-CT not concerning for sarcoidosis  Plan:  -> RFA w/ EP on 01/16, will be NPO at midnight, clarify when can resume anticoagulation  -> Medications: Lopressor 25 mg BID  -> Avoid potential arrhythmogenic triggers (e.g., hypokalemia, hypomagnesemia).    # Type 2 Diabetes Mellitus  Assessment:  :: On insulin glargine and lispro with good glycemic control.  :: No hypoglycemic episodes reported; A1c history pending in records.  Plan:  -> Continue insulin glargine 6 units qAM and lispro sliding scale before meals, adjust if NPO  -> Monitor pre-  and post-meal glucose levels.    # Atrial Fibrillation  Assessment:  :: Paroxysmal AF, likely related to prior COVID infection. Rate controlled with HR 50-60s.  :: On apixaban for thromboembolic prophylaxis.  Plan:  -> transition to heparin in anticipation of procedure  -> No beta-blocker given current HR; avoid AV carolee blockers unless tachycardia occurs.    # Preventative Measures and Prophylaxis  Plan:  -> DVT Prophylaxis: None required as patient is on apixaban.  -> GI Prophylaxis: Continue famotidine 20 mg qHS.  -> Infection Prevention: Augmentin completed for sinusitis; no further antibiotics indicated unless signs of active infection arise    ====================================  Supportive Measures  F: prn  E: Replete K>4, Mg>2, Phos>3 as needed. Monitor electrolytes daily.  N: NPO for MRI. Resume cardiac-friendly diet post-procedure.  A: PIV x2; no Aquino.  A (ABX): None needed currently. Augmentin completed.  P (Pain/Nausea/Constipation): Tylenol PO prn, ondansetron IV prn, senna PO qHS.  P (GI): Famotidine 20 mg qHS for prophylaxis.  D: No additional DVT prophylaxis needed (on apixaban).  Code Status: Full code.  DPOA: No documented POA.  Contact: Extended Emergency Contact Information  Primary Emergency Contact: Masoud Orozco  Home Phone: [phone]  Work Phone: [phone]  Relation: Significant Other     Electronically signed by Po Rousseau MD on 01/16/25 at 11:25 AM

## 2025-01-17 ENCOUNTER — PHARMACY VISIT (OUTPATIENT)
Dept: PHARMACY | Facility: CLINIC | Age: 60
End: 2025-01-17
Payer: COMMERCIAL

## 2025-01-17 ENCOUNTER — HOSPITAL ENCOUNTER (EMERGENCY)
Facility: HOSPITAL | Age: 60
Discharge: HOME | End: 2025-01-18
Attending: EMERGENCY MEDICINE
Payer: MEDICARE

## 2025-01-17 VITALS
OXYGEN SATURATION: 98 % | TEMPERATURE: 96.8 F | WEIGHT: 199.74 LBS | BODY MASS INDEX: 36.76 KG/M2 | DIASTOLIC BLOOD PRESSURE: 68 MMHG | HEIGHT: 62 IN | HEART RATE: 61 BPM | SYSTOLIC BLOOD PRESSURE: 115 MMHG | RESPIRATION RATE: 23 BRPM

## 2025-01-17 DIAGNOSIS — M79.651 RIGHT THIGH PAIN: Primary | ICD-10-CM

## 2025-01-17 LAB
ALBUMIN SERPL BCP-MCNC: 3.6 G/DL (ref 3.4–5)
ANION GAP SERPL CALC-SCNC: 12 MMOL/L (ref 10–20)
BASOPHILS # BLD AUTO: 0.03 X10*3/UL (ref 0–0.1)
BASOPHILS NFR BLD AUTO: 0.4 %
BUN SERPL-MCNC: 16 MG/DL (ref 6–23)
CALCIUM SERPL-MCNC: 8.8 MG/DL (ref 8.6–10.6)
CHLORIDE SERPL-SCNC: 108 MMOL/L (ref 98–107)
CO2 SERPL-SCNC: 27 MMOL/L (ref 21–32)
CREAT SERPL-MCNC: 0.71 MG/DL (ref 0.5–1.05)
EGFRCR SERPLBLD CKD-EPI 2021: >90 ML/MIN/1.73M*2
EOSINOPHIL # BLD AUTO: 0.28 X10*3/UL (ref 0–0.7)
EOSINOPHIL NFR BLD AUTO: 3.7 %
ERYTHROCYTE [DISTWIDTH] IN BLOOD BY AUTOMATED COUNT: 12.9 % (ref 11.5–14.5)
GLUCOSE BLD MANUAL STRIP-MCNC: 134 MG/DL (ref 74–99)
GLUCOSE BLD MANUAL STRIP-MCNC: 142 MG/DL (ref 74–99)
GLUCOSE BLD MANUAL STRIP-MCNC: 158 MG/DL (ref 74–99)
GLUCOSE SERPL-MCNC: 134 MG/DL (ref 74–99)
HCT VFR BLD AUTO: 32.9 % (ref 36–46)
HGB BLD-MCNC: 10.6 G/DL (ref 12–16)
IMM GRANULOCYTES # BLD AUTO: 0.02 X10*3/UL (ref 0–0.7)
IMM GRANULOCYTES NFR BLD AUTO: 0.3 % (ref 0–0.9)
LYMPHOCYTES # BLD AUTO: 2.02 X10*3/UL (ref 1.2–4.8)
LYMPHOCYTES NFR BLD AUTO: 26.8 %
MAGNESIUM SERPL-MCNC: 2.26 MG/DL (ref 1.6–2.4)
MCH RBC QN AUTO: 27.3 PG (ref 26–34)
MCHC RBC AUTO-ENTMCNC: 32.2 G/DL (ref 32–36)
MCV RBC AUTO: 85 FL (ref 80–100)
MONOCYTES # BLD AUTO: 0.49 X10*3/UL (ref 0.1–1)
MONOCYTES NFR BLD AUTO: 6.5 %
NEUTROPHILS # BLD AUTO: 4.71 X10*3/UL (ref 1.2–7.7)
NEUTROPHILS NFR BLD AUTO: 62.3 %
NRBC BLD-RTO: 0 /100 WBCS (ref 0–0)
PHOSPHATE SERPL-MCNC: 3.5 MG/DL (ref 2.5–4.9)
PLATELET # BLD AUTO: 162 X10*3/UL (ref 150–450)
POTASSIUM SERPL-SCNC: 3.7 MMOL/L (ref 3.5–5.3)
RBC # BLD AUTO: 3.88 X10*6/UL (ref 4–5.2)
SODIUM SERPL-SCNC: 143 MMOL/L (ref 136–145)
WBC # BLD AUTO: 7.6 X10*3/UL (ref 4.4–11.3)

## 2025-01-17 PROCEDURE — 83735 ASSAY OF MAGNESIUM: CPT

## 2025-01-17 PROCEDURE — 2500000002 HC RX 250 W HCPCS SELF ADMINISTERED DRUGS (ALT 637 FOR MEDICARE OP, ALT 636 FOR OP/ED)

## 2025-01-17 PROCEDURE — 2500000001 HC RX 250 WO HCPCS SELF ADMINISTERED DRUGS (ALT 637 FOR MEDICARE OP)

## 2025-01-17 PROCEDURE — 36415 COLL VENOUS BLD VENIPUNCTURE: CPT

## 2025-01-17 PROCEDURE — RXMED WILLOW AMBULATORY MEDICATION CHARGE

## 2025-01-17 PROCEDURE — 99283 EMERGENCY DEPT VISIT LOW MDM: CPT | Performed by: EMERGENCY MEDICINE

## 2025-01-17 PROCEDURE — 85025 COMPLETE CBC W/AUTO DIFF WBC: CPT

## 2025-01-17 PROCEDURE — 82947 ASSAY GLUCOSE BLOOD QUANT: CPT

## 2025-01-17 PROCEDURE — 80069 RENAL FUNCTION PANEL: CPT

## 2025-01-17 PROCEDURE — 99238 HOSP IP/OBS DSCHRG MGMT 30/<: CPT

## 2025-01-17 RX ORDER — BISOPROLOL FUMARATE 5 MG/1
5 TABLET, FILM COATED ORAL DAILY
Qty: 90 TABLET | Refills: 0 | Status: SHIPPED | OUTPATIENT
Start: 2025-01-17 | End: 2025-04-17

## 2025-01-17 RX ORDER — ATORVASTATIN CALCIUM 10 MG/1
10 TABLET, FILM COATED ORAL DAILY
Qty: 30 TABLET | Refills: 1 | Status: SHIPPED | OUTPATIENT
Start: 2025-01-17 | End: 2025-03-18

## 2025-01-17 RX ORDER — BISOPROLOL FUMARATE 5 MG/1
5 TABLET, FILM COATED ORAL DAILY
Status: DISCONTINUED | OUTPATIENT
Start: 2025-01-17 | End: 2025-01-17 | Stop reason: HOSPADM

## 2025-01-17 RX ORDER — ACETAMINOPHEN 325 MG/1
975 TABLET ORAL ONCE
Status: COMPLETED | OUTPATIENT
Start: 2025-01-17 | End: 2025-01-17

## 2025-01-17 RX ADMIN — CYANOCOBALAMIN TAB 1000 MCG 1000 MCG: 1000 TAB at 08:24

## 2025-01-17 RX ADMIN — Medication 2000 UNITS: at 08:24

## 2025-01-17 RX ADMIN — TRIAMCINOLONE ACETONIDE: 1 OINTMENT TOPICAL at 08:25

## 2025-01-17 RX ADMIN — BISOPROLOL FUMARATE 5 MG: 5 TABLET ORAL at 08:24

## 2025-01-17 RX ADMIN — INSULIN LISPRO 1 UNITS: 100 INJECTION, SOLUTION INTRAVENOUS; SUBCUTANEOUS at 12:52

## 2025-01-17 RX ADMIN — APIXABAN 5 MG: 5 TABLET, FILM COATED ORAL at 08:24

## 2025-01-17 RX ADMIN — ACETAMINOPHEN 975 MG: 325 TABLET ORAL at 00:11

## 2025-01-17 RX ADMIN — FAMOTIDINE 20 MG: 20 TABLET ORAL at 08:24

## 2025-01-17 RX ADMIN — INSULIN GLARGINE 6 UNITS: 100 INJECTION, SOLUTION SUBCUTANEOUS at 08:22

## 2025-01-17 ASSESSMENT — COGNITIVE AND FUNCTIONAL STATUS - GENERAL
MOBILITY SCORE: 24
DAILY ACTIVITIY SCORE: 24

## 2025-01-17 ASSESSMENT — PAIN DESCRIPTION - PAIN TYPE: TYPE: ACUTE PAIN

## 2025-01-17 ASSESSMENT — PAIN - FUNCTIONAL ASSESSMENT
PAIN_FUNCTIONAL_ASSESSMENT: 0-10
PAIN_FUNCTIONAL_ASSESSMENT: 0-10

## 2025-01-17 ASSESSMENT — PAIN SCALES - GENERAL
PAINLEVEL_OUTOF10: 2
PAINLEVEL_OUTOF10: 8

## 2025-01-17 ASSESSMENT — PAIN DESCRIPTION - DESCRIPTORS: DESCRIPTORS: TENDER

## 2025-01-17 ASSESSMENT — PAIN DESCRIPTION - FREQUENCY: FREQUENCY: CONSTANT/CONTINUOUS

## 2025-01-17 ASSESSMENT — PAIN DESCRIPTION - ONSET: ONSET: SUDDEN

## 2025-01-17 ASSESSMENT — PAIN DESCRIPTION - LOCATION: LOCATION: LEG

## 2025-01-17 ASSESSMENT — PAIN DESCRIPTION - ORIENTATION: ORIENTATION: RIGHT

## 2025-01-17 ASSESSMENT — PAIN DESCRIPTION - PROGRESSION: CLINICAL_PROGRESSION: NOT CHANGED

## 2025-01-17 NOTE — DISCHARGE SUMMARY
"Discharge Diagnosis  NSVT (nonsustained ventricular tachycardia) (Multi)    Issues Requiring Follow-Up  1) Dr Andrew Hou ( Electrophysiology) 1/21/2025    Discharge Meds     Medication List      START taking these medications     bisoprolol 5 mg tablet; Commonly known as: Zebeta; Take 1 tablet (5 mg)   by mouth once daily.     CONTINUE taking these medications     albuterol 90 mcg/actuation inhaler; Inhale 2 puffs every 4 hours if   needed for wheezing or shortness of breath.   apixaban 5 mg tablet; Commonly known as: Eliquis; Take 1 tablet (5 mg)   by mouth 2 times a day.   atorvastatin 10 mg tablet; Commonly known as: Lipitor; Take 1 tablet (10   mg) by mouth once daily.   Basaglar KwikPen U-100 Insulin 100 unit/mL (3 mL) pen; Generic drug:   insulin glargine; Inject 12 Units under the skin once daily in the   morning. Take as directed per insulin instructions.   cholecalciferol 125 mcg (5000 UT) capsule; Commonly known as: Vitamin   D-3   cyanocobalamin (vitamin B-12) 1,000 mcg tablet extended release;   Commonly known as: Vitamin B-12   * Dexcom G7 Sensor device; Generic drug: blood-glucose sensor; Change   every 10 days   * Dexcom G7 Sensor device; Generic drug: blood-glucose sensor; 1 each   see administration instructions. Change sensor every 10 days   famotidine 20 mg tablet; Commonly known as: Pepcid   Mounjaro 10 mg/0.5 mL pen injector; Generic drug: tirzepatide; Inject 10   mg under the skin 1 (one) time per week.   pen needle, diabetic 32 gauge x 5/32\" needle; 4 Needles once daily. Use   with insulin injections four times daily  * This list has 2 medication(s) that are the same as other medications   prescribed for you. Read the directions carefully, and ask your doctor or   other care provider to review them with you.     STOP taking these medications     Flonase Allergy Relief 50 mcg/actuation nasal spray; Generic drug:   fluticasone   furosemide 20 mg tablet; Commonly known as: Lasix   nystatin " ointment; Commonly known as: Mycostatin   omeprazole 20 mg DR capsule; Commonly known as: PriLOSEC   OneTouch Ultra Test strip; Generic drug: blood sugar diagnostic   triamcinolone 0.1 % cream; Commonly known as: Kenalog       Test Results Pending At Discharge  Pending Labs       No current pending labs.            Hospital Course  Holly Chavez is a 59-year-old female with a medical history significant for paroxysmal atrial fibrillation (on apixaban) and type 2 diabetes mellitus, who was transferred to the CICU from [facility] for management of high-burden arrhythmias, including frequent monomorphic PVCs and NSVT. She initially presented to her PCP on 1/8 for sinusitis and was noted to have bigeminy during routine evaluation, prompting referral to the ED. She reported worsening fatigue and exertional dyspnea since August, as well as occasional near-syncope, though she denied chest pain, palpitations, or dyspnea at rest.    At [facility], she was evaluated by cardiology and electrophysiology due to frequent PVCs in bigeminy with short runs of NSVT on telemetry. Her initial workup included an echocardiogram showing mildly reduced EF at 53% without significant valvular pathology. A 24-hour Holter revealed a PVC burden of 46% with multiple NSVT episodes, the longest lasting 10 beats. Amiodarone was initiated, and her PVC burden decreased, though she continued to have short runs of NSVT. Due to concern for an underlying structural or infiltrative cardiac process, transfer to [facility] was arranged for further workup and management.    Upon admission to the CICU on 1/11, she was hemodynamically stable on amiodarone infusion, with a heart rate in the 50s. She continued to have intermittent runs of NSVT, though shorter and less frequent. EP was consulted and noted monomorphic PVCs with a morphology suggestive of LVOT origin near the cardiac conduction system. A repeat echocardiogram confirmed mildly reduced EF at 53%.  Recommendations included stopping the amiodarone infusion and proceeding with a cardiac MRI on 1/13 to evaluate for potential infiltrative disease or myocardial scar as the etiology of her arrhythmias.    Her hospital course was otherwise uncomplicated. She was maintained on apixaban for her atrial fibrillation and her outpatient diabetes regimen, including glargine and lispro. Glargine dosing adjustments were made in preparation for imaging requiring NPO status. The patient reported no recurrence of significant symptoms, including chest pain, dyspnea, or presyncope. Her vitals remained stable, and her physical exam showed no concerning findings.    EP outlined a plan contingent on MRI findings. If imaging reveals no scarring or infiltrative disease, outpatient or inpatient ablation of PVCs with initiation of flecainide will be considered. If scarring is identified, further evaluation with cardiac PET will be performed to assess for inflammatory processes such as sarcoidosis, with concurrent consideration of an EP study and potential ICD placement depending on the burden of PVCs and NSVT.    As of 1/12, she remains clinically stable with a low PVC burden. Patient is s/p cardiac MRI which revealed area of scarring likely associated with arrhythmia. PET-CT performed and ruled out concerns for sarcoidosis. Patient is s/p RFA with EP on 01/16 with good response and normalization of rhythm. Patient remained hemodynamically stable following procedure w/o concerns for repeat arrhythmia. Plan to transition to bisoprolol 5 mg daily.       Issues to follow up:  1) Dr Andrew Hou ( Electrophysiology) 1/21/2025    Pertinent Physical Exam At Time of Discharge  Physical Exam  Constitutional:       General: She is not in acute distress.     Appearance: Normal appearance.   HENT:      Head: Normocephalic and atraumatic.      Mouth/Throat:      Mouth: Mucous membranes are moist.      Pharynx: Oropharynx is clear. No  oropharyngeal exudate.   Eyes:      General: No scleral icterus.     Extraocular Movements: Extraocular movements intact.      Conjunctiva/sclera: Conjunctivae normal.      Pupils: Pupils are equal, round, and reactive to light.   Cardiovascular:      Rate and Rhythm: Normal rate and regular rhythm.      Pulses: Normal pulses.      Heart sounds: Normal heart sounds. No murmur heard.     No friction rub. No gallop.   Pulmonary:      Effort: Pulmonary effort is normal. No respiratory distress.      Breath sounds: Normal breath sounds. No wheezing.   Abdominal:      General: Abdomen is flat. Bowel sounds are normal. There is no distension.      Palpations: Abdomen is soft.      Tenderness: There is no abdominal tenderness.   Musculoskeletal:      Cervical back: Normal range of motion.      Right lower leg: Edema present.      Left lower leg: Edema present.   Skin:     Capillary Refill: Capillary refill takes less than 2 seconds.   Neurological:      General: No focal deficit present.      Mental Status: She is alert and oriented to person, place, and time. Mental status is at baseline.   Psychiatric:         Mood and Affect: Mood normal.         Behavior: Behavior normal.         Outpatient Follow-Up  Future Appointments   Date Time Provider Department Center   1/21/2025  1:30 PM Andrew Hou MD [id number] [place]   2/14/2025  9:00 AM Leigh Ann Nelson MD [id number] [place]   2/26/2025  8:30 AM Johanna Salas DO [id number] [place]   3/3/2025  8:30 AM Andry Easton MD [id number] [place]   5/5/2025  8:15 AM Faustino Dove MD [id number] [place]   5/7/2025  9:40 AM Jovani Romo MD [id number] [place]   7/1/2025  8:50 AM Bree Schmidt MD [id number] [place]         Darien Nolasco MD  Internal Medicine, PGY-1

## 2025-01-17 NOTE — PROGRESS NOTES
Subjective Data:  Feels ok, some numbness right leg site that resolved today, no pain or bleeding or oozin from groin sites         Objective Data:  Last Recorded Vitals:  Vitals:    01/16/25 2040 01/17/25 0539 01/17/25 0600 01/17/25 0756   BP: 98/62 96/59  115/68   BP Location: Right arm Right arm  Right arm   Patient Position: Sitting Lying  Sitting   Pulse: 58 62  61   Resp: 15 14  23   Temp: 36.2 °C (97.2 °F) 36 °C (96.8 °F)  36 °C (96.8 °F)   TempSrc: Temporal Temporal  Temporal   SpO2: 99% 97%  98%   Weight:   90.6 kg (199 lb 11.8 oz)    Height:           Last Labs:  CBC - 1/17/2025:  6:17 AM  7.6 10.6 162    32.9      CMP - 1/17/2025:  6:17 AM  8.8 6.9 12 --- 0.6   3.5 3.6 10 51      PTT - 1/15/2025:  3:51 PM  1.7   18.9 32     TROPHS   Date/Time Value Ref Range Status   01/08/2025 12:51 PM 6 0 - 13 ng/L Final   01/08/2025 11:51 AM 6 0 - 13 ng/L Final   08/27/2024 01:16 PM 4 0 - 13 ng/L Final     BNP   Date/Time Value Ref Range Status   01/08/2025 11:51  0 - 99 pg/mL Final   08/19/2023 09:50  0 - 99 pg/mL Final     Comment:     .  <100 pg/mL - Heart failure unlikely  100-299 pg/mL - Intermediate probability of acute heart  .               failure exacerbation. Correlate with clinical  .               context and patient history.    >=300 pg/mL - Heart Failure likely. Correlate with clinical  .               context and patient history.  BNP testing is performed using different testing   methodology at [facility] than at other   [facility] hospitals. Direct result comparisons should   only be made within the same method.     06/12/2023 09:59 AM 59 0 - 99 pg/mL Final     Comment:     .  <100 pg/mL - Heart failure unlikely  100-299 pg/mL - Intermediate probability of acute heart  .               failure exacerbation. Correlate with clinical  .               context and patient history.    >=300 pg/mL - Heart Failure likely. Correlate with clinical  .               context and patient  history.   Biotin interference may cause falsely decreased results.   Patients taking a Biotin dose of up to 5 mg/day should   refrain from taking Biotin for 24 hours before sample   collection. Providers may contact their local laboratory   for further information.     HGBA1C   Date/Time Value Ref Range Status   11/25/2024 09:48 AM 6.3 4.2 - 6.5 % Final   08/05/2024 01:09 PM 6.1 4.2 - 6.5 % Final     LDLCALC   Date/Time Value Ref Range Status   02/20/2024 08:04 AM 56 <=99 mg/dL Final     Comment:                                 Near   Borderline      AGE      Desirable  Optimal    High     High     Very High     0-19 Y     0 - 109     ---    110-129   >/= 130     ----    20-24 Y     0 - 119     ---    120-159   >/= 160     ----      >24 Y     0 -  99   100-129  130-159   160-189     >/=190       VLDL   Date/Time Value Ref Range Status   02/20/2024 08:04 AM 16 0 - 40 mg/dL Final   02/09/2023 09:02 AM 15 0 - 40 mg/dL Final   05/02/2022 09:51 AM 22 0 - 40 mg/dL Final   10/23/2021 10:21 AM 18 0 - 40 mg/dL Final      Last I/O:  I/O last 3 completed shifts:  In: 300 (3.3 mL/kg) [IV Piggyback:300]  Out: 8 (0.1 mL/kg) [Blood:8]  Weight: 90.6 kg     Past Cardiology Tests (Last 3 Years):  EKG:  Electrocardiogram, 12-lead PRN ACS symptoms 01/11/2025 (Preliminary)      Electrocardiogram, 12-lead PRN ACS symptoms 01/11/2025      Electrocardiogram, 12-lead PRN ACS symptoms 01/10/2025      ECG 12 Lead 01/09/2025      Electrocardiogram, 12-lead PRN ACS symptoms 01/09/2025      ECG 12 lead 01/08/2025      ECG 12 lead 01/08/2025      ECG 12 Lead 01/08/2025      ECG 12 lead 08/27/2024      ECG 12 lead 08/27/2024    Echo:  Transthoracic Echo (TTE) Complete 01/09/2025    Ejection Fractions:  EF   Date/Time Value Ref Range Status   01/09/2025 02:09 PM 53 %      Cath:  No results found for this or any previous visit from the past 1095 days.    Stress Test:  Nuclear Stress Test 04/14/2022    Cardiac Imaging:  MR cardiac morphology and  function w and wo IV contrast 01/13/2025      Inpatient Medications:  Scheduled medications   Medication Dose Route Frequency    apixaban  5 mg oral BID    atorvastatin  10 mg oral Daily    bisoprolol  5 mg oral Daily    cholecalciferol  2,000 Units oral Daily    cyanocobalamin  1,000 mcg oral Daily    famotidine  20 mg oral Daily    insulin glargine  6 Units subcutaneous Daily    insulin lispro  0-5 Units subcutaneous TID AC    nystatin  1 Application Topical BID    triamcinolone   Topical BID     PRN medications   Medication    acetaminophen    albuterol    dextromethorphan-guaifenesin    dextrose    dextrose    diphenhydrAMINE    glucagon    glucagon    LORazepam    melatonin    polyethylene glycol     Continuous Medications   Medication Dose Last Rate       Physical Exam:    Tele: rare PVC's, otherwise sinus rhythm (much less than pre-ablation)    Constitutional: well appearing, no distress  Eyes: no conjunctival injection  ENT: moist mucous membranes, no apparent injury  Respiratory/Thorax: normal work of breathing  Cardiovascular: normal rate, regular rhythm, extremities warm, JVP not elevated  Extremities: warm, intact     Right groin: hemostatic with dressing in place and no evidence of hematoma or discharge or bleeding     Left groin: hemostatic with dressing in place and no evidence of hematoma or discharge or bleeding       Assessment/Plan       59F hx paroxysmal AF possibly due to remote COVID infection (on apixaban) and IDDM2,  who recently developed frequent lightheadedness and irregular heart beat found to have frequent PVCs with nearly 50% burden now s/p endocardial RFA of LVOT PVC ablation 1/16/2025 with Dr Rodriguez.     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 Counseled re: post-procedure activity restrictions and added instructions to discharge instructions. Increased BB to suppress atrial and ventricular ectopy post-ablation and recheck PVC burden in 1 month at follow-up (discussed prior adhesive reaction - she thinks she will be OK to try a Ziopatch)    RECOMMENDATIONS  Activity instructions added to discharge instructions  Continue apixaban 5 bid, do not stop x30 days unless an emergency or discussed with EP given high risk of thromboembolism after left sided ablation  Follow-up with established EP Dr Hou in 4 weeks, plan on Ziopatch at that time to assess PVC burden (discussed prior adhesive reaction - she thinks she will be OK to try a Ziopatch)  Continue bisoprolol 5mg daily  Will sign off, OK to discharge from EP standpoint - please call if issues          Thank you for the opportunity to contribute to the care of this patient. Above recommendations discussed with Dr. Rodriguez. If further questions arise, please page the EP consult pager at [phone] on weekdays 7AM - 6PM and weekends 7AM - 2PM, or at [phone] at all other times. The EP device nurse can be reached at pager [phone] during regular business hours M-F.        Peripheral IV 01/08/25 20 G Left Antecubital (Active)   Site Assessment Clean;Dry;Intact 01/17/25 0900   Dressing Status Clean;Dry;Occlusive 01/17/25 0900   Number of days: 9       Peripheral IV 01/11/25 20 G Right Forearm (Active)   Site Assessment Dry;Clean;Intact 01/17/25 0900   Dressing Status Clean;Dry;Occlusive 01/17/25 0900   Number of days: 6       Code Status:  Full Code    I spent 25 minutes in the professional and overall care of this patient.        Darien Koo MD

## 2025-01-17 NOTE — NURSING NOTE
RN reviewed AVS in detail with patient and patient's  at bedside. All follow up appointments highlighted. Patient and  verbalized understanding of groin site care and monitoring. RN removed transparent pressure dressings from bilateral groin and viewed minimally bruised sites with patient's  at bedside then covered sites with band aids. PIVs removed and telemetry removed. All patient belongings accounted for by patient and patient's  including meds 2 bed beta blocker that was delivered. Patient and patient's  aware to  statin at home pharmacy. Patient left floor with wheelchair transport &  driving patient home.

## 2025-01-18 VITALS
TEMPERATURE: 98.2 F | OXYGEN SATURATION: 98 % | DIASTOLIC BLOOD PRESSURE: 67 MMHG | BODY MASS INDEX: 36.76 KG/M2 | WEIGHT: 199.74 LBS | SYSTOLIC BLOOD PRESSURE: 126 MMHG | HEIGHT: 62 IN | RESPIRATION RATE: 18 BRPM | HEART RATE: 74 BPM

## 2025-01-18 PROCEDURE — 2500000005 HC RX 250 GENERAL PHARMACY W/O HCPCS

## 2025-01-18 RX ORDER — LIDOCAINE 560 MG/1
1 PATCH PERCUTANEOUS; TOPICAL; TRANSDERMAL DAILY
Status: DISCONTINUED | OUTPATIENT
Start: 2025-01-18 | End: 2025-01-18

## 2025-01-18 RX ORDER — LIDOCAINE 560 MG/1
1 PATCH PERCUTANEOUS; TOPICAL; TRANSDERMAL ONCE
Status: DISCONTINUED | OUTPATIENT
Start: 2025-01-18 | End: 2025-01-18 | Stop reason: HOSPADM

## 2025-01-18 RX ORDER — LIDOCAINE 50 MG/G
1 PATCH TOPICAL DAILY
Qty: 4 PATCH | Refills: 0 | Status: SHIPPED | OUTPATIENT
Start: 2025-01-18 | End: 2025-01-20

## 2025-01-18 RX ADMIN — LIDOCAINE 4% 1 PATCH: 40 PATCH TOPICAL at 01:36

## 2025-01-18 ASSESSMENT — PAIN DESCRIPTION - PROGRESSION: CLINICAL_PROGRESSION: GRADUALLY IMPROVING

## 2025-01-18 ASSESSMENT — PAIN DESCRIPTION - LOCATION: LOCATION: LEG

## 2025-01-18 ASSESSMENT — PAIN DESCRIPTION - PAIN TYPE: TYPE: ACUTE PAIN

## 2025-01-18 ASSESSMENT — PAIN DESCRIPTION - DESCRIPTORS: DESCRIPTORS: ACHING;SPASM

## 2025-01-18 ASSESSMENT — PAIN - FUNCTIONAL ASSESSMENT: PAIN_FUNCTIONAL_ASSESSMENT: 0-10

## 2025-01-18 ASSESSMENT — PAIN DESCRIPTION - FREQUENCY: FREQUENCY: INTERMITTENT

## 2025-01-18 ASSESSMENT — PAIN SCALES - GENERAL: PAINLEVEL_OUTOF10: 5 - MODERATE PAIN

## 2025-01-18 ASSESSMENT — PAIN DESCRIPTION - ORIENTATION: ORIENTATION: RIGHT

## 2025-01-18 NOTE — DISCHARGE INSTRUCTIONS
You are assessed in the ED for your right thigh pain.  Your physical exam is normal, the pain you have is most likely musculoskeletal.  Please take Tylenol or ibuprofen for the pain.  You can take 2 of the extra strength Tylenol and then 4 hours later take 600 mg of ibuprofen.  Alternate between the Tylenol and ibuprofen as needed.  Do not take more than 4 g of Tylenol or 2400 mg of ibuprofen in 1 day.  You can ice the thigh or use heating pads on it.  You are prescribed lidocaine patches, change the patches every 12 hours.    Please return to the ED if your pain becomes unbearable, if you lose function in your legs, if you become paralyzed, if you lose control of your bowel or bladder, and if you have any other concerns.    Follow-up with your primary care provider in 1 week.

## 2025-01-18 NOTE — ED PROVIDER NOTES
EMERGENCY DEPARTMENT ENCOUNTER      Pt Name: Holly Chavez  MRN: [id number]  Birthdate 1965  Date of evaluation: 1/17/2025  Provider: Sharif Lamb MD    CHIEF COMPLAINT       Chief Complaint   Patient presents with    Leg Pain     Pt states had an ablation for A-fib yesterday and is c/o right leg spasms, tightening that starts at her thigh and radiates to knee. R and L groin access noted for ablation      HISTORY OF PRESENT ILLNESS    Holly Chavez is a 59 y.o. year old female who presents to the ER for right leg pain. She reports that it was excruciating and 10/10 pain. She describes it as a sharp pain and radiates to her knee. She had a cardiac ablation done yesterday and access was at her R and L groin. She reports that she was able to walk right after the procedure. She was sitting at home after discharge when she needed to stand and her  pulled her up. Then she reported severe pain in her right leg. She took Tylenol for her pain without relief.     PMH is significant for afib on Eliquis, Hyperlipidemia, diabetes on insulin.     PAST MEDICAL HISTORY     Past Medical History:   Diagnosis Date    Acute upper respiratory infection, unspecified 11/13/2018    Acute URI    Anxiety     Benign paroxysmal vertigo, unspecified ear 12/04/2017    Benign positional vertigo    Cataract     Cutaneous abscess of groin 12/04/2017    Abscess of groin, left    Cutaneous abscess of limb, unspecified 09/20/2018    Abscess of axilla    GERD (gastroesophageal reflux disease)     Morbid (severe) obesity due to excess calories (Multi) 07/08/2019    Morbid obesity due to excess calories    Personal history of diseases of the skin and subcutaneous tissue 11/20/2015    History of skin pruritus    Personal history of diseases of the skin and subcutaneous tissue 09/27/2018    History of hidradenitis suppurativa    Personal history of other endocrine, nutritional and metabolic disease     History of diabetes mellitus    Personal  history of other endocrine, nutritional and metabolic disease 08/11/2015    History of uncontrolled diabetes    Personal history of other endocrine, nutritional and metabolic disease 04/16/2019    History of diabetes mellitus    Personal history of other endocrine, nutritional and metabolic disease 06/06/2019    History of diabetes mellitus    Pneumonia 09/07/2023    Rash and other nonspecific skin eruption 08/11/2015    Rash    Rash and other nonspecific skin eruption 08/11/2015    Rash    Type 2 diabetes mellitus with mild nonproliferative diabetic retinopathy without macular edema, unspecified eye 11/09/2015    Mild nonproliferative diabetic retinopathy without macular edema    Type 2 diabetes mellitus with mild nonproliferative diabetic retinopathy without macular edema, unspecified eye 11/09/2015    Mild nonproliferative diabetic retinopathy without macular edema    Type 2 diabetes mellitus with mild nonproliferative diabetic retinopathy without macular edema, unspecified eye 11/10/2015    Mild nonproliferative diabetic retinopathy without macular edema    Type 2 diabetes mellitus with mild nonproliferative diabetic retinopathy without macular edema, unspecified eye 11/12/2015    Mild nonproliferative diabetic retinopathy without macular edema     CURRENT MEDICATIONS       Discharge Medication List as of 1/18/2025  2:16 AM        CONTINUE these medications which have NOT CHANGED    Details   albuterol 90 mcg/actuation inhaler Inhale 2 puffs every 4 hours if needed for wheezing or shortness of breath., Starting Tue 5/30/2023, Normal      apixaban (Eliquis) 5 mg tablet Take 1 tablet (5 mg) by mouth 2 times a day., Starting Wed 5/1/2024, Normal      atorvastatin (Lipitor) 10 mg tablet Take 1 tablet (10 mg) by mouth once daily., Starting Fri 1/17/2025, Until Tue 3/18/2025, Normal      bisoprolol (Zebeta) 5 mg tablet Take 1 tablet (5 mg) by mouth once daily., Starting Fri 1/17/2025, Until Thu 4/17/2025, Normal     "  !! blood-glucose sensor (Dexcom G7 Sensor) device Change every 10 days, Normal      !! blood-glucose sensor (Dexcom G7 Sensor) device 1 each see administration instructions. Change sensor every 10 days, Starting Fri 11/15/2024, Print      cholecalciferol (Vitamin D-3) 125 MCG (5000 UT) capsule Take 1 capsule (125 mcg) by mouth once daily., Starting Mon 10/24/2022, Historical Med      cyanocobalamin, vitamin B-12, (Vitamin B-12) 1,000 mcg tablet extended release Take 1 tablet (1,000 mcg) by mouth once daily., Starting Wed 5/4/2022, Historical Med      famotidine (Pepcid) 20 mg tablet Take 1 tablet (20 mg) by mouth once daily., Historical Med      insulin glargine (Basaglar KwikPen U-100 Insulin) 100 unit/mL (3 mL) pen Inject 12 Units under the skin once daily in the morning. Take as directed per insulin instructions., Starting Mon 11/25/2024, Fill Later      pen needle, diabetic 32 gauge x 5/32\" needle 4 Needles once daily. Use with insulin injections four times daily, Starting Fri 10/13/2023, Normal      tirzepatide (Mounjaro) 10 mg/0.5 mL pen injector Inject 10 mg under the skin 1 (one) time per week., Starting Mon 10/28/2024, Normal       !! - Potential duplicate medications found. Please discuss with provider.        SURGICAL HISTORY       Past Surgical History:   Procedure Laterality Date    CARDIAC ELECTROPHYSIOLOGY PROCEDURE N/A 1/16/2025    Procedure: Ablation PVC/NSVT (30300);  Surgeon: Tania Rodriguez MD;  Location: [place] [address] Cardiac Cath Lab;  Service: Electrophysiology;  Laterality: N/A;  Stereotaxis setup in Stereo Room    CERVICAL BIOPSY  W/ LOOP ELECTRODE EXCISION  04/12/2016    Cervical Loop Electrosurgical Excision (LEEP)    HERNIA REPAIR  08/11/2015    Hernia Repair    TUBAL LIGATION  08/11/2015    Tubal Ligation     ALLERGIES     Adhesive tape-silicones and Codeine  FAMILY HISTORY       Family History   Problem Relation Name Age of Onset    Stroke Mother      Diabetes Mother      Other " (cerebrovascular accident) Mother      Heart failure Father      Diabetes Father      Pancreatic cancer Father      Breast cancer Mother's Sister      Other (cardiac disorder) Other      Diabetes Other      Hypertension Other      Cancer Other      Kidney disease Other      Breast cancer Other aunt     Breast cancer Father's Sister      Glaucoma Neg Hx      Macular degeneration Neg Hx       SOCIAL HISTORY       Social History     Tobacco Use    Smoking status: Former     Current packs/day: 0.00     Types: Cigarettes     Quit date:      Years since quittin.0    Smokeless tobacco: Never   Vaping Use    Vaping status: Never Used   Substance Use Topics    Alcohol use: Not Currently    Drug use: Never     PHYSICAL EXAM  (up to 7 for level 4, 8 or more for level 5)     ED Triage Vitals [25 2348]   Temperature Heart Rate Respirations BP   36.6 °C (97.9 °F) 69 20 168/72      Pulse Ox Temp Source Heart Rate Source Patient Position   99 % Temporal Monitor Sitting      BP Location FiO2 (%)     Right arm --       Physical Exam  Constitutional:       Appearance: Normal appearance. She is obese.   HENT:      Head: Normocephalic and atraumatic.      Nose: Nose normal.      Mouth/Throat:      Mouth: Mucous membranes are moist.   Eyes:      Extraocular Movements: Extraocular movements intact.      Conjunctiva/sclera: Conjunctivae normal.      Pupils: Pupils are equal, round, and reactive to light.   Cardiovascular:      Rate and Rhythm: Regular rhythm. Tachycardia present.      Pulses: Normal pulses.   Pulmonary:      Effort: Pulmonary effort is normal. No respiratory distress.      Breath sounds: Normal breath sounds. No wheezing or rales.   Abdominal:      General: Abdomen is flat. Bowel sounds are normal.      Palpations: Abdomen is soft.      Tenderness: There is no abdominal tenderness. There is no right CVA tenderness, left CVA tenderness or guarding.   Musculoskeletal:         General: Tenderness present. No  swelling.      Cervical back: Normal range of motion and neck supple. No rigidity or tenderness.      Right lower leg: No edema.      Left lower leg: No edema.   Skin:     General: Skin is warm and dry.      Capillary Refill: Capillary refill takes less than 2 seconds.      Comments: Bruising bilaterally at the access site in the left and right groin bilaterally.  Bruising is approximately 1 cm in diameter.  Access site is healing well, no dehiscence or purulent discharge.  Venous stasis in the pretibial area bilaterally.   Neurological:      General: No focal deficit present.      Mental Status: She is alert.      Sensory: No sensory deficit.      Motor: No weakness.      Coordination: Coordination normal.      Gait: Gait normal.        DIAGNOSTIC RESULTS   LABS:  Labs Reviewed - No data to display  All other labs were within normal range or not returned as of this dictation.  Imaging  No orders to display      Procedure  Procedures  EMERGENCY DEPARTMENT COURSE/MDM:   Medical Decision Making    Vitals:    Vitals:    01/18/25 0101 01/18/25 0102 01/18/25 0103 01/18/25 0222   BP: 153/68 129/58 156/67 126/67   BP Location: Right leg Left arm Left leg Right arm   Patient Position: Lying Lying Lying Sitting   Pulse:    74   Resp:    18   Temp:    36.8 °C (98.2 °F)   TempSrc:    Temporal   SpO2:    98%   Weight:       Height:         Holly Chavez is a female 59 y.o. who presents to the ER for  right leg pain. On arrival the patients vital signs were: Afebrile, regular heart rate, normotensive, regular respiration rate, normoxic on room air. History obtained from: patient.     Differential diagnosis include DVT, muscle sprain, post-op complications, infection.    Physical exam is significant for tenderness to the right thigh on palpation, no saddle anesthesia, no hematoma formation on the right thigh, skin is warm and dry, no areas that are hotter than others, no open wounds. The area where the groin was accessed is healing  well, no purulent discharge or dehiscence. There was no unilateral leg swelling, the calves were nontender to palpation.     The patient was given a lidocaine patch for her pain. On reassessment, the patient feels better. She was diagnosed with thigh pain secondary to her procedure. She was prescribed lidocaine patches and was discharged with return precautions. She was instructed to follow up with her cardiologist and PCP. The patient understood and was agreeable with the plan.     Diagnoses as of 01/18/25 0340   Right thigh pain       External Records Reviewed: I reviewed recent and relevant outside records including inpatient notes, outpatient records      Shared decision making for disposition  Patient and/or patient´s representative was counseled regarding labs, imaging, likely diagnosis. All questions were answered. Recommendation was made   for discharge home. The patient agreed and was discharged home in stable condition with appropriate relevant educational materials. Return precautions were provided which included increasing pain, numbness, tingling, weakness, loss of motion in your arms or legs, loss of control of your urine or stool, fever, abdominal pain, chest pain, shortness of breath, or any new or worsening symptoms..     ED Medications administered this visit:    Medications   lidocaine 4 % patch 1 patch (1 patch transdermal Medication Applied 1/18/25 0136)       New Prescriptions from this visit:    Discharge Medication List as of 1/18/2025  2:16 AM        START taking these medications    Details   lidocaine (Lidoderm) 5 % patch Place 1 patch over 12 hours on the skin once daily for 2 days. Remove & discard patch within 12 hours or as directed by MD., Starting Sat 1/18/2025, Until Mon 1/20/2025, Normal             Follow-up:  Johanna Salas DO  [address] [facility] [place] [address]  [phone]    In 1 week          Final Impression:   1. Right thigh pain          Please excuse any  misspellings or unintended errors related to the Dragon speech recognition software used to dictate this note.    I reviewed the case with the attending ED physician. The attending ED physician agrees with the plan.      Sharif Lamb MD  Resident  01/18/25 0390       Sharif Lamb MD  Resident  01/18/25 1123

## 2025-01-20 PROBLEM — R21 RASH, SKIN: Status: RESOLVED | Noted: 2024-08-15 | Resolved: 2025-01-20

## 2025-01-20 PROBLEM — E66.812 CLASS 2 OBESITY WITH BODY MASS INDEX (BMI) OF 36.0 TO 36.9 IN ADULT: Status: ACTIVE | Noted: 2025-01-20

## 2025-01-20 PROBLEM — B37.31 YEAST INFECTION INVOLVING THE VAGINA AND SURROUNDING AREA: Status: RESOLVED | Noted: 2024-08-15 | Resolved: 2025-01-20

## 2025-01-20 PROBLEM — F41.8 MIXED ANXIETY DEPRESSIVE DISORDER: Status: ACTIVE | Noted: 2023-05-19

## 2025-01-20 PROBLEM — Z91.148 NONCOMPLIANCE WITH MEDICATION REGIMEN: Status: ACTIVE | Noted: 2025-01-20

## 2025-01-21 ENCOUNTER — OFFICE VISIT (OUTPATIENT)
Dept: CARDIOLOGY | Facility: CLINIC | Age: 60
End: 2025-01-21
Payer: MEDICARE

## 2025-01-21 ENCOUNTER — HOSPITAL ENCOUNTER (OUTPATIENT)
Dept: RADIOLOGY | Facility: CLINIC | Age: 60
Discharge: HOME | End: 2025-01-21
Payer: MEDICARE

## 2025-01-21 VITALS
DIASTOLIC BLOOD PRESSURE: 64 MMHG | RESPIRATION RATE: 16 BRPM | SYSTOLIC BLOOD PRESSURE: 122 MMHG | WEIGHT: 210 LBS | BODY MASS INDEX: 38.41 KG/M2 | HEART RATE: 61 BPM

## 2025-01-21 DIAGNOSIS — S30.1XXA HEMATOMA OF GROIN, INITIAL ENCOUNTER: ICD-10-CM

## 2025-01-21 DIAGNOSIS — I48.0 PAROXYSMAL ATRIAL FIBRILLATION (MULTI): ICD-10-CM

## 2025-01-21 DIAGNOSIS — S30.1XXA GROIN HEMATOMA, INITIAL ENCOUNTER: ICD-10-CM

## 2025-01-21 DIAGNOSIS — I47.29 NSVT (NONSUSTAINED VENTRICULAR TACHYCARDIA) (MULTI): ICD-10-CM

## 2025-01-21 DIAGNOSIS — I10 PRIMARY HYPERTENSION: ICD-10-CM

## 2025-01-21 DIAGNOSIS — S30.1XXA HEMATOMA OF GROIN, INITIAL ENCOUNTER: Primary | ICD-10-CM

## 2025-01-21 LAB
ATRIAL RATE: 56 BPM
P AXIS: 37 DEGREES
P OFFSET: 195 MS
P ONSET: 138 MS
PR INTERVAL: 158 MS
Q ONSET: 217 MS
QRS COUNT: 9 BEATS
QRS DURATION: 80 MS
QT INTERVAL: 464 MS
QTC CALCULATION(BAZETT): 447 MS
QTC FREDERICIA: 453 MS
R AXIS: 45 DEGREES
T AXIS: 50 DEGREES
T OFFSET: 449 MS
VENTRICULAR RATE: 56 BPM

## 2025-01-21 PROCEDURE — G2211 COMPLEX E/M VISIT ADD ON: HCPCS | Performed by: STUDENT IN AN ORGANIZED HEALTH CARE EDUCATION/TRAINING PROGRAM

## 2025-01-21 PROCEDURE — 74177 CT ABD & PELVIS W/CONTRAST: CPT

## 2025-01-21 PROCEDURE — 93010 ELECTROCARDIOGRAM REPORT: CPT | Performed by: STUDENT IN AN ORGANIZED HEALTH CARE EDUCATION/TRAINING PROGRAM

## 2025-01-21 PROCEDURE — 2550000001 HC RX 255 CONTRASTS: Performed by: STUDENT IN AN ORGANIZED HEALTH CARE EDUCATION/TRAINING PROGRAM

## 2025-01-21 PROCEDURE — 3078F DIAST BP <80 MM HG: CPT | Performed by: STUDENT IN AN ORGANIZED HEALTH CARE EDUCATION/TRAINING PROGRAM

## 2025-01-21 PROCEDURE — 99215 OFFICE O/P EST HI 40 MIN: CPT | Performed by: STUDENT IN AN ORGANIZED HEALTH CARE EDUCATION/TRAINING PROGRAM

## 2025-01-21 PROCEDURE — 4010F ACE/ARB THERAPY RXD/TAKEN: CPT | Performed by: STUDENT IN AN ORGANIZED HEALTH CARE EDUCATION/TRAINING PROGRAM

## 2025-01-21 PROCEDURE — 93005 ELECTROCARDIOGRAM TRACING: CPT | Performed by: STUDENT IN AN ORGANIZED HEALTH CARE EDUCATION/TRAINING PROGRAM

## 2025-01-21 PROCEDURE — 3074F SYST BP LT 130 MM HG: CPT | Performed by: STUDENT IN AN ORGANIZED HEALTH CARE EDUCATION/TRAINING PROGRAM

## 2025-01-21 PROCEDURE — 74177 CT ABD & PELVIS W/CONTRAST: CPT | Performed by: RADIOLOGY

## 2025-01-21 RX ORDER — LISINOPRIL 2.5 MG/1
1 TABLET ORAL DAILY
COMMUNITY

## 2025-01-21 RX ADMIN — IOHEXOL 75 ML: 350 INJECTION, SOLUTION INTRAVENOUS at 15:00

## 2025-01-21 NOTE — PROGRESS NOTES
"Cardiac Electrophysiology Office Visit     Referred by Jena Nieves AP* for   Chief Complaint   Patient presents with    New Patient Visit    Atrial Fibrillation     HPI:  Holly Chavez is a 59 y.o. year old female patient with h/o  HTN, pAF, diabetes, PVC s/p RFA  to establish care    Objective  Current Outpatient Medications   Medication Instructions    albuterol 90 mcg/actuation inhaler 2 puffs, inhalation, Every 4 hours PRN    apixaban (ELIQUIS) 5 mg, oral, 2 times daily    atorvastatin (LIPITOR) 10 mg, oral, Daily    Basaglar KwikPen U-100 Insulin 12 Units, subcutaneous, Every morning, Take as directed per insulin instructions.    bisoprolol (ZEBETA) 5 mg, oral, Daily    blood-glucose sensor (Dexcom G7 Sensor) device Change every 10 days    blood-glucose sensor (Dexcom G7 Sensor) device 1 each, miscellaneous, See admin instructions, Change sensor every 10 days    cholecalciferol (VITAMIN D-3) 125 mcg, Daily    cyanocobalamin, vitamin B-12, (Vitamin B-12) 1,000 mcg tablet extended release 1 tablet, Daily    famotidine (PEPCID) 20 mg, Daily    lisinopril 2.5 mg tablet 1 tablet, Daily    Mounjaro 10 mg, subcutaneous, Weekly    pen needle, diabetic 32 gauge x 5/32\" needle 4 Needles, miscellaneous, Daily, Use with insulin injections four times daily         Visit Vitals  /64   Pulse 61   Resp 16   Wt 95.3 kg (210 lb)   LMP  (LMP Unknown)   BMI 38.41 kg/m²   OB Status Postmenopausal   Smoking Status Former   BSA 2.04 m²      Physical Exam  Vitals reviewed.   Constitutional:       Appearance: Normal appearance.   HENT:      Head: Normocephalic and atraumatic.   Eyes:      Pupils: Pupils are equal, round, and reactive to light.   Cardiovascular:      Rate and Rhythm: Normal rate and regular rhythm.      Pulses: Normal pulses.      Heart sounds: Normal heart sounds. No murmur heard.  Pulmonary:      Effort: Pulmonary effort is normal. No respiratory distress.      Breath sounds: Normal breath sounds. No " wheezing.   Abdominal:      Tenderness: There is no abdominal tenderness.   Musculoskeletal:      Comments: Right groin hematoma with signs of ecchymosis along the medial and posterior aspect of the thigh.  No bruit heard.  No palpable mass outside of the hematoma noted on exam   Skin:     General: Skin is warm and dry.   Neurological:      General: No focal deficit present.      Mental Status: She is alert.           Echo (February 2022): Normal LV function EF of 60 to 65% no regional wall motion abnormalities mildly dilated left atrium.  No pericardial effusion noted.  24-hour monitor (December 2024): Predominant rhythm sinus with average heart rate 89 bpm.  46% PVC burden predominant 1 morphology.  Multiple episodes of NSVT longest lasting 10 beats  Echo (January 2025): Left ventricular function low normal with an EF of 53% normal right ventricular systolic function.  No evidence of diastolic dysfunction.  Trivial pericardial effusion.  No significant valve pathology.   [id number] Score  Age <65: 0   Sex Female: 1   CHF History Yes: 1   HTN Yes: 1   Stroke/TIA/Thromboembolism No: 0   Vascular Dz: CAD/PAD/Aortic Plaque No: 0   DM Yes: 1   Total Score 4     Assessment/Plan   # PVC/NSVT s/p RFA ([facility] LVOT)  Patient had an exhaustive workup including cardiac MRI and PET which did not show any active inflammation cardiac MRI did show some lateral inferior basal scar which was not consistent with the exit of the PVC.  Patient successfully underwent a PVC ablation where PVC was localized to the LVOT below the left coronary cusp and it was ablated.  Patient has not had no recurrence of PVCs or NSVT since ablation.  Bisoprolol 5 mg daily    # Right groin hematoma      Patient is noted acute right leg pain especially with weightbearing she ended up in the emergency room the day after the ablation because of pain at that time patient had a lidocaine patch which had improved.  She has also noticed some discoloration and  bruising along the medial aspect of her right upper thigh and buttocks.  On examination hematoma appears to be soft no bruits heard but given the fact the patient is currently on anticoagulation will need to evaluate further.  Stat CT abdomen pelvis with IV contrast to rule out active bleeding versus pseudoaneurysm  If signs of active bleeding or pseudoaneurysm then will need to potentially admit patient and also hold anticoagulation  Will call patient after the CT to discuss next [place]       #Paroxysmal atrial fibrillation  AF Dx History: Post COVID 2021; h/o Cardioversion: No; AAD Use: None; Anticoagulation use: Apixaban 5mg BID (current); h/o Ablation: None; [id number] Score: 4  Overall burden has been relatively low patient had some episodes after her first episode of COVID but has not had any recurrence recently.  Will continue to watch at this time  c/w AC: Apixaban 5mg BID  Bisoprolol 5 mg daily    Return to Clinic: Patient should return to the EP Clinic in 3 months    Andrew Hou MD [facility]  Cardiac Electrophysiology  [email or web address]    **Disclaimer: This note was dictated by speech recognition, and every effort has been made to prevent any error in transcription, however minor errors may be present**

## 2025-01-22 ENCOUNTER — TELEPHONE (OUTPATIENT)
Dept: CARDIOLOGY | Facility: CLINIC | Age: 60
End: 2025-01-22

## 2025-01-22 NOTE — TELEPHONE ENCOUNTER
Pt called states pharmacy did receive request for pain meds you wanted her to take instead of Ibuprofen ?

## 2025-01-23 ENCOUNTER — TELEPHONE (OUTPATIENT)
Dept: CARDIOLOGY | Facility: CLINIC | Age: 60
End: 2025-01-23
Payer: MEDICARE

## 2025-01-23 LAB
ATRIAL RATE: 61 BPM
P AXIS: 39 DEGREES
P OFFSET: 190 MS
P ONSET: 138 MS
PR INTERVAL: 162 MS
Q ONSET: 219 MS
QRS COUNT: 10 BEATS
QRS DURATION: 78 MS
QT INTERVAL: 434 MS
QTC CALCULATION(BAZETT): 436 MS
QTC FREDERICIA: 436 MS
R AXIS: 51 DEGREES
T AXIS: 27 DEGREES
T OFFSET: 436 MS
VENTRICULAR RATE: 61 BPM

## 2025-01-27 ENCOUNTER — TELEPHONE (OUTPATIENT)
Dept: CARDIOLOGY | Facility: CLINIC | Age: 60
End: 2025-01-27
Payer: MEDICARE

## 2025-01-27 NOTE — TELEPHONE ENCOUNTER
Patient went to the hospital on 1/17. She had an ablation and saw Dr. Hou on 1/21. Patient will like to know if you want to see her sooner than her next apt in May.

## 2025-01-27 NOTE — TELEPHONE ENCOUNTER
Pt called today. She was previously told by Dr Hou/Petty in the hospital to hold mounjaro.     Pt is asking if ok to resume?

## 2025-01-28 ENCOUNTER — HOSPITAL ENCOUNTER (OUTPATIENT)
Dept: CARDIOLOGY | Facility: HOSPITAL | Age: 60
Discharge: HOME | End: 2025-01-28
Payer: MEDICARE

## 2025-01-28 ENCOUNTER — HOSPITAL ENCOUNTER (EMERGENCY)
Facility: HOSPITAL | Age: 60
Discharge: HOME | End: 2025-01-28
Attending: STUDENT IN AN ORGANIZED HEALTH CARE EDUCATION/TRAINING PROGRAM
Payer: MEDICARE

## 2025-01-28 ENCOUNTER — APPOINTMENT (OUTPATIENT)
Dept: CARDIOLOGY | Facility: HOSPITAL | Age: 60
End: 2025-01-28
Payer: MEDICARE

## 2025-01-28 ENCOUNTER — APPOINTMENT (OUTPATIENT)
Dept: RADIOLOGY | Facility: HOSPITAL | Age: 60
End: 2025-01-28
Payer: MEDICARE

## 2025-01-28 VITALS
TEMPERATURE: 97.5 F | WEIGHT: 210 LBS | HEIGHT: 62 IN | SYSTOLIC BLOOD PRESSURE: 144 MMHG | OXYGEN SATURATION: 98 % | BODY MASS INDEX: 38.64 KG/M2 | DIASTOLIC BLOOD PRESSURE: 67 MMHG | HEART RATE: 64 BPM | RESPIRATION RATE: 16 BRPM

## 2025-01-28 DIAGNOSIS — S70.11XA HEMATOMA OF RIGHT THIGH, INITIAL ENCOUNTER: Primary | ICD-10-CM

## 2025-01-28 LAB
ALBUMIN SERPL BCP-MCNC: 4.1 G/DL (ref 3.4–5)
ALP SERPL-CCNC: 45 U/L (ref 33–136)
ALT SERPL W P-5'-P-CCNC: 12 U/L (ref 7–45)
ANION GAP SERPL CALC-SCNC: 11 MMOL/L (ref 10–20)
AST SERPL W P-5'-P-CCNC: 11 U/L (ref 9–39)
BASOPHILS # BLD AUTO: 0.03 X10*3/UL (ref 0–0.1)
BASOPHILS NFR BLD AUTO: 0.4 %
BILIRUB SERPL-MCNC: 0.8 MG/DL (ref 0–1.2)
BUN SERPL-MCNC: 17 MG/DL (ref 6–23)
CALCIUM SERPL-MCNC: 9.1 MG/DL (ref 8.6–10.3)
CARDIAC TROPONIN I PNL SERPL HS: 10 NG/L (ref 0–13)
CARDIAC TROPONIN I PNL SERPL HS: 10 NG/L (ref 0–13)
CHLORIDE SERPL-SCNC: 106 MMOL/L (ref 98–107)
CO2 SERPL-SCNC: 28 MMOL/L (ref 21–32)
CREAT SERPL-MCNC: 0.67 MG/DL (ref 0.5–1.05)
EGFRCR SERPLBLD CKD-EPI 2021: >90 ML/MIN/1.73M*2
EOSINOPHIL # BLD AUTO: 0.17 X10*3/UL (ref 0–0.7)
EOSINOPHIL NFR BLD AUTO: 2.2 %
ERYTHROCYTE [DISTWIDTH] IN BLOOD BY AUTOMATED COUNT: 13.5 % (ref 11.5–14.5)
GLUCOSE SERPL-MCNC: 135 MG/DL (ref 74–99)
HCT VFR BLD AUTO: 29.2 % (ref 36–46)
HGB BLD-MCNC: 9.3 G/DL (ref 12–16)
IMM GRANULOCYTES # BLD AUTO: 0.02 X10*3/UL (ref 0–0.7)
IMM GRANULOCYTES NFR BLD AUTO: 0.3 % (ref 0–0.9)
INR PPP: 1.3 (ref 0.9–1.1)
LYMPHOCYTES # BLD AUTO: 1.61 X10*3/UL (ref 1.2–4.8)
LYMPHOCYTES NFR BLD AUTO: 20.9 %
MAGNESIUM SERPL-MCNC: 2.14 MG/DL (ref 1.6–2.4)
MCH RBC QN AUTO: 27 PG (ref 26–34)
MCHC RBC AUTO-ENTMCNC: 31.8 G/DL (ref 32–36)
MCV RBC AUTO: 85 FL (ref 80–100)
MONOCYTES # BLD AUTO: 0.51 X10*3/UL (ref 0.1–1)
MONOCYTES NFR BLD AUTO: 6.6 %
NEUTROPHILS # BLD AUTO: 5.35 X10*3/UL (ref 1.2–7.7)
NEUTROPHILS NFR BLD AUTO: 69.6 %
NRBC BLD-RTO: 0 /100 WBCS (ref 0–0)
PLATELET # BLD AUTO: 162 X10*3/UL (ref 150–450)
POTASSIUM SERPL-SCNC: 4.1 MMOL/L (ref 3.5–5.3)
PROT SERPL-MCNC: 7 G/DL (ref 6.4–8.2)
PROTHROMBIN TIME: 14.5 SECONDS (ref 9.8–12.8)
RBC # BLD AUTO: 3.45 X10*6/UL (ref 4–5.2)
SODIUM SERPL-SCNC: 141 MMOL/L (ref 136–145)
WBC # BLD AUTO: 7.7 X10*3/UL (ref 4.4–11.3)

## 2025-01-28 PROCEDURE — 36415 COLL VENOUS BLD VENIPUNCTURE: CPT

## 2025-01-28 PROCEDURE — 85610 PROTHROMBIN TIME: CPT

## 2025-01-28 PROCEDURE — 99285 EMERGENCY DEPT VISIT HI MDM: CPT | Performed by: STUDENT IN AN ORGANIZED HEALTH CARE EDUCATION/TRAINING PROGRAM

## 2025-01-28 PROCEDURE — 84075 ASSAY ALKALINE PHOSPHATASE: CPT

## 2025-01-28 PROCEDURE — 75635 CT ANGIO ABDOMINAL ARTERIES: CPT

## 2025-01-28 PROCEDURE — 83735 ASSAY OF MAGNESIUM: CPT

## 2025-01-28 PROCEDURE — 93005 ELECTROCARDIOGRAM TRACING: CPT

## 2025-01-28 PROCEDURE — 84484 ASSAY OF TROPONIN QUANT: CPT

## 2025-01-28 PROCEDURE — 2500000001 HC RX 250 WO HCPCS SELF ADMINISTERED DRUGS (ALT 637 FOR MEDICARE OP)

## 2025-01-28 PROCEDURE — 2550000001 HC RX 255 CONTRASTS: Performed by: STUDENT IN AN ORGANIZED HEALTH CARE EDUCATION/TRAINING PROGRAM

## 2025-01-28 PROCEDURE — 71045 X-RAY EXAM CHEST 1 VIEW: CPT | Performed by: RADIOLOGY

## 2025-01-28 PROCEDURE — 75635 CT ANGIO ABDOMINAL ARTERIES: CPT | Performed by: RADIOLOGY

## 2025-01-28 PROCEDURE — 71045 X-RAY EXAM CHEST 1 VIEW: CPT

## 2025-01-28 PROCEDURE — 85025 COMPLETE CBC W/AUTO DIFF WBC: CPT

## 2025-01-28 RX ORDER — MORPHINE SULFATE 4 MG/ML
4 INJECTION, SOLUTION INTRAMUSCULAR; INTRAVENOUS ONCE
Status: DISCONTINUED | OUTPATIENT
Start: 2025-01-28 | End: 2025-01-28

## 2025-01-28 RX ORDER — ACETAMINOPHEN 325 MG/1
975 TABLET ORAL ONCE
Status: COMPLETED | OUTPATIENT
Start: 2025-01-28 | End: 2025-01-28

## 2025-01-28 RX ORDER — ONDANSETRON HYDROCHLORIDE 2 MG/ML
4 INJECTION, SOLUTION INTRAVENOUS ONCE
Status: DISCONTINUED | OUTPATIENT
Start: 2025-01-28 | End: 2025-01-28

## 2025-01-28 RX ADMIN — IOHEXOL 150 ML: 350 INJECTION, SOLUTION INTRAVENOUS at 04:12

## 2025-01-28 RX ADMIN — ACETAMINOPHEN 975 MG: 325 TABLET ORAL at 04:52

## 2025-01-28 ASSESSMENT — PAIN SCALES - GENERAL
PAINLEVEL_OUTOF10: 3
PAINLEVEL_OUTOF10: 0 - NO PAIN
PAINLEVEL_OUTOF10: 4

## 2025-01-28 ASSESSMENT — PAIN DESCRIPTION - PAIN TYPE: TYPE: ACUTE PAIN

## 2025-01-28 ASSESSMENT — PAIN DESCRIPTION - LOCATION
LOCATION: OTHER (COMMENT)
LOCATION: LEG
LOCATION_2: HEAD

## 2025-01-28 ASSESSMENT — PAIN DESCRIPTION - DESCRIPTORS
DESCRIPTORS: TIGHTNESS;SORE
DESCRIPTORS_2: HEADACHE

## 2025-01-28 ASSESSMENT — PAIN DESCRIPTION - ONSET: ONSET: ONGOING

## 2025-01-28 ASSESSMENT — COLUMBIA-SUICIDE SEVERITY RATING SCALE - C-SSRS
6. HAVE YOU EVER DONE ANYTHING, STARTED TO DO ANYTHING, OR PREPARED TO DO ANYTHING TO END YOUR LIFE?: NO
1. IN THE PAST MONTH, HAVE YOU WISHED YOU WERE DEAD OR WISHED YOU COULD GO TO SLEEP AND NOT WAKE UP?: NO
2. HAVE YOU ACTUALLY HAD ANY THOUGHTS OF KILLING YOURSELF?: NO

## 2025-01-28 ASSESSMENT — PAIN DESCRIPTION - FREQUENCY: FREQUENCY: CONSTANT/CONTINUOUS

## 2025-01-28 ASSESSMENT — PAIN DESCRIPTION - PROGRESSION
CLINICAL_PROGRESSION: GRADUALLY IMPROVING
CLINICAL_PROGRESSION: NOT CHANGED

## 2025-01-28 ASSESSMENT — PAIN DESCRIPTION - ORIENTATION
ORIENTATION: RIGHT
ORIENTATION: RIGHT
ORIENTATION: UPPER

## 2025-01-28 ASSESSMENT — PAIN - FUNCTIONAL ASSESSMENT: PAIN_FUNCTIONAL_ASSESSMENT: 0-10

## 2025-01-28 NOTE — DISCHARGE INSTRUCTIONS
Please continue to take your medications as prescribed.  Follow-up with your cardiologist and PCP as indicated and scheduled.  If you develop worsening pain, numbness or tingling in your toes, cool extremities, or other worrisome symptoms, return to the ER.

## 2025-01-28 NOTE — ED PROVIDER NOTES
HPI   Chief Complaint   Patient presents with    multiple medical complaints      Pt arrives to ED with c/o BLE swelling, right thigh pain, bruising and swelling, SOB and headache. Pt had an ablation approx 2.5 weeks ago and just started back on Eliquis today        HPI  Patient is a 59-year-old female with history of A-fib on Eliquis, DM2, HTN, HLD presenting with concern for worsening swelling and pain of her right leg.  Notably, she recently had a cardiac ablation performed with access to the right femoral artery.  She suffered a hematoma after the fact and was admitted to the hospital temporarily.  She was told to come back to the emergency department for worsening swelling or pain, concern for possible compartment syndrome.  Patient is denying numbness or tingling, cold feeling in the associated foot.  Pain is 4/10, throbbing, nonradiating, without consistently being exacerbating factors.  She otherwise had a brief episode of shortness of breath that has since resolved.  She denies fever, sweats, chills, lightheadedness, headache, visual changes, chest pain, nausea, vomiting.      Patient History   Past Medical History:   Diagnosis Date    Acute upper respiratory infection, unspecified 11/13/2018    Acute URI    Anxiety     Benign paroxysmal vertigo, unspecified ear 12/04/2017    Benign positional vertigo    Cataract     Cutaneous abscess of groin 12/04/2017    Abscess of groin, left    Cutaneous abscess of limb, unspecified 09/20/2018    Abscess of axilla    GERD (gastroesophageal reflux disease)     Morbid (severe) obesity due to excess calories (Multi) 07/08/2019    Morbid obesity due to excess calories    Personal history of diseases of the skin and subcutaneous tissue 11/20/2015    History of skin pruritus    Personal history of diseases of the skin and subcutaneous tissue 09/27/2018    History of hidradenitis suppurativa    Personal history of other endocrine, nutritional and metabolic disease     History  of diabetes mellitus    Personal history of other endocrine, nutritional and metabolic disease 08/11/2015    History of uncontrolled diabetes    Personal history of other endocrine, nutritional and metabolic disease 04/16/2019    History of diabetes mellitus    Personal history of other endocrine, nutritional and metabolic disease 06/06/2019    History of diabetes mellitus    Pneumonia 09/07/2023    Rash and other nonspecific skin eruption 08/11/2015    Rash    Rash and other nonspecific skin eruption 08/11/2015    Rash    Type 2 diabetes mellitus with mild nonproliferative diabetic retinopathy without macular edema, unspecified eye 11/09/2015    Mild nonproliferative diabetic retinopathy without macular edema    Type 2 diabetes mellitus with mild nonproliferative diabetic retinopathy without macular edema, unspecified eye 11/09/2015    Mild nonproliferative diabetic retinopathy without macular edema    Type 2 diabetes mellitus with mild nonproliferative diabetic retinopathy without macular edema, unspecified eye 11/10/2015    Mild nonproliferative diabetic retinopathy without macular edema    Type 2 diabetes mellitus with mild nonproliferative diabetic retinopathy without macular edema, unspecified eye 11/12/2015    Mild nonproliferative diabetic retinopathy without macular edema     Past Surgical History:   Procedure Laterality Date    CARDIAC ELECTROPHYSIOLOGY PROCEDURE N/A 1/16/2025    Procedure: Ablation PVC/NSVT (61514);  Surgeon: Tania Rodriguez MD;  Location: [place] [address] [facility];  Service: Electrophysiology;  Laterality: N/A;  Stereotaxis setup in Stereo Room    CERVICAL BIOPSY  W/ LOOP ELECTRODE EXCISION  04/12/2016    Cervical Loop Electrosurgical Excision (LEEP)    HERNIA REPAIR  08/11/2015    Hernia Repair    TUBAL LIGATION  08/11/2015    Tubal Ligation     Family History   Problem Relation Name Age of Onset    Stroke Mother      Diabetes Mother      Other (cerebrovascular accident) Mother       Heart failure Father      Diabetes Father      Pancreatic cancer Father      Breast cancer Mother's Sister      Other (cardiac disorder) Other      Diabetes Other      Hypertension Other      Cancer Other      Kidney disease Other      Breast cancer Other aunt     Breast cancer Father's Sister      Glaucoma Neg Hx      Macular degeneration Neg Hx       Social History     Tobacco Use    Smoking status: Former     Current packs/day: 0.00     Types: Cigarettes     Quit date:      Years since quittin.0    Smokeless tobacco: Never   Vaping Use    Vaping status: Never Used   Substance Use Topics    Alcohol use: Not Currently    Drug use: Never       Physical Exam   ED Triage Vitals [25 0003]   Temperature Heart Rate Respirations BP   36.8 °C (98.2 °F) 66 18 138/64      Pulse Ox Temp Source Heart Rate Source Patient Position   99 % Temporal Monitor Sitting      BP Location FiO2 (%)     Right arm --       Physical Exam  Constitutional:       General: She is not in acute distress.     Appearance: She is not ill-appearing.   HENT:      Head: Normocephalic and atraumatic.      Nose: Nose normal.      Mouth/Throat:      Mouth: Mucous membranes are moist.      Pharynx: Oropharynx is clear.   Eyes:      Extraocular Movements: Extraocular movements intact.      Conjunctiva/sclera: Conjunctivae normal.   Cardiovascular:      Rate and Rhythm: Normal rate and regular rhythm.      Pulses: Normal pulses.      Heart sounds: Normal heart sounds.   Pulmonary:      Effort: Pulmonary effort is normal. No respiratory distress.      Breath sounds: Normal breath sounds.   Abdominal:      General: There is no distension.      Palpations: Abdomen is soft.      Tenderness: There is no abdominal tenderness.   Musculoskeletal:      Cervical back: Normal range of motion and neck supple.      Right lower leg: Edema present.      Left lower leg: Edema present.      Comments: Associated bruising to the medial and posterior right thigh.   No palpable crepitus, induration, fluctuance.  There is associated edema.  Compartments are not tight.  Distal extremity is distally neurovascular intact.  Chronic hearing skin changes with associated erythema to the bilateral lower legs.   Skin:     General: Skin is warm and dry.      Capillary Refill: Capillary refill takes less than 2 seconds.   Neurological:      General: No focal deficit present.           ED Course & MDM   ED Course as of 01/28/25 0648   e Jan 28, 2025   0130 I independently interpreted the ECG:  Regular rate. Regular rhythm. Normal axis Vent rate 66.  Normal MS, QRS, and Qtc intervals   No significant ST segment elevations, depressions   Nonspecific T wave inversion in lead III and T wave flattening in V2.  New from prior on 1/11/2025  Not ischemic  [FN]   0549 CT angio aorta and bilateral iliofemoral runoff including without contrast if performed  There is an incidental finding of bilateral soft tissue edema that may be concerning for cellulitis on CT.  After examination patient's leg edema is chronic appearing.  Nontender.  Do not suspect cellulitis or infection at this time. [FN]   0643 3:32 AM on my interpretation ECG shows normal sinus rhythm, nonischemic, no significant ST segment elevation or depression.  Nonspecific T wave inversions lead III New since January 11, 2025 [FN]      ED Course User Index  [FN] Vickey Alva MD         Diagnoses as of 01/28/25 0648   Hematoma of right thigh, initial encounter                 No data recorded     [place] Coma Scale Score: 15 (01/28/25 0120 : Lior Wilkinson RN)                           Medical Decision Making  History obtained from the patient and family.  Records clued labs, imaging, notes independently reviewed by me.  Presentation most concerning for possible expanding hematoma, infected hematoma.  With concern for episode of shortness of breath and her cardiac history, we did obtain cardiac labs as well.  Patient was given Tylenol  with improvement in her pain.  (Normal and stable at 10, EKG without evidence of acute injury pattern.  Low suspicion for ACS at this time.  CMP unremarkable.  CBC with a minorly worsening anemia of hemoglobin 9.3.  INR minorly increased at 1.3.  X-ray of the chest unremarkable.  CT angio aorta and bilateral iliofemoral runoff was obtained to evaluate for active bleeding or evidence of infected hematoma.  This demonstrated a stable hematoma only.  There was concerning findings for possible cellulitic changes of the bilateral lower legs, not involving the thigh and associated hematoma.  However, we discussed at length with the patient and the family who concurred that the skin changes were chronic and had been like this for 15 years.  No indication for antibiotics or further workup at this time.  Patient subsequently given reassurance and discharged home in satisfactory condition.  All questions answered and return precautions discussed.    EKG demonstrates normal sinus rhythm at a rate of 65, normal intervals.  No acute injury pattern.    Procedure  Procedures     Brayan Trevino MD  Resident  01/28/25 6885

## 2025-01-29 DIAGNOSIS — E11.39 TYPE 2 DIABETES MELLITUS WITH OTHER OPHTHALMIC COMPLICATION, WITH LONG-TERM CURRENT USE OF INSULIN: Primary | ICD-10-CM

## 2025-01-29 DIAGNOSIS — Z79.4 TYPE 2 DIABETES MELLITUS WITH OTHER OPHTHALMIC COMPLICATION, WITH LONG-TERM CURRENT USE OF INSULIN: Primary | ICD-10-CM

## 2025-01-30 ENCOUNTER — APPOINTMENT (OUTPATIENT)
Dept: OPHTHALMOLOGY | Facility: CLINIC | Age: 60
End: 2025-01-30
Payer: MEDICARE

## 2025-01-30 ENCOUNTER — OFFICE VISIT (OUTPATIENT)
Dept: PRIMARY CARE | Facility: CLINIC | Age: 60
End: 2025-01-30
Payer: MEDICARE

## 2025-01-30 VITALS
HEIGHT: 62 IN | BODY MASS INDEX: 39.01 KG/M2 | TEMPERATURE: 97.7 F | HEART RATE: 60 BPM | RESPIRATION RATE: 14 BRPM | WEIGHT: 212 LBS | SYSTOLIC BLOOD PRESSURE: 128 MMHG | OXYGEN SATURATION: 98 % | DIASTOLIC BLOOD PRESSURE: 70 MMHG

## 2025-01-30 DIAGNOSIS — E11.39 TYPE 2 DIABETES MELLITUS WITH OTHER OPHTHALMIC COMPLICATION, WITH LONG-TERM CURRENT USE OF INSULIN: ICD-10-CM

## 2025-01-30 DIAGNOSIS — E11.3413 DIABETIC VISUAL LOSS: SEVERE VISION IMPAIRMENT OF BOTH EYES, WITH MACULAR EDEMA, WITH SEVERE NONPROLIFERATIVE RETINOPATHY, ASSOCIATED WITH TYPE 2 DIABETES MELLITUS: ICD-10-CM

## 2025-01-30 DIAGNOSIS — I47.29 NSVT (NONSUSTAINED VENTRICULAR TACHYCARDIA) (MULTI): Primary | ICD-10-CM

## 2025-01-30 DIAGNOSIS — S30.1XXD HEMATOMA OF GROIN, SUBSEQUENT ENCOUNTER: ICD-10-CM

## 2025-01-30 DIAGNOSIS — I89.0 LYMPHEDEMA OF BOTH LOWER EXTREMITIES: ICD-10-CM

## 2025-01-30 DIAGNOSIS — R06.02 SOB (SHORTNESS OF BREATH) ON EXERTION: ICD-10-CM

## 2025-01-30 DIAGNOSIS — Z79.4 TYPE 2 DIABETES MELLITUS WITH OTHER OPHTHALMIC COMPLICATION, WITH LONG-TERM CURRENT USE OF INSULIN: ICD-10-CM

## 2025-01-30 DIAGNOSIS — H54.2X22 DIABETIC VISUAL LOSS: SEVERE VISION IMPAIRMENT OF BOTH EYES, WITH MACULAR EDEMA, WITH SEVERE NONPROLIFERATIVE RETINOPATHY, ASSOCIATED WITH TYPE 2 DIABETES MELLITUS: ICD-10-CM

## 2025-01-30 DIAGNOSIS — I48.0 PAROXYSMAL ATRIAL FIBRILLATION (MULTI): ICD-10-CM

## 2025-01-30 PROCEDURE — 3074F SYST BP LT 130 MM HG: CPT | Performed by: INTERNAL MEDICINE

## 2025-01-30 PROCEDURE — 99214 OFFICE O/P EST MOD 30 MIN: CPT | Performed by: INTERNAL MEDICINE

## 2025-01-30 PROCEDURE — 3078F DIAST BP <80 MM HG: CPT | Performed by: INTERNAL MEDICINE

## 2025-01-30 PROCEDURE — 3008F BODY MASS INDEX DOCD: CPT | Performed by: INTERNAL MEDICINE

## 2025-01-30 RX ORDER — TIRZEPATIDE 5 MG/.5ML
5 INJECTION, SOLUTION SUBCUTANEOUS WEEKLY
Qty: 2 ML | Refills: 1 | Status: SHIPPED | OUTPATIENT
Start: 2025-01-30

## 2025-01-30 ASSESSMENT — PATIENT HEALTH QUESTIONNAIRE - PHQ9
2. FEELING DOWN, DEPRESSED OR HOPELESS: SEVERAL DAYS
SUM OF ALL RESPONSES TO PHQ9 QUESTIONS 1 AND 2: 2
1. LITTLE INTEREST OR PLEASURE IN DOING THINGS: SEVERAL DAYS
10. IF YOU CHECKED OFF ANY PROBLEMS, HOW DIFFICULT HAVE THESE PROBLEMS MADE IT FOR YOU TO DO YOUR WORK, TAKE CARE OF THINGS AT HOME, OR GET ALONG WITH OTHER PEOPLE: NOT DIFFICULT AT ALL

## 2025-01-30 NOTE — PROGRESS NOTES
"Subjective    Holly Chavez is a 59 y.o. female who presents for Hospital Follow-up.  [facility] fu    Admitted to [facility] 1/8/2025, transferred to [facility] on 1/11 and discharged 1/17/25    Had a cardiac ablation    [facility] ED visit on 1/17 for right leg pain as result of the ablation. Could not ambulate    Returned to [facility] ED on 1/28. Right leg/thigh pain. Leg felt hard. Swelling.     Continues to have right leg/thigh pain and swelling.     Has not taken Mounjaro for one month. Cardiologist just gave her clearance to restart. Not sure if she should resume at the same dose.     Depression screen completed.   Review of Systems   All other systems reviewed and are negative.        Objective     /70 (BP Location: Left arm, Patient Position: Sitting, BP Cuff Size: Adult)   Pulse 60   Temp 36.5 °C (97.7 °F) (Skin)   Resp 14   Ht 1.575 m (5' 2\")   Wt 96.2 kg (212 lb)   LMP  (LMP Unknown)   SpO2 98%   BMI 38.78 kg/m²    Physical Exam  Vitals reviewed.   Constitutional:       General: She is not in acute distress.     Appearance: Normal appearance.   Cardiovascular:      Rate and Rhythm: Normal rate and regular rhythm.      Pulses: Normal pulses.      Heart sounds: Normal heart sounds.   Pulmonary:      Effort: Pulmonary effort is normal.      Breath sounds: Normal breath sounds.   Abdominal:      Tenderness: There is no abdominal tenderness.   Musculoskeletal:         General: No swelling.      Comments: Improving right leg hematoma   Skin:     General: Skin is warm and dry.   Neurological:      Mental Status: She is alert.       Health Maintenance Due   Topic Date Due    HIV Screening  Never done    MMR Vaccines (1 of 1 - Standard series) Never done    Hepatitis C Screening  Never done    DTaP/Tdap/Td Vaccines (1 - Tdap) Never done    Zoster Vaccines (1 of 2) Never done    Pneumococcal Vaccine (2 of 2 - PPSV23) 11/22/2018    Colorectal Cancer Screening  09/23/2023    RSV High Risk: (Elderly (60+) or Pregnant " Population) (1 - Risk 60-74 years 1-dose series) Never done    Medicare Annual Wellness Visit (AWV)  01/23/2025    Mammogram  02/13/2025    Lipid Panel  02/20/2025    Diabetes: Urine Protein Screening  02/20/2025    Diabetes: Hemoglobin A1C  02/25/2025          Assessment/Plan   Problem List Items Addressed This Visit       Lymphedema of both lower extremities    Paroxysmal atrial fibrillation (Multi)    Type 2 diabetes mellitus    Relevant Medications    tirzepatide (Mounjaro) 5 mg/0.5 mL pen injector    NSVT (nonsustained ventricular tachycardia) (Multi) - Primary    Groin hematoma, initial encounter     Other Visit Diagnoses       SOB (shortness of breath) on exertion        Relevant Orders    Disability Placard    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 Heart is regular. Had hematoma right leg that is improving.  Ok to restart mounjaro 5 mg.  Call  with any problems or questions.   Follow up in three months  Labs, radiology, etc reviewed

## 2025-02-01 LAB
ATRIAL RATE: 65 BPM
ATRIAL RATE: 66 BPM
P AXIS: 40 DEGREES
P AXIS: 44 DEGREES
P OFFSET: 191 MS
P OFFSET: 191 MS
P ONSET: 138 MS
P ONSET: 141 MS
PR INTERVAL: 158 MS
PR INTERVAL: 166 MS
Q ONSET: 220 MS
Q ONSET: 221 MS
QRS COUNT: 11 BEATS
QRS COUNT: 11 BEATS
QRS DURATION: 74 MS
QRS DURATION: 76 MS
QT INTERVAL: 428 MS
QT INTERVAL: 436 MS
QTC CALCULATION(BAZETT): 445 MS
QTC CALCULATION(BAZETT): 457 MS
QTC FREDERICIA: 439 MS
QTC FREDERICIA: 450 MS
R AXIS: 23 DEGREES
R AXIS: 36 DEGREES
T AXIS: 17 DEGREES
T AXIS: 21 DEGREES
T OFFSET: 435 MS
T OFFSET: 438 MS
VENTRICULAR RATE: 65 BPM
VENTRICULAR RATE: 66 BPM

## 2025-02-07 ENCOUNTER — TELEPHONE (OUTPATIENT)
Dept: ENDOCRINOLOGY | Facility: CLINIC | Age: 60
End: 2025-02-07

## 2025-02-07 ENCOUNTER — APPOINTMENT (OUTPATIENT)
Dept: PODIATRY | Facility: CLINIC | Age: 60
End: 2025-02-07
Payer: MEDICARE

## 2025-02-07 DIAGNOSIS — M79.675 PAIN IN TOES OF BOTH FEET: ICD-10-CM

## 2025-02-07 DIAGNOSIS — Z79.4 TYPE 2 DIABETES MELLITUS WITH HYPERGLYCEMIA, WITH LONG-TERM CURRENT USE OF INSULIN: Primary | ICD-10-CM

## 2025-02-07 DIAGNOSIS — E11.69 TYPE 2 DIABETES MELLITUS WITH OTHER SPECIFIED COMPLICATION, WITH LONG-TERM CURRENT USE OF INSULIN: ICD-10-CM

## 2025-02-07 DIAGNOSIS — Z79.4 TYPE 2 DIABETES MELLITUS WITH OTHER SPECIFIED COMPLICATION, WITH LONG-TERM CURRENT USE OF INSULIN: ICD-10-CM

## 2025-02-07 DIAGNOSIS — M79.674 PAIN IN TOES OF BOTH FEET: ICD-10-CM

## 2025-02-07 DIAGNOSIS — B35.1 FUNGAL NAIL INFECTION: ICD-10-CM

## 2025-02-07 DIAGNOSIS — E11.65 TYPE 2 DIABETES MELLITUS WITH HYPERGLYCEMIA, WITH LONG-TERM CURRENT USE OF INSULIN: Primary | ICD-10-CM

## 2025-02-07 DIAGNOSIS — I89.0 LYMPHEDEMA: ICD-10-CM

## 2025-02-07 PROCEDURE — 11721 DEBRIDE NAIL 6 OR MORE: CPT | Performed by: PODIATRIST

## 2025-02-07 RX ORDER — INSULIN GLARGINE 100 [IU]/ML
14 INJECTION, SOLUTION SUBCUTANEOUS EVERY MORNING
Qty: 15 ML | Refills: 0 | Status: SHIPPED | OUTPATIENT
Start: 2025-02-07

## 2025-02-07 NOTE — PROGRESS NOTES
History Of Present Illness  Holly Chavez is a 59 y.o. female presenting with painful elongated nails.    PCP Johanna Salas DO  Last visit 01/30/25     Past Medical History  She has a past medical history of Acute upper respiratory infection, unspecified (11/13/2018), Anxiety, Benign paroxysmal vertigo, unspecified ear (12/04/2017), Cataract, Cutaneous abscess of groin (12/04/2017), Cutaneous abscess of limb, unspecified (09/20/2018), GERD (gastroesophageal reflux disease), Morbid (severe) obesity due to excess calories (Multi) (07/08/2019), Personal history of diseases of the skin and subcutaneous tissue (11/20/2015), Personal history of diseases of the skin and subcutaneous tissue (09/27/2018), Personal history of other endocrine, nutritional and metabolic disease, Personal history of other endocrine, nutritional and metabolic disease (08/11/2015), Personal history of other endocrine, nutritional and metabolic disease (04/16/2019), Personal history of other endocrine, nutritional and metabolic disease (06/06/2019), Pneumonia (09/07/2023), Rash and other nonspecific skin eruption (08/11/2015), Rash and other nonspecific skin eruption (08/11/2015), Type 2 diabetes mellitus with mild nonproliferative diabetic retinopathy without macular edema, unspecified eye (11/09/2015), Type 2 diabetes mellitus with mild nonproliferative diabetic retinopathy without macular edema, unspecified eye (11/09/2015), Type 2 diabetes mellitus with mild nonproliferative diabetic retinopathy without macular edema, unspecified eye (11/10/2015), and Type 2 diabetes mellitus with mild nonproliferative diabetic retinopathy without macular edema, unspecified eye (11/12/2015).    Surgical History  She has a past surgical history that includes Tubal ligation (08/11/2015); Hernia repair (08/11/2015); Cervical biopsy w/ loop electrode excision (04/12/2016); and Cardiac electrophysiology procedure (N/A, 1/16/2025).     Social History  She reports that  she quit smoking about 26 years ago. Her smoking use included cigarettes. She has never used smokeless tobacco. She reports that she does not currently use alcohol. She reports that she does not use drugs.    Family History  Family History   Problem Relation Name Age of Onset    Stroke Mother      Diabetes Mother      Other (cerebrovascular accident) Mother      Heart failure Father      Diabetes Father      Pancreatic cancer Father      Breast cancer Mother's Sister      Other (cardiac disorder) Other      Diabetes Other      Hypertension Other      Cancer Other      Kidney disease Other      Breast cancer Other aunt     Breast cancer Father's Sister      Glaucoma Neg Hx      Macular degeneration Neg Hx          Allergies  Adhesive tape-silicones and Codeine    Medications  Current Outpatient Medications   Medication Sig Dispense Refill    albuterol 90 mcg/actuation inhaler Inhale 2 puffs every 4 hours if needed for wheezing or shortness of breath. 18 g 3    apixaban (Eliquis) 5 mg tablet Take 1 tablet (5 mg) by mouth 2 times a day. 180 tablet 3    atorvastatin (Lipitor) 10 mg tablet Take 1 tablet (10 mg) by mouth once daily. 30 tablet 1    bisoprolol (Zebeta) 5 mg tablet Take 1 tablet (5 mg) by mouth once daily. 90 tablet 0    blood-glucose sensor (Dexcom G7 Sensor) device Change every 10 days 10 each 3    blood-glucose sensor (Dexcom G7 Sensor) device 1 each see administration instructions. Change sensor every 10 days 9 each 3    cholecalciferol (Vitamin D-3) 125 MCG (5000 UT) capsule Take 1 capsule (125 mcg) by mouth once daily.      cyanocobalamin, vitamin B-12, (Vitamin B-12) 1,000 mcg tablet extended release Take 1 tablet (1,000 mcg) by mouth once daily.      famotidine (Pepcid) 20 mg tablet Take 1 tablet (20 mg) by mouth once daily.      insulin glargine (Basaglar KwikPen U-100 Insulin) 100 unit/mL (3 mL) pen Inject 12 Units under the skin once daily in the morning. Take as directed per insulin instructions.  "3 mL 0    pen needle, diabetic 32 gauge x 5/32\" needle 4 Needles once daily. Use with insulin injections four times daily 400 each 3    tirzepatide (Mounjaro) 5 mg/0.5 mL pen injector Inject 5 mg under the skin 1 (one) time per week. 2 mL 1     No current facility-administered medications for this visit.       Review of Systems    REVIEW OF SYSTEMS  GENERAL:  Negative for malaise, significant weight loss, fever  CARDIOVASCULAR: leg swelling   MUSCULOSKELETAL:  Negative for joint pain or swelling, back pain, and muscle pain.  SKIN:  Negative for lesions, rash, and itching  PSYCH:  Negative for sleep disturbance, mood disorder and recent psychosocial stressors  NEURO: Negative, denies any burning, tingling or numbness     Objective:   Vasc: DP and PT pulses are palpable bilateral.  CFT is less than 3 seconds bilateral.  Skin temperature is warm to cool proximal to distal bilateral.  Pitting pedal edema. No digital hair      Neuro:  Light touch is intact to the foot bilateral.  l.  There is no clonus noted.  The hallux is downgoing bilateral.      Derm: Nails 1-5 bilateral are thickened, elongated and crumbly with subungual debris. Skin is supple with normal texture and turgor noted.  Webspaces are clean, dry and intact bilateral.  There are no hyperkeratoses, ulcerations, verruca or other lesions noted.      Ortho: Muscle strength is 5/5 for all pedal groups tested.  Ankle joint, subtalar joint, 1st MPJ and lesser MPJ ROM is full and without pain or crepitus.  The foot type is rectus bilateral off weight bearing.  There are no structural deformities noted.    Assessment/Plan   Painful nail mycosis  DM        Toenails are debrided in length and thickness to avoid infection and for pain relief        I spent 20 minutes in the professional and overall care of this patient.      "

## 2025-02-07 NOTE — TELEPHONE ENCOUNTER
[facility]   RF: Basaglar 100u/ml kwik pen  30 units once daily #18ml

## 2025-02-13 ENCOUNTER — APPOINTMENT (OUTPATIENT)
Dept: OPHTHALMOLOGY | Facility: CLINIC | Age: 60
End: 2025-02-13
Payer: COMMERCIAL

## 2025-02-14 ENCOUNTER — APPOINTMENT (OUTPATIENT)
Dept: ENDOCRINOLOGY | Facility: CLINIC | Age: 60
End: 2025-02-14
Payer: COMMERCIAL

## 2025-02-25 ENCOUNTER — APPOINTMENT (OUTPATIENT)
Dept: CARDIOLOGY | Facility: CLINIC | Age: 60
End: 2025-02-25
Payer: MEDICARE

## 2025-02-25 VITALS
HEART RATE: 80 BPM | DIASTOLIC BLOOD PRESSURE: 66 MMHG | HEIGHT: 62 IN | SYSTOLIC BLOOD PRESSURE: 126 MMHG | WEIGHT: 206 LBS | BODY MASS INDEX: 37.91 KG/M2

## 2025-02-25 DIAGNOSIS — I47.29 NSVT (NONSUSTAINED VENTRICULAR TACHYCARDIA) (MULTI): ICD-10-CM

## 2025-02-25 DIAGNOSIS — Z98.890 STATUS POST ABLATION OF VENTRICULAR ARRHYTHMIA: Primary | ICD-10-CM

## 2025-02-25 DIAGNOSIS — I31.39 PERICARDIAL EFFUSION (HHS-HCC): ICD-10-CM

## 2025-02-25 DIAGNOSIS — Z86.79 STATUS POST ABLATION OF VENTRICULAR ARRHYTHMIA: Primary | ICD-10-CM

## 2025-02-25 DIAGNOSIS — I10 PRIMARY HYPERTENSION: ICD-10-CM

## 2025-02-25 DIAGNOSIS — I48.0 PAROXYSMAL ATRIAL FIBRILLATION (MULTI): ICD-10-CM

## 2025-02-25 PROBLEM — Z91.148 NONCOMPLIANCE WITH MEDICATION REGIMEN: Status: RESOLVED | Noted: 2025-01-20 | Resolved: 2025-02-25

## 2025-02-25 PROCEDURE — 99215 OFFICE O/P EST HI 40 MIN: CPT | Performed by: INTERNAL MEDICINE

## 2025-02-25 PROCEDURE — 3074F SYST BP LT 130 MM HG: CPT | Performed by: INTERNAL MEDICINE

## 2025-02-25 PROCEDURE — 1036F TOBACCO NON-USER: CPT | Performed by: INTERNAL MEDICINE

## 2025-02-25 PROCEDURE — 3078F DIAST BP <80 MM HG: CPT | Performed by: INTERNAL MEDICINE

## 2025-02-25 PROCEDURE — G2211 COMPLEX E/M VISIT ADD ON: HCPCS | Performed by: INTERNAL MEDICINE

## 2025-02-25 PROCEDURE — 3008F BODY MASS INDEX DOCD: CPT | Performed by: INTERNAL MEDICINE

## 2025-02-25 RX ORDER — BISOPROLOL FUMARATE 5 MG/1
5 TABLET, FILM COATED ORAL DAILY
Qty: 90 TABLET | Refills: 3 | Status: SHIPPED | OUTPATIENT
Start: 2025-02-25 | End: 2026-02-25

## 2025-02-25 RX ORDER — COLCHICINE 0.6 MG/1
0.6 CAPSULE ORAL 2 TIMES DAILY
Qty: 60 CAPSULE | Refills: 2 | Status: SHIPPED | OUTPATIENT
Start: 2025-02-25 | End: 2025-05-26

## 2025-02-25 NOTE — PROGRESS NOTES
Chief Complaint:   Please see below.     History Of Present Illness:    Holly Chavez is a 59 y.o. female presenting with PAF, PVCs, ventricular tachycardia.    In early January 2025, this 59-year-old diabetic, hyperlipidemic woman with a BMI of 37.7 was admitted with nonsustained ventricular tachycardia, frequent PVCs, and acute diastolic heart failure.  The patient's echocardiogram dated January 9, 2025 revealed an EF of 53%, with a trivial pericardial effusion, mild tricuspid regurgitation, and an RV systolic pressure of 59 mm.  Based on her high-grade ectopy and presentation with heart failure and pericardial effusion, she was transferred to [facility].  There she underwent cardiac MRI on January 13, 2025, disclosing a trivial pericardial effusion, ejection fraction 51%, patchy mid wall fibrosis of the basal mid inferior inferolateral segments, no evidence of infarction, and was equivocal regarding the possibility of myocardial edema and inflammation.  Therefore, a myocardial PET-sarcoid study was done  This disclosed normal perfusion, with an EF of 49%.  We did not start an SGLT2 inhibitor due to the fact the patient has a history of UTIs and inguinal rashes without being on such medications.  The patient's pharmacologic nuclear stress test was negative for ischemia in April 2022.  She has a history of venous insufficiency with reflux.    On January 16, 2025, the patient underwent VT ablation.  Please see the procedure note for complete details.  She was subsequently discharged.    From time to time she experiences a piercing pressure in her left lower chest, that lasts for 30 minutes.  This occurs twice a day.  The discomfort is more noticeable lying on her left side.  These symptoms are not exertional, nor are they pleuritic in nature.  The patient denies angina,  dyspnea, palpitations, orthopnea, PND, syncope, and near syncope.       Last Recorded Vitals:  Vitals:    02/25/25 0700   BP: 126/66  "  BP Location: Left arm   Patient Position: Sitting   Pulse: 80   Weight: 93.4 kg (206 lb)   Height: 1.575 m (5' 2\")       Past Medical History:  She has a past medical history of Acute upper respiratory infection, unspecified (11/13/2018), Anxiety, Benign paroxysmal vertigo, unspecified ear (12/04/2017), Cataract, Cutaneous abscess of groin (12/04/2017), Cutaneous abscess of limb, unspecified (09/20/2018), GERD (gastroesophageal reflux disease), Morbid (severe) obesity due to excess calories (Multi) (07/08/2019), Personal history of diseases of the skin and subcutaneous tissue (11/20/2015), Personal history of diseases of the skin and subcutaneous tissue (09/27/2018), Personal history of other endocrine, nutritional and metabolic disease, Personal history of other endocrine, nutritional and metabolic disease (08/11/2015), Personal history of other endocrine, nutritional and metabolic disease (04/16/2019), Personal history of other endocrine, nutritional and metabolic disease (06/06/2019), Pneumonia (09/07/2023), Rash and other nonspecific skin eruption (08/11/2015), Rash and other nonspecific skin eruption (08/11/2015), Type 2 diabetes mellitus with mild nonproliferative diabetic retinopathy without macular edema, unspecified eye (11/09/2015), Type 2 diabetes mellitus with mild nonproliferative diabetic retinopathy without macular edema, unspecified eye (11/09/2015), Type 2 diabetes mellitus with mild nonproliferative diabetic retinopathy without macular edema, unspecified eye (11/10/2015), and Type 2 diabetes mellitus with mild nonproliferative diabetic retinopathy without macular edema, unspecified eye (11/12/2015).    Past Surgical History:  She has a past surgical history that includes Tubal ligation (08/11/2015); Hernia repair (08/11/2015); Cervical biopsy w/ loop electrode excision (04/12/2016); and Cardiac electrophysiology procedure (N/A, 1/16/2025).      Social History:  She reports that she quit smoking " "about 26 years ago. Her smoking use included cigarettes. She has never used smokeless tobacco. She reports that she does not currently use alcohol. She reports that she does not use drugs.    Family History:  Family History   Problem Relation Name Age of Onset    Stroke Mother      Diabetes Mother      Other (cerebrovascular accident) Mother      Heart failure Father      Diabetes Father      Pancreatic cancer Father      Breast cancer Mother's Sister      Other (cardiac disorder) Other      Diabetes Other      Hypertension Other      Cancer Other      Kidney disease Other      Breast cancer Other aunt     Breast cancer Father's Sister      Glaucoma Neg Hx      Macular degeneration Neg Hx          Allergies:  Adhesive tape-silicones and Codeine    Outpatient Medications:  Current Outpatient Medications   Medication Instructions    albuterol 90 mcg/actuation inhaler 2 puffs, inhalation, Every 4 hours PRN    apixaban (ELIQUIS) 5 mg, oral, 2 times daily    atorvastatin (LIPITOR) 10 mg, oral, Daily    Basaglar KwikPen U-100 Insulin 14 Units, subcutaneous, Every morning, Take as directed per insulin instructions.    bisoprolol (ZEBETA) 5 mg, oral, Daily    blood-glucose sensor (Dexcom G7 Sensor) device Change every 10 days    blood-glucose sensor (Dexcom G7 Sensor) device 1 each, miscellaneous, See admin instructions, Change sensor every 10 days    cholecalciferol (VITAMIN D-3) 125 mcg, Daily    cyanocobalamin, vitamin B-12, (Vitamin B-12) 1,000 mcg tablet extended release 1 tablet, Daily    famotidine (PEPCID) 20 mg, Daily    Mounjaro 5 mg, subcutaneous, Weekly    pen needle, diabetic 32 gauge x 5/32\" needle 4 Needles, miscellaneous, Daily, Use with insulin injections four times daily       Physical Exam:  GENERAL:  pleasant 59 year-old  HEENT: No xanthelasma  NECK: Supple, no palpable adenopathy or thyromegaly  CHEST: Clear to auscultation, respiratory effort unlabored  CARDIAC: RRR, normal S1 and S2, no audible " murmur, rub, gallop, carotids are brisk, PMI is not displaced  ABD: Active bowel sounds, nontender, no organomegaly, no evidence of ascites  EXT: No clubbing, cyanosis, edema, or tenderness  NEURO: Awake, alert, appropriate, speech is fluent       Last Labs:  CBC -  Lab Results   Component Value Date    WBC 7.7 01/28/2025    HGB 9.3 (L) 01/28/2025    HCT 29.2 (L) 01/28/2025    MCV 85 01/28/2025     01/28/2025       CMP -  Lab Results   Component Value Date    CALCIUM 9.1 01/28/2025    PHOS 3.5 01/17/2025    PROT 7.0 01/28/2025    ALBUMIN 4.1 01/28/2025    AST 11 01/28/2025    ALT 12 01/28/2025    ALKPHOS 45 01/28/2025    BILITOT 0.8 01/28/2025       LIPID PANEL -   Lab Results   Component Value Date    CHOL 111 02/20/2024    TRIG 79 02/20/2024    HDL 39.7 02/20/2024    CHHDL 2.8 02/20/2024    LDLF 87 02/09/2023    VLDL 16 02/20/2024    NHDL 71 02/20/2024       RENAL FUNCTION PANEL -   Lab Results   Component Value Date    GLUCOSE 135 (H) 01/28/2025     01/28/2025    K 4.1 01/28/2025     01/28/2025    CO2 28 01/28/2025    ANIONGAP 11 01/28/2025    BUN 17 01/28/2025    CREATININE 0.67 01/28/2025    CALCIUM 9.1 01/28/2025    PHOS 3.5 01/17/2025    ALBUMIN 4.1 01/28/2025        Lab Results   Component Value Date     (H) 01/08/2025    HGBA1C 6.3 11/25/2024         Lab review: I have Chemistry CMP:   Lab Results   Component Value Date    ALBUMIN 4.1 01/28/2025    CALCIUM 9.1 01/28/2025    CO2 28 01/28/2025    CREATININE 0.67 01/28/2025    GLUCOSE 135 (H) 01/28/2025    BILITOT 0.8 01/28/2025    PROT 7.0 01/28/2025    ALT 12 01/28/2025    AST 11 01/28/2025    ALKPHOS 45 01/28/2025   , Chemistry BMP   Lab Results   Component Value Date    GLUCOSE 135 (H) 01/28/2025    CALCIUM 9.1 01/28/2025    CO2 28 01/28/2025    CREATININE 0.67 01/28/2025   , CBC:  Lab Results   Component Value Date    WBC 7.7 01/28/2025    RBC 3.45 (L) 01/28/2025    HGB 9.3 (L) 01/28/2025    HCT 29.2 (L) 01/28/2025    MCV 85  "01/28/2025    MCH 27.0 01/28/2025    MCHC 31.8 (L) 01/28/2025    RDW 13.5 01/28/2025    NRBC 0.0 01/28/2025   , and Cardiac Enzymes: No results found for: \"CKTOTAL\", \"CKMB\", \"CKMBINDEX\", \"TROPONINT\"  Diagnostic review: I have personally reviewed the result(s) of the Echocardiogram and cardiac MRI, and nuclear medicine cardiac PET scan.  Please see the respective reports and HPI for details.    Assessment/Plan   Assessment & Plan  NSVT (nonsustained ventricular tachycardia) (Multi)  Status post VT ablation, 1/16/2025.  Follow-up with electrophysiology as arranged.  Orders:    bisoprolol (Zebeta) 5 mg tablet; Take 1 tablet (5 mg) by mouth once daily.    Follow Up In Cardiology; Future    Status post ablation of ventricular arrhythmia  As above  Orders:    Follow Up In Cardiology; Future    Paroxysmal atrial fibrillation (Multi)  With a [id number] score of 2, she is high risk for stroke and thromboembolism, and appropriately anticoagulated.  Orders:    Follow Up In Cardiology; Future    Pericardial effusion ([facility])  Because of the potential interaction between atorvastatin and colchicine, I advised her to hold her atorvastatin while on colchicine.  Orders:    colchicine (Mitigare) 0.6 mg capsule capsule; Take 1 capsule (0.6 mg) by mouth 2 times a day.    Transthoracic Echo Limited; Future    Follow Up In Cardiology; Future          Faustino Dove MD  "

## 2025-02-25 NOTE — PATIENT INSTRUCTIONS

## 2025-02-25 NOTE — ASSESSMENT & PLAN NOTE
With a [id number]-HKFg score of 2, she is high risk for stroke and thromboembolism, and appropriately anticoagulated.  Orders:    Follow Up In Cardiology; Future

## 2025-02-25 NOTE — ASSESSMENT & PLAN NOTE
Because of the potential interaction between atorvastatin and colchicine, I advised her to hold her atorvastatin while on colchicine.  Orders:    colchicine (Mitigare) 0.6 mg capsule capsule; Take 1 capsule (0.6 mg) by mouth 2 times a day.    Transthoracic Echo Limited; Future    Follow Up In Cardiology; Future

## 2025-02-25 NOTE — ASSESSMENT & PLAN NOTE
Status post VT ablation, 1/16/2025.  Follow-up with electrophysiology as arranged.  Orders:    bisoprolol (Zebeta) 5 mg tablet; Take 1 tablet (5 mg) by mouth once daily.    Follow Up In Cardiology; Future

## 2025-02-26 ENCOUNTER — APPOINTMENT (OUTPATIENT)
Dept: PRIMARY CARE | Facility: CLINIC | Age: 60
End: 2025-02-26
Payer: COMMERCIAL

## 2025-03-03 ENCOUNTER — APPOINTMENT (OUTPATIENT)
Dept: OPHTHALMOLOGY | Facility: CLINIC | Age: 60
End: 2025-03-03
Payer: COMMERCIAL

## 2025-03-03 ENCOUNTER — DOCUMENTATION (OUTPATIENT)
Dept: CARDIOLOGY | Facility: CLINIC | Age: 60
End: 2025-03-03

## 2025-03-03 DIAGNOSIS — E11.3313 MODERATE NONPROLIFERATIVE DIABETIC RETINOPATHY OF BOTH EYES WITH MACULAR EDEMA ASSOCIATED WITH TYPE 2 DIABETES MELLITUS: Primary | ICD-10-CM

## 2025-03-03 PROCEDURE — 99213 OFFICE O/P EST LOW 20 MIN: CPT | Performed by: OPHTHALMOLOGY

## 2025-03-03 ASSESSMENT — ENCOUNTER SYMPTOMS
RESPIRATORY NEGATIVE: 0
ENDOCRINE NEGATIVE: 1
MUSCULOSKELETAL NEGATIVE: 0
HEMATOLOGIC/LYMPHATIC NEGATIVE: 0
CARDIOVASCULAR NEGATIVE: 0
CONSTITUTIONAL NEGATIVE: 0
GASTROINTESTINAL NEGATIVE: 0
ALLERGIC/IMMUNOLOGIC NEGATIVE: 0
EYES NEGATIVE: 1
PSYCHIATRIC NEGATIVE: 0
NEUROLOGICAL NEGATIVE: 0

## 2025-03-03 ASSESSMENT — VISUAL ACUITY
METHOD: SNELLEN - LINEAR
CORRECTION_TYPE: GLASSES
OS_CC: 20/40
OD_CC: 20/40

## 2025-03-03 ASSESSMENT — TONOMETRY
OD_IOP_MMHG: 18
OS_IOP_MMHG: 18
IOP_METHOD: GOLDMANN APPLANATION

## 2025-03-03 ASSESSMENT — CUP TO DISC RATIO
OD_RATIO: .3
OS_RATIO: .3

## 2025-03-03 ASSESSMENT — SLIT LAMP EXAM - LIDS
COMMENTS: GOOD POSITION
COMMENTS: GOOD POSITION

## 2025-03-03 ASSESSMENT — EXTERNAL EXAM - LEFT EYE: OS_EXAM: NORMAL

## 2025-03-03 ASSESSMENT — EXTERNAL EXAM - RIGHT EYE: OD_EXAM: NORMAL

## 2025-03-03 NOTE — PROGRESS NOTES
Assessment/Plan   Assessment/Plan   Diagnoses and all orders for this visit:  Moderate nonproliferative diabetic retinopathy of both eyes with macular edema associated with type 2 diabetes mellitus  -     OCT, Retina - OU - Both Eyes    DIAGNOSTIC PROCEDURE DONE    OCT DONE OD/OS            REASON FOR TEST: will help address and tailor  therapy by detecting subclinical CME SRF     Hi quality OCT  scans obtained  signal good    OCT OD - Normal Foveal Contour Edema, IS/OS Junction Normal  OCT OS - Normal Foveal Contour, Edema, IS/OS Junction Normal    additional commnents:    No injections OU today     Will need injections a week prior to NS surgery

## 2025-03-03 NOTE — PROGRESS NOTES
The patient contacted me by way of [employer], reporting that she had developed diarrhea on colchicine.  I sent her a message, advising her to decrease her dose of colchicine to 0.6 mg once daily, and to keep me updated with her progress.

## 2025-03-24 DIAGNOSIS — Z79.4 TYPE 2 DIABETES MELLITUS WITH OTHER OPHTHALMIC COMPLICATION, WITH LONG-TERM CURRENT USE OF INSULIN: ICD-10-CM

## 2025-03-24 DIAGNOSIS — E11.39 TYPE 2 DIABETES MELLITUS WITH OTHER OPHTHALMIC COMPLICATION, WITH LONG-TERM CURRENT USE OF INSULIN: ICD-10-CM

## 2025-03-24 RX ORDER — TIRZEPATIDE 7.5 MG/.5ML
7.5 INJECTION, SOLUTION SUBCUTANEOUS WEEKLY
Qty: 2 ML | Refills: 1 | Status: SHIPPED | OUTPATIENT
Start: 2025-03-24

## 2025-03-24 RX ORDER — TIRZEPATIDE 5 MG/.5ML
5 INJECTION, SOLUTION SUBCUTANEOUS WEEKLY
Qty: 2 ML | Refills: 1 | Status: CANCELLED | OUTPATIENT
Start: 2025-03-24

## 2025-03-24 NOTE — TELEPHONE ENCOUNTER
----- Message from Johanna Salas sent at 3/24/2025 10:21 AM EDT -----  Does she want the increased dose of mounjaro 7.5

## 2025-04-02 ENCOUNTER — TELEPHONE (OUTPATIENT)
Dept: PRIMARY CARE | Facility: CLINIC | Age: 60
End: 2025-04-02
Payer: MEDICARE

## 2025-04-15 ENCOUNTER — APPOINTMENT (OUTPATIENT)
Dept: PODIATRY | Facility: CLINIC | Age: 60
End: 2025-04-15
Payer: MEDICARE

## 2025-04-18 ENCOUNTER — OFFICE VISIT (OUTPATIENT)
Dept: CARDIOLOGY | Facility: CLINIC | Age: 60
End: 2025-04-18
Payer: MEDICARE

## 2025-04-18 ENCOUNTER — ANCILLARY PROCEDURE (OUTPATIENT)
Dept: CARDIOLOGY | Facility: CLINIC | Age: 60
End: 2025-04-18
Payer: MEDICARE

## 2025-04-18 VITALS
WEIGHT: 209.2 LBS | BODY MASS INDEX: 37.07 KG/M2 | DIASTOLIC BLOOD PRESSURE: 58 MMHG | HEIGHT: 63 IN | OXYGEN SATURATION: 93 % | HEART RATE: 76 BPM | SYSTOLIC BLOOD PRESSURE: 102 MMHG

## 2025-04-18 DIAGNOSIS — Z86.79 STATUS POST ABLATION OF VENTRICULAR ARRHYTHMIA: ICD-10-CM

## 2025-04-18 DIAGNOSIS — I47.29 NSVT (NONSUSTAINED VENTRICULAR TACHYCARDIA) (MULTI): ICD-10-CM

## 2025-04-18 DIAGNOSIS — I10 PRIMARY HYPERTENSION: ICD-10-CM

## 2025-04-18 DIAGNOSIS — I48.0 PAROXYSMAL ATRIAL FIBRILLATION (MULTI): ICD-10-CM

## 2025-04-18 DIAGNOSIS — Z98.890 STATUS POST ABLATION OF VENTRICULAR ARRHYTHMIA: ICD-10-CM

## 2025-04-18 DIAGNOSIS — I49.3 PVC (PREMATURE VENTRICULAR CONTRACTION): Primary | ICD-10-CM

## 2025-04-18 LAB
ATRIAL RATE: 77 BPM
BODY SURFACE AREA: 2.05 M2
P AXIS: 46 DEGREES
P OFFSET: 200 MS
P ONSET: 143 MS
PR INTERVAL: 162 MS
Q ONSET: 224 MS
QRS COUNT: 12 BEATS
QRS DURATION: 78 MS
QT INTERVAL: 394 MS
QTC CALCULATION(BAZETT): 445 MS
QTC FREDERICIA: 428 MS
R AXIS: 49 DEGREES
T AXIS: 38 DEGREES
T OFFSET: 421 MS
VENTRICULAR RATE: 77 BPM

## 2025-04-18 PROCEDURE — 99215 OFFICE O/P EST HI 40 MIN: CPT

## 2025-04-18 PROCEDURE — 93270 REMOTE 30 DAY ECG REV/REPORT: CPT

## 2025-04-18 PROCEDURE — 93005 ELECTROCARDIOGRAM TRACING: CPT

## 2025-04-18 NOTE — PROGRESS NOTES
"Chief Complaint:   Palpitations      History Of Present Illness:    Holly Chavez is a 59 y.o. female with PMHx of paroxysmal atrial fibrillation, PVCs, NSVT, HTN, chronic venous stasis, lymphedema of BLE, T2DM, diabetic retinopathy, and anxiety presenting today for palpitations.  She is planning to undergo eye surgery and would like to be cleared by Dr. Hou prior to scheduling the surgery. She is s/p successfully PVC ablation where PVC was localized to the LVOT below the left coronary cusp and it was ablated on 1/15/2025 with Dr. Hou.  Patient has not had no recurrence of PVCs or NSVT since ablation. She does tell me she continues to have occasional palpitations that are occurring very sparingly, may be 1-2 times per week. She did have pain and hematoma along the medial aspect of her right upper thigh and buttocks. CT of abdomen pelvis with IV contrast ruled out any active bleeding or pseudoaneurysm. She reports that this is all now cleared up, however she still occasionally has some discomfort in her leg. She denies any CP, SOB, lightheadedness, syncope, orthopnea, PND. She has chronic BLE lymphedema. Her regular cardiologist is Dr. Dove at [facility].     Last Recorded Vitals:  Vitals:    04/18/25 0756   BP: 102/58   BP Location: Left arm   Patient Position: Sitting   BP Cuff Size: Adult   Pulse: 76   SpO2: 93%   Weight: 94.9 kg (209 lb 3.2 oz)   Height: 1.588 m (5' 2.5\")     Past Medical History:  She has a past medical history of Acute upper respiratory infection, unspecified (11/13/2018), Anxiety, Benign paroxysmal vertigo, unspecified ear (12/04/2017), Cataract, Cutaneous abscess of groin (12/04/2017), Cutaneous abscess of limb, unspecified (09/20/2018), GERD (gastroesophageal reflux disease), Morbid (severe) obesity due to excess calories (Multi) (07/08/2019), Personal history of diseases of the skin and subcutaneous tissue (11/20/2015), Personal history of diseases of the skin and subcutaneous " tissue (09/27/2018), Personal history of other endocrine, nutritional and metabolic disease, Personal history of other endocrine, nutritional and metabolic disease (08/11/2015), Personal history of other endocrine, nutritional and metabolic disease (04/16/2019), Personal history of other endocrine, nutritional and metabolic disease (06/06/2019), Pneumonia (09/07/2023), Rash and other nonspecific skin eruption (08/11/2015), Rash and other nonspecific skin eruption (08/11/2015), Type 2 diabetes mellitus with mild nonproliferative diabetic retinopathy without macular edema, unspecified eye (11/09/2015), Type 2 diabetes mellitus with mild nonproliferative diabetic retinopathy without macular edema, unspecified eye (11/09/2015), Type 2 diabetes mellitus with mild nonproliferative diabetic retinopathy without macular edema, unspecified eye (11/10/2015), and Type 2 diabetes mellitus with mild nonproliferative diabetic retinopathy without macular edema, unspecified eye (11/12/2015).    Past Surgical History:  She has a past surgical history that includes Tubal ligation (08/11/2015); Hernia repair (08/11/2015); Cervical biopsy w/ loop electrode excision (04/12/2016); and Cardiac electrophysiology procedure (N/A, 1/16/2025).      Social History:  She reports that she quit smoking about 26 years ago. Her smoking use included cigarettes. She has never used smokeless tobacco. She reports that she does not currently use alcohol. She reports that she does not use drugs.    Family History:  Family History[1]     Allergies:  Adhesive tape-silicones and Codeine    Outpatient Medications:  Current Outpatient Medications   Medication Instructions    albuterol 90 mcg/actuation inhaler 2 puffs, inhalation, Every 4 hours PRN    apixaban (ELIQUIS) 5 mg, oral, 2 times daily    atorvastatin (LIPITOR) 10 mg, oral, Daily    Basaglar KwikPen U-100 Insulin 14 Units, subcutaneous, Every morning, Take as directed per insulin instructions.     "bisoprolol (ZEBETA) 5 mg, oral, Daily    blood-glucose sensor (Dexcom G7 Sensor) device Change every 10 days    blood-glucose sensor (Dexcom G7 Sensor) device 1 each, miscellaneous, See admin instructions, Change sensor every 10 days    cholecalciferol (VITAMIN D-3) 125 mcg, Daily    colchicine (MITIGARE) 0.6 mg, oral, 2 times daily    cyanocobalamin, vitamin B-12, (Vitamin B-12) 1,000 mcg tablet extended release 1 tablet, Daily    famotidine (PEPCID) 20 mg, Daily    Mounjaro 7.5 mg, subcutaneous, Weekly    pen needle, diabetic 32 gauge x 5/32\" needle 4 Needles, miscellaneous, Daily, Use with insulin injections four times daily     Physical Exam:  General: no acute distress  HEENT: EOMI, no scleral icterus.  Lungs: Clear to auscultation bilaterally without wheezing, rales, or rhonchi.  Cardiovascular: Regular rhythm and rate. Normal S1 and S2. No murmurs, rubs, or gallops are appreciated. JVP normal.  Abdomen: Soft, nontender, nondistended. Bowel sounds present.  Extremities: Warm and well perfused with equal 2+ pulses bilaterally.  No edema.  Neurologic: Alert and oriented x3.      Last Labs:  CBC -  Lab Results   Component Value Date    WBC 7.7 01/28/2025    HGB 9.3 (L) 01/28/2025    HCT 29.2 (L) 01/28/2025    MCV 85 01/28/2025     01/28/2025     CMP -  Lab Results   Component Value Date    CALCIUM 9.1 01/28/2025    PHOS 3.5 01/17/2025    PROT 7.0 01/28/2025    ALBUMIN 4.1 01/28/2025    AST 11 01/28/2025    ALT 12 01/28/2025    ALKPHOS 45 01/28/2025    BILITOT 0.8 01/28/2025     LIPID PANEL -   Lab Results   Component Value Date    CHOL 111 02/20/2024    TRIG 79 02/20/2024    HDL 39.7 02/20/2024    CHHDL 2.8 02/20/2024    LDLF 87 02/09/2023    VLDL 16 02/20/2024    NHDL 71 02/20/2024     RENAL FUNCTION PANEL -   Lab Results   Component Value Date    GLUCOSE 135 (H) 01/28/2025     01/28/2025    K 4.1 01/28/2025     01/28/2025    CO2 28 01/28/2025    ANIONGAP 11 01/28/2025    BUN 17 01/28/2025    " CREATININE 0.67 01/28/2025    CALCIUM 9.1 01/28/2025    PHOS 3.5 01/17/2025    ALBUMIN 4.1 01/28/2025      Lab Results   Component Value Date     (H) 01/08/2025    HGBA1C 6.3 11/25/2024     Last Cardiology Tests:  ECG:  ECG 12 lead 4/18/2025  NSR with ventricular rate of 77 bpm    Echo:  Transthoracic Echo (TTE) Complete 01/09/2025        Cath:  No results found for this or any previous visit from the past 1095 days.    Stress Test:  No results found for this or any previous visit from the past 1095 days.    Cardiac Imaging:  MR cardiac morphology and function w and wo IV contrast 01/13/2025  1. Normal LV size (EDVi 73 ml/m2) with low-normal systolic function (LVEF 51%).  2. No definite regional wall motion abnormalities.  3. Focally elevated myocardial T2 values of the mid-apical lateral  wall, uncertain significance but can be seen in the context of  myocardial edema/inflammation. If clinically indicated consider  further evaluation with FDG PET-CT.  4. Patchy mid-wall LGE/fibrosis of the basal-mid  inferior/inferolateral segments (nonspecific/nonischemic pattern). No  evidence of prior infarction.  5. Normal RV size (EDVi 65 ml/m2) and systolic function (RVEF 52%).  No regional wall motion abnormalities.    I have personally reviewed most recent PCP, cardiology, vascular, studies and/or documentation.      Assessment/Plan   Paroxysmal A-fib, EKG in office today showing NSR with ventricular rate of 77 bpm. She is on Eliquis 5mg daily and bisoprolol 5mg daily.     PVC, s/p successfully PVC ablation where PVC was localized to the LVOT below the left coronary cusp and it was ablated on 1/15/2025 with Dr. Hou.  Patient has not had no recurrence of PVCs or NSVT since ablation. She is on Bisoprolol 5 mg daily. She reports having occasional palpitations lasting for few minutes and occurring sparingly 1-2 times per week.  Will have her undergo 30 day Event monitor.     Lateral inferior basal scar, patchy mid-wall  LGE/fibrosis of the basal-mid inferior/inferolateral segments (nonspecific/nonischemic pattern) noted on cardiac MRI (1/13/2025). No evidence of prior infarction. This is not consistent with the exit of the PVC per EP.     HLD, 2/20/2024 LDL 56, HDL 39.7, triglycerides 79, she is on atorvastatin 10mg daily. Repeat labs can be done with PCP.     Follow up with Dr. Hou as scheduled.     Misty Arciniega, APRN-CNP       [1]   Family History  Problem Relation Name Age of Onset    Stroke Mother      Diabetes Mother      Other (cerebrovascular accident) Mother      Heart failure Father      Diabetes Father      Pancreatic cancer Father      Breast cancer Mother's Sister      Other (cardiac disorder) Other      Diabetes Other      Hypertension Other      Cancer Other      Kidney disease Other      Breast cancer Other aunt     Breast cancer Father's Sister      Glaucoma Neg Hx      Macular degeneration Neg Hx

## 2025-04-21 LAB
ATRIAL RATE: 77 BPM
P AXIS: 46 DEGREES
P OFFSET: 200 MS
P ONSET: 143 MS
PR INTERVAL: 162 MS
Q ONSET: 224 MS
QRS COUNT: 12 BEATS
QRS DURATION: 78 MS
QT INTERVAL: 394 MS
QTC CALCULATION(BAZETT): 445 MS
QTC FREDERICIA: 428 MS
R AXIS: 49 DEGREES
T AXIS: 38 DEGREES
T OFFSET: 421 MS
VENTRICULAR RATE: 77 BPM

## 2025-04-24 ENCOUNTER — APPOINTMENT (OUTPATIENT)
Dept: CARDIOLOGY | Facility: CLINIC | Age: 60
End: 2025-04-24
Payer: MEDICARE

## 2025-04-25 ENCOUNTER — APPOINTMENT (OUTPATIENT)
Dept: PODIATRY | Facility: CLINIC | Age: 60
End: 2025-04-25
Payer: MEDICARE

## 2025-04-25 DIAGNOSIS — M79.675 PAIN IN TOES OF BOTH FEET: ICD-10-CM

## 2025-04-25 DIAGNOSIS — B35.1 FUNGAL NAIL INFECTION: ICD-10-CM

## 2025-04-25 DIAGNOSIS — M79.674 PAIN IN TOES OF BOTH FEET: ICD-10-CM

## 2025-04-25 DIAGNOSIS — E11.9 TYPE 2 DIABETES MELLITUS WITHOUT COMPLICATION, WITHOUT LONG-TERM CURRENT USE OF INSULIN: Primary | ICD-10-CM

## 2025-04-25 PROCEDURE — 11721 DEBRIDE NAIL 6 OR MORE: CPT | Performed by: PODIATRIST

## 2025-04-25 NOTE — PROGRESS NOTES
History Of Present Illness  Holly Chavez is a 59 y.o. female presenting with painful elongated nails.    PCP Johanna Salas DO  Last visit 01/30/25     Past Medical History  She has a past medical history of Acute upper respiratory infection, unspecified (11/13/2018), Anxiety, Benign paroxysmal vertigo, unspecified ear (12/04/2017), Cataract, Cutaneous abscess of groin (12/04/2017), Cutaneous abscess of limb, unspecified (09/20/2018), GERD (gastroesophageal reflux disease), Morbid (severe) obesity due to excess calories (Multi) (07/08/2019), Personal history of diseases of the skin and subcutaneous tissue (11/20/2015), Personal history of diseases of the skin and subcutaneous tissue (09/27/2018), Personal history of other endocrine, nutritional and metabolic disease, Personal history of other endocrine, nutritional and metabolic disease (08/11/2015), Personal history of other endocrine, nutritional and metabolic disease (04/16/2019), Personal history of other endocrine, nutritional and metabolic disease (06/06/2019), Pneumonia (09/07/2023), Rash and other nonspecific skin eruption (08/11/2015), Rash and other nonspecific skin eruption (08/11/2015), Type 2 diabetes mellitus with mild nonproliferative diabetic retinopathy without macular edema, unspecified eye (11/09/2015), Type 2 diabetes mellitus with mild nonproliferative diabetic retinopathy without macular edema, unspecified eye (11/09/2015), Type 2 diabetes mellitus with mild nonproliferative diabetic retinopathy without macular edema, unspecified eye (11/10/2015), and Type 2 diabetes mellitus with mild nonproliferative diabetic retinopathy without macular edema, unspecified eye (11/12/2015).    Surgical History  She has a past surgical history that includes Tubal ligation (08/11/2015); Hernia repair (08/11/2015); Cervical biopsy w/ loop electrode excision (04/12/2016); and Cardiac electrophysiology procedure (N/A, 1/16/2025).     Social History  She reports that  she quit smoking about 26 years ago. Her smoking use included cigarettes. She has never used smokeless tobacco. She reports that she does not currently use alcohol. She reports that she does not use drugs.    Family History  Family History   Problem Relation Name Age of Onset    Stroke Mother      Diabetes Mother      Other (cerebrovascular accident) Mother      Heart failure Father      Diabetes Father      Pancreatic cancer Father      Breast cancer Mother's Sister      Other (cardiac disorder) Other      Diabetes Other      Hypertension Other      Cancer Other      Kidney disease Other      Breast cancer Other aunt     Breast cancer Father's Sister      Glaucoma Neg Hx      Macular degeneration Neg Hx          Allergies  Adhesive tape-silicones and Codeine    Medications  Current Outpatient Medications   Medication Sig Dispense Refill    albuterol 90 mcg/actuation inhaler Inhale 2 puffs every 4 hours if needed for wheezing or shortness of breath. 18 g 3    apixaban (Eliquis) 5 mg tablet Take 1 tablet (5 mg) by mouth 2 times a day. 180 tablet 3    bisoprolol (Zebeta) 5 mg tablet Take 1 tablet (5 mg) by mouth once daily. 90 tablet 3    blood-glucose sensor (Dexcom G7 Sensor) device Change every 10 days 10 each 3    blood-glucose sensor (Dexcom G7 Sensor) device 1 each see administration instructions. Change sensor every 10 days 9 each 3    cholecalciferol (Vitamin D-3) 125 MCG (5000 UT) capsule Take 1 capsule (125 mcg) by mouth once daily.      colchicine (Mitigare) 0.6 mg capsule capsule Take 1 capsule (0.6 mg) by mouth 2 times a day. (Patient taking differently: Take 1 capsule (0.6 mg) by mouth once daily.) 60 capsule 2    cyanocobalamin, vitamin B-12, (Vitamin B-12) 1,000 mcg tablet extended release Take 1 tablet (1,000 mcg) by mouth once daily.      famotidine (Pepcid) 20 mg tablet Take 1 tablet (20 mg) by mouth once daily.      insulin glargine (Basaglar KwikPen U-100 Insulin) 100 unit/mL (3 mL) pen Inject 14  "Units under the skin once daily in the morning. Take as directed per insulin instructions. 15 mL 0    pen needle, diabetic 32 gauge x 5/32\" needle 4 Needles once daily. Use with insulin injections four times daily 400 each 3    tirzepatide (Mounjaro) 7.5 mg/0.5 mL pen injector Inject 7.5 mg under the skin 1 (one) time per week. 2 mL 1    atorvastatin (Lipitor) 10 mg tablet Take 1 tablet (10 mg) by mouth once daily. 30 tablet 1     No current facility-administered medications for this visit.       Review of Systems    REVIEW OF SYSTEMS  GENERAL:  Negative for malaise, significant weight loss, fever  CARDIOVASCULAR: leg swelling   MUSCULOSKELETAL:  Negative for joint pain or swelling, back pain, and muscle pain.  SKIN:  Negative for lesions, rash, and itching  PSYCH:  Negative for sleep disturbance, mood disorder and recent psychosocial stressors  NEURO: Negative, denies any burning, tingling or numbness     Objective:   Vasc: DP and PT pulses are palpable bilateral.  CFT is less than 3 seconds bilateral.  Skin temperature is warm to cool proximal to distal bilateral.  One plus Pitting pedal edema. No digital hair      Neuro:  Light touch is intact to the foot bilateral.  l.  There is no clonus noted.  The hallux is downgoing bilateral.      Derm: Nails 1-5 bilateral are thickened, elongated and crumbly with subungual debris. Skin is supple with normal texture and turgor noted.  Webspaces are clean, dry and intact bilateral.  There are no hyperkeratoses, ulcerations, verruca or other lesions noted.      Ortho: Muscle strength is 5/5 for all pedal groups tested.  Ankle joint, subtalar joint, 1st MPJ and lesser MPJ ROM is full and without pain or crepitus.  The foot type is rectus bilateral off weight bearing.  There are no structural deformities noted.    Assessment/Plan   Painful nail mycosis  DM        Toenails are debrided in length and thickness to avoid infection and for pain relief        I spent 20 minutes in " the professional and overall care of this patient.

## 2025-05-01 ENCOUNTER — APPOINTMENT (OUTPATIENT)
Dept: PRIMARY CARE | Facility: CLINIC | Age: 60
End: 2025-05-01
Payer: MEDICARE

## 2025-05-01 VITALS
HEIGHT: 63 IN | OXYGEN SATURATION: 99 % | HEART RATE: 73 BPM | BODY MASS INDEX: 36.86 KG/M2 | SYSTOLIC BLOOD PRESSURE: 108 MMHG | WEIGHT: 208 LBS | TEMPERATURE: 96.8 F | DIASTOLIC BLOOD PRESSURE: 60 MMHG | RESPIRATION RATE: 14 BRPM

## 2025-05-01 DIAGNOSIS — J45.20 MILD INTERMITTENT REACTIVE AIRWAY DISEASE WITHOUT COMPLICATION (HHS-HCC): ICD-10-CM

## 2025-05-01 DIAGNOSIS — Z79.4 TYPE 2 DIABETES MELLITUS WITH OTHER OPHTHALMIC COMPLICATION, WITH LONG-TERM CURRENT USE OF INSULIN: ICD-10-CM

## 2025-05-01 DIAGNOSIS — E53.8 VITAMIN B12 DEFICIENCY: ICD-10-CM

## 2025-05-01 DIAGNOSIS — Z79.4 TYPE 2 DIABETES MELLITUS WITH OTHER SPECIFIED COMPLICATION, WITH LONG-TERM CURRENT USE OF INSULIN: ICD-10-CM

## 2025-05-01 DIAGNOSIS — J30.2 SEASONAL ALLERGIES: ICD-10-CM

## 2025-05-01 DIAGNOSIS — E11.39 TYPE 2 DIABETES MELLITUS WITH OTHER OPHTHALMIC COMPLICATION, WITH LONG-TERM CURRENT USE OF INSULIN: ICD-10-CM

## 2025-05-01 DIAGNOSIS — Z00.00 ROUTINE GENERAL MEDICAL EXAMINATION AT HEALTH CARE FACILITY: Primary | ICD-10-CM

## 2025-05-01 DIAGNOSIS — M54.6 CHRONIC BILATERAL THORACIC BACK PAIN: ICD-10-CM

## 2025-05-01 DIAGNOSIS — E11.69 TYPE 2 DIABETES MELLITUS WITH OTHER SPECIFIED COMPLICATION, WITH LONG-TERM CURRENT USE OF INSULIN: ICD-10-CM

## 2025-05-01 DIAGNOSIS — D64.9 ANEMIA, UNSPECIFIED TYPE: ICD-10-CM

## 2025-05-01 DIAGNOSIS — G89.29 CHRONIC BILATERAL THORACIC BACK PAIN: ICD-10-CM

## 2025-05-01 DIAGNOSIS — Z12.31 ENCOUNTER FOR SCREENING MAMMOGRAM FOR BREAST CANCER: ICD-10-CM

## 2025-05-01 DIAGNOSIS — E55.9 VITAMIN D DEFICIENCY: ICD-10-CM

## 2025-05-01 DIAGNOSIS — R21 RASH: ICD-10-CM

## 2025-05-01 LAB — POC HEMOGLOBIN A1C: 6.2 % (ref 4.2–6.5)

## 2025-05-01 PROCEDURE — 3008F BODY MASS INDEX DOCD: CPT | Performed by: INTERNAL MEDICINE

## 2025-05-01 PROCEDURE — 3078F DIAST BP <80 MM HG: CPT | Performed by: INTERNAL MEDICINE

## 2025-05-01 PROCEDURE — 1036F TOBACCO NON-USER: CPT | Performed by: INTERNAL MEDICINE

## 2025-05-01 PROCEDURE — 83036 HEMOGLOBIN GLYCOSYLATED A1C: CPT | Performed by: INTERNAL MEDICINE

## 2025-05-01 PROCEDURE — 3074F SYST BP LT 130 MM HG: CPT | Performed by: INTERNAL MEDICINE

## 2025-05-01 PROCEDURE — 3044F HG A1C LEVEL LT 7.0%: CPT | Performed by: INTERNAL MEDICINE

## 2025-05-01 PROCEDURE — G0439 PPPS, SUBSEQ VISIT: HCPCS | Performed by: INTERNAL MEDICINE

## 2025-05-01 PROCEDURE — 99214 OFFICE O/P EST MOD 30 MIN: CPT | Performed by: INTERNAL MEDICINE

## 2025-05-01 RX ORDER — INSULIN GLARGINE 100 [IU]/ML
14 INJECTION, SOLUTION SUBCUTANEOUS EVERY MORNING
Qty: 15 ML | Refills: 3 | Status: SHIPPED | OUTPATIENT
Start: 2025-05-01 | End: 2025-05-01

## 2025-05-01 RX ORDER — ALBUTEROL SULFATE 90 UG/1
2 INHALANT RESPIRATORY (INHALATION) EVERY 4 HOURS PRN
Qty: 18 G | Refills: 3 | Status: SHIPPED | OUTPATIENT
Start: 2025-05-01

## 2025-05-01 RX ORDER — TRIAMCINOLONE ACETONIDE 1 MG/G
CREAM TOPICAL 2 TIMES DAILY
Qty: 28.4 G | Refills: 3 | Status: SHIPPED | OUTPATIENT
Start: 2025-05-01

## 2025-05-01 RX ORDER — ALBUTEROL SULFATE 0.83 MG/ML
2.5 SOLUTION RESPIRATORY (INHALATION) EVERY 6 HOURS PRN
Qty: 75 ML | Refills: 11 | Status: SHIPPED | OUTPATIENT
Start: 2025-05-01 | End: 2026-05-01

## 2025-05-01 RX ORDER — INSULIN GLARGINE 100 [IU]/ML
12 INJECTION, SOLUTION SUBCUTANEOUS EVERY MORNING
Qty: 15 ML | Refills: 3 | Status: SHIPPED | OUTPATIENT
Start: 2025-05-01

## 2025-05-01 RX ORDER — PEN NEEDLE, DIABETIC 30 GX3/16"
4 NEEDLE, DISPOSABLE MISCELLANEOUS DAILY
Qty: 400 EACH | Refills: 3 | Status: SHIPPED | OUTPATIENT
Start: 2025-05-01

## 2025-05-01 ASSESSMENT — ACTIVITIES OF DAILY LIVING (ADL)
DOING_HOUSEWORK: INDEPENDENT
BATHING: INDEPENDENT
GROCERY_SHOPPING: INDEPENDENT
DRESSING: INDEPENDENT
MANAGING_FINANCES: INDEPENDENT
TAKING_MEDICATION: INDEPENDENT

## 2025-05-01 ASSESSMENT — ENCOUNTER SYMPTOMS
DEPRESSION: 0
LOSS OF SENSATION IN FEET: 0
OCCASIONAL FEELINGS OF UNSTEADINESS: 1

## 2025-05-01 ASSESSMENT — PATIENT HEALTH QUESTIONNAIRE - PHQ9
2. FEELING DOWN, DEPRESSED OR HOPELESS: NOT AT ALL
1. LITTLE INTEREST OR PLEASURE IN DOING THINGS: NOT AT ALL
SUM OF ALL RESPONSES TO PHQ9 QUESTIONS 1 AND 2: 0

## 2025-05-01 NOTE — PROGRESS NOTES
"Subjective    Holly Chavez is a 59 y.o. female who presents for Medicare Annual Wellness Visit Subsequent.  HPI    Mamm 2/2024  Colonoscopy ordered last  year but not completed  She eats a lot of processed foods.  She eats a lot of processed carbs.    Flu vaccine 9/2024  Prevnar 2018  Has not had shingrix     Needs Living will     Wearing a 30 day Holter Monitor   Trying to get clearance for cataract surgery    Continues to have dizziness, ears ringing.     Reports upper back pain/across shoulders. Muscular tightness and tenderness    Checks blood sugar several times a day via dexcom - average 160    Requesting RF Triamcinolone, albuterol hfa and solution    Review of Systems   All other systems reviewed and are negative.        Objective     /60 (BP Location: Left arm, Patient Position: Sitting, BP Cuff Size: Adult)   Pulse 73   Temp 36 °C (96.8 °F) (Skin)   Resp 14   Ht 1.588 m (5' 2.5\")   Wt 94.3 kg (208 lb)   LMP  (LMP Unknown)   SpO2 99%   BMI 37.44 kg/m²    Physical Exam  Constitutional:       Appearance: Normal appearance.   HENT:      Mouth/Throat:      Mouth: Mucous membranes are moist.   Neck:      Thyroid: No thyroid mass or thyromegaly.   Cardiovascular:      Rate and Rhythm: Normal rate and regular rhythm.      Pulses: Normal pulses.   Pulmonary:      Effort: Pulmonary effort is normal.      Breath sounds: Normal breath sounds.   Chest:      Chest wall: No mass or tenderness.   Breasts:     Right: Normal.      Left: Normal.   Abdominal:      General: Abdomen is flat.      Palpations: Abdomen is soft.      Tenderness: There is no abdominal tenderness.   Musculoskeletal:      Cervical back: Neck supple. No tenderness.      Comments: Increased tissue tension and tenderness left sided trapezium   Lymphadenopathy:      Cervical: No cervical adenopathy.      Upper Body:      Right upper body: No supraclavicular or axillary adenopathy.      Left upper body: No supraclavicular or axillary " "adenopathy.   Skin:     General: Skin is warm.   Neurological:      General: No focal deficit present.      Mental Status: She is alert.   Psychiatric:         Attention and Perception: Attention and perception normal.         Mood and Affect: Mood and affect normal.       Health Maintenance Due   Topic Date Due    HIV Screening  Never done    MMR Vaccines (1 of 1 - Standard series) Never done    Hepatitis C Screening  Never done    DTaP/Tdap/Td Vaccines (1 - Tdap) Never done    Zoster Vaccines (1 of 2) Never done    Pneumococcal Vaccine (2 of 2 - PPSV23) 11/22/2018    Colorectal Cancer Screening  09/23/2023    RSV High Risk: (Elderly (60+) or Pregnant Population) (1 - Risk 60-74 years 1-dose series) Never done    Mammogram  02/13/2025    Lipid Panel  02/20/2025    Diabetes: Urine Protein Screening  02/20/2025    Cervical Cancer Screening  05/20/2025          Assessment/Plan   Problem List Items Addressed This Visit       Vitamin D deficiency    Relevant Orders    Vitamin D 25-Hydroxy,Total (for eval of Vitamin D levels)    Type 2 diabetes mellitus    Relevant Medications    pen needle, diabetic 32 gauge x 5/32\" needle    insulin glargine (Basaglar KwikPen U-100 Insulin) 100 unit/mL (3 mL) pen    Other Relevant Orders    POCT glycosylated hemoglobin (Hb A1C) manually resulted (Completed)    Albumin-Creatinine Ratio, Urine Random    Comprehensive Metabolic Panel    Lipid Panel     Other Visit Diagnoses         Routine general medical examination at health care facility    -  Primary    Relevant Orders    1 Year Follow Up In [facility] - PCP - Wellness Exam    CBC    Comprehensive Metabolic Panel    Lipid Panel      Mild intermittent reactive airway disease without complication ([facility])        Relevant Medications    albuterol 90 mcg/actuation inhaler      Rash        Relevant Medications    triamcinolone (Kenalog) 0.1 % cream      Seasonal allergies        Relevant Medications    albuterol 2.5 mg /3 mL " (0.083 %) nebulizer solution      Encounter for screening mammogram for breast cancer        Relevant Orders    BI mammo bilateral screening tomosynthesis      Anemia, unspecified type        Relevant Orders    CBC    Iron and TIBC      Vitamin B12 deficiency        Relevant Orders    Vitamin B12      Chronic bilateral thoracic back pain        muscular    Relevant Orders    XR chest 2 views    XR thoracic spine 2 views        Blood sugars are good  Decrease her insulin by 2 units.   We had extensive discussion regarding diet recommend Mediterranean type diet.   She eats too many processed carbohydrates and not enough protein.   Check labs.   Has muscular back pain. Check xray's.  follow up in 3 months. Recommend yearly eye exams and self foot exams. call if any problems or questions.

## 2025-05-02 LAB
ALBUMIN/CREAT UR: 17 MG/G CREAT
CREAT UR-MCNC: 132 MG/DL (ref 20–275)
MICROALBUMIN UR-MCNC: 2.3 MG/DL

## 2025-05-05 ENCOUNTER — APPOINTMENT (OUTPATIENT)
Dept: CARDIOLOGY | Facility: CLINIC | Age: 60
End: 2025-05-05
Payer: MEDICARE

## 2025-05-06 DIAGNOSIS — E11.39 TYPE 2 DIABETES MELLITUS WITH OTHER OPHTHALMIC COMPLICATION, WITH LONG-TERM CURRENT USE OF INSULIN: ICD-10-CM

## 2025-05-06 DIAGNOSIS — Z79.4 TYPE 2 DIABETES MELLITUS WITH OTHER OPHTHALMIC COMPLICATION, WITH LONG-TERM CURRENT USE OF INSULIN: ICD-10-CM

## 2025-05-06 RX ORDER — INSULIN GLARGINE-YFGN 100 [IU]/ML
12 INJECTION, SOLUTION SUBCUTANEOUS EVERY 24 HOURS
Qty: 15 ML | Refills: 2 | Status: SHIPPED | OUTPATIENT
Start: 2025-05-06 | End: 2026-05-06

## 2025-05-07 ENCOUNTER — APPOINTMENT (OUTPATIENT)
Dept: OTOLARYNGOLOGY | Facility: CLINIC | Age: 60
End: 2025-05-07
Payer: MEDICARE

## 2025-05-07 DIAGNOSIS — H93.A1 PULSATILE TINNITUS, RIGHT EAR: ICD-10-CM

## 2025-05-07 DIAGNOSIS — H93.13 BILATERAL TINNITUS: Primary | ICD-10-CM

## 2025-05-07 LAB
25(OH)D3+25(OH)D2 SERPL-MCNC: 31 NG/ML (ref 30–100)
ALBUMIN SERPL-MCNC: 4 G/DL (ref 3.6–5.1)
ALP SERPL-CCNC: 60 U/L (ref 37–153)
ALT SERPL-CCNC: 11 U/L (ref 6–29)
ANION GAP SERPL CALCULATED.4IONS-SCNC: 7 MMOL/L (CALC) (ref 7–17)
AST SERPL-CCNC: 11 U/L (ref 10–35)
BILIRUB SERPL-MCNC: 0.6 MG/DL (ref 0.2–1.2)
BUN SERPL-MCNC: 12 MG/DL (ref 7–25)
CALCIUM SERPL-MCNC: 8.9 MG/DL (ref 8.6–10.4)
CHLORIDE SERPL-SCNC: 105 MMOL/L (ref 98–110)
CHOLEST SERPL-MCNC: 170 MG/DL
CHOLEST/HDLC SERPL: 3.3 (CALC)
CO2 SERPL-SCNC: 30 MMOL/L (ref 20–32)
CREAT SERPL-MCNC: 0.7 MG/DL (ref 0.5–1.05)
EGFRCR SERPLBLD CKD-EPI 2021: 98 ML/MIN/1.73M2
ERYTHROCYTE [DISTWIDTH] IN BLOOD BY AUTOMATED COUNT: 14 % (ref 11–15)
GLUCOSE SERPL-MCNC: 131 MG/DL (ref 65–99)
HCT VFR BLD AUTO: 34.1 % (ref 35–45)
HDLC SERPL-MCNC: 52 MG/DL
HGB BLD-MCNC: 11.3 G/DL (ref 11.7–15.5)
IRON SATN MFR SERPL: 21 % (CALC) (ref 16–45)
IRON SERPL-MCNC: 64 MCG/DL (ref 45–160)
LDLC SERPL CALC-MCNC: 97 MG/DL (CALC)
MCH RBC QN AUTO: 29 PG (ref 27–33)
MCHC RBC AUTO-ENTMCNC: 33.1 G/DL (ref 32–36)
MCV RBC AUTO: 87.4 FL (ref 80–100)
NONHDLC SERPL-MCNC: 118 MG/DL (CALC)
PLATELET # BLD AUTO: 170 THOUSAND/UL (ref 140–400)
PMV BLD REES-ECKER: 10 FL (ref 7.5–12.5)
POTASSIUM SERPL-SCNC: 4.7 MMOL/L (ref 3.5–5.3)
PROT SERPL-MCNC: 6.7 G/DL (ref 6.1–8.1)
RBC # BLD AUTO: 3.9 MILLION/UL (ref 3.8–5.1)
SODIUM SERPL-SCNC: 142 MMOL/L (ref 135–146)
TIBC SERPL-MCNC: 305 MCG/DL (CALC) (ref 250–450)
TRIGL SERPL-MCNC: 114 MG/DL
VIT B12 SERPL-MCNC: >2000 PG/ML (ref 200–1100)
WBC # BLD AUTO: 6.4 THOUSAND/UL (ref 3.8–10.8)

## 2025-05-07 PROCEDURE — 99213 OFFICE O/P EST LOW 20 MIN: CPT | Performed by: OTOLARYNGOLOGY

## 2025-05-07 PROCEDURE — 3044F HG A1C LEVEL LT 7.0%: CPT | Performed by: OTOLARYNGOLOGY

## 2025-05-07 NOTE — PROGRESS NOTES
Subjective   Patient ID: Holly Chavez is a 59 y.o. female who presents for 6 MONTH FU ON TINNITUS.    HPI  Patient returns.  Seeing her back today surveillance check history of both nonpulsatile and pulsatile tinnitus.  She is doing okay.  Intermittently notices the pulsations right side predominantly.  All remaining ENT inquiry is otherwise clear.  No change in hearing etc.  There have been no significant changes in past medical or past surgical histories except as mentioned.        Physical Exam  General appearance: No acute distress. Normal facies. Symmetric facial movement. No gross lesions of the face are noted.  The external ear structures appear normal. The ear canals patent and the tympanic membranes are intact without evidence of air-fluid levels, retraction, or congenital defects.  Anterior rhinoscopy notes essentially a midline nasal septum. Examination is noted for normal healthy mucosal membranes without any evidence of lesions, polyps, or exudate. The tongue is normally mobile. There are no lesions on the gingiva, buccal, or oral mucosa. There are no oral cavity masses.  The neck is negative for mass lymphadenopathy. The trachea and parotid are clear. The thyroid bed is grossly unremarkable. The salivary gland structures are grossly unremarkable.    Assessment/Plan   History of pulsatile/nonpulsatile tinnitus involving right greater than left.  Patient recommended for surveillance recheck in 6 months.  If indeed patient has no issues we will favor observation.  If indeed there is a significant worsening of the tenderness we will check CT angio appropriately.  This seemingly is present daily but not a very intermittent basis and seemingly would also correlate with some change in blood pressure.  Very detailed discussion with patient and spouse in this regard.  Update me accordingly.  All questions were answered in this regard accordingly.

## 2025-05-08 DIAGNOSIS — Z79.4 TYPE 2 DIABETES MELLITUS WITH OTHER OPHTHALMIC COMPLICATION, WITH LONG-TERM CURRENT USE OF INSULIN: ICD-10-CM

## 2025-05-08 DIAGNOSIS — E11.39 TYPE 2 DIABETES MELLITUS WITH OTHER OPHTHALMIC COMPLICATION, WITH LONG-TERM CURRENT USE OF INSULIN: ICD-10-CM

## 2025-05-08 RX ORDER — TIRZEPATIDE 7.5 MG/.5ML
7.5 INJECTION, SOLUTION SUBCUTANEOUS WEEKLY
Qty: 2 ML | Refills: 1 | Status: SHIPPED | OUTPATIENT
Start: 2025-05-08

## 2025-05-08 NOTE — TELEPHONE ENCOUNTER
Pt left vm requesting call back regarding Labs.      Further states she needs Mounjaro 7.5 mg rf to [facility] [place].

## 2025-05-15 ENCOUNTER — TELEPHONE (OUTPATIENT)
Dept: CARDIOLOGY | Facility: CLINIC | Age: 60
End: 2025-05-15
Payer: MEDICARE

## 2025-05-15 NOTE — TELEPHONE ENCOUNTER
Pt LM to report she has been wearing a monitor for a few weeks and is now having a rash at the site of monitor strips and it is not adhering to her skin and she is losing connection.    Flei ordered a 30 day EM which was placed on 4/18/25 and pt to mail back on 5/18/25. Reviewed above with Feli. Per Feli, ok to remove monitor and mail back.    Called and notified pt who verbalizes understanding.

## 2025-05-22 LAB — BODY SURFACE AREA: 2.05 M2

## 2025-05-22 PROCEDURE — 93272 ECG/REVIEW INTERPRET ONLY: CPT | Performed by: INTERNAL MEDICINE

## 2025-05-27 ENCOUNTER — HOSPITAL ENCOUNTER (OUTPATIENT)
Dept: CARDIOLOGY | Facility: HOSPITAL | Age: 60
Discharge: HOME | End: 2025-05-27
Payer: MEDICARE

## 2025-05-27 DIAGNOSIS — I31.39 PERICARDIAL EFFUSION (HHS-HCC): ICD-10-CM

## 2025-05-27 PROCEDURE — 93308 TTE F-UP OR LMTD: CPT | Performed by: INTERNAL MEDICINE

## 2025-05-27 PROCEDURE — 93321 DOPPLER ECHO F-UP/LMTD STD: CPT | Performed by: INTERNAL MEDICINE

## 2025-05-27 PROCEDURE — 93325 DOPPLER ECHO COLOR FLOW MAPG: CPT | Performed by: INTERNAL MEDICINE

## 2025-05-27 PROCEDURE — 93325 DOPPLER ECHO COLOR FLOW MAPG: CPT

## 2025-05-28 LAB
EJECTION FRACTION APICAL 4 CHAMBER: 61.6
EJECTION FRACTION: 60 %
LEFT VENTRICLE INTERNAL DIMENSION DIASTOLE: 5.01 CM (ref 3.5–6)
LV EJECTION FRACTION BIPLANE: 60 %
MITRAL VALVE E/A RATIO: 0.7
MITRAL VALVE E/E' RATIO: 8.7

## 2025-05-29 ENCOUNTER — APPOINTMENT (OUTPATIENT)
Dept: CARDIOLOGY | Facility: CLINIC | Age: 60
End: 2025-05-29
Payer: MEDICARE

## 2025-05-29 VITALS
SYSTOLIC BLOOD PRESSURE: 108 MMHG | HEIGHT: 63 IN | BODY MASS INDEX: 36.86 KG/M2 | OXYGEN SATURATION: 99 % | HEART RATE: 74 BPM | DIASTOLIC BLOOD PRESSURE: 60 MMHG | WEIGHT: 208 LBS

## 2025-05-29 DIAGNOSIS — I49.3 PVC (PREMATURE VENTRICULAR CONTRACTION): Primary | ICD-10-CM

## 2025-05-29 DIAGNOSIS — I47.29 NSVT (NONSUSTAINED VENTRICULAR TACHYCARDIA) (MULTI): ICD-10-CM

## 2025-05-29 DIAGNOSIS — I48.0 PAROXYSMAL ATRIAL FIBRILLATION (MULTI): ICD-10-CM

## 2025-05-29 DIAGNOSIS — I31.39 PERICARDIAL EFFUSION (HHS-HCC): ICD-10-CM

## 2025-05-29 RX ORDER — BISOPROLOL FUMARATE 2.5 MG/1
2.5 TABLET, FILM COATED ORAL DAILY
Qty: 90 TABLET | Refills: 3 | Status: SHIPPED | OUTPATIENT
Start: 2025-05-29 | End: 2026-05-29

## 2025-05-29 NOTE — PROGRESS NOTES
"  Cardiac Electrophysiology Office Visit     Referred by Dr. Uriostegui ref. provider found for   No chief complaint on file.    HPI:  Holly Chavez is a 59 y.o. year old female patient with h/o  HTN, pAF, diabetes, PVC s/p RFA  for follow up    Objective  Current Outpatient Medications   Medication Instructions    albuterol 90 mcg/actuation inhaler 2 puffs, inhalation, Every 4 hours PRN    albuterol 2.5 mg, nebulization, Every 6 hours PRN    apixaban (ELIQUIS) 5 mg, oral, 2 times daily    atorvastatin (LIPITOR) 10 mg, oral, Daily    Basaglar KwikPen U-100 Insulin 12 Units, subcutaneous, Every morning, Take as directed per insulin instructions.    bisoprolol (ZEBETA) 5 mg, oral, Daily    blood-glucose sensor (Dexcom G7 Sensor) device Change every 10 days    blood-glucose sensor (Dexcom G7 Sensor) device 1 each, miscellaneous, See admin instructions, Change sensor every 10 days    cholecalciferol (VITAMIN D-3) 125 mcg, Daily    cyanocobalamin, vitamin B-12, (Vitamin B-12) 1,000 mcg tablet extended release 1 tablet, Daily    famotidine (PEPCID) 20 mg, Daily    insulin glargine-yfgn (SEMGLEE(INSULIN GLARG-YFGN)PEN) 12 Units, subcutaneous, Every 24 hours, Take as directed per insulin instructions.    Mounjaro 7.5 mg, subcutaneous, Weekly    pen needle, diabetic 32 gauge x 5/32\" needle 4 Needles, miscellaneous, Daily, Use with insulin injections four times daily    triamcinolone (Kenalog) 0.1 % cream Topical, 2 times daily, Apply to affected area 1-2 times daily as needed.         Visit Vitals  /60 (BP Location: Left arm, Patient Position: Sitting)   Pulse 74   Ht 1.588 m (5' 2.5\")   Wt 94.3 kg (208 lb)   LMP  (LMP Unknown)   SpO2 99%   BMI 37.44 kg/m²   OB Status Postmenopausal   Smoking Status Former   BSA 2.04 m²      Physical Exam  Vitals reviewed.   Constitutional:       Appearance: Normal appearance.   HENT:      Head: Normocephalic and atraumatic.   Eyes:      Pupils: Pupils are equal, round, and reactive to light. "   Cardiovascular:      Rate and Rhythm: Normal rate and regular rhythm.      Pulses: Normal pulses.      Heart sounds: Normal heart sounds. No murmur heard.  Pulmonary:      Effort: Pulmonary effort is normal. No respiratory distress.      Breath sounds: Normal breath sounds. No wheezing.   Abdominal:      Tenderness: There is no abdominal tenderness.   Musculoskeletal:      Comments: Right groin hematoma with signs of ecchymosis along the medial and posterior aspect of the thigh.  No bruit heard.  No palpable mass outside of the hematoma noted on exam   Skin:     General: Skin is warm and dry.   Neurological:      General: No focal deficit present.      Mental Status: She is alert.         Results      DIAGNOSTIC  30 Day cardiac monitor: Predominant sinus rhythm, average heart rate 74 bpm, PVC burden <1%, PAC burden <1%, no atrial or ventricular arrhythmia (04/2025)  Echocardiogram: Ejection fraction 60%, no structural abnormality (05/27/2025)  Echo (February 2022): Normal LV function EF of 60 to 65% no regional wall motion abnormalities mildly dilated left atrium.  No pericardial effusion noted.  24-hour monitor (December 2024): Predominant rhythm sinus with average heart rate 89 bpm.  46% PVC burden predominant 1 morphology.  Multiple episodes of NSVT longest lasting 10 beats  Echo (January 2025): Left ventricular function low normal with an EF of 53% normal right ventricular systolic function.  No evidence of diastolic dysfunction.  Trivial pericardial effusion.  No significant valve pathology.   [id number] Score  Age <65: 0   Sex Female: 1   CHF History Yes: 1   HTN Yes: 1   Stroke/TIA/Thromboembolism No: 0   Vascular Dz: CAD/PAD/Aortic Plaque No: 0   DM Yes: 1   Total Score 4     Assessment & Plan  Paroxysmal atrial fibrillation  AF Dx History: Post COVID 2021; h/o Cardioversion: No; AAD Use: None; Anticoagulation use: Apixaban 5mg BID (current); h/o Ablation: None; [id number] Score: 4  Overall burden has  been relatively low patient had some episodes after her first episode of COVID but has not had any recurrence recently.  Will continue to watch at this time  Paroxysmal atrial fibrillation is well-controlled with a normal heart rhythm and no episodes on the recent 30-day cardiac monitor. Continued use of apixaban is recommended to prevent thromboembolic events. Discussed the importance of COVID-19 boosters due to cardiac concerns and potential for inflammation leading to more episodes of atrial fibrillation.  - Continue apixaban 5 mg oral twice daily.  - Continue bisoprolol 2.5 mg daily (reduced from previous 5 mg dosing)  - Recommend continuing COVID-19 boosters.    Orthostatic Hypotension  Symptoms of dizziness and lightheadedness upon changing positions suggest orthostatic hypotension, possibly exacerbated by diabetes affecting small nerve fibers. Current blood pressure is slightly low, and bisoprolol may be contributing. Discussed the mechanism of orthostatic hypotension and the importance of slow positional changes to mitigate symptoms. Reduction of bisoprolol may help minimize dizziness.  - Reduce bisoprolol to 2.5 mg daily. Send a new prescription for 2.5 mg tablets.  - Advise slow positional changes to minimize dizziness.    PVC/NSVT s/p RFA ([facility] LVOT)  Patient had an exhaustive workup including cardiac MRI and PET which did not show any active inflammation cardiac MRI did show some lateral inferior basal scar which was not consistent with the exit of the PVC.  Patient successfully underwent a PVC ablation where PVC was localized to the LVOT below the left coronary cusp and it was ablated.  Patient has not had no recurrence of PVCs or NSVT since ablation.  PVCs are not a concern as recent monitoring shows a PVC burden of less than 1%. No current symptoms attributable to PVCs.    Return to Clinic: Patient should return to the EP Clinic in 6 months    Andrew Hou MD [facility]  Cardiac  [email or web address]    **Disclaimer: This note was dictated by speech recognition, and every effort has been made to prevent any error in transcription, however minor errors may be present**    This medical note was created with the assistance of artificial intelligence (AI) for documentation purposes. The content has been reviewed and confirmed by the healthcare provider for accuracy and completeness. Patient consented to the use of audio recording and use of AI during their visit.

## 2025-06-03 ENCOUNTER — APPOINTMENT (OUTPATIENT)
Dept: CARDIOLOGY | Facility: CLINIC | Age: 60
End: 2025-06-03
Payer: MEDICARE

## 2025-06-03 ENCOUNTER — HOSPITAL ENCOUNTER (OUTPATIENT)
Dept: RADIOLOGY | Facility: CLINIC | Age: 60
Discharge: HOME | End: 2025-06-03
Payer: MEDICARE

## 2025-06-03 VITALS
BODY MASS INDEX: 37.03 KG/M2 | HEIGHT: 63 IN | SYSTOLIC BLOOD PRESSURE: 120 MMHG | HEART RATE: 80 BPM | DIASTOLIC BLOOD PRESSURE: 68 MMHG | WEIGHT: 209 LBS

## 2025-06-03 VITALS — BODY MASS INDEX: 38.46 KG/M2 | WEIGHT: 209 LBS | HEIGHT: 62 IN

## 2025-06-03 DIAGNOSIS — I31.39 PERICARDIAL EFFUSION (HHS-HCC): ICD-10-CM

## 2025-06-03 DIAGNOSIS — I48.0 PAROXYSMAL ATRIAL FIBRILLATION (MULTI): ICD-10-CM

## 2025-06-03 DIAGNOSIS — G89.29 CHRONIC BILATERAL THORACIC BACK PAIN: ICD-10-CM

## 2025-06-03 DIAGNOSIS — M54.6 CHRONIC BILATERAL THORACIC BACK PAIN: ICD-10-CM

## 2025-06-03 DIAGNOSIS — Z98.890 STATUS POST ABLATION OF VENTRICULAR ARRHYTHMIA: ICD-10-CM

## 2025-06-03 DIAGNOSIS — Z01.810 PRE-OPERATIVE CARDIOVASCULAR EXAMINATION: ICD-10-CM

## 2025-06-03 DIAGNOSIS — Z79.4 TYPE 2 DIABETES MELLITUS WITH OTHER OPHTHALMIC COMPLICATION, WITH LONG-TERM CURRENT USE OF INSULIN: ICD-10-CM

## 2025-06-03 DIAGNOSIS — E11.39 TYPE 2 DIABETES MELLITUS WITH OTHER OPHTHALMIC COMPLICATION, WITH LONG-TERM CURRENT USE OF INSULIN: ICD-10-CM

## 2025-06-03 DIAGNOSIS — E78.5 DYSLIPIDEMIA: ICD-10-CM

## 2025-06-03 DIAGNOSIS — I47.29 NSVT (NONSUSTAINED VENTRICULAR TACHYCARDIA) (MULTI): ICD-10-CM

## 2025-06-03 DIAGNOSIS — Z86.79 STATUS POST ABLATION OF VENTRICULAR ARRHYTHMIA: ICD-10-CM

## 2025-06-03 DIAGNOSIS — Z12.31 ENCOUNTER FOR SCREENING MAMMOGRAM FOR BREAST CANCER: ICD-10-CM

## 2025-06-03 DIAGNOSIS — I10 PRIMARY HYPERTENSION: ICD-10-CM

## 2025-06-03 PROCEDURE — 99215 OFFICE O/P EST HI 40 MIN: CPT | Performed by: INTERNAL MEDICINE

## 2025-06-03 PROCEDURE — 3044F HG A1C LEVEL LT 7.0%: CPT | Performed by: INTERNAL MEDICINE

## 2025-06-03 PROCEDURE — 72072 X-RAY EXAM THORAC SPINE 3VWS: CPT | Performed by: RADIOLOGY

## 2025-06-03 PROCEDURE — 71046 X-RAY EXAM CHEST 2 VIEWS: CPT | Performed by: RADIOLOGY

## 2025-06-03 PROCEDURE — 3078F DIAST BP <80 MM HG: CPT | Performed by: INTERNAL MEDICINE

## 2025-06-03 PROCEDURE — 77067 SCR MAMMO BI INCL CAD: CPT | Performed by: STUDENT IN AN ORGANIZED HEALTH CARE EDUCATION/TRAINING PROGRAM

## 2025-06-03 PROCEDURE — 72072 X-RAY EXAM THORAC SPINE 3VWS: CPT

## 2025-06-03 PROCEDURE — 3074F SYST BP LT 130 MM HG: CPT | Performed by: INTERNAL MEDICINE

## 2025-06-03 PROCEDURE — 77063 BREAST TOMOSYNTHESIS BI: CPT | Performed by: STUDENT IN AN ORGANIZED HEALTH CARE EDUCATION/TRAINING PROGRAM

## 2025-06-03 PROCEDURE — 77067 SCR MAMMO BI INCL CAD: CPT

## 2025-06-03 PROCEDURE — 71046 X-RAY EXAM CHEST 2 VIEWS: CPT

## 2025-06-03 PROCEDURE — 1036F TOBACCO NON-USER: CPT | Performed by: INTERNAL MEDICINE

## 2025-06-03 PROCEDURE — G2211 COMPLEX E/M VISIT ADD ON: HCPCS | Performed by: INTERNAL MEDICINE

## 2025-06-03 PROCEDURE — 3008F BODY MASS INDEX DOCD: CPT | Performed by: INTERNAL MEDICINE

## 2025-06-03 RX ORDER — ATORVASTATIN CALCIUM 10 MG/1
10 TABLET, FILM COATED ORAL DAILY
Qty: 30 TABLET | Refills: 1 | Status: SHIPPED | OUTPATIENT
Start: 2025-06-03 | End: 2025-06-05 | Stop reason: SDUPTHER

## 2025-06-03 NOTE — ASSESSMENT & PLAN NOTE
She is preop prior to elective surgery.  According to the [facility] Activity Status Index, the patient's functional capacity exceeds 4 METS.  On this basis, the patient is an acceptable cardiac risk for surgery.

## 2025-06-03 NOTE — PROGRESS NOTES
Chief Complaint:   Please see below.     History Of Present Illness:    Holly Chavez is a 59 y.o. female presenting with PAF, PVCs, ventricular tachycardia.     In early January 2025, this 59-year-old diabetic, hyperlipidemic woman with a BMI of 37.7 was admitted with nonsustained ventricular tachycardia, frequent PVCs, and acute diastolic heart failure.  The patient's echocardiogram dated January 9, 2025 revealed an EF of 53%, with a trivial pericardial effusion, mild tricuspid regurgitation, and an RV systolic pressure of 59 mm.  Based on her high-grade ectopy and presentation with heart failure and pericardial effusion, she was transferred to [facility].  There she underwent cardiac MRI on January 13, 2025, disclosing a trivial pericardial effusion, ejection fraction 51%, patchy mid wall fibrosis of the basal mid inferior inferolateral segments, no evidence of infarction, and was equivocal regarding the possibility of myocardial edema and inflammation.  Therefore, a myocardial PET-sarcoid study was done  This disclosed normal perfusion, with an EF of 49%.  On January 16, 2025, the patient underwent VT ablation.  Please see the procedure note for complete details.  Of note, we did not start an SGLT2 inhibitor for HF due to the fact the patient has a history of UTIs and inguinal rashes without being on such medications.  The patient's pharmacologic nuclear stress test was negative for ischemia in April 2022.  She has a history of venous insufficiency with reflux.    Overall she feels well.  Once a week she experiences a bit shortness of breath when she exercises.  The patient denies chest discomfort,  palpitations, orthopnea, PND, syncope, and near syncope.  She has been walking daily.  The patient can do household chores such as vacuuming, cooking, cleaning, and shopping, gardening etc.  She stopped taking Colchicine due to diarrhea in March 2025.    Dr. Hou decreased her Bisoprolol to 2.5 mg daily  "last week.    .     Last Recorded Vitals:  Vitals:    06/03/25 0900   BP: 120/68   BP Location: Left arm   Patient Position: Sitting   Pulse: 80   Weight: 94.8 kg (209 lb)   Height: 1.588 m (5' 2.5\")       Past Medical History:  She has a past medical history of Acute upper respiratory infection, unspecified (11/13/2018), Anxiety, Benign paroxysmal vertigo, unspecified ear (12/04/2017), Cataract, Cutaneous abscess of groin (12/04/2017), Cutaneous abscess of limb, unspecified (09/20/2018), GERD (gastroesophageal reflux disease), Morbid (severe) obesity due to excess calories (Multi) (07/08/2019), Personal history of diseases of the skin and subcutaneous tissue (11/20/2015), Personal history of diseases of the skin and subcutaneous tissue (09/27/2018), Personal history of other endocrine, nutritional and metabolic disease, Personal history of other endocrine, nutritional and metabolic disease (08/11/2015), Personal history of other endocrine, nutritional and metabolic disease (04/16/2019), Personal history of other endocrine, nutritional and metabolic disease (06/06/2019), Pneumonia (09/07/2023), Rash and other nonspecific skin eruption (08/11/2015), Rash and other nonspecific skin eruption (08/11/2015), Type 2 diabetes mellitus with mild nonproliferative diabetic retinopathy without macular edema, unspecified eye (11/09/2015), Type 2 diabetes mellitus with mild nonproliferative diabetic retinopathy without macular edema, unspecified eye (11/09/2015), Type 2 diabetes mellitus with mild nonproliferative diabetic retinopathy without macular edema, unspecified eye (11/10/2015), and Type 2 diabetes mellitus with mild nonproliferative diabetic retinopathy without macular edema, unspecified eye (11/12/2015).    Past Surgical History:  She has a past surgical history that includes Tubal ligation (08/11/2015); Hernia repair (08/11/2015); Cervical biopsy w/ loop electrode excision (04/12/2016); and Cardiac electrophysiology " "procedure (N/A, 1/16/2025).      Social History:  She reports that she quit smoking about 26 years ago. Her smoking use included cigarettes. She has never used smokeless tobacco. She reports that she does not currently use alcohol. She reports that she does not use drugs.    Family History:  Family History[1]     Allergies:  Adhesive tape-silicones and Codeine    Outpatient Medications:  Current Outpatient Medications   Medication Instructions    albuterol 90 mcg/actuation inhaler 2 puffs, inhalation, Every 4 hours PRN    albuterol 2.5 mg, nebulization, Every 6 hours PRN    apixaban (ELIQUIS) 5 mg, oral, 2 times daily    atorvastatin (LIPITOR) 10 mg, oral, Daily    Basaglar KwikPen U-100 Insulin 12 Units, subcutaneous, Every morning, Take as directed per insulin instructions.    bisoprolol fumarate 2.5 mg, oral, Daily    blood-glucose sensor (Dexcom G7 Sensor) device Change every 10 days    blood-glucose sensor (Dexcom G7 Sensor) device 1 each, miscellaneous, See admin instructions, Change sensor every 10 days    cholecalciferol (VITAMIN D-3) 125 mcg, Daily    cyanocobalamin, vitamin B-12, (Vitamin B-12) 1,000 mcg tablet extended release 1 tablet, Daily    famotidine (PEPCID) 20 mg, Daily    insulin glargine-yfgn (SEMGLEE(INSULIN GLARG-YFGN)PEN) 12 Units, subcutaneous, Every 24 hours, Take as directed per insulin instructions.    Mounjaro 7.5 mg, subcutaneous, Weekly    pen needle, diabetic 32 gauge x 5/32\" needle 4 Needles, miscellaneous, Daily, Use with insulin injections four times daily    triamcinolone (Kenalog) 0.1 % cream Topical, 2 times daily, Apply to affected area 1-2 times daily as needed.       Physical Exam:  GENERAL:  pleasant 59  year-old  HEENT: No xanthelasma  NECK: Supple, no palpable adenopathy or thyromegaly  CHEST: Clear to auscultation, respiratory effort unlabored  CARDIAC: RRR, normal S1 and S2, no audible murmur, rub, gallop, carotids are brisk, PMI is not displaced  ABD: Active bowel " sounds, nontender, no organomegaly, no evidence of ascites  EXT: No clubbing, cyanosis, edema, or tenderness  NEURO: Awake, alert, appropriate, speech is fluent       Last Labs:  CBC -  Lab Results   Component Value Date    WBC 6.4 05/07/2025    HGB 11.3 (L) 05/07/2025    HCT 34.1 (L) 05/07/2025    MCV 87.4 05/07/2025     05/07/2025       CMP -  Lab Results   Component Value Date    CALCIUM 8.9 05/07/2025    PHOS 3.5 01/17/2025    PROT 6.7 05/07/2025    ALBUMIN 4.0 05/07/2025    AST 11 05/07/2025    ALT 11 05/07/2025    ALKPHOS 60 05/07/2025    BILITOT 0.6 05/07/2025       LIPID PANEL -   Lab Results   Component Value Date    CHOL 170 05/07/2025    TRIG 114 05/07/2025    HDL 52 05/07/2025    CHHDL 3.3 05/07/2025    LDLF 87 02/09/2023    VLDL 16 02/20/2024    NHDL 118 05/07/2025       RENAL FUNCTION PANEL -   Lab Results   Component Value Date    GLUCOSE 131 (H) 05/07/2025     05/07/2025    K 4.7 05/07/2025     05/07/2025    CO2 30 05/07/2025    ANIONGAP 7 05/07/2025    BUN 12 05/07/2025    CREATININE 0.70 05/07/2025    CALCIUM 8.9 05/07/2025    PHOS 3.5 01/17/2025    ALBUMIN 4.0 05/07/2025        Lab Results   Component Value Date     (H) 01/08/2025    HGBA1C 6.2 05/01/2025         Lab review: I have Chemistry CMP:   Lab Results   Component Value Date    ALBUMIN 4.0 05/07/2025    CALCIUM 8.9 05/07/2025    CO2 30 05/07/2025    CREATININE 0.70 05/07/2025    GLUCOSE 131 (H) 05/07/2025    BILITOT 0.6 05/07/2025    PROT 6.7 05/07/2025    ALT 11 05/07/2025    AST 11 05/07/2025    ALKPHOS 60 05/07/2025   , Chemistry BMP   Lab Results   Component Value Date    GLUCOSE 131 (H) 05/07/2025    CALCIUM 8.9 05/07/2025    CO2 30 05/07/2025    CREATININE 0.70 05/07/2025   , CBC:  Lab Results   Component Value Date    WBC 6.4 05/07/2025    RBC 3.90 05/07/2025    HGB 11.3 (L) 05/07/2025    HCT 34.1 (L) 05/07/2025    MCV 87.4 05/07/2025    MCH 29.0 05/07/2025    MCHC 33.1 05/07/2025    RDW 14.0 05/07/2025     MPV 10.0 05/07/2025    NRBC 0.0 01/28/2025   , and Lipids:   Lab Results   Component Value Date    CHOL 170 05/07/2025    HDL 52 05/07/2025    LDLCALC 97 05/07/2025    TRIG 114 05/07/2025       Assessment/Plan   Assessment & Plan  NSVT (nonsustained ventricular tachycardia) (Multi)    Orders:    Follow Up In Cardiology    Follow Up In Cardiology; Future    Status post ablation of ventricular arrhythmia    Orders:    Follow Up In Cardiology    Paroxysmal atrial fibrillation (Multi)  PAF:stable on anticoagulation, maintaining SR.  Continue present management.  I've reviewed the patient's recent labs.    Orders:    Follow Up In Cardiology    Follow Up In Cardiology; Future    Primary hypertension  HTN:  BP is well controlled.   We discussed sodium restriction, lifestyle modification, and the DASH diet.  I advised the patient to check BPs at home daily, and to contact me with an update in one month.    Orders:    Follow Up In Cardiology    Follow Up In Cardiology; Future    Pericardial effusion ([facility]-HCC)    Orders:    Follow Up In Cardiology    Type 2 diabetes mellitus with other ophthalmic complication, with long-term current use of insulin    Orders:    atorvastatin (Lipitor) 10 mg tablet; Take 1 tablet (10 mg) by mouth once daily.    Body mass index (BMI) 37.0-37.9, adult  Risk factor modification: educational materials were provided to the patient.          Pre-operative cardiovascular examination  She is preop prior to elective surgery.  According to the Duke Activity Status Index, the patient's functional capacity exceeds 4 METS.  On this basis, the patient is an acceptable cardiac risk for surgery.         Dyslipidemia               Faustino Dove MD         [1]   Family History  Problem Relation Name Age of Onset    Stroke Mother      Diabetes Mother      Other (cerebrovascular accident) Mother      Heart failure Father      Diabetes Father      Pancreatic cancer Father      Breast cancer Mother's Sister       Other (cardiac disorder) Other      Diabetes Other      Hypertension Other      Cancer Other      Kidney disease Other      Breast cancer Other aunt     Breast cancer Father's Sister      Glaucoma Neg Hx      Macular degeneration Neg Hx

## 2025-06-03 NOTE — ASSESSMENT & PLAN NOTE
PAF:stable on anticoagulation, maintaining SR.  Continue present management.  I've reviewed the patient's recent labs.    Orders:    Follow Up In Cardiology    Follow Up In Cardiology; Future

## 2025-06-03 NOTE — ASSESSMENT & PLAN NOTE
HTN:  BP is well controlled.   We discussed sodium restriction, lifestyle modification, and the DASH diet.  I advised the patient to check BPs at home daily, and to contact me with an update in one month.    Orders:    Follow Up In Cardiology    Follow Up In Cardiology; Future

## 2025-06-05 DIAGNOSIS — E78.5 DYSLIPIDEMIA: Primary | ICD-10-CM

## 2025-06-05 DIAGNOSIS — Z79.4 TYPE 2 DIABETES MELLITUS WITH OTHER OPHTHALMIC COMPLICATION, WITH LONG-TERM CURRENT USE OF INSULIN: ICD-10-CM

## 2025-06-05 DIAGNOSIS — E11.39 TYPE 2 DIABETES MELLITUS WITH OTHER OPHTHALMIC COMPLICATION, WITH LONG-TERM CURRENT USE OF INSULIN: ICD-10-CM

## 2025-06-05 RX ORDER — ATORVASTATIN CALCIUM 10 MG/1
10 TABLET, FILM COATED ORAL DAILY
Qty: 90 TABLET | Refills: 3 | Status: SHIPPED | OUTPATIENT
Start: 2025-06-05 | End: 2025-08-04

## 2025-06-09 DIAGNOSIS — I47.29 NSVT (NONSUSTAINED VENTRICULAR TACHYCARDIA) (MULTI): ICD-10-CM

## 2025-06-09 RX ORDER — BISOPROLOL FUMARATE 2.5 MG/1
2.5 TABLET, FILM COATED ORAL DAILY
Qty: 90 TABLET | Refills: 3 | Status: SHIPPED | OUTPATIENT
Start: 2025-06-09 | End: 2026-06-09

## 2025-06-16 DIAGNOSIS — E11.69 TYPE 2 DIABETES MELLITUS WITH OTHER SPECIFIED COMPLICATION, WITH LONG-TERM CURRENT USE OF INSULIN: ICD-10-CM

## 2025-06-16 DIAGNOSIS — Z79.4 TYPE 2 DIABETES MELLITUS WITH OTHER SPECIFIED COMPLICATION, WITH LONG-TERM CURRENT USE OF INSULIN: ICD-10-CM

## 2025-06-16 RX ORDER — BLOOD SUGAR DIAGNOSTIC
1 STRIP MISCELLANEOUS 4 TIMES DAILY
Qty: 100 STRIP | Refills: 11 | Status: SHIPPED | OUTPATIENT
Start: 2025-06-16

## 2025-06-23 ENCOUNTER — TELEPHONE (OUTPATIENT)
Dept: PRIMARY CARE | Facility: CLINIC | Age: 60
End: 2025-06-23
Payer: MEDICARE

## 2025-06-23 DIAGNOSIS — Z79.4 TYPE 2 DIABETES MELLITUS WITH OTHER OPHTHALMIC COMPLICATION, WITH LONG-TERM CURRENT USE OF INSULIN: ICD-10-CM

## 2025-06-23 DIAGNOSIS — E11.39 TYPE 2 DIABETES MELLITUS WITH OTHER OPHTHALMIC COMPLICATION, WITH LONG-TERM CURRENT USE OF INSULIN: ICD-10-CM

## 2025-06-23 RX ORDER — TIRZEPATIDE 5 MG/.5ML
5 INJECTION, SOLUTION SUBCUTANEOUS WEEKLY
Qty: 2 ML | Refills: 0 | Status: CANCELLED | OUTPATIENT
Start: 2025-06-23

## 2025-06-23 RX ORDER — TIRZEPATIDE 5 MG/.5ML
5 INJECTION, SOLUTION SUBCUTANEOUS WEEKLY
Qty: 2 ML | Refills: 0 | Status: SHIPPED | OUTPATIENT
Start: 2025-06-23
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
## 2025-07-01 ENCOUNTER — APPOINTMENT (OUTPATIENT)
Dept: OBSTETRICS AND GYNECOLOGY | Facility: CLINIC | Age: 60
End: 2025-07-01
Payer: COMMERCIAL

## 2025-07-08 ENCOUNTER — APPOINTMENT (OUTPATIENT)
Dept: PODIATRY | Facility: CLINIC | Age: 60
End: 2025-07-08
Payer: MEDICARE

## 2025-07-08 DIAGNOSIS — I83.212 VARICOSE VEINS OF RIGHT LOWER EXTREMITY WITH INFLAMMATION, WITH ULCER OF CALF WITH FAT LAYER EXPOSED: ICD-10-CM

## 2025-07-08 DIAGNOSIS — M79.674 PAIN IN TOES OF BOTH FEET: ICD-10-CM

## 2025-07-08 DIAGNOSIS — L97.212 VARICOSE VEINS OF RIGHT LOWER EXTREMITY WITH INFLAMMATION, WITH ULCER OF CALF WITH FAT LAYER EXPOSED: ICD-10-CM

## 2025-07-08 DIAGNOSIS — B35.1 FUNGAL NAIL INFECTION: ICD-10-CM

## 2025-07-08 DIAGNOSIS — I89.0 LYMPHEDEMA: ICD-10-CM

## 2025-07-08 DIAGNOSIS — I87.8 CHRONIC VENOUS STASIS: ICD-10-CM

## 2025-07-08 DIAGNOSIS — E11.9 TYPE 2 DIABETES MELLITUS WITHOUT COMPLICATION, WITHOUT LONG-TERM CURRENT USE OF INSULIN: Primary | ICD-10-CM

## 2025-07-08 DIAGNOSIS — M79.675 PAIN IN TOES OF BOTH FEET: ICD-10-CM

## 2025-07-08 NOTE — PROGRESS NOTES
History Of Present Illness  Holly Chavez is a 59 y.o. female presenting with for diabetic foot care. Patient complains painful elongated nails. Patient has wound to right chin. A1C 6.2  Holly has not been wearing her compression hose.  She is still doing her lymphedema pumps.  She noticed an open wound on her anterior right leg over the weekend.  She has been leaving it open to air.  PCP Johanna Salas DO  Last visit 5/1/25     Past Medical History  She has a past medical history of Acute upper respiratory infection, unspecified (11/13/2018), Anxiety, Benign paroxysmal vertigo, unspecified ear (12/04/2017), Cataract, Cutaneous abscess of groin (12/04/2017), Cutaneous abscess of limb, unspecified (09/20/2018), GERD (gastroesophageal reflux disease), Morbid (severe) obesity due to excess calories (Multi) (07/08/2019), Personal history of diseases of the skin and subcutaneous tissue (11/20/2015), Personal history of diseases of the skin and subcutaneous tissue (09/27/2018), Personal history of other endocrine, nutritional and metabolic disease, Personal history of other endocrine, nutritional and metabolic disease (08/11/2015), Personal history of other endocrine, nutritional and metabolic disease (04/16/2019), Personal history of other endocrine, nutritional and metabolic disease (06/06/2019), Pneumonia (09/07/2023), Rash and other nonspecific skin eruption (08/11/2015), Rash and other nonspecific skin eruption (08/11/2015), Type 2 diabetes mellitus with mild nonproliferative diabetic retinopathy without macular edema, unspecified eye (11/09/2015), Type 2 diabetes mellitus with mild nonproliferative diabetic retinopathy without macular edema, unspecified eye (11/09/2015), Type 2 diabetes mellitus with mild nonproliferative diabetic retinopathy without macular edema, unspecified eye (11/10/2015), and Type 2 diabetes mellitus with mild nonproliferative diabetic retinopathy without macular edema, unspecified eye  (11/12/2015).    Surgical History  She has a past surgical history that includes Tubal ligation (08/11/2015); Hernia repair (08/11/2015); Cervical biopsy w/ loop electrode excision (04/12/2016); and Cardiac electrophysiology procedure (N/A, 1/16/2025).     Social History  She reports that she quit smoking about 26 years ago. Her smoking use included cigarettes. She has never used smokeless tobacco. She reports that she does not currently use alcohol. She reports that she does not use drugs.    Family History  Family History   Problem Relation Name Age of Onset    Stroke Mother      Diabetes Mother      Other (cerebrovascular accident) Mother      Heart failure Father      Diabetes Father      Pancreatic cancer Father      Breast cancer Mother's Sister      Other (cardiac disorder) Other      Diabetes Other      Hypertension Other      Cancer Other      Kidney disease Other      Breast cancer Other aunt     Breast cancer Father's Sister      Glaucoma Neg Hx      Macular degeneration Neg Hx          Allergies  Colchicine, Adhesive tape-silicones, and Codeine    Medications  Current Outpatient Medications   Medication Sig Dispense Refill    albuterol 2.5 mg /3 mL (0.083 %) nebulizer solution Take 3 mL (2.5 mg) by nebulization every 6 hours if needed for wheezing. 75 mL 11    albuterol 90 mcg/actuation inhaler Inhale 2 puffs every 4 hours if needed for wheezing or shortness of breath. 18 g 3    apixaban (Eliquis) 5 mg tablet Take 1 tablet (5 mg) by mouth 2 times a day. 180 tablet 3    atorvastatin (Lipitor) 10 mg tablet Take 1 tablet (10 mg) by mouth once daily. 90 tablet 3    bisoprolol fumarate 2.5 mg tablet Take 2.5 mg by mouth once daily. 90 tablet 3    blood-glucose sensor (Dexcom G7 Sensor) device Change every 10 days 10 each 3    blood-glucose sensor (Dexcom G7 Sensor) device 1 each see administration instructions. Change sensor every 10 days 9 each 3    cholecalciferol (Vitamin D-3) 125 MCG (5000 UT) capsule  "Take 1 capsule (125 mcg) by mouth once daily.      cyanocobalamin, vitamin B-12, (Vitamin B-12) 1,000 mcg tablet extended release Take 1 tablet (1,000 mcg) by mouth once daily.      famotidine (Pepcid) 20 mg tablet Take 1 tablet (20 mg) by mouth once daily.      insulin glargine (Basaglar KwikPen U-100 Insulin) 100 unit/mL (3 mL) pen Inject 12 Units under the skin once daily in the morning. Take as directed per insulin instructions. 15 mL 3    insulin glargine-yfgn (Semglee,insulin glarg-yfgn,Pen) 100 unit/mL (3 mL) pen Inject 12 Units under the skin once every 24 hours. Take as directed per insulin instructions. 15 mL 2    OneTouch Ultra Test 1 each 4 times a day. 100 strip 11    pen needle, diabetic 32 gauge x 5/32\" needle 4 Needles once daily. Use with insulin injections four times daily 400 each 3    tirzepatide (Mounjaro) 5 mg/0.5 mL pen injector Inject 5 mg under the skin 1 (one) time per week. 2 mL 0    tirzepatide (Mounjaro) 7.5 mg/0.5 mL pen injector Inject 7.5 mg under the skin 1 (one) time per week. 2 mL 1    triamcinolone (Kenalog) 0.1 % cream Apply topically 2 times a day. Apply to affected area 1-2 times daily as needed. 28.4 g 3     No current facility-administered medications for this visit.       Review of Systems    REVIEW OF SYSTEMS  GENERAL:  Negative for malaise, significant weight loss, fever  CARDIOVASCULAR: leg swelling   MUSCULOSKELETAL:  Negative for joint pain or swelling, back pain, and muscle pain.  SKIN:  Negative for lesions, rash, and itching  PSYCH:  Negative for sleep disturbance, mood disorder and recent psychosocial stressors  NEURO: Negative, denies any burning, tingling or numbness     Objective:   Vasc: DP and PT pulses are palpable bilateral.  CFT is less than 3 seconds bilateral.  Skin temperature is warm to cool proximal to distal bilateral.  One plus Pitting pedal edema. No digital hair      Neuro:  Light touch is intact to the foot bilateral.  There is no clonus noted.  " The hallux is downgoing bilateral.      Derm: Nails 1-5 bilateral are thickened, elongated and crumbly with subungual debris. Skin is supple with normal texture and turgor noted.  Webspaces are clean, dry and intact bilateral.  On anterior right leg patient has open wound that measures 1.5 x 2 cm.  There is no depth.  Wound appears somewhat dry.  It is beefy red.  No drainage is present.    Ortho: Muscle strength is 5/5 for all pedal groups tested.  Ankle joint, subtalar joint, 1st MPJ and lesser MPJ ROM is full and without pain or crepitus.  The foot type is rectus bilateral off weight bearing.  There are no structural deformities noted.    Assessment/Plan   Painful nail mycosis  DM  Patient has a stasis wound on her anterior leg most likely from blister formation.  Patient is traveling this weekend so I will not apply an Unna boot.  Advised patient to keep wound covered with silver gel and a Band-Aid.  She is also to return to her compression hose.  She is to follow-up with me upon return from her vacation.  If she is still not healed, we will do a compression wrap.        Toenails are debrided in length and thickness to avoid infection and for pain relief

## 2025-07-22 ENCOUNTER — APPOINTMENT (OUTPATIENT)
Dept: PODIATRY | Facility: CLINIC | Age: 60
End: 2025-07-22
Payer: MEDICARE

## 2025-07-22 DIAGNOSIS — Z79.4 TYPE 2 DIABETES MELLITUS WITH OTHER OPHTHALMIC COMPLICATION, WITH LONG-TERM CURRENT USE OF INSULIN: ICD-10-CM

## 2025-07-22 DIAGNOSIS — I48.0 PAROXYSMAL ATRIAL FIBRILLATION (MULTI): ICD-10-CM

## 2025-07-22 DIAGNOSIS — E11.39 TYPE 2 DIABETES MELLITUS WITH OTHER OPHTHALMIC COMPLICATION, WITH LONG-TERM CURRENT USE OF INSULIN: ICD-10-CM

## 2025-07-22 RX ORDER — TIRZEPATIDE 7.5 MG/.5ML
7.5 INJECTION, SOLUTION SUBCUTANEOUS WEEKLY
Qty: 2 ML | Refills: 3 | Status: SHIPPED | OUTPATIENT
Start: 2025-07-22

## 2025-07-28 ENCOUNTER — APPOINTMENT (OUTPATIENT)
Dept: OPHTHALMOLOGY | Facility: CLINIC | Age: 60
End: 2025-07-28
Payer: MEDICARE

## 2025-07-28 DIAGNOSIS — H25.811 COMBINED FORMS OF AGE-RELATED CATARACT OF RIGHT EYE: ICD-10-CM

## 2025-07-28 DIAGNOSIS — E11.3313 MODERATE NONPROLIFERATIVE DIABETIC RETINOPATHY OF BOTH EYES WITH MACULAR EDEMA ASSOCIATED WITH TYPE 2 DIABETES MELLITUS: ICD-10-CM

## 2025-07-28 DIAGNOSIS — H25.812 COMBINED FORMS OF AGE-RELATED CATARACT OF LEFT EYE: Primary | ICD-10-CM

## 2025-07-28 PROCEDURE — 92134 CPTRZ OPH DX IMG PST SGM RTA: CPT | Mod: BILATERAL PROCEDURE | Performed by: OPHTHALMOLOGY

## 2025-07-28 PROCEDURE — 99214 OFFICE O/P EST MOD 30 MIN: CPT | Performed by: OPHTHALMOLOGY

## 2025-07-28 ASSESSMENT — CUP TO DISC RATIO
OS_RATIO: .3
OD_RATIO: .3

## 2025-07-28 ASSESSMENT — ENCOUNTER SYMPTOMS
ENDOCRINE NEGATIVE: 0
GASTROINTESTINAL NEGATIVE: 0
ALLERGIC/IMMUNOLOGIC NEGATIVE: 0
PSYCHIATRIC NEGATIVE: 0
CONSTITUTIONAL NEGATIVE: 0
NEUROLOGICAL NEGATIVE: 0
CARDIOVASCULAR NEGATIVE: 0
MUSCULOSKELETAL NEGATIVE: 0
RESPIRATORY NEGATIVE: 0
EYES NEGATIVE: 1
HEMATOLOGIC/LYMPHATIC NEGATIVE: 0

## 2025-07-28 ASSESSMENT — VISUAL ACUITY
OS_CC: 20/50
METHOD: SNELLEN - LINEAR
CORRECTION_TYPE: GLASSES
OD_CC: 20/60

## 2025-07-28 ASSESSMENT — EXTERNAL EXAM - LEFT EYE: OS_EXAM: NORMAL

## 2025-07-28 ASSESSMENT — TONOMETRY
IOP_METHOD: GOLDMANN APPLANATION
OS_IOP_MMHG: 19
OD_IOP_MMHG: 17

## 2025-07-28 ASSESSMENT — SLIT LAMP EXAM - LIDS: COMMENTS: GOOD POSITION

## 2025-07-28 ASSESSMENT — EXTERNAL EXAM - RIGHT EYE: OD_EXAM: NORMAL

## 2025-07-28 NOTE — PROGRESS NOTES
Assessment/Plan   Diagnoses and all orders for this visit:  Combined forms of age-related cataract of left eye  Combined forms of age-related cataract of right eye   Scheduled for left eye on 8/20, will schedule for right eye     Moderate nonproliferative diabetic retinopathy of both eyes with macular edema associated with type 2 diabetes mellitus  -     OCT, Retina - OU - Both Eyes  Increased macular edema OU today   Managed by Dr. Easton  Needs intravitreal anti-VEGF one week prior to surgery per Dr. Easton, scheduled for 8/13     Hyperopia of both eyes  Short axial length (AL) and shallow AC  Discussed with pt extra challenges associated with CE in a short eye  Will do mannitol preop

## 2025-08-05 ENCOUNTER — APPOINTMENT (OUTPATIENT)
Dept: PRIMARY CARE | Facility: CLINIC | Age: 60
End: 2025-08-05
Payer: MEDICARE

## 2025-08-05 VITALS
HEART RATE: 78 BPM | HEIGHT: 62 IN | OXYGEN SATURATION: 96 % | BODY MASS INDEX: 39.2 KG/M2 | DIASTOLIC BLOOD PRESSURE: 78 MMHG | SYSTOLIC BLOOD PRESSURE: 124 MMHG | TEMPERATURE: 97.9 F | WEIGHT: 213 LBS | RESPIRATION RATE: 14 BRPM

## 2025-08-05 DIAGNOSIS — Z79.4 TYPE 2 DIABETES MELLITUS WITH OTHER OPHTHALMIC COMPLICATION, WITH LONG-TERM CURRENT USE OF INSULIN: ICD-10-CM

## 2025-08-05 DIAGNOSIS — Z12.11 COLON CANCER SCREENING: Primary | ICD-10-CM

## 2025-08-05 DIAGNOSIS — M25.551 RIGHT HIP PAIN: ICD-10-CM

## 2025-08-05 DIAGNOSIS — R21 RASH: ICD-10-CM

## 2025-08-05 DIAGNOSIS — Z12.83 SKIN CANCER SCREENING: ICD-10-CM

## 2025-08-05 DIAGNOSIS — E11.39 TYPE 2 DIABETES MELLITUS WITH OTHER OPHTHALMIC COMPLICATION, WITH LONG-TERM CURRENT USE OF INSULIN: ICD-10-CM

## 2025-08-05 PROCEDURE — 99214 OFFICE O/P EST MOD 30 MIN: CPT | Performed by: INTERNAL MEDICINE

## 2025-08-05 PROCEDURE — 3008F BODY MASS INDEX DOCD: CPT | Performed by: INTERNAL MEDICINE

## 2025-08-05 PROCEDURE — 3078F DIAST BP <80 MM HG: CPT | Performed by: INTERNAL MEDICINE

## 2025-08-05 PROCEDURE — 3074F SYST BP LT 130 MM HG: CPT | Performed by: INTERNAL MEDICINE

## 2025-08-05 PROCEDURE — 3044F HG A1C LEVEL LT 7.0%: CPT | Performed by: INTERNAL MEDICINE

## 2025-08-05 RX ORDER — TRIAMCINOLONE ACETONIDE 1 MG/G
CREAM TOPICAL 2 TIMES DAILY
Qty: 28.4 G | Refills: 3 | Status: SHIPPED | OUTPATIENT
Start: 2025-08-05

## 2025-08-05 NOTE — PROGRESS NOTES
"Subjective    Holly Chavez is a 59 y.o. female who presents for Diabetes and Leg Pain.     HPI      3 month fu DM  Uses CGM Dexcom   Average blood glucose 190 since decreasing Mounjaro   Her mammogram is good.   No colonoscopy since 2013      Scheduled for cataract surgery on 8/20.   Needs to hold Mounjaro.     Reports right leg pain. Upper. Feels heavy. Approx 2 months groin pain when she crosses her legs    New mole x 2 months , left side of face/jaw line.     Reports bloating she has alternating constipation with diarrhea.   She takes imodium when she gets diarrhea. This has always been how her bowels have been. She does think the constipation is a little worse with Mounjaro. She has not had her colonoscopy.   She has a hard time eating right due to her teeth. She eats too much      Review of Systems   All other systems reviewed and are negative.        Objective     /78 (BP Location: Left arm, Patient Position: Sitting, BP Cuff Size: Adult)   Pulse 78   Temp 36.6 °C (97.9 °F) (Skin)   Resp 14   Ht 1.575 m (5' 2\")   Wt 96.6 kg (213 lb)   LMP  (LMP Unknown)   SpO2 96%   BMI 38.96 kg/m²    Physical Exam  Vitals reviewed.   Constitutional:       General: She is not in acute distress.     Appearance: Normal appearance.     Cardiovascular:      Rate and Rhythm: Normal rate and regular rhythm.      Pulses: Normal pulses.      Heart sounds: Normal heart sounds.   Pulmonary:      Effort: Pulmonary effort is normal.      Breath sounds: Normal breath sounds.   Abdominal:      Tenderness: There is no abdominal tenderness.     Musculoskeletal:         General: No swelling.      Comments: There  is tenderness in her right groin with external rotation.        Skin:     General: Skin is warm and dry.      Comments: Warty leison left side of face     Neurological:      Mental Status: She is alert.       Health Maintenance Due   Topic Date Due    HIV Screening  Never done    MMR Vaccines (1 of 1 - Standard series) " Never done    Hepatitis C Screening  Never done    DTaP/Tdap/Td Vaccines (1 - Tdap) Never done    Zoster Vaccines (1 of 2) Never done    Pneumococcal Vaccine (2 of 2 - PPSV23, PCV20, or PCV21) 11/22/2018    Colorectal Cancer Screening  09/23/2023    RSV High Risk: (Elderly (60+) or Pregnant Population) (1 - Risk 60-74 years 1-dose series) Never done    Cervical Cancer Screening  05/20/2025    Diabetes: Hemoglobin A1C  08/01/2025    Influenza Vaccine (1) 09/01/2025          Assessment/Plan   Problem List Items Addressed This Visit       Type 2 diabetes mellitus    Relevant Orders    Referral to Nutrition Services     Other Visit Diagnoses         Colon cancer screening    -  Primary    Relevant Orders    Colonoscopy Screening; Average Risk Patient      Rash        Relevant Medications    triamcinolone (Kenalog) 0.1 % cream      Skin cancer screening        Relevant Orders    Referral to Dermatology      Right hip pain        Relevant Orders    XR hip right with pelvis when performed 2 or 3 views        Colonoscopy reordered.  Check xray.   She has trouble eating healthy due to poor dentition.   Refer to dietary .  Refer to derm.   follow up in 3 months. Recommend yearly eye exams and self foot exams. call if any problems or questions.   Recommend taking miralax or Citrucel OTC for likely IBS  Increase her Mounjaro when she is done with this dose.

## 2025-08-13 ENCOUNTER — APPOINTMENT (OUTPATIENT)
Dept: OPHTHALMOLOGY | Facility: CLINIC | Age: 60
End: 2025-08-13
Payer: MEDICARE

## 2025-08-13 DIAGNOSIS — E11.3411 SEVERE NONPROLIFERATIVE DIABETIC RETINOPATHY OF RIGHT EYE, WITH MACULAR EDEMA, ASSOCIATED WITH TYPE 2 DIABETES MELLITUS: Primary | ICD-10-CM

## 2025-08-13 DIAGNOSIS — E11.3313 MODERATE NONPROLIFERATIVE DIABETIC RETINOPATHY OF BOTH EYES WITH MACULAR EDEMA ASSOCIATED WITH TYPE 2 DIABETES MELLITUS: ICD-10-CM

## 2025-08-13 PROCEDURE — 92134 CPTRZ OPH DX IMG PST SGM RTA: CPT | Mod: BILATERAL PROCEDURE | Performed by: OPHTHALMOLOGY

## 2025-08-13 PROCEDURE — 67028 INJECTION EYE DRUG: CPT | Mod: BILATERAL PROCEDURE | Performed by: OPHTHALMOLOGY

## 2025-08-13 ASSESSMENT — TONOMETRY
OS_IOP_MMHG: 18
IOP_METHOD: GOLDMANN APPLANATION
OD_IOP_MMHG: 17

## 2025-08-13 ASSESSMENT — SLIT LAMP EXAM - LIDS: COMMENTS: GOOD POSITION

## 2025-08-13 ASSESSMENT — ENCOUNTER SYMPTOMS
MUSCULOSKELETAL NEGATIVE: 0
RESPIRATORY NEGATIVE: 0
NEUROLOGICAL NEGATIVE: 0
HEMATOLOGIC/LYMPHATIC NEGATIVE: 0
GASTROINTESTINAL NEGATIVE: 0
EYES NEGATIVE: 1
CARDIOVASCULAR NEGATIVE: 0
ENDOCRINE NEGATIVE: 0
ALLERGIC/IMMUNOLOGIC NEGATIVE: 0
CONSTITUTIONAL NEGATIVE: 0
PSYCHIATRIC NEGATIVE: 0

## 2025-08-13 ASSESSMENT — VISUAL ACUITY
OS_CC: 20/50
METHOD: SNELLEN - LINEAR
CORRECTION_TYPE: GLASSES
OD_CC: 20/80

## 2025-08-13 ASSESSMENT — CUP TO DISC RATIO
OD_RATIO: .3
OS_RATIO: .3

## 2025-08-13 ASSESSMENT — EXTERNAL EXAM - RIGHT EYE: OD_EXAM: NORMAL

## 2025-08-13 ASSESSMENT — EXTERNAL EXAM - LEFT EYE: OS_EXAM: NORMAL

## 2025-08-19 ENCOUNTER — APPOINTMENT (OUTPATIENT)
Dept: OBSTETRICS AND GYNECOLOGY | Facility: CLINIC | Age: 60
End: 2025-08-19
Payer: MEDICARE

## 2025-08-20 ENCOUNTER — HOSPITAL ENCOUNTER (OUTPATIENT)
Facility: CLINIC | Age: 60
Setting detail: OUTPATIENT SURGERY
Discharge: HOME | End: 2025-08-20
Attending: OPHTHALMOLOGY | Admitting: OPHTHALMOLOGY
Payer: MEDICARE

## 2025-08-20 ENCOUNTER — ANESTHESIA (OUTPATIENT)
Dept: OPERATING ROOM | Facility: CLINIC | Age: 60
End: 2025-08-20
Payer: MEDICARE

## 2025-08-20 ENCOUNTER — ANESTHESIA EVENT (OUTPATIENT)
Dept: OPERATING ROOM | Facility: CLINIC | Age: 60
End: 2025-08-20
Payer: MEDICARE

## 2025-08-20 VITALS
HEART RATE: 73 BPM | RESPIRATION RATE: 18 BRPM | WEIGHT: 213.63 LBS | HEIGHT: 62 IN | BODY MASS INDEX: 39.31 KG/M2 | SYSTOLIC BLOOD PRESSURE: 116 MMHG | TEMPERATURE: 97 F | OXYGEN SATURATION: 97 % | DIASTOLIC BLOOD PRESSURE: 58 MMHG

## 2025-08-20 DIAGNOSIS — H25.812 COMBINED FORMS OF AGE-RELATED CATARACT OF LEFT EYE: Primary | ICD-10-CM

## 2025-08-20 PROCEDURE — 2500000005 HC RX 250 GENERAL PHARMACY W/O HCPCS: Performed by: OPHTHALMOLOGY

## 2025-08-20 PROCEDURE — A66982 PR REMV CATARACT EXTRACAP,INSERT LENS,COMP: Performed by: ANESTHESIOLOGY

## 2025-08-20 PROCEDURE — V2632 POST CHMBR INTRAOCULAR LENS: HCPCS | Performed by: OPHTHALMOLOGY

## 2025-08-20 PROCEDURE — 2500000004 HC RX 250 GENERAL PHARMACY W/ HCPCS (ALT 636 FOR OP/ED): Performed by: OPHTHALMOLOGY

## 2025-08-20 PROCEDURE — 2500000004 HC RX 250 GENERAL PHARMACY W/ HCPCS (ALT 636 FOR OP/ED): Performed by: NURSE ANESTHETIST, CERTIFIED REGISTERED

## 2025-08-20 PROCEDURE — 2500000005 HC RX 250 GENERAL PHARMACY W/O HCPCS

## 2025-08-20 PROCEDURE — 3600000008 HC OR TIME - EACH INCREMENTAL 1 MINUTE - PROCEDURE LEVEL THREE: Performed by: OPHTHALMOLOGY

## 2025-08-20 PROCEDURE — 3600000003 HC OR TIME - INITIAL BASE CHARGE - PROCEDURE LEVEL THREE: Performed by: OPHTHALMOLOGY

## 2025-08-20 PROCEDURE — A66982 PR REMV CATARACT EXTRACAP,INSERT LENS,COMP: Performed by: NURSE ANESTHETIST, CERTIFIED REGISTERED

## 2025-08-20 PROCEDURE — 3700000001 HC GENERAL ANESTHESIA TIME - INITIAL BASE CHARGE: Performed by: OPHTHALMOLOGY

## 2025-08-20 PROCEDURE — 7100000010 HC PHASE TWO TIME - EACH INCREMENTAL 1 MINUTE: Performed by: OPHTHALMOLOGY

## 2025-08-20 PROCEDURE — 2720000007 HC OR 272 NO HCPCS: Performed by: OPHTHALMOLOGY

## 2025-08-20 PROCEDURE — 66984 XCAPSL CTRC RMVL W/O ECP: CPT | Performed by: OPHTHALMOLOGY

## 2025-08-20 PROCEDURE — 3700000002 HC GENERAL ANESTHESIA TIME - EACH INCREMENTAL 1 MINUTE: Performed by: OPHTHALMOLOGY

## 2025-08-20 PROCEDURE — 7100000009 HC PHASE TWO TIME - INITIAL BASE CHARGE: Performed by: OPHTHALMOLOGY

## 2025-08-20 PROCEDURE — 2760000001 HC OR 276 NO HCPCS: Performed by: OPHTHALMOLOGY

## 2025-08-20 RX ORDER — FENTANYL CITRATE 50 UG/ML
INJECTION, SOLUTION INTRAMUSCULAR; INTRAVENOUS AS NEEDED
Status: DISCONTINUED | OUTPATIENT
Start: 2025-08-20 | End: 2025-08-20

## 2025-08-20 RX ORDER — PHENYLEPHRINE HYDROCHLORIDE 25 MG/ML
1 SOLUTION/ DROPS OPHTHALMIC
Status: COMPLETED | OUTPATIENT
Start: 2025-08-20 | End: 2025-08-20

## 2025-08-20 RX ORDER — MOXIFLOXACIN 5 MG/ML
1 SOLUTION/ DROPS OPHTHALMIC
Status: COMPLETED | OUTPATIENT
Start: 2025-08-20 | End: 2025-08-20

## 2025-08-20 RX ORDER — MIDAZOLAM HYDROCHLORIDE 1 MG/ML
INJECTION, SOLUTION INTRAMUSCULAR; INTRAVENOUS AS NEEDED
Status: DISCONTINUED | OUTPATIENT
Start: 2025-08-20 | End: 2025-08-20

## 2025-08-20 RX ORDER — TROPICAMIDE 10 MG/ML
1 SOLUTION/ DROPS OPHTHALMIC
Status: COMPLETED | OUTPATIENT
Start: 2025-08-20 | End: 2025-08-20

## 2025-08-20 RX ORDER — PREDNISOLONE ACETATE 10 MG/ML
1 SUSPENSION/ DROPS OPHTHALMIC 4 TIMES DAILY
Qty: 5 ML | Refills: 1 | Status: SHIPPED | OUTPATIENT
Start: 2025-08-20 | End: 2025-09-19

## 2025-08-20 RX ORDER — LIDOCAINE HYDROCHLORIDE 10 MG/ML
INJECTION, SOLUTION INFILTRATION; PERINEURAL AS NEEDED
Status: DISCONTINUED | OUTPATIENT
Start: 2025-08-20 | End: 2025-08-20 | Stop reason: HOSPADM

## 2025-08-20 RX ORDER — CYCLOPENTOLATE HYDROCHLORIDE 10 MG/ML
1 SOLUTION/ DROPS OPHTHALMIC
Status: COMPLETED | OUTPATIENT
Start: 2025-08-20 | End: 2025-08-20

## 2025-08-20 RX ORDER — TETRACAINE HYDROCHLORIDE 5 MG/ML
1 SOLUTION OPHTHALMIC ONCE
Status: COMPLETED | OUTPATIENT
Start: 2025-08-20 | End: 2025-08-20

## 2025-08-20 RX ORDER — KETOROLAC TROMETHAMINE 5 MG/ML
1 SOLUTION OPHTHALMIC 4 TIMES DAILY
Qty: 5 ML | Refills: 0 | Status: SHIPPED | OUTPATIENT
Start: 2025-08-20 | End: 2025-09-19

## 2025-08-20 RX ORDER — MOXIFLOXACIN 5 MG/ML
1 SOLUTION/ DROPS OPHTHALMIC 4 TIMES DAILY
Qty: 5 ML | Refills: 0 | Status: SHIPPED | OUTPATIENT
Start: 2025-08-20 | End: 2025-08-27

## 2025-08-20 RX ADMIN — MOXIFLOXACIN HYDROCHLORIDE 1 DROP: 5 SOLUTION/ DROPS OPHTHALMIC at 07:34

## 2025-08-20 RX ADMIN — TETRACAINE HYDROCHLORIDE 1 DROP: 5 SOLUTION/ DROPS OPHTHALMIC at 07:24

## 2025-08-20 RX ADMIN — MIDAZOLAM 2 MG: 1 INJECTION INTRAMUSCULAR; INTRAVENOUS at 08:12

## 2025-08-20 RX ADMIN — PHENYLEPHRINE HYDROCHLORIDE 1 DROP: 25 SOLUTION/ DROPS OPHTHALMIC at 07:24

## 2025-08-20 RX ADMIN — FENTANYL CITRATE 50 MCG: 50 INJECTION, SOLUTION INTRAMUSCULAR; INTRAVENOUS at 08:12

## 2025-08-20 RX ADMIN — TROPICAMIDE 1 DROP: 10 SOLUTION/ DROPS OPHTHALMIC at 07:24

## 2025-08-20 RX ADMIN — PHENYLEPHRINE HYDROCHLORIDE 1 DROP: 25 SOLUTION/ DROPS OPHTHALMIC at 07:29

## 2025-08-20 RX ADMIN — TROPICAMIDE 1 DROP: 10 SOLUTION/ DROPS OPHTHALMIC at 07:29

## 2025-08-20 RX ADMIN — CYCLOPENTOLATE HYDROCHLORIDE 1 DROP: 10 SOLUTION/ DROPS OPHTHALMIC at 07:34

## 2025-08-20 RX ADMIN — CYCLOPENTOLATE HYDROCHLORIDE 1 DROP: 10 SOLUTION/ DROPS OPHTHALMIC at 07:24

## 2025-08-20 RX ADMIN — PHENYLEPHRINE HYDROCHLORIDE 1 DROP: 25 SOLUTION/ DROPS OPHTHALMIC at 07:34

## 2025-08-20 RX ADMIN — TROPICAMIDE 1 DROP: 10 SOLUTION/ DROPS OPHTHALMIC at 07:34

## 2025-08-20 RX ADMIN — CYCLOPENTOLATE HYDROCHLORIDE 1 DROP: 10 SOLUTION/ DROPS OPHTHALMIC at 07:29

## 2025-08-20 RX ADMIN — MOXIFLOXACIN HYDROCHLORIDE 1 DROP: 5 SOLUTION/ DROPS OPHTHALMIC at 07:29

## 2025-08-20 RX ADMIN — MOXIFLOXACIN HYDROCHLORIDE 1 DROP: 5 SOLUTION/ DROPS OPHTHALMIC at 07:24

## 2025-08-20 SDOH — HEALTH STABILITY: MENTAL HEALTH: CURRENT SMOKER: 0

## 2025-08-20 ASSESSMENT — PAIN SCALES - GENERAL
PAINLEVEL_OUTOF10: 0 - NO PAIN
PAIN_LEVEL: 4
PAINLEVEL_OUTOF10: 0 - NO PAIN

## 2025-08-20 ASSESSMENT — PAIN - FUNCTIONAL ASSESSMENT
PAIN_FUNCTIONAL_ASSESSMENT: 0-10

## 2025-08-21 ENCOUNTER — APPOINTMENT (OUTPATIENT)
Dept: OPHTHALMOLOGY | Facility: CLINIC | Age: 60
End: 2025-08-21
Payer: MEDICARE

## 2025-08-21 DIAGNOSIS — H25.812 COMBINED FORMS OF AGE-RELATED CATARACT OF LEFT EYE: Primary | ICD-10-CM

## 2025-08-21 PROCEDURE — 99024 POSTOP FOLLOW-UP VISIT: CPT | Performed by: OPHTHALMOLOGY

## 2025-08-21 ASSESSMENT — ENCOUNTER SYMPTOMS
EYES NEGATIVE: 1
NEUROLOGICAL NEGATIVE: 0
GASTROINTESTINAL NEGATIVE: 0
CARDIOVASCULAR NEGATIVE: 0
HEMATOLOGIC/LYMPHATIC NEGATIVE: 0
RESPIRATORY NEGATIVE: 0
CONSTITUTIONAL NEGATIVE: 0
ALLERGIC/IMMUNOLOGIC NEGATIVE: 0
PSYCHIATRIC NEGATIVE: 0
ENDOCRINE NEGATIVE: 0
MUSCULOSKELETAL NEGATIVE: 0

## 2025-08-21 ASSESSMENT — TONOMETRY
IOP_METHOD: GOLDMANN APPLANATION
OS_IOP_MMHG: 16

## 2025-08-21 ASSESSMENT — VISUAL ACUITY
OS_CC+: -2
OS_CC: 20/80
METHOD: SNELLEN - LINEAR

## 2025-08-21 ASSESSMENT — PAIN SCALES - GENERAL: PAINLEVEL_OUTOF10: 0 - NO PAIN

## 2025-08-21 ASSESSMENT — EXTERNAL EXAM - RIGHT EYE: OD_EXAM: NORMAL

## 2025-08-21 ASSESSMENT — SLIT LAMP EXAM - LIDS: COMMENTS: GOOD POSITION

## 2025-08-21 ASSESSMENT — EXTERNAL EXAM - LEFT EYE: OS_EXAM: NORMAL

## 2025-08-22 DIAGNOSIS — Z12.11 COLON CANCER SCREENING: ICD-10-CM

## 2025-08-22 RX ORDER — POLYETHYLENE GLYCOL 3350, SODIUM CHLORIDE, SODIUM BICARBONATE, POTASSIUM CHLORIDE 420; 11.2; 5.72; 1.48 G/4L; G/4L; G/4L; G/4L
4000 POWDER, FOR SOLUTION ORAL ONCE
Qty: 4000 ML | Refills: 0 | Status: CANCELLED | OUTPATIENT
Start: 2025-08-22 | End: 2025-08-22

## 2025-08-25 RX ORDER — SODIUM, POTASSIUM,MAG SULFATES 17.5-3.13G
SOLUTION, RECONSTITUTED, ORAL ORAL
Qty: 1 EACH | Refills: 0 | Status: SHIPPED | OUTPATIENT
Start: 2025-08-25

## 2025-08-28 ENCOUNTER — APPOINTMENT (OUTPATIENT)
Dept: PRIMARY CARE | Facility: CLINIC | Age: 60
End: 2025-08-28
Payer: MEDICARE

## 2025-08-28 VITALS — HEIGHT: 62 IN | BODY MASS INDEX: 39.07 KG/M2

## 2025-08-28 DIAGNOSIS — E11.39 TYPE 2 DIABETES MELLITUS WITH OTHER OPHTHALMIC COMPLICATION, WITH LONG-TERM CURRENT USE OF INSULIN: Primary | ICD-10-CM

## 2025-08-28 DIAGNOSIS — Z79.4 TYPE 2 DIABETES MELLITUS WITH OTHER OPHTHALMIC COMPLICATION, WITH LONG-TERM CURRENT USE OF INSULIN: Primary | ICD-10-CM

## 2025-08-28 PROCEDURE — 97802 MEDICAL NUTRITION INDIV IN: CPT | Performed by: DIETITIAN, REGISTERED

## 2025-09-02 ENCOUNTER — TELEPHONE (OUTPATIENT)
Dept: GASTROENTEROLOGY | Facility: CLINIC | Age: 60
End: 2025-09-02
Payer: MEDICARE

## 2025-09-04 ENCOUNTER — APPOINTMENT (OUTPATIENT)
Dept: OPHTHALMOLOGY | Facility: CLINIC | Age: 60
End: 2025-09-04
Payer: MEDICARE

## 2025-09-04 ASSESSMENT — TONOMETRY
IOP_METHOD: TONOPEN
OD_IOP_MMHG: 16
OS_IOP_MMHG: 18

## 2025-09-04 ASSESSMENT — ENCOUNTER SYMPTOMS: EYES NEGATIVE: 1

## 2025-09-04 ASSESSMENT — VISUAL ACUITY
OD_SC: 20/200
METHOD: SNELLEN - LINEAR
OS_PH_SC: 20/40
OS_SC: 20/50
OS_SC+: +2
OS_PH_SC+: +2

## 2025-09-04 ASSESSMENT — EXTERNAL EXAM - RIGHT EYE: OD_EXAM: NORMAL

## 2025-09-04 ASSESSMENT — SLIT LAMP EXAM - LIDS: COMMENTS: GOOD POSITION

## 2025-09-04 ASSESSMENT — EXTERNAL EXAM - LEFT EYE: OS_EXAM: NORMAL

## 2025-09-09 ENCOUNTER — APPOINTMENT (OUTPATIENT)
Dept: PODIATRY | Facility: CLINIC | Age: 60
End: 2025-09-09
Payer: MEDICARE

## 2025-09-10 ENCOUNTER — APPOINTMENT (OUTPATIENT)
Dept: OPHTHALMOLOGY | Facility: CLINIC | Age: 60
End: 2025-09-10
Payer: MEDICARE

## 2025-09-11 ENCOUNTER — APPOINTMENT (OUTPATIENT)
Dept: OPHTHALMOLOGY | Facility: CLINIC | Age: 60
End: 2025-09-11
Payer: MEDICARE

## 2025-09-15 ENCOUNTER — APPOINTMENT (OUTPATIENT)
Dept: GASTROENTEROLOGY | Facility: EXTERNAL LOCATION | Age: 60
End: 2025-09-15
Payer: MEDICARE

## 2025-09-17 ENCOUNTER — APPOINTMENT (OUTPATIENT)
Dept: OBSTETRICS AND GYNECOLOGY | Facility: CLINIC | Age: 60
End: 2025-09-17
Payer: MEDICARE

## 2025-09-29 ENCOUNTER — APPOINTMENT (OUTPATIENT)
Dept: OPHTHALMOLOGY | Facility: CLINIC | Age: 60
End: 2025-09-29
Payer: MEDICARE

## 2025-10-08 ENCOUNTER — APPOINTMENT (OUTPATIENT)
Dept: PRIMARY CARE | Facility: CLINIC | Age: 60
End: 2025-10-08
Payer: MEDICARE

## 2025-10-13 ENCOUNTER — APPOINTMENT (OUTPATIENT)
Dept: GASTROENTEROLOGY | Facility: EXTERNAL LOCATION | Age: 60
End: 2025-10-13
Payer: MEDICARE

## 2025-11-06 ENCOUNTER — APPOINTMENT (OUTPATIENT)
Dept: PRIMARY CARE | Facility: CLINIC | Age: 60
End: 2025-11-06
Payer: MEDICARE

## 2025-11-10 ENCOUNTER — APPOINTMENT (OUTPATIENT)
Dept: OTOLARYNGOLOGY | Facility: CLINIC | Age: 60
End: 2025-11-10
Payer: MEDICARE

## 2025-12-04 ENCOUNTER — APPOINTMENT (OUTPATIENT)
Dept: CARDIOLOGY | Facility: CLINIC | Age: 60
End: 2025-12-04
Payer: MEDICARE

## 2026-06-09 ENCOUNTER — APPOINTMENT (OUTPATIENT)
Dept: CARDIOLOGY | Facility: CLINIC | Age: 61
End: 2026-06-09
Payer: MEDICARE

## (undated) DEVICE — Device

## (undated) DEVICE — CABLE, HYPERTRONICS, 25 X 34-PIN, RED, 10FT (REPROCESS)

## (undated) DEVICE — PATCHES, EXTERNAL REFERENCE, CARTO3

## (undated) DEVICE — NEEDLE, NRG TRANSSEPTAL, 98CM, CURVE C0

## (undated) DEVICE — INTRODUCER, SHEATH, FAST-CATH, 8FR X 12CM, C-LOCK

## (undated) DEVICE — CABLE, CONNECTOR, 10FT

## (undated) DEVICE — GOWN, ASTOUND, XL

## (undated) DEVICE — CANNULA, ANTERIOR CHAMBER, 27 GA

## (undated) DEVICE — KNIFE, CLEARCUT HP2, DUAL BEVEL, SLIT, 2.4MM ANGLED

## (undated) DEVICE — SHIELD, RADPAD, EP LEFT SUBCLAVIAN, 12.5 X 16.5, YELLOW LEVEL ATTENUATION

## (undated) DEVICE — NEEDLE, HYDRODISSECTION 25G 11MM BEND

## (undated) DEVICE — INTRODUCER, AGILIS, MEDIUM CURL, 8.5FR X 71CM, DUAL

## (undated) DEVICE — HANDPIECE,  IRRIGATION/ASPIRATION, 45DEG, 2.2MM-2.8MM, BLUE

## (undated) DEVICE — NEEDLE, FILTER 19 G X 1 IN

## (undated) DEVICE — CATHETER, DIAGNOSTIC, SOUNDSTAR ECO SMS, 8FR

## (undated) DEVICE — DRAPE, UTILITY, INSTRUMENT PANEL, 46CM X 56CM (18X22"

## (undated) DEVICE — GLOVE, SURGICAL, PROTEXIS PI , 8.0, PF, LF

## (undated) DEVICE — APPLICATOR, COTTON TIP, 6 IN, 2PK, STERILE

## (undated) DEVICE — CATHETER, NAVISTAR, RMT THERMOCOOL

## (undated) DEVICE — SYRINGE, 10 CC, LUER LOCK

## (undated) DEVICE — INTRODUCER, SHEATH, FAST-CATH, 7FR X 12CM, C-LOCK

## (undated) DEVICE — TUBING SET, IRRIGATION, SMARTABLATE

## (undated) DEVICE — CARDIODRIVE, QUICKCAS

## (undated) DEVICE — PAD, ELECTRODE DEFIB PADPRO ADULT STRL W/ADAPTER

## (undated) DEVICE — SYRINGE, 1ML, SLIP LUER TIP, W/O NEEDLE, NS

## (undated) DEVICE — INTRODUCER, HEMOSTASIS, STR/J .038 IN, 8.5FR 12CM